# Patient Record
Sex: FEMALE | Race: WHITE | NOT HISPANIC OR LATINO
[De-identification: names, ages, dates, MRNs, and addresses within clinical notes are randomized per-mention and may not be internally consistent; named-entity substitution may affect disease eponyms.]

---

## 2017-11-08 ENCOUNTER — APPOINTMENT (OUTPATIENT)
Dept: FAMILY MEDICINE | Facility: CLINIC | Age: 57
End: 2017-11-08
Payer: COMMERCIAL

## 2017-11-08 ENCOUNTER — NON-APPOINTMENT (OUTPATIENT)
Age: 57
End: 2017-11-08

## 2017-11-08 VITALS
BODY MASS INDEX: 28.34 KG/M2 | RESPIRATION RATE: 14 BRPM | OXYGEN SATURATION: 97 % | SYSTOLIC BLOOD PRESSURE: 134 MMHG | DIASTOLIC BLOOD PRESSURE: 68 MMHG | HEIGHT: 62 IN | TEMPERATURE: 97.6 F | HEART RATE: 59 BPM | WEIGHT: 154 LBS

## 2017-11-08 VITALS — DIASTOLIC BLOOD PRESSURE: 70 MMHG | SYSTOLIC BLOOD PRESSURE: 158 MMHG

## 2017-11-08 DIAGNOSIS — F15.90 OTHER STIMULANT USE, UNSPECIFIED, UNCOMPLICATED: ICD-10-CM

## 2017-11-08 DIAGNOSIS — Z87.891 PERSONAL HISTORY OF NICOTINE DEPENDENCE: ICD-10-CM

## 2017-11-08 DIAGNOSIS — F32.9 MAJOR DEPRESSIVE DISORDER, SINGLE EPISODE, UNSPECIFIED: ICD-10-CM

## 2017-11-08 DIAGNOSIS — Z87.09 PERSONAL HISTORY OF OTHER DISEASES OF THE RESPIRATORY SYSTEM: ICD-10-CM

## 2017-11-08 DIAGNOSIS — L10.0 PEMPHIGUS VULGARIS: ICD-10-CM

## 2017-11-08 DIAGNOSIS — Z83.3 FAMILY HISTORY OF DIABETES MELLITUS: ICD-10-CM

## 2017-11-08 DIAGNOSIS — Z83.49 FAMILY HISTORY OF OTHER ENDOCRINE, NUTRITIONAL AND METABOLIC DISEASES: ICD-10-CM

## 2017-11-08 DIAGNOSIS — Z78.9 OTHER SPECIFIED HEALTH STATUS: ICD-10-CM

## 2017-11-08 DIAGNOSIS — Z92.89 PERSONAL HISTORY OF OTHER MEDICAL TREATMENT: ICD-10-CM

## 2017-11-08 DIAGNOSIS — Z82.49 FAMILY HISTORY OF ISCHEMIC HEART DISEASE AND OTHER DISEASES OF THE CIRCULATORY SYSTEM: ICD-10-CM

## 2017-11-08 PROCEDURE — 93000 ELECTROCARDIOGRAM COMPLETE: CPT

## 2017-11-08 PROCEDURE — 99203 OFFICE O/P NEW LOW 30 MIN: CPT | Mod: 25

## 2017-11-22 ENCOUNTER — APPOINTMENT (OUTPATIENT)
Dept: FAMILY MEDICINE | Facility: CLINIC | Age: 57
End: 2017-11-22
Payer: COMMERCIAL

## 2017-11-22 VITALS
BODY MASS INDEX: 28.34 KG/M2 | HEIGHT: 62 IN | OXYGEN SATURATION: 99 % | RESPIRATION RATE: 14 BRPM | SYSTOLIC BLOOD PRESSURE: 142 MMHG | WEIGHT: 154 LBS | HEART RATE: 55 BPM | DIASTOLIC BLOOD PRESSURE: 74 MMHG

## 2017-11-22 VITALS — DIASTOLIC BLOOD PRESSURE: 76 MMHG | SYSTOLIC BLOOD PRESSURE: 146 MMHG

## 2017-11-22 DIAGNOSIS — F41.1 GENERALIZED ANXIETY DISORDER: ICD-10-CM

## 2017-11-22 DIAGNOSIS — D56.9 THALASSEMIA, UNSPECIFIED: ICD-10-CM

## 2017-11-22 PROCEDURE — 99213 OFFICE O/P EST LOW 20 MIN: CPT

## 2017-11-22 RX ORDER — ALPRAZOLAM 0.25 MG/1
0.25 TABLET ORAL
Qty: 30 | Refills: 0 | Status: ACTIVE | COMMUNITY
Start: 2017-11-22 | End: 1900-01-01

## 2018-01-26 LAB — HCV AB SER-ACNC: <0.1

## 2018-10-30 ENCOUNTER — APPOINTMENT (OUTPATIENT)
Dept: OBGYN | Facility: CLINIC | Age: 58
End: 2018-10-30

## 2018-11-12 ENCOUNTER — APPOINTMENT (OUTPATIENT)
Dept: OBGYN | Facility: CLINIC | Age: 58
End: 2018-11-12
Payer: COMMERCIAL

## 2018-11-12 VITALS
BODY MASS INDEX: 28.89 KG/M2 | SYSTOLIC BLOOD PRESSURE: 124 MMHG | HEIGHT: 62 IN | DIASTOLIC BLOOD PRESSURE: 68 MMHG | WEIGHT: 157 LBS

## 2018-11-12 DIAGNOSIS — Z00.00 ENCOUNTER FOR GENERAL ADULT MEDICAL EXAMINATION W/OUT ABNORMAL FINDINGS: ICD-10-CM

## 2018-11-12 PROCEDURE — 99396 PREV VISIT EST AGE 40-64: CPT

## 2018-11-14 LAB
C TRACH RRNA SPEC QL NAA+PROBE: NOT DETECTED
HPV HIGH+LOW RISK DNA PNL CVX: NOT DETECTED
N GONORRHOEA RRNA SPEC QL NAA+PROBE: NOT DETECTED
SOURCE TP AMPLIFICATION: NORMAL

## 2018-11-16 LAB — CYTOLOGY CVX/VAG DOC THIN PREP: NORMAL

## 2019-04-10 ENCOUNTER — OFFICE VISIT (OUTPATIENT)
Dept: URGENT CARE | Facility: CLINIC | Age: 59
End: 2019-04-10
Payer: COMMERCIAL

## 2019-04-10 VITALS
BODY MASS INDEX: 29.63 KG/M2 | HEIGHT: 62 IN | SYSTOLIC BLOOD PRESSURE: 177 MMHG | OXYGEN SATURATION: 100 % | TEMPERATURE: 97.5 F | DIASTOLIC BLOOD PRESSURE: 83 MMHG | HEART RATE: 65 BPM | RESPIRATION RATE: 18 BRPM | WEIGHT: 161 LBS

## 2019-04-10 DIAGNOSIS — L23.7 ALLERGIC CONTACT DERMATITIS DUE TO PLANTS, EXCEPT FOOD: Primary | ICD-10-CM

## 2019-04-10 PROCEDURE — 99203 OFFICE O/P NEW LOW 30 MIN: CPT | Performed by: FAMILY MEDICINE

## 2019-04-10 RX ORDER — CITALOPRAM 10 MG/1
10 TABLET ORAL DAILY
COMMUNITY
End: 2019-09-17 | Stop reason: SDUPTHER

## 2019-04-10 RX ORDER — LEVOTHYROXINE SODIUM 0.03 MG/1
25 TABLET ORAL DAILY
COMMUNITY
End: 2019-06-04 | Stop reason: SDUPTHER

## 2019-04-10 RX ORDER — PREDNISONE 20 MG/1
40 TABLET ORAL DAILY
Qty: 14 TABLET | Refills: 0 | Status: SHIPPED | OUTPATIENT
Start: 2019-04-10 | End: 2019-06-04 | Stop reason: ALTCHOICE

## 2019-06-04 ENCOUNTER — OFFICE VISIT (OUTPATIENT)
Dept: FAMILY MEDICINE CLINIC | Facility: CLINIC | Age: 59
End: 2019-06-04
Payer: COMMERCIAL

## 2019-06-04 VITALS
SYSTOLIC BLOOD PRESSURE: 140 MMHG | DIASTOLIC BLOOD PRESSURE: 86 MMHG | BODY MASS INDEX: 29.26 KG/M2 | HEIGHT: 62 IN | WEIGHT: 159 LBS | TEMPERATURE: 98.6 F | OXYGEN SATURATION: 98 % | RESPIRATION RATE: 12 BRPM | HEART RATE: 63 BPM

## 2019-06-04 DIAGNOSIS — E03.9 ACQUIRED HYPOTHYROIDISM: Primary | ICD-10-CM

## 2019-06-04 DIAGNOSIS — J45.20 MILD INTERMITTENT ASTHMA WITHOUT COMPLICATION: ICD-10-CM

## 2019-06-04 DIAGNOSIS — L10.0 PEMPHIGUS VULGARIS: ICD-10-CM

## 2019-06-04 DIAGNOSIS — F41.9 ANXIETY: ICD-10-CM

## 2019-06-04 DIAGNOSIS — E78.2 MIXED HYPERLIPIDEMIA: ICD-10-CM

## 2019-06-04 DIAGNOSIS — D56.9 MEDITERRANEAN ANEMIA: ICD-10-CM

## 2019-06-04 DIAGNOSIS — E53.8 VITAMIN B12 DEFICIENCY: ICD-10-CM

## 2019-06-04 DIAGNOSIS — E55.9 VITAMIN D DEFICIENCY: ICD-10-CM

## 2019-06-04 PROCEDURE — 36415 COLL VENOUS BLD VENIPUNCTURE: CPT | Performed by: NURSE PRACTITIONER

## 2019-06-04 PROCEDURE — 99204 OFFICE O/P NEW MOD 45 MIN: CPT | Performed by: NURSE PRACTITIONER

## 2019-06-04 RX ORDER — ALPRAZOLAM 0.25 MG/1
0.25 TABLET ORAL
COMMUNITY
End: 2019-09-17 | Stop reason: SDUPTHER

## 2019-06-04 RX ORDER — ALBUTEROL SULFATE 90 UG/1
2 AEROSOL, METERED RESPIRATORY (INHALATION) EVERY 6 HOURS PRN
Qty: 1 INHALER | Refills: 3 | Status: SHIPPED | OUTPATIENT
Start: 2019-06-04 | End: 2019-12-10 | Stop reason: SDUPTHER

## 2019-06-04 RX ORDER — LEVOTHYROXINE SODIUM 0.03 MG/1
25 TABLET ORAL DAILY
Qty: 90 TABLET | Refills: 1 | Status: SHIPPED | OUTPATIENT
Start: 2019-06-04 | End: 2019-09-17 | Stop reason: SDUPTHER

## 2019-06-04 RX ORDER — ALBUTEROL SULFATE 90 UG/1
2 AEROSOL, METERED RESPIRATORY (INHALATION) EVERY 6 HOURS PRN
COMMUNITY
End: 2019-06-04 | Stop reason: SDUPTHER

## 2019-06-05 LAB
25(OH)D3+25(OH)D2 SERPL-MCNC: 29.3 NG/ML (ref 30–100)
ALBUMIN SERPL-MCNC: 4.6 G/DL (ref 3.5–5.5)
ALBUMIN/GLOB SERPL: 1.6 {RATIO} (ref 1.2–2.2)
ALP SERPL-CCNC: 56 IU/L (ref 39–117)
ALT SERPL-CCNC: 17 IU/L (ref 0–32)
AST SERPL-CCNC: 17 IU/L (ref 0–40)
BASOPHILS # BLD AUTO: 0 X10E3/UL (ref 0–0.2)
BASOPHILS NFR BLD AUTO: 1 %
BILIRUB SERPL-MCNC: 0.3 MG/DL (ref 0–1.2)
BUN SERPL-MCNC: 16 MG/DL (ref 6–24)
BUN/CREAT SERPL: 20 (ref 9–23)
CALCIUM SERPL-MCNC: 9.8 MG/DL (ref 8.7–10.2)
CHLORIDE SERPL-SCNC: 101 MMOL/L (ref 96–106)
CHOLEST SERPL-MCNC: 207 MG/DL (ref 100–199)
CHOLEST/HDLC SERPL: 3.9 RATIO (ref 0–4.4)
CO2 SERPL-SCNC: 25 MMOL/L (ref 20–29)
CREAT SERPL-MCNC: 0.81 MG/DL (ref 0.57–1)
EOSINOPHIL # BLD AUTO: 0.5 X10E3/UL (ref 0–0.4)
EOSINOPHIL NFR BLD AUTO: 8 %
ERYTHROCYTE [DISTWIDTH] IN BLOOD BY AUTOMATED COUNT: 15.3 % (ref 12.3–15.4)
FERRITIN SERPL-MCNC: 386 NG/ML (ref 15–150)
GLOBULIN SER-MCNC: 2.8 G/DL (ref 1.5–4.5)
GLUCOSE SERPL-MCNC: 82 MG/DL (ref 65–99)
HCT VFR BLD AUTO: 40.4 % (ref 34–46.6)
HDLC SERPL-MCNC: 53 MG/DL
HGB BLD-MCNC: 13 G/DL (ref 11.1–15.9)
IMM GRANULOCYTES # BLD: 0 X10E3/UL (ref 0–0.1)
IMM GRANULOCYTES NFR BLD: 0 %
IRON SERPL-MCNC: 42 UG/DL (ref 27–159)
LDLC SERPL CALC-MCNC: 120 MG/DL (ref 0–99)
LYMPHOCYTES # BLD AUTO: 2.4 X10E3/UL (ref 0.7–3.1)
LYMPHOCYTES NFR BLD AUTO: 37 %
MCH RBC QN AUTO: 24.7 PG (ref 26.6–33)
MCHC RBC AUTO-ENTMCNC: 32.2 G/DL (ref 31.5–35.7)
MCV RBC AUTO: 77 FL (ref 79–97)
MONOCYTES # BLD AUTO: 0.4 X10E3/UL (ref 0.1–0.9)
MONOCYTES NFR BLD AUTO: 7 %
NEUTROPHILS # BLD AUTO: 3.1 X10E3/UL (ref 1.4–7)
NEUTROPHILS NFR BLD AUTO: 47 %
PLATELET # BLD AUTO: 207 X10E3/UL (ref 150–450)
POTASSIUM SERPL-SCNC: 4 MMOL/L (ref 3.5–5.2)
PROT SERPL-MCNC: 7.4 G/DL (ref 6–8.5)
RBC # BLD AUTO: 5.27 X10E6/UL (ref 3.77–5.28)
SL AMB EGFR AFRICAN AMERICAN: 93 ML/MIN/1.73
SL AMB EGFR NON AFRICAN AMERICAN: 80 ML/MIN/1.73
SL AMB VLDL CHOLESTEROL CALC: 34 MG/DL (ref 5–40)
SODIUM SERPL-SCNC: 141 MMOL/L (ref 134–144)
TRIGL SERPL-MCNC: 170 MG/DL (ref 0–149)
TSH SERPL DL<=0.005 MIU/L-ACNC: 1.94 UIU/ML (ref 0.45–4.5)
VIT B12 SERPL-MCNC: 760 PG/ML (ref 232–1245)
WBC # BLD AUTO: 6.5 X10E3/UL (ref 3.4–10.8)

## 2019-06-11 ENCOUNTER — OFFICE VISIT (OUTPATIENT)
Dept: FAMILY MEDICINE CLINIC | Facility: CLINIC | Age: 59
End: 2019-06-11
Payer: COMMERCIAL

## 2019-06-11 VITALS
DIASTOLIC BLOOD PRESSURE: 78 MMHG | HEIGHT: 62 IN | BODY MASS INDEX: 29.81 KG/M2 | RESPIRATION RATE: 12 BRPM | HEART RATE: 72 BPM | WEIGHT: 162 LBS | SYSTOLIC BLOOD PRESSURE: 134 MMHG | TEMPERATURE: 98.5 F | OXYGEN SATURATION: 99 %

## 2019-06-11 DIAGNOSIS — D56.9 MEDITERRANEAN ANEMIA: ICD-10-CM

## 2019-06-11 DIAGNOSIS — E55.9 VITAMIN D DEFICIENCY: Primary | ICD-10-CM

## 2019-06-11 DIAGNOSIS — E03.9 ACQUIRED HYPOTHYROIDISM: ICD-10-CM

## 2019-06-11 DIAGNOSIS — E78.1 HYPERTRIGLYCERIDEMIA: ICD-10-CM

## 2019-06-11 DIAGNOSIS — E78.2 MIXED HYPERLIPIDEMIA: ICD-10-CM

## 2019-06-11 DIAGNOSIS — F41.9 ANXIETY: ICD-10-CM

## 2019-06-11 DIAGNOSIS — E01.0 THYROMEGALY: ICD-10-CM

## 2019-06-11 DIAGNOSIS — E53.8 VITAMIN B12 DEFICIENCY: ICD-10-CM

## 2019-06-11 PROBLEM — L10.0 PEMPHIGUS VULGARIS: Status: ACTIVE | Noted: 2019-06-11

## 2019-06-11 PROCEDURE — 1036F TOBACCO NON-USER: CPT | Performed by: NURSE PRACTITIONER

## 2019-06-11 PROCEDURE — 99214 OFFICE O/P EST MOD 30 MIN: CPT | Performed by: NURSE PRACTITIONER

## 2019-06-11 PROCEDURE — 3008F BODY MASS INDEX DOCD: CPT | Performed by: NURSE PRACTITIONER

## 2019-06-11 RX ORDER — ERGOCALCIFEROL 1.25 MG/1
50000 CAPSULE ORAL WEEKLY
Qty: 4 CAPSULE | Refills: 3 | Status: SHIPPED | OUTPATIENT
Start: 2019-06-11 | End: 2019-09-17

## 2019-07-02 ENCOUNTER — TELEPHONE (OUTPATIENT)
Dept: FAMILY MEDICINE CLINIC | Facility: CLINIC | Age: 59
End: 2019-07-02

## 2019-07-02 NOTE — TELEPHONE ENCOUNTER
US Thyroid is in clinical review  Reference number [id number]  Clinical review fax # [phone]  OV, blood work faxed

## 2019-07-05 ENCOUNTER — TELEPHONE (OUTPATIENT)
Dept: FAMILY MEDICINE CLINIC | Facility: CLINIC | Age: 59
End: 2019-07-05

## 2019-07-05 NOTE — TELEPHONE ENCOUNTER
Brady Mota:    Patient needs an order for an orthopedic for her knee  Please advise when this is ready

## 2019-07-08 ENCOUNTER — TELEPHONE (OUTPATIENT)
Dept: FAMILY MEDICINE CLINIC | Facility: CLINIC | Age: 59
End: 2019-07-08

## 2019-07-08 DIAGNOSIS — J30.9 ALLERGIC RHINITIS, UNSPECIFIED SEASONALITY, UNSPECIFIED TRIGGER: Primary | ICD-10-CM

## 2019-07-08 RX ORDER — FLUTICASONE PROPIONATE 50 MCG
2 SPRAY, SUSPENSION (ML) NASAL DAILY
Qty: 16 G | Refills: 3 | Status: SHIPPED | OUTPATIENT
Start: 2019-07-08 | End: 2019-12-10 | Stop reason: SDUPTHER

## 2019-07-08 NOTE — TELEPHONE ENCOUNTER
Pt has been using Generic Flonase 2 sprays into each nostril previous Doctor and is wondering if she can get this refilled

## 2019-07-08 NOTE — TELEPHONE ENCOUNTER
Pt is seeing Dr Cady Cardenas on 7/12/19 For pain in both knees, specifically the right one  She would get shots in her knees for previous orthopedic doctor    Referral is needed and insurance referral will be started

## 2019-07-10 ENCOUNTER — CONSULT (OUTPATIENT)
Dept: HEMATOLOGY ONCOLOGY | Facility: MEDICAL CENTER | Age: 59
End: 2019-07-10
Payer: COMMERCIAL

## 2019-07-10 VITALS
DIASTOLIC BLOOD PRESSURE: 70 MMHG | RESPIRATION RATE: 18 BRPM | HEIGHT: 62 IN | TEMPERATURE: 98 F | BODY MASS INDEX: 29.44 KG/M2 | OXYGEN SATURATION: 98 % | HEART RATE: 69 BPM | WEIGHT: 160 LBS | SYSTOLIC BLOOD PRESSURE: 130 MMHG

## 2019-07-10 DIAGNOSIS — D56.9 MEDITERRANEAN ANEMIA: ICD-10-CM

## 2019-07-10 DIAGNOSIS — L10.0 PEMPHIGUS VULGARIS: ICD-10-CM

## 2019-07-10 PROCEDURE — 99204 OFFICE O/P NEW MOD 45 MIN: CPT | Performed by: INTERNAL MEDICINE

## 2019-07-10 NOTE — PROGRESS NOTES
Leeann Sevilla  1960  [facility] [place] [facility] [address] [address]  HEMATOLOGY/ONCOLOGY CONSULTATION REPORT    DISCUSSION/SUMMARY:    07/19/2019 5:00 p m  Addendum  Information from [facility] became available recently  The information will be scanned into the chart  Patient has an appointment in 3 months  Before that, patient will have a CBC, CMP, iron studies, hemoglobinopathy panel and desmoglein 1 and 3 antibodies (for the pemphigus vulgaris) drawn at approximately 2 5 months  23-year-old female with a history of pemphigus vulgaris treated with Rituxan (rituximab) (another institution)  Presently Mrs Ana Rodriguez feels well and clinically there are no troubling dermatologic/immunologic signs  We discussed options  The information on the pemphigus is not presently available  This office will contact the patient's prior hematologist (Dr Gail Ocampo, [facility] [address]) to get the specific information on the pemphigus and the treatments  Pemphigus vulgaris is an autoimmune disorder of the skin involving blistering and sores not only of the skin but also of mucous membranes  It is more commonly followed by either dermatology or rheumatology - at this time is not clear why patient was being followed by Hematology (thus why the information from Dr Cindi Sutherland is so crucial)  For noow, Mrs Ana Rodriguez is to return in 3 months; this may change depending upon the eventual obtained information  There is a question of whether or not patient has a thalassemia trait  The hemoglobin level is actually pretty good; MCV is slightly decreased with a normal RDW  A hemoglobinopathy panel can be eventually drawn - not an emergent problem  See potential causes for an elevated ferritin level below      The ferritin level is not terribly elevated, patient does not have any signs or symptoms associated with the elevated ferritin level  At this time, I would just monitor the trend  Ferritin is the cellular storage protein for iron  It is a huge 440 kill adult in 247 unit protein consisting of light and heavy chains and can store up to 4500 Zambrano of iron  Ferritin is an acute phase reactant and along with transferrin and its receptor, coordinate cellular defense against oxidative stress and inflammation  Ferritin is increased in acute and chronic liver disease, alcoholism (will decline with abstinence), malignancies such as leukemia and Hodgkin's disease, infections and chronic inflammation such as arthritis, hypothyroidism and iron overload  Ferritin is elevated in acute MI and Gaucher disease  It is also seen in hemolytic anemias, megaloblastic anemia is, sideroblastic anemias and thalassemia  It can also be seen in patients with porphyria cutanea tarda and in patients with end-stage renal disease  Patient knows to call the hematology/oncology office if there are any other questions or concerns  Carefully review your medication list and verify that the list is accurate and up-to-date  Please call the hematology/oncology office if there are medications missing from the list, medications on the list that you are not currently taking or if there is a dosage or instruction that is different from how you're taking that medication  Patient goals and areas of care:  Obtain information from prior hematologist  Barriers to care:  None  Patient is able to self-care   ______________________________________________________________________________________    Chief Complaint   Patient presents with    Consult     Elevated ferritin, history of pemphigus vulgaris     History of Present Illness:  67-year-old female previously diagnosed with pemphigus vulgaris treated with Rituxan (rituximab) in need of a new hematologist   Recently patient was found to have an elevated ferritin level      Presently Mrs Andra Finn states feeling OK, baseline; fatigue is baseline  Mrs Naif Sanders is under stress presently; patient is looking for new job  No fevers, chills or sweats  No pain control issues  Appetite is good, weight is stable  Activities are baseline  No shortness of breath or dyspnea on exertion  No chest pain or pressure  No recent skin issues, blistering or bleeding  Routine health maintenance and medical care is up-to-date  Review of Systems   Constitutional: Positive for fatigue  HENT: Negative  Eyes: Negative  Respiratory: Negative  Cardiovascular: Negative  Gastrointestinal: Negative  Endocrine: Negative  Genitourinary: Negative  Musculoskeletal: Negative  Skin: Negative  Allergic/Immunologic: Negative  Neurological: Negative  Hematological: Negative  Psychiatric/Behavioral: Negative  All other systems reviewed and are negative  Patient Active Problem List   Diagnosis    Mild intermittent asthma without complication    Acquired hypothyroidism    Vitamin D deficiency    Anxiety    Vitamin B12 deficiency    Mediterranean anemia    Mixed hyperlipidemia    Hypertriglyceridemia    Pemphigus vulgaris     Past Medical History:   Diagnosis Date    Arthritis     Asthma     Pemphigus vulgaris     Thalassemia      Past surgical history:  No prior blood transfusions    Ob gyn:  No breast pathology, mammograms up-to-date, no postmenopausal bleeding    History reviewed  No pertinent surgical history    Family History   Problem Relation Age of Onset    Asthma Father     Hypertension Maternal Grandmother     Liver cancer Maternal Grandmother     Cancer Family         multiple relatives    Seizures Sister     Asthma Brother     Hypertension Brother     Leukemia Maternal Uncle     Substance Abuse Neg Hx     Mental illness Neg Hx     Family history:  2 children alive and well, no known familial or genetic diseases although various family members have had different forms of 2 cancers, other family members have had issues with asthma, diabetes and hypertension, patient does not know the specifics on the thalassemia history    Social History     Socioeconomic History    Marital status: Single     Spouse name: Not on file    Number of children: Not on file    Years of education: Not on file    Highest education level: Not on file   Occupational History    Not on file   Social Needs    Financial resource strain: Not on file    Food insecurity:     Worry: Not on file     Inability: Not on file    Transportation needs:     Medical: Not on file     Non-medical: Not on file   Tobacco Use    Smoking status: Former Smoker     Last attempt to quit:      Years since quittin 5    Smokeless tobacco: Never Used   Substance and Sexual Activity    Alcohol use: Yes     Frequency: 2-4 times a month     Comment: socially     Drug use: Never    Sexual activity: Not on file   Lifestyle    Physical activity:     Days per week: Not on file     Minutes per session: Not on file    Stress: Not on file   Relationships    Social connections:     Talks on phone: Not on file     Gets together: Not on file     Attends Zoroastrian service: Not on file     Active member of club or organization: Not on file     Attends meetings of clubs or organizations: Not on file     Relationship status: Not on file    Intimate partner violence:     Fear of current or ex partner: Not on file     Emotionally abused: Not on file     Physically abused: Not on file     Forced sexual activity: Not on file   Other Topics Concern    Not on file   Social History Narrative    Not on file    Social history:  No tobacco, alcohol or drug abuse, no toxic exposure, 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    Current Outpatient Medications:     albuterol (PROVENTIL HFA,VENTOLIN HFA) 90 mcg/act inhaler, Inhale 2 puffs every 6 (six) hours as needed for wheezing, Disp: 1 Inhaler, Rfl: 3    ALPRAZolam (XANAX) 0 25 mg tablet, Take 0 25 mg by mouth, Disp: , Rfl:     citalopram (CeleXA) 10 mg tablet, Take 10 mg by mouth daily, Disp: , Rfl:     ergocalciferol (VITAMIN D2) 50,000 units, Take 1 capsule (50,000 Units total) by mouth once a week, Disp: 4 capsule, Rfl: 3    fluticasone (FLONASE) 50 mcg/act nasal spray, 2 sprays into each nostril daily, Disp: 16 g, Rfl: 3    levothyroxine 25 mcg tablet, Take 1 tablet (25 mcg total) by mouth daily, Disp: 90 tablet, Rfl: 1    Allergies   Allergen Reactions    Iodine     Levaquin [Levofloxacin]     Shellfish-Derived Products        Vitals:    07/10/19 1450   BP: 130/70   Pulse: 69   Resp: 18   Temp: 98 °F (36 7 °C)   SpO2: 98%     Physical Exam   Constitutional: She is oriented to person, place, and time  She appears well-developed and well-nourished  Well-nourished female, no respiratory distress   HENT:   Head: Normocephalic and atraumatic  Right Ear: External ear normal    Left Ear: External ear normal    Mouth/Throat: Oropharynx is clear and moist    Mucosa moist and pink, no exudate, teeth good condition, no blisters or ulcers   Eyes: Pupils are equal, round, and reactive to light  Conjunctivae and EOM are normal    Neck: Normal range of motion  Neck supple  Supple, no JVD   Cardiovascular: Normal rate, regular rhythm, normal heart sounds and intact distal pulses  Pulmonary/Chest: Effort normal and breath sounds normal    Good air entry bilaterally, clear   Abdominal: Soft  Bowel sounds are normal    Soft, nontender, +bowel sounds, no hepatosplenomegaly, no guarding, no rigidity or rebound   Musculoskeletal: Normal range of motion  Good range of motion in all 4 extremities, no pain or tenderness with palpation of joints, muscles or bones, some tenderness with pressure on right posterior lateral ribcage   Neurological: She is alert and oriented to person, place, and time  She has normal reflexes  Skin: Skin is warm     Good color, warm, moist, no petechiae or ecchymoses   Psychiatric: She has a normal mood and affect   Her behavior is normal  Judgment and thought content normal    Extremities:  No lower extremity edema, no cords, pulses are 1+  Lymphatics:  No adenopathy in the neck, supraclavicular region, axilla and groin bilaterally    Labs    06/04/2019 WBC = 6 5 hemoglobin = 13 0 hematocrit = 40 4 MCV = 77 RDW = 15 3 platelet = 963 neutrophil = 47% lymphocytes = 37% monocyte = 7% eosinophil = 8% BUN = 16 creatinine = 0 81 calcium = 9 8 total protein = 7 4 total bilirubin = 0 3 AST = 17 ALT = 17 TSH = 1 940 vitamin B12 = 760 iron = 42 ferritin = 386 = elevated ( ng/mL)

## 2019-07-11 ENCOUNTER — OFFICE VISIT (OUTPATIENT)
Dept: OBGYN CLINIC | Facility: CLINIC | Age: 59
End: 2019-07-11
Payer: COMMERCIAL

## 2019-07-11 ENCOUNTER — APPOINTMENT (OUTPATIENT)
Dept: RADIOLOGY | Facility: CLINIC | Age: 59
End: 2019-07-11
Payer: COMMERCIAL

## 2019-07-11 ENCOUNTER — HOSPITAL ENCOUNTER (OUTPATIENT)
Dept: RADIOLOGY | Facility: HOSPITAL | Age: 59
Discharge: HOME | End: 2019-07-11
Payer: COMMERCIAL

## 2019-07-11 VITALS
SYSTOLIC BLOOD PRESSURE: 152 MMHG | BODY MASS INDEX: 29.33 KG/M2 | DIASTOLIC BLOOD PRESSURE: 67 MMHG | WEIGHT: 159.4 LBS | HEIGHT: 62 IN | HEART RATE: 59 BPM

## 2019-07-11 DIAGNOSIS — M25.562 PAIN IN BOTH KNEES, UNSPECIFIED CHRONICITY: Primary | ICD-10-CM

## 2019-07-11 DIAGNOSIS — M25.562 PAIN IN BOTH KNEES, UNSPECIFIED CHRONICITY: ICD-10-CM

## 2019-07-11 DIAGNOSIS — E01.0 THYROMEGALY: ICD-10-CM

## 2019-07-11 DIAGNOSIS — M17.0 PRIMARY OSTEOARTHRITIS OF KNEES, BILATERAL: ICD-10-CM

## 2019-07-11 DIAGNOSIS — E03.9 ACQUIRED HYPOTHYROIDISM: ICD-10-CM

## 2019-07-11 DIAGNOSIS — M25.561 PAIN IN BOTH KNEES, UNSPECIFIED CHRONICITY: Primary | ICD-10-CM

## 2019-07-11 DIAGNOSIS — M25.561 PAIN IN BOTH KNEES, UNSPECIFIED CHRONICITY: ICD-10-CM

## 2019-07-11 PROCEDURE — 73562 X-RAY EXAM OF KNEE 3: CPT

## 2019-07-11 PROCEDURE — 76536 US EXAM OF HEAD AND NECK: CPT

## 2019-07-11 PROCEDURE — 99203 OFFICE O/P NEW LOW 30 MIN: CPT | Performed by: ORTHOPAEDIC SURGERY

## 2019-07-11 NOTE — PROGRESS NOTES
Assessment/Plan:  1  Pain in both knees, unspecified chronicity  XR knee 3 vw right non injury    Ambulatory referral to Physical Therapy    Injection procedure prior authorization   2  Primary osteoarthritis of knees, bilateral  Ambulatory referral to Physical Therapy    Injection procedure prior authorization     Patient is a very pleasant 69-year-old female that is here to see me for treatment of her bilateral anterior knee pain  After thorough history, clinical exam, review of her x-rays she does have bilateral knee osteoarthritis that is mild  Most of her symptoms are coming from her bilateral patellofemoral joint  She did get significant relief with Euflexxa in the past   She has gotten over a year's worth of relief from her last Euflexxa injection series and I feel that this is an appropriate course of treatment again here today  I also gave her a physical therapy referral to address her bilateral knee patellofemoral joint  I have placed a referral for bilateral knee Euflexxa injection series  We will call her once approved and have her appointment scheduled  Subjective:  Bilateral knee pain    Patient ID: Carole Age is a 63 y o  female  HPI  Patient is here for evaluation of her bilateral knee pain  The patient is a 69-year-old female that presents today with bilateral anterior knee pain  The right is worse than the left  Pain in the left is 5/10 in the pain and right is 10/10 at times  Pain is localized over the anterior medial aspect of her knee  Pain is throbbing in nature  The pain worsens with activity, prolonged periods of standing, walking, activity, she states that there is intermittent swelling  The pain could also be swelling in the posterior aspect of the knee  She states she has significant discomfort going up and down steps, getting out of a low seated chair off the toilet  She has trouble stooping down or squatting  She denies mechanical symptoms    Steroid use has become difficult for her and going up and down the steps she has using hand rail  She has not attempted anything but an occasional anti-inflammatory and Tylenol  She had undergone Euflexxa injection series approximately a year ago with another orthopedic surgeon and got significant relief from that in her bilateral knees  She got approximately 1 year worth of relief  She is here to establish care with me as she recently moved to the area and would like to seek treatment again  Review of Systems   Constitutional: Positive for activity change  HENT: Negative  Eyes: Negative  Respiratory: Negative  Cardiovascular: Negative  Gastrointestinal: Negative  Endocrine: Negative  Musculoskeletal: Positive for arthralgias  Skin: Negative  Neurological: Negative  Psychiatric/Behavioral: Negative  Past Medical History:   Diagnosis Date    Arthritis     Asthma     Pemphigus vulgaris     Thalassemia        History reviewed  No pertinent surgical history      Family History   Problem Relation Age of Onset    Asthma Father     Hypertension Maternal Grandmother     Liver cancer Maternal Grandmother     Cancer Family         multiple relatives    Seizures Sister     Asthma Brother     Hypertension Brother     Leukemia Maternal Uncle     Substance Abuse Neg Hx     Mental illness Neg Hx        Social History     Occupational History    Not on file   Tobacco Use    Smoking status: Former Smoker     Last attempt to quit:      Years since quittin 5    Smokeless tobacco: Never Used   Substance and Sexual Activity    Alcohol use: Yes     Frequency: 2-4 times a month     Comment: socially     Drug use: Never    Sexual activity: Not on file         Current Outpatient Medications:     albuterol (PROVENTIL HFA,VENTOLIN HFA) 90 mcg/act inhaler, Inhale 2 puffs every 6 (six) hours as needed for wheezing, Disp: 1 Inhaler, Rfl: 3    ALPRAZolam (XANAX) 0 25 mg tablet, Take 0 25 mg by mouth, Disp: , Rfl:     citalopram (CeleXA) 10 mg tablet, Take 10 mg by mouth daily, Disp: , Rfl:     ergocalciferol (VITAMIN D2) 50,000 units, Take 1 capsule (50,000 Units total) by mouth once a week, Disp: 4 capsule, Rfl: 3    fluticasone (FLONASE) 50 mcg/act nasal spray, 2 sprays into each nostril daily, Disp: 16 g, Rfl: 3    levothyroxine 25 mcg tablet, Take 1 tablet (25 mcg total) by mouth daily, Disp: 90 tablet, Rfl: 1    Allergies   Allergen Reactions    Iodine     Levaquin [Levofloxacin]     Shellfish-Derived Products        Objective:  Vitals:    07/11/19 1401   BP: 152/67   Pulse: 59       Body mass index is 29 15 kg/m²  Right Knee Exam     Tenderness   The patient is experiencing tenderness in the patella  Range of Motion   Extension: 0   Flexion: 130     Tests   Davida:  Medial - negative Lateral - negative  Varus: negative Valgus: negative  Lachman:  Anterior - negative      Drawer:  Anterior - negative    Posterior - negative  Patellar apprehension: negative    Other   Erythema: absent  Scars: absent  Sensation: normal  Pulse: present  Swelling: none  Effusion: no effusion present    Comments:  Positive patellofemoral grind  Knee is stable ligamentous exam  No erythema effusion or warmth      Left Knee Exam     Tenderness   The patient is experiencing tenderness in the patella  Range of Motion   Extension: 0   Flexion: 130     Tests   Davida:  Medial - negative Lateral - negative  Varus: negative Valgus: negative  Lachman:  Anterior - negative      Drawer:  Anterior - negative     Posterior - negative  Patellar apprehension: negative    Other   Erythema: absent  Sensation: normal  Pulse: present  Swelling: none  Effusion: no effusion present    Comments:  Negative patellofemoral grind  Knee is stable ligamentous exam  No erythema effusion or warmth          Observations   Left Knee   Negative for effusion  Right Knee   Negative for effusion         Physical Exam Constitutional: She is oriented to person, place, and time  She appears well-developed  HENT:   Head: Atraumatic  Eyes: EOM are normal    Neck: Neck supple  Cardiovascular: Normal rate  Pulmonary/Chest: Effort normal    Musculoskeletal:        Right knee: She exhibits no effusion  Left knee: She exhibits no effusion  See orthopedic exam   Neurological: She is alert and oriented to person, place, and time  Skin: Skin is warm and dry  Psychiatric: She has a normal mood and affect  Nursing note and vitals reviewed  I have personally reviewed pertinent films in PACS  X-rays of the bilateral knees obtained here in the office today demonstrate mild osteoarthritis of the bilateral knees with medial compartment joint space narrowing and sclerosis  There is still approximately a greater than 50% of the joint space still remaining  No significant osteophyte formation  Meredith WATSON    Division of Adult Reconstruction  Department of Orthopaedic Surgery  [facility]

## 2019-07-19 DIAGNOSIS — L10.0 PEMPHIGUS VULGARIS: Primary | ICD-10-CM

## 2019-07-19 DIAGNOSIS — D56.9 MEDITERRANEAN ANEMIA: ICD-10-CM

## 2019-08-01 ENCOUNTER — TELEPHONE (OUTPATIENT)
Dept: OBGYN CLINIC | Facility: HOSPITAL | Age: 59
End: 2019-08-01

## 2019-08-01 NOTE — TELEPHONE ENCOUNTER
TO BE COMPLETED BY CENTRAL AUTH TEAM:     Physician: DR Hi Bryant    Medication: Lucy Caro    Number of Injections in Series (Appointments scheduled 1 week apart from one another): 3    Schedule after this date: 8/9/19    Billing Info: Buy and Bill/Specialty Pharmacy-Patient Supply>> BUY & BILL    Appointment Message Line:  (please copy and paste appointment message line when scheduling appointment) BILATERAL EUFLEXXA #1,#2,#3/BUY & BILL    Additional Comments:

## 2019-08-06 NOTE — TELEPHONE ENCOUNTER
TO BE COMPLETED BY :     Office location appointment scheduled: [facility]    Date of First Appointment scheduled:  08/14    Additional Comments:

## 2019-08-14 ENCOUNTER — OFFICE VISIT (OUTPATIENT)
Dept: OBGYN CLINIC | Facility: CLINIC | Age: 59
End: 2019-08-14
Payer: COMMERCIAL

## 2019-08-14 ENCOUNTER — TELEPHONE (OUTPATIENT)
Dept: OTHER | Facility: OTHER | Age: 59
End: 2019-08-14

## 2019-08-14 VITALS — WEIGHT: 160 LBS | BODY MASS INDEX: 29.44 KG/M2 | HEIGHT: 62 IN

## 2019-08-14 DIAGNOSIS — M25.561 CHRONIC PAIN OF BOTH KNEES: Primary | ICD-10-CM

## 2019-08-14 DIAGNOSIS — M25.562 CHRONIC PAIN OF BOTH KNEES: Primary | ICD-10-CM

## 2019-08-14 DIAGNOSIS — M17.0 BILATERAL PRIMARY OSTEOARTHRITIS OF KNEE: ICD-10-CM

## 2019-08-14 DIAGNOSIS — G89.29 CHRONIC PAIN OF BOTH KNEES: Primary | ICD-10-CM

## 2019-08-14 PROCEDURE — 20610 DRAIN/INJ JOINT/BURSA W/O US: CPT | Performed by: ORTHOPAEDIC SURGERY

## 2019-08-14 RX ORDER — HYALURONATE SODIUM 10 MG/ML
20 SYRINGE (ML) INTRAARTICULAR
Status: COMPLETED | OUTPATIENT
Start: 2019-08-14 | End: 2019-08-14

## 2019-08-14 RX ADMIN — Medication 20 MG: at 18:00

## 2019-08-14 NOTE — PROGRESS NOTES
Assessment/Plan:  1  Chronic pain of both knees     2  Bilateral primary osteoarthritis of knee       Patient is here for her 1st bilateral knee Euflexxa injection  She tolerated these injections well today  Post-injection instructions were provided  I will see her back next week for her next injection in the series  Large joint arthrocentesis: bilateral knee  Date/Time: 8/14/2019 6:00 PM  Consent given by: patient  Site marked: site marked  Timeout: Immediately prior to procedure a time out was called to verify the correct patient, procedure, equipment, support staff and site/side marked as required   Supporting Documentation  Indications: pain   Procedure Details  Location: knee - bilateral knee  Preparation: Patient was prepped and draped in the usual sterile fashion  Needle size: 20 G  Ultrasound guidance: no  Approach: anterolateral    Medications (Right): 20 mg Sodium Hyaluronate 20 MG/2MLMedications (Left): 20 mg Sodium Hyaluronate 20 MG/2ML   Patient tolerance: patient tolerated the procedure well with no immediate complications  Dressing:  Sterile dressing applied        Subjective: bilateral knee Euflexxa injection #1    Patient ID: Mariel Joshua is a 63 y o  female  HPI  Patient is here for her 1st Euflexxa injection in a series of 3 for her bilateral knee  Review of Systems   Constitutional: Positive for activity change  HENT: Negative  Eyes: Negative  Respiratory: Negative  Cardiovascular: Negative  Gastrointestinal: Negative  Endocrine: Negative  Musculoskeletal: Positive for arthralgias  Skin: Negative  Neurological: Negative  Psychiatric/Behavioral: Negative  Past Medical History:   Diagnosis Date    Arthritis     Asthma     Pemphigus vulgaris     Thalassemia        History reviewed  No pertinent surgical history      Family History   Problem Relation Age of Onset    Asthma Father     Hypertension Maternal Grandmother     Liver cancer Maternal David [facility] Family         multiple relatives    Seizures Sister     Asthma Brother     Hypertension Brother     Leukemia Maternal Uncle     Substance Abuse Neg Hx     Mental illness Neg Hx        Social History     Occupational History    Not on file   Tobacco Use    Smoking status: Former Smoker     Last attempt to quit: 2000     Years since quittin 6    Smokeless tobacco: Never Used   Substance and Sexual Activity    Alcohol use: Yes     Frequency: 2-4 times a month     Comment: socially     Drug use: Never    Sexual activity: Not on file         Current Outpatient Medications:     albuterol (PROVENTIL HFA,VENTOLIN HFA) 90 mcg/act inhaler, Inhale 2 puffs every 6 (six) hours as needed for wheezing, Disp: 1 Inhaler, Rfl: 3    ALPRAZolam (XANAX) 0 25 mg tablet, Take 0 25 mg by mouth, Disp: , Rfl:     citalopram (CeleXA) 10 mg tablet, Take 10 mg by mouth daily, Disp: , Rfl:     ergocalciferol (VITAMIN D2) 50,000 units, Take 1 capsule (50,000 Units total) by mouth once a week, Disp: 4 capsule, Rfl: 3    fluticasone (FLONASE) 50 mcg/act nasal spray, 2 sprays into each nostril daily, Disp: 16 g, Rfl: 3    levothyroxine 25 mcg tablet, Take 1 tablet (25 mcg total) by mouth daily, Disp: 90 tablet, Rfl: 1    Allergies   Allergen Reactions    Iodine     Levaquin [Levofloxacin]     Shellfish-Derived Products        Objective: There were no vitals filed for this visit  Body mass index is 29 26 kg/m²  Right Knee Exam     Tenderness   The patient is experiencing tenderness in the patella      Range of Motion   Extension: 0   Flexion: 130     Tests   Davida:  Medial - negative Lateral - negative  Varus: negative Valgus: negative  Lachman:  Anterior - negative      Drawer:  Anterior - negative    Posterior - negative  Patellar apprehension: negative    Other   Erythema: absent  Scars: absent  Sensation: normal  Pulse: present  Swelling: none  Effusion: no effusion present    Comments: Positive patellofemoral grind  Knee is stable ligamentous exam  No erythema effusion or warmth      Left Knee Exam     Tenderness   The patient is experiencing tenderness in the patella  Range of Motion   Extension: 0   Flexion: 130     Tests   Davida:  Medial - negative Lateral - negative  Varus: negative Valgus: negative  Lachman:  Anterior - negative      Drawer:  Anterior - negative     Posterior - negative  Patellar apprehension: negative    Other   Erythema: absent  Sensation: normal  Pulse: present  Swelling: none  Effusion: no effusion present    Comments:  Negative patellofemoral grind  Knee is stable ligamentous exam  No erythema effusion or warmth          Observations   Left Knee   Negative for effusion  Right Knee   Negative for effusion  Physical Exam   Musculoskeletal:        Right knee: She exhibits no effusion  Left knee: She exhibits no effusion  Nursing note and vitals reviewed  Tori WATSON    Division of Adult Reconstruction  Department of Orthopaedic Surgery  [facility]

## 2019-08-21 ENCOUNTER — OFFICE VISIT (OUTPATIENT)
Dept: OBGYN CLINIC | Facility: CLINIC | Age: 59
End: 2019-08-21
Payer: COMMERCIAL

## 2019-08-21 VITALS — HEIGHT: 62 IN | BODY MASS INDEX: 29.26 KG/M2 | WEIGHT: 159 LBS

## 2019-08-21 DIAGNOSIS — M25.561 CHRONIC PAIN OF BOTH KNEES: ICD-10-CM

## 2019-08-21 DIAGNOSIS — G89.29 CHRONIC PAIN OF BOTH KNEES: ICD-10-CM

## 2019-08-21 DIAGNOSIS — M17.0 BILATERAL PRIMARY OSTEOARTHRITIS OF KNEE: Primary | ICD-10-CM

## 2019-08-21 DIAGNOSIS — M25.562 CHRONIC PAIN OF BOTH KNEES: ICD-10-CM

## 2019-08-21 PROCEDURE — 20610 DRAIN/INJ JOINT/BURSA W/O US: CPT | Performed by: PHYSICIAN ASSISTANT

## 2019-08-21 RX ORDER — HYALURONATE SODIUM 10 MG/ML
20 SYRINGE (ML) INTRAARTICULAR
Status: COMPLETED | OUTPATIENT
Start: 2019-08-21 | End: 2019-08-21

## 2019-08-21 RX ADMIN — Medication 20 MG: at 14:57

## 2019-08-21 NOTE — PROGRESS NOTES
Assessment/Plan:  1  Bilateral primary osteoarthritis of knee  Large joint arthrocentesis   2  Chronic pain of both knees  Large joint arthrocentesis     Patient is here for her 2nd bilateral knee Euflexxa injection  She tolerated these injections well today  Post-injection instructions were provided  I will see her back next week to complete the series  All questions addressed  Large joint arthrocentesis: bilateral knee  Date/Time: 8/21/2019 2:57 PM  Consent given by: patient  Site marked: site marked  Timeout: Immediately prior to procedure a time out was called to verify the correct patient, procedure, equipment, support staff and site/side marked as required   Supporting Documentation  Indications: pain   Procedure Details  Location: knee - bilateral knee  Preparation: Patient was prepped and draped in the usual sterile fashion  Needle size: 20 G  Ultrasound guidance: no  Approach: anterolateral    Medications (Right): 20 mg Sodium Hyaluronate 20 MG/2MLMedications (Left): 20 mg Sodium Hyaluronate 20 MG/2ML   Patient tolerance: patient tolerated the procedure well with no immediate complications  Dressing:  Sterile dressing applied        Subjective: bilateral knee Euflexxa injection #2     Patient ID: Atul Miller is a 63 y o  female  HPI  Patient is here for her 2nd Euflexxa injections in a series of 3 for her bilateral knee  She did see benefit from the first injections and denies adverse effects  Review of Systems   Constitutional: Positive for activity change  HENT: Negative  Eyes: Negative  Respiratory: Negative  Cardiovascular: Negative  Gastrointestinal: Negative  Endocrine: Negative  Musculoskeletal: Positive for arthralgias  Skin: Negative  Neurological: Negative  Psychiatric/Behavioral: Negative  Past Medical History:   Diagnosis Date    Arthritis     Asthma     Pemphigus vulgaris     Thalassemia        No past surgical history on file      Family History   Problem Relation Age of Onset    Asthma Father     Hypertension Maternal Grandmother     Liver cancer Maternal Grandmother     Cancer Family         multiple relatives    Seizures Sister     Asthma Brother     Hypertension Brother     Leukemia Maternal Uncle     Substance Abuse Neg Hx     Mental illness Neg Hx        Social History     Occupational History    Not on file   Tobacco Use    Smoking status: Former Smoker     Last attempt to quit: 2000     Years since quittin 6    Smokeless tobacco: Never Used   Substance and Sexual Activity    Alcohol use: Yes     Frequency: 2-4 times a month     Comment: socially     Drug use: Never    Sexual activity: Not on file         Current Outpatient Medications:     albuterol (PROVENTIL HFA,VENTOLIN HFA) 90 mcg/act inhaler, Inhale 2 puffs every 6 (six) hours as needed for wheezing, Disp: 1 Inhaler, Rfl: 3    ALPRAZolam (XANAX) 0 25 mg tablet, Take 0 25 mg by mouth, Disp: , Rfl:     citalopram (CeleXA) 10 mg tablet, Take 10 mg by mouth daily, Disp: , Rfl:     ergocalciferol (VITAMIN D2) 50,000 units, Take 1 capsule (50,000 Units total) by mouth once a week, Disp: 4 capsule, Rfl: 3    fluticasone (FLONASE) 50 mcg/act nasal spray, 2 sprays into each nostril daily, Disp: 16 g, Rfl: 3    levothyroxine 25 mcg tablet, Take 1 tablet (25 mcg total) by mouth daily, Disp: 90 tablet, Rfl: 1    Allergies   Allergen Reactions    Iodine     Levaquin [Levofloxacin]     Shellfish-Derived Products        Objective: There were no vitals filed for this visit  Body mass index is 29 08 kg/m²  Right Knee Exam     Tenderness   The patient is experiencing tenderness in the patella      Range of Motion   Extension: 0   Flexion: 130     Tests   Davida:  Medial - negative Lateral - negative  Varus: negative Valgus: negative  Lachman:  Anterior - negative      Drawer:  Anterior - negative    Posterior - negative  Patellar apprehension: negative    Other Erythema: absent  Scars: absent  Sensation: normal  Pulse: present  Swelling: none  Effusion: no effusion present    Comments:  Positive patellofemoral grind  Knee is stable ligamentous exam  No erythema effusion or warmth      Left Knee Exam     Tenderness   The patient is experiencing tenderness in the patella  Range of Motion   Extension: 0   Flexion: 130     Tests   Davida:  Medial - negative Lateral - negative  Varus: negative Valgus: negative  Lachman:  Anterior - negative      Drawer:  Anterior - negative     Posterior - negative  Patellar apprehension: negative    Other   Erythema: absent  Sensation: normal  Pulse: present  Swelling: none  Effusion: no effusion present    Comments:  Negative patellofemoral grind  Knee is stable ligamentous exam  No erythema effusion or warmth          Observations   Left Knee   Negative for effusion  Right Knee   Negative for effusion  Physical Exam   Constitutional: She is oriented to person, place, and time  She appears well-developed and well-nourished  Body mass index is 29 08 kg/m²  HENT:   Head: Normocephalic and atraumatic  Eyes: EOM are normal    Neck: Normal range of motion  Cardiovascular: Intact distal pulses  Pulmonary/Chest: Effort normal    Musculoskeletal:        Right knee: She exhibits no effusion  Left knee: She exhibits no effusion  See ortho exam   Neurological: She is alert and oriented to person, place, and time  Skin: Skin is warm and dry  Psychiatric: She has a normal mood and affect  Her behavior is normal  Judgment and thought content normal    Nursing note and vitals reviewed

## 2019-08-28 ENCOUNTER — OFFICE VISIT (OUTPATIENT)
Dept: OBGYN CLINIC | Facility: CLINIC | Age: 59
End: 2019-08-28
Payer: COMMERCIAL

## 2019-08-28 DIAGNOSIS — M25.562 CHRONIC PAIN OF BOTH KNEES: ICD-10-CM

## 2019-08-28 DIAGNOSIS — G89.29 CHRONIC PAIN OF BOTH KNEES: ICD-10-CM

## 2019-08-28 DIAGNOSIS — M17.0 PRIMARY OSTEOARTHRITIS OF KNEES, BILATERAL: Primary | ICD-10-CM

## 2019-08-28 DIAGNOSIS — M25.561 CHRONIC PAIN OF BOTH KNEES: ICD-10-CM

## 2019-08-28 PROCEDURE — 20610 DRAIN/INJ JOINT/BURSA W/O US: CPT | Performed by: ORTHOPAEDIC SURGERY

## 2019-08-28 RX ORDER — HYALURONATE SODIUM 10 MG/ML
20 SYRINGE (ML) INTRAARTICULAR
Status: COMPLETED | OUTPATIENT
Start: 2019-08-28 | End: 2019-08-28

## 2019-08-28 RX ADMIN — Medication 20 MG: at 17:51

## 2019-08-28 NOTE — PROGRESS NOTES
Assessment/Plan:  1  Primary osteoarthritis of knees, bilateral     2  Chronic pain of both knees       Patient is here for her 3rd and final Euflexxa injection for the bilateral knee  She tolerated today's injection well  Post-injection instructions were provided  I will see her back in 6 months for possible re-initiation of viscosupplementation injections  Large joint arthrocentesis: bilateral knee  Date/Time: 8/28/2019 5:51 PM  Consent given by: patient  Site marked: site marked  Timeout: Immediately prior to procedure a time out was called to verify the correct patient, procedure, equipment, support staff and site/side marked as required   Supporting Documentation  Indications: pain   Procedure Details  Location: knee - bilateral knee  Preparation: Patient was prepped and draped in the usual sterile fashion  Needle size: 20 G  Ultrasound guidance: no  Approach: anterolateral    Medications (Right): 20 mg Sodium Hyaluronate 20 MG/2MLMedications (Left): 20 mg Sodium Hyaluronate 20 MG/2ML   Patient tolerance: patient tolerated the procedure well with no immediate complications  Dressing:  Sterile dressing applied        Subjective:  Bilateral knee Euflexxa injection #3    Patient ID: Helene Walker is a 63 y o  female  HPI  She is here for 3rd and final Euflexxa injections for her bilateral knee  She has been tolerating the series of injections well  Review of Systems   Constitutional: Positive for activity change  HENT: Negative  Eyes: Negative  Respiratory: Negative  Cardiovascular: Negative  Gastrointestinal: Negative  Endocrine: Negative  Musculoskeletal: Positive for arthralgias  Skin: Negative  Neurological: Negative  Psychiatric/Behavioral: Negative  Past Medical History:   Diagnosis Date    Arthritis     Asthma     Pemphigus vulgaris     Thalassemia        History reviewed  No pertinent surgical history      Family History   Problem Relation Age of Onset  Asthma Father     Hypertension Maternal Grandmother     Liver cancer Maternal Grandmother     Cancer Family         multiple relatives    Seizures Sister     Asthma Brother     Hypertension Brother     Leukemia Maternal Uncle     Substance Abuse Neg Hx     Mental illness Neg Hx        Social History     Occupational History    Not on file   Tobacco Use    Smoking status: Former Smoker     Last attempt to quit:      Years since quittin 6    Smokeless tobacco: Never Used   Substance and Sexual Activity    Alcohol use: Yes     Frequency: 2-4 times a month     Comment: socially     Drug use: Never    Sexual activity: Not on file         Current Outpatient Medications:     albuterol (PROVENTIL HFA,VENTOLIN HFA) 90 mcg/act inhaler, Inhale 2 puffs every 6 (six) hours as needed for wheezing, Disp: 1 Inhaler, Rfl: 3    ALPRAZolam (XANAX) 0 25 mg tablet, Take 0 25 mg by mouth, Disp: , Rfl:     citalopram (CeleXA) 10 mg tablet, Take 10 mg by mouth daily, Disp: , Rfl:     ergocalciferol (VITAMIN D2) 50,000 units, Take 1 capsule (50,000 Units total) by mouth once a week, Disp: 4 capsule, Rfl: 3    fluticasone (FLONASE) 50 mcg/act nasal spray, 2 sprays into each nostril daily, Disp: 16 g, Rfl: 3    levothyroxine 25 mcg tablet, Take 1 tablet (25 mcg total) by mouth daily, Disp: 90 tablet, Rfl: 1    Allergies   Allergen Reactions    Iodine     Levaquin [Levofloxacin]     Shellfish-Derived Products        Objective: There were no vitals filed for this visit  There is no height or weight on file to calculate BMI  Right Knee Exam     Tenderness   The patient is experiencing tenderness in the patella      Range of Motion   Extension: 0   Flexion: 130     Tests   Davida:  Medial - negative Lateral - negative  Varus: negative Valgus: negative  Lachman:  Anterior - negative      Drawer:  Anterior - negative    Posterior - negative  Patellar apprehension: negative    Other   Erythema: absent  Scars: absent  Sensation: normal  Pulse: present  Swelling: none  Effusion: no effusion present    Comments:  Positive patellofemoral grind  Knee is stable ligamentous exam  No erythema effusion or warmth      Left Knee Exam     Tenderness   The patient is experiencing tenderness in the patella  Range of Motion   Extension: 0   Flexion: 130     Tests   Davida:  Medial - negative Lateral - negative  Varus: negative Valgus: negative  Lachman:  Anterior - negative      Drawer:  Anterior - negative     Posterior - negative  Patellar apprehension: negative    Other   Erythema: absent  Sensation: normal  Pulse: present  Swelling: none  Effusion: no effusion present    Comments:  Negative patellofemoral grind  Knee is stable ligamentous exam  No erythema effusion or warmth          Observations   Left Knee   Negative for effusion  Right Knee   Negative for effusion  Physical Exam   Musculoskeletal:        Right knee: She exhibits no effusion  Left knee: She exhibits no effusion  Nursing note and vitals reviewed  Roxy WATSON    Division of Adult Reconstruction  Department of Orthopaedic Surgery  [facility]

## 2019-09-17 ENCOUNTER — OFFICE VISIT (OUTPATIENT)
Dept: FAMILY MEDICINE CLINIC | Facility: CLINIC | Age: 59
End: 2019-09-17
Payer: COMMERCIAL

## 2019-09-17 VITALS
TEMPERATURE: 98.3 F | SYSTOLIC BLOOD PRESSURE: 118 MMHG | HEIGHT: 62 IN | BODY MASS INDEX: 29.63 KG/M2 | RESPIRATION RATE: 12 BRPM | DIASTOLIC BLOOD PRESSURE: 76 MMHG | HEART RATE: 72 BPM | WEIGHT: 161 LBS

## 2019-09-17 DIAGNOSIS — Z82.49 FAMILY HISTORY OF EARLY CAD: ICD-10-CM

## 2019-09-17 DIAGNOSIS — F41.9 ANXIETY: ICD-10-CM

## 2019-09-17 DIAGNOSIS — J45.20 MILD INTERMITTENT ASTHMA WITHOUT COMPLICATION: ICD-10-CM

## 2019-09-17 DIAGNOSIS — E03.9 ACQUIRED HYPOTHYROIDISM: Primary | ICD-10-CM

## 2019-09-17 DIAGNOSIS — Z12.11 SCREENING FOR MALIGNANT NEOPLASM OF COLON: ICD-10-CM

## 2019-09-17 DIAGNOSIS — D56.9 MEDITERRANEAN ANEMIA: ICD-10-CM

## 2019-09-17 DIAGNOSIS — E78.2 MIXED HYPERLIPIDEMIA: ICD-10-CM

## 2019-09-17 DIAGNOSIS — E55.9 VITAMIN D DEFICIENCY: ICD-10-CM

## 2019-09-17 DIAGNOSIS — E78.1 HYPERTRIGLYCERIDEMIA: ICD-10-CM

## 2019-09-17 PROCEDURE — 3008F BODY MASS INDEX DOCD: CPT | Performed by: NURSE PRACTITIONER

## 2019-09-17 PROCEDURE — 99214 OFFICE O/P EST MOD 30 MIN: CPT | Performed by: NURSE PRACTITIONER

## 2019-09-17 RX ORDER — ALPRAZOLAM 0.25 MG/1
0.25 TABLET ORAL
Qty: 30 TABLET | Refills: 1 | Status: SHIPPED | OUTPATIENT
Start: 2019-09-17 | End: 2019-11-19 | Stop reason: SDUPTHER

## 2019-09-17 RX ORDER — ERGOCALCIFEROL 1.25 MG/1
50000 CAPSULE ORAL
Qty: 3 CAPSULE | Refills: 3 | Status: SHIPPED | OUTPATIENT
Start: 2019-09-17 | End: 2019-12-10 | Stop reason: SDUPTHER

## 2019-09-17 RX ORDER — LEVOTHYROXINE SODIUM 0.03 MG/1
25 TABLET ORAL DAILY
Qty: 90 TABLET | Refills: 1 | Status: SHIPPED | OUTPATIENT
Start: 2019-09-17 | End: 2019-12-10 | Stop reason: SDUPTHER

## 2019-09-17 RX ORDER — CITALOPRAM 10 MG/1
10 TABLET ORAL DAILY
Qty: 90 TABLET | Refills: 1 | Status: SHIPPED | OUTPATIENT
Start: 2019-09-17 | End: 2019-12-10 | Stop reason: SDUPTHER

## 2019-09-17 NOTE — PATIENT INSTRUCTIONS
Continue with current medications  No change to current treatment plan  Schedule evaluation with cardiology as discussed  Stress management  Activities to divert attention when possible  Conscious breathing techniques as discussed  Coping mechanisms and strategies vary from person to person so try to utilize strategies that you think may work for you (such as meditation, music, etc  )  Anti anxiety/depression medications as prescribed   Will decrease vitamin D2 to once monthly  Follow up as discussed or return to office earlier if you have any new issues or concerns

## 2019-09-17 NOTE — PROGRESS NOTES
Assessment/Plan:  1  Acquired hypothyroidism  Stable  No change to current tx  Will repeat labs in 3 months  - levothyroxine 25 mcg tablet; Take 1 tablet (25 mcg total) by mouth daily  Dispense: 90 tablet; Refill: 1  2  Mild intermittent asthma without complication  Stable  Monitor  3  Vitamin D deficiency  Decrease to monthly  Will repeat labs in 3 months  - ergocalciferol (VITAMIN D2) 50,000 units; Take 1 capsule (50,000 Units total) by mouth every 28 days  Dispense: 3 capsule; Refill: 3  4  Anxiety  Stress management  Activities to divert attention when possible  Conscious breathing techniques as discussed  Coping mechanisms and strategies vary from person to person so try to utilize strategies that you think may work for you (such as meditation, music, etc  )  Consider or continue counseling as discussed  Anti anxiety/depression medications as prescribed if applicable  - citalopram (CeleXA) 10 mg tablet; Take 1 tablet (10 mg total) by mouth daily  Dispense: 90 tablet; Refill: 1  - ALPRAZolam (XANAX) 0 25 mg tablet; Take 1 tablet (0 25 mg total) by mouth daily at bedtime as needed for anxiety  Dispense: 30 tablet; Refill: 1  5  Mediterranean anemia  Stable  Managed by hematology  6  Mixed hyperlipidemia  Heart  Healthy diet  Will check labs in 3 months  7  Hypertriglyceridemia  Heart healthy diet  Will check labs in 3 months  8  Family history of early CAD  - Ambulatory referral to Cardiology; Future  9  Screening for malignant neoplasm of colon  - Ambulatory referral to Gastroenterology; Future    Patient was counseled regarding instructions for management which included: impression/diagnosis, risk/benefits of treatment options, importance of compliance with treatment, risk factor reduction, and prognosis  I have reviewed the instructions with the patient answering all questions and patient verbalized understanding  BMI Counseling: Body mass index is 29 45 kg/m²   The BMI is above normal  Nutrition recommendations include reducing portion sizes, decreasing overall calorie intake, moderation in carbohydrate intake, reducing intake of saturated fat and trans fat and reducing intake of cholesterol  Exercise recommendations include exercising 3-5 times per week  Subjective:      Patient ID: Sade Davidson is a 63 y o  female who presents for follow up on chronic issues    Here for follow up on chronic issues  Saw ortho for knees  Saw hematology regarding anemia  Taking celexa for anxiety  No panic attacks  Takes xanax when feels overly stressed, only occasionally needs it  Sleeping well  Family history of cad  Would like to see cardiology        The following portions of the patient's history were reviewed and updated as appropriate: allergies, current medications, past family history, past medical history, past social history, past surgical history and problem list     Review of Systems   Constitutional: Negative for activity change, appetite change and fatigue  Respiratory: Negative for cough, chest tightness and shortness of breath  Cardiovascular: Negative for chest pain, palpitations and leg swelling  Gastrointestinal: Negative for abdominal pain, diarrhea, nausea and vomiting  Endocrine: Negative for cold intolerance, heat intolerance, polydipsia, polyphagia and polyuria  Skin: Negative for color change, pallor and rash  Neurological: Negative for dizziness and headaches  Psychiatric/Behavioral: Negative for self-injury, sleep disturbance and suicidal ideas  The patient is nervous/anxious  Objective:      /76 (BP Location: Right arm, Patient Position: Sitting, Cuff Size: Large)   Pulse 72   Temp 98 3 °F (36 8 °C) (Temporal)   Resp 12   Ht 5' 2" (1 575 m)   Wt 73 kg (161 lb)   BMI 29 45 kg/m²          Physical Exam   Constitutional: She is oriented to person, place, and time  She appears well-developed and well-nourished  No distress     Neck: Normal range of motion  Neck supple  Cardiovascular: Normal rate and regular rhythm  No JVD  No audible carotid bruit  No peripheral edema  Pulmonary/Chest: Effort normal and breath sounds normal  No respiratory distress  She has no wheezes  Abdominal: Soft  Musculoskeletal: She exhibits no edema  Neurological: She is alert and oriented to person, place, and time  No cranial nerve deficit  Coordination normal    Skin: Skin is warm and dry  No rash noted  She is not diaphoretic  No erythema  No pallor  Psychiatric: She has a normal mood and affect  Her behavior is normal  Judgment and thought content normal    Vitals reviewed

## 2019-10-02 ENCOUNTER — TRANSCRIBE ORDERS (OUTPATIENT)
Dept: HEMATOLOGY ONCOLOGY | Facility: MEDICAL CENTER | Age: 59
End: 2019-10-02

## 2019-10-02 DIAGNOSIS — D56.9 MEDITERRANEAN ANEMIA: Primary | ICD-10-CM

## 2019-10-03 LAB
ALBUMIN SERPL-MCNC: 4.6 G/DL (ref 3.5–5.5)
ALBUMIN/GLOB SERPL: 1.6 {RATIO} (ref 1.2–2.2)
ALP SERPL-CCNC: 57 IU/L (ref 39–117)
ALT SERPL-CCNC: 20 IU/L (ref 0–32)
AST SERPL-CCNC: 18 IU/L (ref 0–40)
BASOPHILS # BLD AUTO: 0 X10E3/UL (ref 0–0.2)
BASOPHILS NFR BLD AUTO: 1 %
BILIRUB SERPL-MCNC: 0.3 MG/DL (ref 0–1.2)
BUN SERPL-MCNC: 17 MG/DL (ref 6–24)
BUN/CREAT SERPL: 18 (ref 9–23)
CALCIUM SERPL-MCNC: 9.9 MG/DL (ref 8.7–10.2)
CHLORIDE SERPL-SCNC: 100 MMOL/L (ref 96–106)
CO2 SERPL-SCNC: 26 MMOL/L (ref 20–29)
CREAT SERPL-MCNC: 0.92 MG/DL (ref 0.57–1)
EOSINOPHIL # BLD AUTO: 0.4 X10E3/UL (ref 0–0.4)
EOSINOPHIL NFR BLD AUTO: 7 %
ERYTHROCYTE [DISTWIDTH] IN BLOOD BY AUTOMATED COUNT: 15.4 % (ref 12.3–15.4)
FERRITIN SERPL-MCNC: 354 NG/ML (ref 15–150)
GLOBULIN SER-MCNC: 2.8 G/DL (ref 1.5–4.5)
GLUCOSE SERPL-MCNC: 83 MG/DL (ref 65–99)
HCT VFR BLD AUTO: 39.6 % (ref 34–46.6)
HGB BLD-MCNC: 12.8 G/DL (ref 11.1–15.9)
IMM GRANULOCYTES # BLD: 0 X10E3/UL (ref 0–0.1)
IMM GRANULOCYTES NFR BLD: 0 %
IRON SATN MFR SERPL: 24 % (ref 15–55)
IRON SERPL-MCNC: 67 UG/DL (ref 27–159)
LYMPHOCYTES # BLD AUTO: 2.3 X10E3/UL (ref 0.7–3.1)
LYMPHOCYTES NFR BLD AUTO: 38 %
MCH RBC QN AUTO: 24.8 PG (ref 26.6–33)
MCHC RBC AUTO-ENTMCNC: 32.3 G/DL (ref 31.5–35.7)
MCV RBC AUTO: 77 FL (ref 79–97)
MONOCYTES # BLD AUTO: 0.4 X10E3/UL (ref 0.1–0.9)
MONOCYTES NFR BLD AUTO: 7 %
NEUTROPHILS # BLD AUTO: 2.9 X10E3/UL (ref 1.4–7)
NEUTROPHILS NFR BLD AUTO: 47 %
PLATELET # BLD AUTO: 196 X10E3/UL (ref 150–450)
POTASSIUM SERPL-SCNC: 4.1 MMOL/L (ref 3.5–5.2)
PROT SERPL-MCNC: 7.4 G/DL (ref 6–8.5)
RBC # BLD AUTO: 5.17 X10E6/UL (ref 3.77–5.28)
SL AMB EGFR AFRICAN AMERICAN: 79 ML/MIN/1.73
SL AMB EGFR NON AFRICAN AMERICAN: 69 ML/MIN/1.73
SODIUM SERPL-SCNC: 142 MMOL/L (ref 134–144)
TIBC SERPL-MCNC: 276 UG/DL (ref 250–450)
UIBC SERPL-MCNC: 209 UG/DL (ref 131–425)
WBC # BLD AUTO: 6.1 X10E3/UL (ref 3.4–10.8)

## 2019-10-04 ENCOUNTER — CONSULT (OUTPATIENT)
Dept: CARDIOLOGY CLINIC | Facility: CLINIC | Age: 59
End: 2019-10-04
Payer: COMMERCIAL

## 2019-10-04 VITALS
DIASTOLIC BLOOD PRESSURE: 80 MMHG | HEIGHT: 62 IN | WEIGHT: 160 LBS | BODY MASS INDEX: 29.44 KG/M2 | HEART RATE: 83 BPM | SYSTOLIC BLOOD PRESSURE: 140 MMHG | OXYGEN SATURATION: 100 %

## 2019-10-04 DIAGNOSIS — E78.2 MIXED HYPERLIPIDEMIA: Primary | ICD-10-CM

## 2019-10-04 DIAGNOSIS — R00.2 PALPITATIONS: ICD-10-CM

## 2019-10-04 DIAGNOSIS — R07.9 CHEST PAIN IN ADULT: ICD-10-CM

## 2019-10-04 DIAGNOSIS — R06.00 EXERTIONAL DYSPNEA: ICD-10-CM

## 2019-10-04 PROCEDURE — 93000 ELECTROCARDIOGRAM COMPLETE: CPT | Performed by: INTERNAL MEDICINE

## 2019-10-04 PROCEDURE — 99244 OFF/OP CNSLTJ NEW/EST MOD 40: CPT | Performed by: INTERNAL MEDICINE

## 2019-10-04 NOTE — PROGRESS NOTES
[facility]  [address] [place] [address] [address]   [place], [address]  Cardiology Consultation    Hanna Pappas  [id number]  1960      Consult for:Dyspnea  Appreciate consult by: HAYDEE Oliva    1  Mixed hyperlipidemia  POCT ECG    Holter monitor - 48 hour    Stress test only, exercise   2  Exertional dyspnea  Holter monitor - 48 hour    Stress test only, exercise   3  Chest pain in adult  Holter monitor - 48 hour    Stress test only, exercise   4  Palpitations  Holter monitor - 48 hour    Stress test only, exercise      Discussion/Summary:   Shortness of breath with exertion + family history of premature coronary artery disease- exercise treadmill stress test to rule out ischemia    Palpitations- 48hr holter monitor    Elevated triglycerides with hyperlipidemia- low-carbohydrate diet, exercise, a Omega 3 supplementation 2000 mg twice a day    Hypothyroidism currently on levothyroxine 25 mcg daily    Mild intermittent asthma without complications    Vitamin-D deficiency    Pemphigus vulgaris treated with Rituxan    Elevated ferritin    Smoking hx      HPI:   70-year-old woman with a former history of smoking, no prior cardiac events, elevated triglycerides presents secondary to exertional shortness of breath plus neck discomfort  She has noted had increased dyspnea with mild exertion  She has also noted pulsations in her bilateral carotid distribution  She denies having prior history of myocardial infarction  She denies having prior history of atrial fibrillation  She denies having prior history of syncope    She is picking up gallons of paint    Social Hx  Live with friends  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  Former smoker- 20 years ago  Social alcohol  Sleep-wake up early morning  Stress test 10 years    Family Hx  Both grandmother-CV[facility]  Cousin- MI at young age      Past Medical History:   Diagnosis Date    Arthritis     Asthma     Pemphigus vulgaris     Thalassemia      Social History     Socioeconomic History    Marital status: Single     Spouse name: Not on file    Number of children: Not on file    Years of education: Not on file    Highest education level: Not on file   Occupational History    Not on file   Social Needs    Financial resource strain: Not on file    Food insecurity:     Worry: Not on file     Inability: Not on file    Transportation needs:     Medical: Not on file     Non-medical: Not on file   Tobacco Use    Smoking status: Former Smoker     Last attempt to quit:      Years since quittin 7    Smokeless tobacco: Never Used   Substance and Sexual Activity    Alcohol use:  Yes     Alcohol/week: 1 0 standard drinks     Types: 1 Glasses of wine per week     Frequency: 2-4 times a month     Drinks per session: 1 or 2     Binge frequency: Less than monthly     Comment: socially     Drug use: Never    Sexual activity: Not on file   Lifestyle    Physical activity:     Days per week: Not on file     Minutes per session: Not on file    Stress: Not on file   Relationships    Social connections:     Talks on phone: Not on file     Gets together: Not on file     Attends Restorationism service: Not on file     Active member of club or organization: Not on file     Attends meetings of clubs or organizations: Not on file     Relationship status: Not on file    Intimate partner violence:     Fear of current or ex partner: Not on file     Emotionally abused: Not on file     Physically abused: Not on file     Forced sexual activity: Not on file   Other Topics Concern    Not on file   Social History Narrative    Not on file      Family History   Problem Relation Age of Onset    Asthma Father     Hypertension Maternal Grandmother     Liver cancer Maternal Grandmother     Cancer Family         multiple relatives    Seizures Sister     Asthma Brother     Hypertension Brother     Leukemia Maternal Uncle     Heart attack Cousin     Substance Abuse Neg Hx     Mental illness Neg Hx      History reviewed  No pertinent surgical history  Current Outpatient Medications:     albuterol (PROVENTIL HFA,VENTOLIN HFA) 90 mcg/act inhaler, Inhale 2 puffs every 6 (six) hours as needed for wheezing, Disp: 1 Inhaler, Rfl: 3    ALPRAZolam (XANAX) 0 25 mg tablet, Take 1 tablet (0 25 mg total) by mouth daily at bedtime as needed for anxiety, Disp: 30 tablet, Rfl: 1    citalopram (CeleXA) 10 mg tablet, Take 1 tablet (10 mg total) by mouth daily, Disp: 90 tablet, Rfl: 1    ergocalciferol (VITAMIN D2) 50,000 units, Take 1 capsule (50,000 Units total) by mouth every 28 days, Disp: 3 capsule, Rfl: 3    fluticasone (FLONASE) 50 mcg/act nasal spray, 2 sprays into each nostril daily, Disp: 16 g, Rfl: 3    levothyroxine 25 mcg tablet, Take 1 tablet (25 mcg total) by mouth daily, Disp: 90 tablet, Rfl: 1  Allergies   Allergen Reactions    Iodine     Levaquin [Levofloxacin]     Shellfish-Derived Products      Vitals:    10/04/19 1604   BP: 140/80   BP Location: Left arm   Patient Position: Sitting   Cuff Size: Large   Pulse: 83   SpO2: 100%   Weight: 72 6 kg (160 lb)   Height: 5' 2" (1 575 m)       Review of Systems:   Review of Systems    Vitals:    10/04/19 1604   BP: 140/80   BP Location: Left arm   Patient Position: Sitting   Cuff Size: Large   Pulse: 83   SpO2: 100%   Weight: 72 6 kg (160 lb)   Height: 5' 2" (1 575 m)     Physical Examination:   Physical Exam   Constitutional: She is oriented to person, place, and time  She appears well-developed and well-nourished  No distress  HENT:   Head: Normocephalic and atraumatic  Right Ear: External ear normal    Left Ear: External ear normal    Eyes: Pupils are equal, round, and reactive to light  Conjunctivae are normal  Right eye exhibits no discharge  Left eye exhibits no discharge  No scleral icterus  Neck: Normal range of motion  Neck supple  No JVD present   No tracheal deviation present  No thyromegaly present  Cardiovascular: Normal rate and regular rhythm  Exam reveals gallop  Exam reveals no friction rub  No murmur heard  Pulmonary/Chest: Effort normal and breath sounds normal  No stridor  No respiratory distress  She has no wheezes  She has no rales  She exhibits no tenderness  Abdominal: Soft  Bowel sounds are normal  She exhibits no distension and no mass  There is no tenderness  There is no rebound and no guarding  Musculoskeletal: Normal range of motion  She exhibits no edema, tenderness or deformity  Neurological: She is alert and oriented to person, place, and time  She has normal reflexes  She displays normal reflexes  No cranial nerve deficit  She exhibits normal muscle tone  Coordination normal    Skin: Skin is warm and dry  No rash noted  She is not diaphoretic  No erythema  No pallor  Psychiatric: She has a normal mood and affect   Her behavior is normal  Judgment and thought content normal        Labs:     Lab Results   Component Value Date    WBC 6 1 10/03/2019    HGB 12 8 10/03/2019    HCT 39 6 10/03/2019    MCV 77 (L) 10/03/2019    RDW 15 4 10/03/2019     10/03/2019     BMP:  Lab Results   Component Value Date    SODIUM 142 10/03/2019    K 4 1 10/03/2019     10/03/2019    CO2 26 10/03/2019    BUN 17 10/03/2019    CREATININE 0 92 10/03/2019    GLUC 83 10/03/2019     LFT:  Lab Results   Component Value Date    AST 18 10/03/2019    ALT 20 10/03/2019    TP 7 4 10/03/2019    ALB 4 6 10/03/2019      No results found for: [id number]  No results found for: HGBA1C  Lipid Profile:   Lab Results   Component Value Date    CHOLESTEROL 207 (H) 06/04/2019    HDL 53 06/04/2019    TRIG 170 (H) 06/04/2019     Lab Results   Component Value Date    CHOLESTEROL 207 (H) 06/04/2019     No results found for: CKTOTAL, CKMB, CKMBINDEX, TROPONINI  No results found for: NTBNP   Recent Results (from the past 672 hour(s))   CBC and differential    Collection Time: 10/03/19 12:22 PM   Result Value Ref Range    White Blood Cell Count 6 1 3 4 - 10 8 x10E3/uL    Red Blood Cell Count 5 17 3 77 - 5 28 x10E6/uL    Hemoglobin 12 8 11 1 - 15 9 g/dL    HCT 39 6 34 0 - 46 6 %    MCV 77 (L) 79 - 97 fL    MCH 24 8 (L) 26 6 - 33 0 pg    MCHC 32 3 31 5 - 35 7 g/dL    RDW 15 4 12 3 - 15 4 %    Platelet Count 667 162 - 450 x10E3/uL    Neutrophils 47 Not Estab  %    Lymphocytes 38 Not Estab  %    Monocytes 7 Not Estab  %    Eosinophils 7 Not Estab  %    Basophils PCT 1 Not Estab  %    Neutrophils (Absolute) 2 9 1 4 - 7 0 x10E3/uL    Lymphocytes (Absolute) 2 3 0 7 - 3 1 x10E3/uL    Monocytes (Absolute) 0 4 0 1 - 0 9 x10E3/uL    Eosinophils (Absolute) 0 4 0 0 - 0 4 x10E3/uL    Basophils ABS 0 0 0 0 - 0 2 x10E3/uL    Immature Granulocytes 0 Not Estab  %    Immature Granulocytes (Absolute) 0 0 0 0 - 0 1 x10E3/uL   Comprehensive metabolic panel    Collection Time: 10/03/19 12:22 PM   Result Value Ref Range    Glucose, Random 83 65 - 99 mg/dL    BUN 17 6 - 24 mg/dL    Creatinine 0 92 0 57 - 1 00 mg/dL    eGFR Non  69 >59 mL/min/1 73    eGFR  79 >59 mL/min/1 73    SL AMB BUN/CREATININE RATIO 18 9 - 23    Sodium 142 134 - 144 mmol/L    Potassium 4 1 3 5 - 5 2 mmol/L    Chloride 100 96 - 106 mmol/L    CO2 26 20 - 29 mmol/L    CALCIUM 9 9 8 7 - 10 2 mg/dL    Protein, Total 7 4 6 0 - 8 5 g/dL    Albumin 4 6 3 5 - 5 5 g/dL    Globulin, Total 2 8 1 5 - 4 5 g/dL    Albumin/Globulin Ratio 1 6 1 2 - 2 2    TOTAL BILIRUBIN 0 3 0 0 - 1 2 mg/dL    Alk Phos Isoenzymes 57 39 - 117 IU/L    AST 18 0 - 40 IU/L    ALT 20 0 - 32 IU/L   TIBC    Collection Time: 10/03/19 12:25 PM   Result Value Ref Range    Total Iron Binding Capacity (TIBC) 276 250 - 450 ug/dL    UIBC 209 131 - 425 ug/dL    Iron, Serum 67 27 - 159 ug/dL    Iron Saturation 24 15 - 55 %   Ferritin    Collection Time: 10/03/19 12:25 PM   Result Value Ref Range    Ferritin 354 (H) 15 - 150 ng/mL       Imaging & Testing   I have personally reviewed pertinent reports  Cardiac Testing   No results found for this or any previous visit  EKG: Personally reviewed  Normal sinus rhythm no acute st/t wave changes qtc [address] [facility]  [phone]  Please call with any questions or suggestions    Counseling :  A description of the counseling:   Goals and Barriers:  Patient's ability to self care:  Medication side effect reviewed with patient in detail and all their questions answered  "Portions of the record may have been created with voice recognition software  Occasional wrong word or "sound a like" substitutions may have occurred due to the inherent limitations of voice recognition software  Read the chart carefully and recognize, using context, where substitutions have occurred   Please call if you have any questions  "

## 2019-10-08 ENCOUNTER — TELEPHONE (OUTPATIENT)
Dept: HEMATOLOGY ONCOLOGY | Facility: MEDICAL CENTER | Age: 59
End: 2019-10-08

## 2019-10-08 NOTE — TELEPHONE ENCOUNTER
Patient aware that we are working on obtaining the authorization for her labs  I will contact patient with a status update

## 2019-10-14 ENCOUNTER — HOSPITAL ENCOUNTER (OUTPATIENT)
Dept: NON INVASIVE DIAGNOSTICS | Facility: HOSPITAL | Age: 59
Discharge: HOME | End: 2019-10-14
Attending: INTERNAL MEDICINE

## 2019-11-19 ENCOUNTER — EVALUATION (OUTPATIENT)
Dept: PHYSICAL THERAPY | Facility: CLINIC | Age: 59
End: 2019-11-19
Payer: COMMERCIAL

## 2019-11-19 DIAGNOSIS — M25.562 PAIN IN BOTH KNEES, UNSPECIFIED CHRONICITY: Primary | ICD-10-CM

## 2019-11-19 DIAGNOSIS — F41.9 ANXIETY: ICD-10-CM

## 2019-11-19 DIAGNOSIS — M25.561 PAIN IN BOTH KNEES, UNSPECIFIED CHRONICITY: Primary | ICD-10-CM

## 2019-11-19 PROCEDURE — 97161 PT EVAL LOW COMPLEX 20 MIN: CPT

## 2019-11-19 RX ORDER — ALPRAZOLAM 0.25 MG/1
TABLET ORAL
Qty: 30 TABLET | Refills: 0 | Status: SHIPPED | OUTPATIENT
Start: 2019-11-19 | End: 2019-12-10 | Stop reason: SDUPTHER

## 2019-11-19 NOTE — PROGRESS NOTES
PT Evaluation     Today's date: 2019  Patient name: Tasha Muñoz  : 1960  MRN: [id number]  Referring provider: Rosie Mark DO  Dx:   Encounter Diagnosis     ICD-10-CM    1  Pain in both knees, unspecified chronicity M25 561     M25 562        Start Time: 1720  Stop Time: 1800  Total time in clinic (min): 40 minutes    Assessment  Assessment details: Patient reports BL knee pain that began a few months with no trauma or accident  Pain is located on medial side of the knees and goes down the shins BL  Pain is at worst 9/10 with no specific position or activity, sometimes with stairs and with carrying heavy weight or groceries  Painful end ranges limiting knee ROM actively  Strength impairments present in BL LEs including the L hip musculature  Poor activation of BL quadriceps and glutes contributing to poor knee stability BL  Patellar hypermobility in all planes present BL  Patient would benefit from skilled PT in order to reduce pain, improve ROM, improve BL LE strength, improve gait abnormalities, and address patellar mobility in order to stand and ambulate symptom free, 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, complete yard work and outdoor activities, complete standing household chores symptom free  Patient was provided with thorough HEP which she demonstrated correctly and verbalized understanding  Patient brought in knee brace that she bought; educated to wear as needed with function to provide pain relief and improved stability and told not to wear at night  Impairments: abnormal gait, abnormal muscle firing, abnormal muscle tone, abnormal or restricted ROM, impaired balance, impaired physical strength, lacks appropriate home exercise program, pain with function and weight-bearing intolerance    Symptom irritability: moderateUnderstanding of Dx/Px/POC: good   Prognosis: good    Goals  STGs:  1  Reduce pain levels to constant 0/10 50% of time  2   Improve strength of BL LEs by 1/4-1/2 in order to stand and ambulate with good stabilization to prevent re-injury  3  Progress quad sets to [facility] KAYLEN LOPEZ [facility] to regain quadriceps strength present prior to injury  4  Improve ROM by 5-10 degrees of BL knees to WNL to allow easier ambulation and stair climbing  LTGs:  1  Improve strength of BL LEs to 4+/5 or greater in order to stand and ambulate with good stabilization to prevent re-injury  2  Ambulate 10 flights of stairs without knee buckling and no UE use indicating improvements in quad strength   3  Ambulate on TM for 20-30 min indicating improvements in endurance and LE strength so she can 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 and dancing symptom free  Plan  Patient would benefit from: PT eval and skilled physical therapy  Planned modality interventions: TENS, thermotherapy: hydrocollator packs, ultrasound, unattended electrical stimulation and cryotherapy  Planned therapy interventions: joint mobilization, manual therapy, abdominal trunk stabilization, neuromuscular re-education, patient education, strengthening, stretching, therapeutic exercise, home exercise program, graded motor, graded exercise, functional ROM exercises and gait training  Frequency: 2x week  Duration in weeks: 8  Plan of Care beginning date: 11/19/2019  Plan of Care expiration date: 1/14/2020  Treatment plan discussed with: patient        Subjective Evaluation    History of Present Illness  Mechanism of injury: Patient reports BL knee pain that began a few months with no trauma or accident  Pain is located on medial side of the knees and goes down the shins BL  Pain is at worst 9/10 with no specific position or activity, sometimes with stairs and with carrying heavy weight or groceries    No NT or burning  Pain is intermittent reaching 0/10 at best    Had x rays which showed degenerative changes  Difficulty sleeping  States that cancer runs in her family and is afraid of cancer causing BL shin pain    Pain  Current pain rating: 0  At best pain ratin  At worst pain ratin  Location: medial BL knees into the shins   Quality: throbbing and sharp  Relieving factors: relaxation and rest    Social Support  Steps to enter house: yes  Stairs in house: yes   Lives in: multiple-level home  Lives with: lives with friends      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)    Diagnostic Tests  X-ray: abnormal    FCE comments: Medial narrowing of kneesTreatments  Current treatment: physical therapy  Patient Goals  Patient goals for therapy: increased strength, independence with ADLs/IADLs, decreased pain, increased motion, return to sport/leisure activities and improved balance  Patient goal: dancing and wearing high heels         Objective     Static Posture     Comments  SLS: R= 30 sec; L= 20 sec stop due to increase pain     Palpation     Additional Palpation Details  Poor muscle activation of BL quads and glutes    Active Range of Motion   Left Knee   Flexion: 145 degrees   Extension: 5 degrees     Right Knee   Flexion: 130 degrees with pain  Extension: 0 degrees     Mobility   Patellar Mobility:   Left Knee   Hypermobile: left medial, left lateral, left superior and left inferior      Right Knee   Hypermobile: medial, lateral, superior and inferior     Strength/Myotome Testing     Left Hip   Planes of Motion   Flexion: 4  Abduction: 4  External rotation: 4  Internal rotation: 4    Right Hip   Planes of Motion   Flexion: 4  Abduction: 4  External rotation: 4  Internal rotation: 4    Left Knee   Flexion: 4+  Extension: 5    Right Knee   Flexion: 4+  Extension: 5    Left Ankle/Foot   Dorsiflexion: 5    Right Ankle/Foot   Dorsiflexion: 5    Functional Assessment        Comments  Gait observation: hip ER, toe out, genu valgus, reduced hip extension BL  Stair ambulation: pain and genu valgus present             Precautions:   Arthritis   Patient   Asthma   Provider   Pemphigus vulgaris   Patient   Thalassemia Manual:      TE's:  Glute sets: 2 x 10 3" hold-pain free  Quad set: 2  x10 3" hold-pain free  Standing hip abduction: 2 x 10 pain free  Strength/ROM testin'    Updated HEP: 19- standing hip abd @ counter, quad set, glute set    Modalites:

## 2019-11-21 ENCOUNTER — HOSPITAL ENCOUNTER (OUTPATIENT)
Dept: NON INVASIVE DIAGNOSTICS | Facility: HOSPITAL | Age: 59
Discharge: HOME | End: 2019-11-21
Attending: INTERNAL MEDICINE
Payer: COMMERCIAL

## 2019-11-21 ENCOUNTER — OFFICE VISIT (OUTPATIENT)
Dept: PHYSICAL THERAPY | Facility: CLINIC | Age: 59
End: 2019-11-21
Payer: COMMERCIAL

## 2019-11-21 DIAGNOSIS — E78.2 MIXED HYPERLIPIDEMIA: ICD-10-CM

## 2019-11-21 DIAGNOSIS — R07.9 CHEST PAIN IN ADULT: ICD-10-CM

## 2019-11-21 DIAGNOSIS — R06.00 EXERTIONAL DYSPNEA: ICD-10-CM

## 2019-11-21 DIAGNOSIS — M25.562 PAIN IN BOTH KNEES, UNSPECIFIED CHRONICITY: Primary | ICD-10-CM

## 2019-11-21 DIAGNOSIS — R00.2 PALPITATIONS: ICD-10-CM

## 2019-11-21 DIAGNOSIS — M25.561 PAIN IN BOTH KNEES, UNSPECIFIED CHRONICITY: Primary | ICD-10-CM

## 2019-11-21 LAB
MAX DIASTOLIC BP: 80 MMHG
MAX HEART RATE: 139 BPM
MAX PREDICTED HEART RATE: 162 BPM
MAX. SYSTOLIC BP: 174 MMHG
PROTOCOL NAME: NORMAL
REASON FOR TERMINATION: NORMAL
TARGET HR FORMULA: NORMAL
TEST INDICATION: NORMAL
TIME IN EXERCISE PHASE: NORMAL

## 2019-11-21 PROCEDURE — 93225 XTRNL ECG REC<48 HRS REC: CPT

## 2019-11-21 PROCEDURE — 93226 XTRNL ECG REC<48 HR SCAN A/R: CPT

## 2019-11-21 PROCEDURE — 97112 NEUROMUSCULAR REEDUCATION: CPT

## 2019-11-21 PROCEDURE — 97110 THERAPEUTIC EXERCISES: CPT

## 2019-11-21 PROCEDURE — 93016 CV STRESS TEST SUPVJ ONLY: CPT | Performed by: INTERNAL MEDICINE

## 2019-11-21 PROCEDURE — 93017 CV STRESS TEST TRACING ONLY: CPT

## 2019-11-21 PROCEDURE — 93018 CV STRESS TEST I&R ONLY: CPT | Performed by: INTERNAL MEDICINE

## 2019-11-22 NOTE — PROGRESS NOTES
Daily Note     Today's date: 19  Patient name: Aristides Tamez  : 1960  MRN: [id number]  Referring provider: Cuauhtemoc Henry DO  Dx:   Encounter Diagnosis     ICD-10-CM    1  Pain in both knees, unspecified chronicity M25 561     M25 562        Start Time: 3054  Stop Time: 1830  Total time in clinic (min): 45 minutes    Subjective:patient reports that she has been non compliant with HEP  Objective: See treatment diary below      Assessment: Tolerated treatment well  Patient would benefit from continued PT      Plan: Continue per plan of care        Precautions:   Arthritis   Patient   Asthma   Provider   Pemphigus vulgaris   Patient   Thalassemia              TE's:  Upright Bike: 7' L1  Standing gastrocs stretch: 10 x 10" each LE   HS SOS: 10 x 10" R/L  SLR Flexion: 1  X 10 each  Modified demarcus stretch: 6 x 10" R/L     NM:  TB side stepping: red 10'x 2  TB diagonal walking: red 10' x 2  Bridges: 2 x 01    Modalities  CP post tx to BL knees: 5' skin intact pre and post    Updated HEP: 19- standing hip abd @ counter, quad set, glute set

## 2019-11-25 ENCOUNTER — OFFICE VISIT (OUTPATIENT)
Dept: PHYSICAL THERAPY | Facility: CLINIC | Age: 59
End: 2019-11-25
Payer: COMMERCIAL

## 2019-11-25 DIAGNOSIS — M25.561 PAIN IN BOTH KNEES, UNSPECIFIED CHRONICITY: Primary | ICD-10-CM

## 2019-11-25 DIAGNOSIS — M25.562 PAIN IN BOTH KNEES, UNSPECIFIED CHRONICITY: Primary | ICD-10-CM

## 2019-11-25 PROCEDURE — 97110 THERAPEUTIC EXERCISES: CPT | Performed by: PHYSICAL THERAPIST

## 2019-11-25 NOTE — PROGRESS NOTES
Daily Note     Today's date: 2019  Patient name: Silverio Holman  : 1960  MRN: [id number]  Referring provider: Negro Patterson DO  Dx:   Encounter Diagnosis     ICD-10-CM    1  Pain in both knees, unspecified chronicity M25 561     M25 562                   Subjective: Pt states at times it feels like her muscle is ripping in her R knee, but other times it feels ok  Tylenol really helps  She has no c/o during her session today - she took Tylenol earlier  Objective: See treatment diary below      Assessment: Tolerated treatment well  Patient demonstrated fatigue post treatment and would benefit from continued PT      Plan: Progress treatment as tolerated         Precautions:   Arthritis   Patient   Asthma   Provider   Pemphigus vulgaris   Patient   Thalassemia            SOC: 19  Visit#: 3  FOTO:     Daily treatment Log:    TE's: 30'  Bike L1: 8'  Stand hip ext w/ TB: red 20x R/L  diag walk w/ TB: red 15'x2  Sidestepping w/ TB: red 15'x1 R/L  Bridges: 2" 2x10  SLR flexion w/ qset: 2x10 R/L  Sup modif demarcus stretch: 20"x5 R/L  Sup ham stretch w/ SOS: 20"x5 R/L    Updated HEP: 19- standing hip abd @ counter, quad set, glute set    Modalites:  CP B/L knees: 5' to end - skin intact pre/post

## 2019-11-27 PROCEDURE — 93227 XTRNL ECG REC<48 HR R&I: CPT | Performed by: INTERNAL MEDICINE

## 2019-12-02 ENCOUNTER — OFFICE VISIT (OUTPATIENT)
Dept: CARDIOLOGY CLINIC | Facility: CLINIC | Age: 59
End: 2019-12-02
Payer: COMMERCIAL

## 2019-12-02 VITALS
HEIGHT: 62 IN | HEART RATE: 63 BPM | DIASTOLIC BLOOD PRESSURE: 70 MMHG | OXYGEN SATURATION: 98 % | SYSTOLIC BLOOD PRESSURE: 162 MMHG | WEIGHT: 162 LBS | BODY MASS INDEX: 29.81 KG/M2

## 2019-12-02 DIAGNOSIS — R00.2 PALPITATIONS: ICD-10-CM

## 2019-12-02 DIAGNOSIS — E78.2 MIXED HYPERLIPIDEMIA: Primary | ICD-10-CM

## 2019-12-02 DIAGNOSIS — R07.9 CHEST PAIN IN ADULT: ICD-10-CM

## 2019-12-02 PROCEDURE — 99214 OFFICE O/P EST MOD 30 MIN: CPT | Performed by: INTERNAL MEDICINE

## 2019-12-02 NOTE — PROGRESS NOTES
[facility]  [address] [place] [address] [address]   [place], [address]  Cardiology Consultation    Tasha Muñoz  [id number]  1960      Consult for:Dyspnea  Appreciate consult by: HAYDEE Barnes    No diagnosis found  Discussion/Summary:   Shortness of breath with exertion + family history of premature coronary artery disease- exercise treadmill stress test to rule out ischemia    Palpitations- holter negative for significant arrhtyhmia    Elevated triglycerides with hyperlipidemia- low-carbohydrate diet, exercise, a Omega 3 supplementation 2000 mg twice a day    Hypothyroidism currently on levothyroxine 25 mcg daily    Mild intermittent asthma without complications    Vitamin-D deficiency    Pemphigus vulgaris treated with Rituxan    Elevated ferritin    Bruit/smoking- vascular u/s    Smoking hx      HPI:   60-year-old woman with a former history of smoking, no prior cardiac events, elevated triglycerides presents secondary to exertional shortness of breath plus neck discomfort  She has noted had increased dyspnea with mild exertion  She has also noted pulsations in her bilateral carotid distribution  She denies having prior history of myocardial infarction  She denies having prior history of atrial fibrillation  She denies having prior history of syncope  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    12/02/2019: We reviewed through her recent exercise stress test   It was negative for evidence of exercise-induced ischemia  She still feels some pulsations in her bilateral carotid distribution  She has concerning sick include given her family history for CVA      Social Hx  Live with friends  [employer]- parking lots/[employer]- some fumes  Former smoker- 20 years ago  Social alcohol  Sleep-wake up early morning  Stress test 10 years    Family Hx  Both grandmother-CVA  Cousin- MI at young age      Past Medical History:   Diagnosis Date    Arthritis     Asthma  Pemphigus vulgaris     Thalassemia      Social History     Socioeconomic History    Marital status: Single     Spouse name: Not on file    Number of children: Not on file    Years of education: Not on file    Highest education level: Not on file   Occupational History    Not on file   Social Needs    Financial resource strain: Not on file    Food insecurity:     Worry: Not on file     Inability: Not on file    Transportation needs:     Medical: Not on file     Non-medical: Not on file   Tobacco Use    Smoking status: Former Smoker     Last attempt to quit: 2000     Years since quittin 9    Smokeless tobacco: Never Used   Substance and Sexual Activity    Alcohol use:  Yes     Alcohol/week: 1 0 standard drinks     Types: 1 Glasses of wine per week     Frequency: 2-4 times a month     Drinks per session: 1 or 2     Binge frequency: Less than monthly     Comment: socially     Drug use: Never    Sexual activity: Not on file   Lifestyle    Physical activity:     Days per week: Not on file     Minutes per session: Not on file    Stress: Not on file   Relationships    Social connections:     Talks on phone: Not on file     Gets together: Not on file     Attends Church service: Not on file     Active member of club or organization: Not on file     Attends meetings of clubs or organizations: Not on file     Relationship status: Not on file    Intimate partner violence:     Fear of current or ex partner: Not on file     Emotionally abused: Not on file     Physically abused: Not on file     Forced sexual activity: Not on file   Other Topics Concern    Not on file   Social History Narrative    Not on file      Family History   Problem Relation Age of Onset    Asthma Father     Hypertension Maternal Grandmother     Liver cancer Maternal Grandmother     Cancer Family         multiple relatives    Seizures Sister     Asthma Brother     Hypertension Brother     Leukemia Maternal Uncle     Heart attack Cousin     Substance Abuse Neg Hx     Mental illness Neg Hx      History reviewed  No pertinent surgical history  Current Outpatient Medications:     albuterol (PROVENTIL HFA,VENTOLIN HFA) 90 mcg/act inhaler, Inhale 2 puffs every 6 (six) hours as needed for wheezing, Disp: 1 Inhaler, Rfl: 3    ALPRAZolam (XANAX) 0 25 mg tablet, take 1 tablet by mouth at bedtime if needed for anxiety, Disp: 30 tablet, Rfl: 0    citalopram (CeleXA) 10 mg tablet, Take 1 tablet (10 mg total) by mouth daily, Disp: 90 tablet, Rfl: 1    ergocalciferol (VITAMIN D2) 50,000 units, Take 1 capsule (50,000 Units total) by mouth every 28 days, Disp: 3 capsule, Rfl: 3    fluticasone (FLONASE) 50 mcg/act nasal spray, 2 sprays into each nostril daily, Disp: 16 g, Rfl: 3    levothyroxine 25 mcg tablet, Take 1 tablet (25 mcg total) by mouth daily, Disp: 90 tablet, Rfl: 1  Allergies   Allergen Reactions    Iodine     Levaquin [Levofloxacin]     Shellfish-Derived Products      Vitals:    12/02/19 1612   BP: 162/70   BP Location: Right arm   Patient Position: Sitting   Cuff Size: Large   Pulse: 63   SpO2: 98%   Weight: 73 5 kg (162 lb)   Height: 5' 2" (1 575 m)       Review of Systems:   Review of Systems   Constitutional: Negative  Negative for activity change, appetite change, chills, diaphoresis, fatigue, fever and unexpected weight change  HENT: Negative  Negative for congestion, dental problem, drooling, ear discharge, ear pain, facial swelling, hearing loss, mouth sores, nosebleeds, postnasal drip, rhinorrhea, sinus pressure, sinus pain, sneezing, sore throat, tinnitus, trouble swallowing and voice change  Eyes: Negative  Negative for photophobia, pain, redness, itching and visual disturbance  Respiratory: Negative  Negative for apnea, cough, choking, chest tightness, shortness of breath, wheezing and stridor  Cardiovascular: Positive for palpitations  Negative for chest pain and leg swelling  Gastrointestinal: Negative  Negative for abdominal distention, abdominal pain, anal bleeding, blood in stool, constipation, diarrhea, nausea, rectal pain and vomiting  Endocrine: Negative  Negative for cold intolerance, heat intolerance, polydipsia, polyphagia and polyuria  Genitourinary: Negative  Negative for decreased urine volume, difficulty urinating, dyspareunia, dysuria, enuresis, flank pain, frequency, genital sores, hematuria, menstrual problem, pelvic pain, urgency, vaginal bleeding, vaginal discharge and vaginal pain  Musculoskeletal: Negative  Negative for arthralgias, back pain, gait problem, joint swelling, myalgias, neck pain and neck stiffness  Skin: Negative  Negative for color change, pallor, rash and wound  Allergic/Immunologic: Negative  Negative for environmental allergies, food allergies and immunocompromised state  Neurological: Negative  Negative for dizziness, tremors, seizures, syncope, facial asymmetry, speech difficulty, weakness, light-headedness, numbness and headaches  Hematological: Negative  Negative for adenopathy  Does not bruise/bleed easily  Psychiatric/Behavioral: Negative  Negative for agitation, behavioral problems, confusion, decreased concentration, dysphoric mood, hallucinations, self-injury, sleep disturbance and suicidal ideas  The patient is not nervous/anxious and is not hyperactive  All other systems reviewed and are negative  Vitals:    12/02/19 1612   BP: 162/70   BP Location: Right arm   Patient Position: Sitting   Cuff Size: Large   Pulse: 63   SpO2: 98%   Weight: 73 5 kg (162 lb)   Height: 5' 2" (1 575 m)     Physical Examination:   Physical Exam   Constitutional: She is oriented to person, place, and time  She appears well-developed and well-nourished  No distress  HENT:   Head: Normocephalic and atraumatic     Right Ear: External ear normal    Left Ear: External ear normal    Eyes: Pupils are equal, round, and reactive to light  Conjunctivae are normal  Right eye exhibits no discharge  Left eye exhibits no discharge  No scleral icterus  Neck: Normal range of motion  Neck supple  No JVD present  No tracheal deviation present  No thyromegaly present  Cardiovascular: Normal rate and regular rhythm  Exam reveals gallop  Exam reveals no friction rub  No murmur heard  Pulmonary/Chest: Effort normal and breath sounds normal  No stridor  No respiratory distress  She has no wheezes  She has no rales  She exhibits no tenderness  Abdominal: Soft  Bowel sounds are normal  She exhibits no distension and no mass  There is no tenderness  There is no rebound and no guarding  Musculoskeletal: Normal range of motion  She exhibits no edema, tenderness or deformity  Neurological: She is alert and oriented to person, place, and time  She has normal reflexes  She displays normal reflexes  No cranial nerve deficit  She exhibits normal muscle tone  Coordination normal    Skin: Skin is warm and dry  No rash noted  She is not diaphoretic  No erythema  No pallor  Psychiatric: She has a normal mood and affect   Her behavior is normal  Judgment and thought content normal        Labs:     Lab Results   Component Value Date    WBC 6 1 10/03/2019    HGB 12 8 10/03/2019    HCT 39 6 10/03/2019    MCV 77 (L) 10/03/2019    RDW 15 4 10/03/2019     10/03/2019     BMP:  Lab Results   Component Value Date    SODIUM 142 10/03/2019    K 4 1 10/03/2019     10/03/2019    CO2 26 10/03/2019    BUN 17 10/03/2019    CREATININE 0 92 10/03/2019    GLUC 83 10/03/2019     LFT:  Lab Results   Component Value Date    AST 18 10/03/2019    ALT 20 10/03/2019    TP 7 4 10/03/2019    ALB 4 6 10/03/2019      No results found for: [id number]  No results found for: HGBA1C  Lipid Profile:   Lab Results   Component Value Date    CHOLESTEROL 207 (H) 06/04/2019    HDL 53 06/04/2019    TRIG 170 (H) 06/04/2019     Lab Results   Component Value Date    CHOLESTEROL 207 (H) 06/04/2019     No results found for: CKTOTAL, CKMB, CKMBINDEX, TROPONINI  No results found for: NTBNP   Recent Results (from the past 672 hour(s))   Stress strip    Collection Time: 11/21/19 11:00 AM   Result Value Ref Range    Protocol Name CHRISTEL     Time In Exercise Phase 00:09:14     MAX  SYSTOLIC  mmHg    Max Diastolic Bp 80 mmHg    Max Heart Rate 139 BPM    Max Predicted Heart Rate 162 BPM    Reason for Termination Fatigue     Test Indication HUA, CHEST PAIN     Target Hr Formular (220 - Age)*100%     Arrhy During Ex      ECG Interp Before Ex      ECG Interp during Ex      Ex Summary Comment      Overall Hr Response To Exercise      Overall BP Response To Exercise     Stress test only, exercise    Collection Time: 11/22/19  4:28 PM   Result Value Ref Range    Target HR  bpm       Imaging & Testing   I have personally reviewed pertinent reports  Cardiac Testing   No results found for this or any previous visit  EKG: Personally reviewed  Normal sinus rhythm no acute st/t wave changes [facility] [facility]  [phone]  Please call with any questions or suggestions    Counseling :  A description of the counseling:   Goals and Barriers:  Patient's ability to self care:  Medication side effect reviewed with patient in detail and all their questions answered  "Portions of the record may have been created with voice recognition software  Occasional wrong word or "sound a like" substitutions may have occurred due to the inherent limitations of voice recognition software  Read the chart carefully and recognize, using context, where substitutions have occurred   Please call if you have any questions  "

## 2019-12-03 ENCOUNTER — OFFICE VISIT (OUTPATIENT)
Dept: PHYSICAL THERAPY | Facility: CLINIC | Age: 59
End: 2019-12-03
Payer: COMMERCIAL

## 2019-12-03 DIAGNOSIS — M25.561 PAIN IN BOTH KNEES, UNSPECIFIED CHRONICITY: Primary | ICD-10-CM

## 2019-12-03 DIAGNOSIS — M25.562 PAIN IN BOTH KNEES, UNSPECIFIED CHRONICITY: Primary | ICD-10-CM

## 2019-12-03 PROCEDURE — 97110 THERAPEUTIC EXERCISES: CPT

## 2019-12-03 NOTE — PROGRESS NOTES
Daily Note     Today's date: 12/3/2019  Patient name: Ariadna Baca  : 1960  MRN: [id number]  Referring provider: Papito Wolf DO  Dx:   Encounter Diagnosis     ICD-10-CM    1  Pain in both knees, unspecified chronicity M25 561     M25 562                   Subjective: pt reports pain on Saturday when she laid down at night that was so bad she wanted to go to the hospital, tried ice with no relief  Pain kept her up at night  Prolonged walking causes her pain  She is having no pain upon arrival in her R knee or L coelho  Lamar Hind is painful to the touch but not in WB or rest  Reports it feels like her "bone is ripping" in her L shin and her R knee when she has pain  Objective: See treatment diary below      Assessment: Tolerated treatment well  Patient exhibited good technique with therapeutic exercises  Pt educated to try REIL when the shin pain arises to see if that can abolish the shin pain  Will report back nv to see if it gives her relief  Plan: Continue per plan of care        Precautions:   Arthritis   Patient   Asthma   Provider   Pemphigus vulgaris   Patient   Thalassemia            SOC: 19  Visit#: 4  FOTO:     Daily treatment Log:    TE's: 20'  Bike L1: 8'  Stand hip ext w/ TB: red 20x R/L  diag walk w/ TB: red 15'x2  Sidestepping w/ TB: red 15'x1 R/L-pain today in medial R knee   Bridges: 2" 2x10 unable to do today, medial R knee pain   SLR flexion w/ qset: 2x10 R/L  Sup modif demarcus stretch: 20"x5 R/L  Sup ham stretch w/ SOS: 20"x5 R/L    Updated HEP: 19- standing hip abd @ counter, quad set, glute set    Modalites:  CP R knee, L shin: 5' to end - skin intact pre/post

## 2019-12-05 ENCOUNTER — OFFICE VISIT (OUTPATIENT)
Dept: PHYSICAL THERAPY | Facility: CLINIC | Age: 59
End: 2019-12-05
Payer: COMMERCIAL

## 2019-12-05 DIAGNOSIS — M25.562 PAIN IN BOTH KNEES, UNSPECIFIED CHRONICITY: Primary | ICD-10-CM

## 2019-12-05 DIAGNOSIS — M25.561 PAIN IN BOTH KNEES, UNSPECIFIED CHRONICITY: Primary | ICD-10-CM

## 2019-12-05 PROCEDURE — 97110 THERAPEUTIC EXERCISES: CPT

## 2019-12-05 NOTE — PROGRESS NOTES
Daily Note     Today's date: 2019  Patient name: Ariadna Baca  : 1960  MRN: [id number]  Referring provider: Papito Wolf DO  Dx:   Encounter Diagnosis     ICD-10-CM    1  Pain in both knees, unspecified chronicity M25 561     M25 562                   Subjective: Pt reports feeling okay today, reports walking around the mall yesterday and her legs were very tired after  Going up the steps today her were tired  Complaints of intermittent pain in her knees with no cause except for certain positions in bed  Reports she is working tomorrow and will take note if anything at work increases her pain  Was unable to test REIL to alleviate shin pain due to no episodes of pain except to the touch        Objective: See treatment diary below      Assessment: Tolerated treatment well  Patient exhibited good technique with therapeutic exercises      Plan: Continue per plan of care        Precautions:   Arthritis   Patient   Asthma   Provider   Pemphigus vulgaris   Patient   Thalassemia            SOC: 19  Visit#: 5  FOTO:     Daily treatment Log:    TE's: 30'  Bike L1: 8'  Stand hip ext w/ TB: red 20x R/L  diag walk w/ TB: red 15'x2   Sidestepping w/ TB: red 15'x1 R/L-pain today in medial R knee    SLR flexion w/ qset: 2x10 R/L  Sup modif demarcus stretch: 20"x5 R/L  Sup ham stretch w/ SOS: 20"x5 R/L    Updated HEP: 19- standing hip abd @ counter, quad set, glute set    Modalites:  CP R knee, L shin: 5' to end - skin intact pre/post      Not today:  Bridges: 2" 2x10 unable to do today, medial R knee pain

## 2019-12-10 ENCOUNTER — APPOINTMENT (OUTPATIENT)
Dept: PHYSICAL THERAPY | Facility: CLINIC | Age: 59
End: 2019-12-10
Payer: COMMERCIAL

## 2019-12-10 ENCOUNTER — OFFICE VISIT (OUTPATIENT)
Dept: FAMILY MEDICINE CLINIC | Facility: CLINIC | Age: 59
End: 2019-12-10
Payer: COMMERCIAL

## 2019-12-10 VITALS
TEMPERATURE: 98.9 F | SYSTOLIC BLOOD PRESSURE: 122 MMHG | HEIGHT: 62 IN | HEART RATE: 80 BPM | WEIGHT: 164 LBS | DIASTOLIC BLOOD PRESSURE: 80 MMHG | OXYGEN SATURATION: 96 % | BODY MASS INDEX: 30.18 KG/M2 | RESPIRATION RATE: 20 BRPM

## 2019-12-10 DIAGNOSIS — J45.20 MILD INTERMITTENT ASTHMA WITHOUT COMPLICATION: ICD-10-CM

## 2019-12-10 DIAGNOSIS — F41.9 ANXIETY: ICD-10-CM

## 2019-12-10 DIAGNOSIS — E03.9 ACQUIRED HYPOTHYROIDISM: Primary | ICD-10-CM

## 2019-12-10 DIAGNOSIS — Z13.820 SCREENING FOR OSTEOPOROSIS: ICD-10-CM

## 2019-12-10 DIAGNOSIS — E78.2 MIXED HYPERLIPIDEMIA: ICD-10-CM

## 2019-12-10 DIAGNOSIS — D56.9 MEDITERRANEAN ANEMIA: ICD-10-CM

## 2019-12-10 DIAGNOSIS — D17.0 LIPOMA OF SCALP: ICD-10-CM

## 2019-12-10 DIAGNOSIS — Z12.39 SCREENING FOR MALIGNANT NEOPLASM OF BREAST: ICD-10-CM

## 2019-12-10 DIAGNOSIS — E78.1 HYPERTRIGLYCERIDEMIA: ICD-10-CM

## 2019-12-10 DIAGNOSIS — E55.9 VITAMIN D DEFICIENCY: ICD-10-CM

## 2019-12-10 DIAGNOSIS — Z11.4 SCREENING FOR HIV (HUMAN IMMUNODEFICIENCY VIRUS): ICD-10-CM

## 2019-12-10 DIAGNOSIS — J30.9 ALLERGIC RHINITIS, UNSPECIFIED SEASONALITY, UNSPECIFIED TRIGGER: ICD-10-CM

## 2019-12-10 PROCEDURE — 99214 OFFICE O/P EST MOD 30 MIN: CPT | Performed by: NURSE PRACTITIONER

## 2019-12-10 PROCEDURE — 1036F TOBACCO NON-USER: CPT | Performed by: NURSE PRACTITIONER

## 2019-12-10 PROCEDURE — 3008F BODY MASS INDEX DOCD: CPT | Performed by: NURSE PRACTITIONER

## 2019-12-10 RX ORDER — ERGOCALCIFEROL 1.25 MG/1
50000 CAPSULE ORAL
Qty: 3 CAPSULE | Refills: 3 | Status: SHIPPED | OUTPATIENT
Start: 2019-12-10 | End: 2020-03-10 | Stop reason: SDUPTHER

## 2019-12-10 RX ORDER — ALPRAZOLAM 0.25 MG/1
0.25 TABLET ORAL
Qty: 30 TABLET | Refills: 0 | Status: SHIPPED | OUTPATIENT
Start: 2019-12-10 | End: 2020-03-10 | Stop reason: SDUPTHER

## 2019-12-10 RX ORDER — FLUTICASONE PROPIONATE 50 MCG
2 SPRAY, SUSPENSION (ML) NASAL DAILY
Qty: 16 G | Refills: 3 | Status: SHIPPED | OUTPATIENT
Start: 2019-12-10 | End: 2020-03-10 | Stop reason: SDUPTHER

## 2019-12-10 RX ORDER — LEVOTHYROXINE SODIUM 0.03 MG/1
25 TABLET ORAL DAILY
Qty: 90 TABLET | Refills: 1 | Status: SHIPPED | OUTPATIENT
Start: 2019-12-10 | End: 2020-03-10 | Stop reason: SDUPTHER

## 2019-12-10 RX ORDER — ALBUTEROL SULFATE 90 UG/1
2 AEROSOL, METERED RESPIRATORY (INHALATION) EVERY 6 HOURS PRN
Qty: 1 INHALER | Refills: 3 | Status: SHIPPED | OUTPATIENT
Start: 2019-12-10 | End: 2020-12-07

## 2019-12-10 RX ORDER — CITALOPRAM 10 MG/1
10 TABLET ORAL DAILY
Qty: 90 TABLET | Refills: 1 | Status: SHIPPED | OUTPATIENT
Start: 2019-12-10 | End: 2020-03-10 | Stop reason: SDUPTHER

## 2019-12-10 NOTE — PROGRESS NOTES
Assessment/Plan:  1  Acquired hypothyroidism  Stable  Will check labs  - CBC and differential; Future  - Comprehensive metabolic panel; Future  - TSH, 3rd generation; Future  - levothyroxine 25 mcg tablet; Take 1 tablet (25 mcg total) by mouth daily  Dispense: 90 tablet; Refill: 1  2  Mild intermittent asthma without complication  Stable  No recent exac  Rescue inhaler prn    - CBC and differential; Future  - Comprehensive metabolic panel; Future  - - albuterol (PROVENTIL HFA,VENTOLIN HFA) 90 mcg/act inhaler; Inhale 2 puffs every 6 (six) hours as needed for wheezing  Dispense: 1 Inhaler; Refill: 3  3  Vitamin D deficiency  - CBC and differential; Future  - Comprehensive metabolic panel; Future  - Vitamin D 1,25 dihydroxy; Future  - - ergocalciferol (VITAMIN D2) 50,000 units; Take 1 capsule (50,000 Units total) by mouth every 28 days  Dispense: 3 capsule; Refill: 3  4  Anxiety  Stress management  Activities to divert attention when possible  Conscious breathing techniques as discussed  Coping mechanisms and strategies vary from person to person so try to utilize strategies that you think may work for you (such as meditation, music, etc  )  Consider  counseling as discussed  Anti anxiety/depression medications as prescribed  - CBC and differential; Future  - Comprehensive metabolic panel; Future  -- Comprehensive metabolic panel  - citalopram (CeleXA) 10 mg tablet; Take 1 tablet (10 mg total) by mouth daily  Dispense: 90 tablet; Refill: 1  - ALPRAZolam (XANAX) 0 25 mg tablet; Take 1 tablet (0 25 mg total) by mouth daily at bedtime as needed for anxiety  Dispense: 30 tablet; Refill: 0  5  Mediterranean anemia  Stable  Management per hematology  - CBC and differential; Future  - Comprehensive metabolic panel; Future  6  Mixed hyperlipidemia  - CBC and differential; Future  - Comprehensive metabolic panel; Future  - Lipid panel; Future  Heart healthy diet     7  Hypertriglyceridemia  Heart healthy diet  - CBC and differential; Future  - Comprehensive metabolic panel; Future  - Lipid panel; Future  - 8  Screening for osteoporosis  - DXA bone density spine hip and pelvis; Future  9  Screening for malignant neoplasm of breast  - Mammo screening bilateral w cad; Future  10  Screening for HIV (human immunodeficiency virus)  - Human Immunodeficiency Virus 1/2 Antigen / Antibody ( Fourth Generation) with Reflex Testing; Future  11  Allergic rhinitis, unspecified seasonality, unspecified trigger  - fluticasone (FLONASE) 50 mcg/act nasal spray; 2 sprays into each nostril daily  Dispense: 16 g; Refill: 3  12  Lipoma of scalp  Stable  No need for treatment  Will refer to derm if worsens    Patient was counseled regarding instructions for management which included: impression/diagnosis, risk/benefits of treatment options, importance of compliance with treatment, risk factor reduction, and prognosis  I have reviewed the instructions with the patient answering all questions and patient verbalized understanding  Subjective:      Patient ID: Yary Sadler is a 63 y o  female who presents for follow up    Here for follow up on chronic issues  Feels well  No recent illness  Saw cardio a week ago  Will have carotid vascular study  Has not been taking celexa regularly  No panic attacks  Does find that she needs to take xanax at times , especially when trying to wind down for night and sleep  States cannot "shut brain off"  No chest pain or sob  No side effects from meds  On meds for thyroid and vitamin d  Has mediterrean anemia an saw hematologist  Insurance would not cover all of the testing  Has lump on scalp  Not painful   Been there for a while but getting bigger      The following portions of the patient's history were reviewed and updated as appropriate: allergies, current medications, past family history, past medical history, past social history, past surgical history and problem list     Review of Systems Constitutional: Negative for activity change, appetite change and unexpected weight change  Respiratory: Negative for chest tightness and shortness of breath  Cardiovascular: Negative for chest pain, palpitations and leg swelling  Gastrointestinal: Negative for abdominal pain, diarrhea, nausea and vomiting  Endocrine: Negative for cold intolerance, heat intolerance, polydipsia, polyphagia and polyuria  Skin: Negative for color change and pallor  Neurological: Negative for dizziness and headaches  Hematological: Negative for adenopathy  Psychiatric/Behavioral: Positive for sleep disturbance  Negative for self-injury and suicidal ideas  The patient is nervous/anxious  Objective:      /80 (BP Location: Right arm, Patient Position: Sitting, Cuff Size: Adult)   Pulse 80   Temp 98 9 °F (37 2 °C) (Temporal)   Resp 20   Ht 5' 2" (1 575 m)   Wt 74 4 kg (164 lb)   SpO2 96%   BMI 30 00 kg/m²          Physical Exam   Constitutional: She is oriented to person, place, and time  She appears well-developed and well-nourished  No distress  Neck: No thyromegaly present  Cardiovascular: Normal rate and regular rhythm  No JVD  No audible carotid bruit  No peripheral edema  Pulmonary/Chest: Effort normal and breath sounds normal  No respiratory distress  She has no wheezes  Lymphadenopathy:     She has no cervical adenopathy  Neurological: She is alert and oriented to person, place, and time  No cranial nerve deficit  Coordination normal    Skin: Skin is warm and dry  No rash noted  She is not diaphoretic  No erythema  No pallor  Small protruding, raised , fleshy lesion to left mid scalp  approx 1 cm diameter   Psychiatric: She has a normal mood and affect  Her behavior is normal  Judgment and thought content normal    Vitals reviewed

## 2019-12-12 ENCOUNTER — OFFICE VISIT (OUTPATIENT)
Dept: PHYSICAL THERAPY | Facility: CLINIC | Age: 59
End: 2019-12-12
Payer: COMMERCIAL

## 2019-12-12 DIAGNOSIS — M25.561 PAIN IN BOTH KNEES, UNSPECIFIED CHRONICITY: Primary | ICD-10-CM

## 2019-12-12 DIAGNOSIS — M25.562 PAIN IN BOTH KNEES, UNSPECIFIED CHRONICITY: Primary | ICD-10-CM

## 2019-12-12 PROCEDURE — 97110 THERAPEUTIC EXERCISES: CPT

## 2019-12-12 NOTE — PROGRESS NOTES
Daily Note     Today's date: 2019  Patient name: Néstor Grace  : 1960  MRN: [id number]  Referring provider: Ramona Patel DO  Dx:   Encounter Diagnosis     ICD-10-CM    1  Pain in both knees, unspecified chronicity M25 561     M25 562                   Subjective: Pt reports she has been feeling soreness in the outside of her thighs over the past couple days with an unknown origin  reports she wore orthotics she has not been wearing and walked a lot around the mall during her xmas shoppng  Reports her knee has been feeling a lot better since wearing them, her shin is less tender to the touch  Objective: See treatment diary below      Assessment: Tolerated treatment well  Patient exhibited good technique with therapeutic exercises  Educated pt that the use of the orthotics may have made her gait change that could have been the cause of the muscle soreness in her legs if she did not do anything differently      Plan: Continue per plan of care        Precautions:   Arthritis   Patient   Asthma   Provider   Pemphigus vulgaris   Patient   Thalassemia            SOC: 19  Visit#: 6  FOTO:     Daily treatment Log:    TE's: 39'  Bike L2: 10'  Stand hip ext w/ TB: red 20x R/L  diag walk w/ TB: red 15'x3   Sidestepping w/ TB: red 15'x2  SLR flexion w/ qset: 2x10 R/L   SLR abd 1x10 R/L   Sup modif demarcus stretch: 30"x5 R/L  Sup ham stretch w/ SOS: 30"x5 R/L    Updated HEP: 19- standing hip abd @ counter, quad set, glute set    Modalites:  CP R knee, L shin: 5' to end - skin intact pre/post      Not today:   Bridges: 2" 2x10 unable to do today, medial R knee pain

## 2019-12-16 ENCOUNTER — APPOINTMENT (OUTPATIENT)
Dept: PHYSICAL THERAPY | Facility: CLINIC | Age: 59
End: 2019-12-16
Payer: COMMERCIAL

## 2019-12-17 ENCOUNTER — APPOINTMENT (OUTPATIENT)
Dept: PHYSICAL THERAPY | Facility: CLINIC | Age: 59
End: 2019-12-17
Payer: COMMERCIAL

## 2019-12-19 ENCOUNTER — APPOINTMENT (OUTPATIENT)
Dept: PHYSICAL THERAPY | Facility: CLINIC | Age: 59
End: 2019-12-19
Payer: COMMERCIAL

## 2019-12-24 ENCOUNTER — APPOINTMENT (OUTPATIENT)
Dept: PHYSICAL THERAPY | Facility: CLINIC | Age: 59
End: 2019-12-24
Payer: COMMERCIAL

## 2019-12-26 ENCOUNTER — EVALUATION (OUTPATIENT)
Dept: PHYSICAL THERAPY | Facility: CLINIC | Age: 59
End: 2019-12-26
Payer: COMMERCIAL

## 2019-12-26 DIAGNOSIS — M25.562 PAIN IN BOTH KNEES, UNSPECIFIED CHRONICITY: Primary | ICD-10-CM

## 2019-12-26 DIAGNOSIS — M25.561 PAIN IN BOTH KNEES, UNSPECIFIED CHRONICITY: Primary | ICD-10-CM

## 2019-12-26 PROCEDURE — 97110 THERAPEUTIC EXERCISES: CPT | Performed by: PHYSICAL THERAPIST

## 2019-12-26 PROCEDURE — 97140 MANUAL THERAPY 1/> REGIONS: CPT | Performed by: PHYSICAL THERAPIST

## 2019-12-26 NOTE — PROGRESS NOTES
PT Re-Evaluation     Today's date: 2019  Patient name: Yary Sadler  : 1960  MRN: [id number]  Referring provider: Keith Tobar DO  Dx:   Encounter Diagnosis     ICD-10-CM    1  Pain in both knees, unspecified chronicity M25 561     M25 562                   Assessment  Assessment details:   19: Patient reports BL knee pain that began a few months with no trauma or accident  Pain is located on medial side of the knees and goes down the shins BL  Pain is at worst /10 with no specific position or activity, sometimes with stairs and with carrying heavy weight or groceries  Painful end ranges limiting knee ROM actively  Strength impairments present in BL LEs including the L hip musculature  Poor activation of BL quadriceps and glutes contributing to poor knee stability BL  Patellar hypermobility in all planes present BL  Patient would benefit from skilled PT in order to reduce pain, improve ROM, improve BL LE strength, improve gait abnormalities, and address patellar mobility in order to stand and ambulate symptom free, 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, complete yard work and outdoor activities, complete standing household chores symptom free  Patient was provided with thorough HEP which she demonstrated correctly and verbalized understanding  Patient brought in knee brace that she bought; educated to wear as needed with function to provide pain relief and improved stability and told not to wear at night  19: Pt has made gains in strength, modest improvements in pain and ROM  Her attendance has been sparse (7 visits in 6 weeks)  She would likely be progressing better w/ more consistent attendance, but has had to tend to helping other family members w/ their medical issues  Pt will benefit from continued PT per POC to progress towards all goals (will extend POC to allow for 4 weeks of PT from now)    Impairments: abnormal gait, abnormal muscle firing, abnormal muscle tone, abnormal or restricted ROM, impaired balance, impaired physical strength, lacks appropriate home exercise program, pain with function and weight-bearing intolerance    Symptom irritability: moderateUnderstanding of Dx/Px/POC: good   Prognosis: good    Goals  STGs:   1  Reduce pain levels to constant 0/10 50% of time - 0/10 25% of the time  2  Improve strength of BL LEs by 1/4-1/2 in order to stand and ambulate with good stabilization to prevent re-injury  - met  3  [facility] to regain quadriceps strength present prior to injury  - met  4  Improve ROM by 5-10 degrees of BL knees to WNL to allow easier ambulation and stair climbing  - partially met (L knee flexion still limited by pain)      LTGs:  1  Improve strength of BL LEs to 4+/5 or greater in order to stand and ambulate with good stabilization to prevent re-injury  - met  2  Ambulate 10 flights of stairs without knee buckling and no UE use indicating improvements in quad strength   - able to do w/ pain  3  Ambulate on TM for 20-30 min indicating improvements in endurance and LE strength so she can 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 and dancing symptom free   - not met    Plan  Patient would benefit from: PT eval and skilled physical therapy  Planned modality interventions: TENS, thermotherapy: hydrocollator packs, ultrasound, unattended electrical stimulation and cryotherapy  Planned therapy interventions: joint mobilization, manual therapy, abdominal trunk stabilization, neuromuscular re-education, patient education, strengthening, stretching, therapeutic exercise, home exercise program, graded motor, graded exercise, functional ROM exercises and gait training  Frequency: 2x week  Plan of Care beginning date: 11/19/2019  Plan of Care expiration date: 1/23/2020  Treatment plan discussed with: patient        Subjective Evaluation    History of Present Illness  Mechanism of injury:   11/19/19: Patient reports BL knee pain that began a few months with no trauma or accident  Pain is located on medial side of the knees and goes down the shins BL  Pain is at worst 9/10 with no specific position or activity, sometimes with stairs and with carrying heavy weight or groceries    No NT or burning  Pain is intermittent reaching 0/10 at best    Had x rays which showed degenerative changes  Difficulty sleeping  States that cancer runs in her family and is afraid of cancer causing BL shin pain  19: Pt reports she feels therapy is helping her strength, but she still has intermittent high pain  She has not had L or R shin pain, but continues w/ R medial knee pain and tenderness  Pain  Current pain ratin  At best pain ratin  At worst pain ratin  Location: medial BL knees into the shins   Quality: throbbing and sharp  Relieving factors: relaxation and rest    Social Support  Steps to enter house: yes  Stairs in house: yes   Lives in: multiple-level home  Lives with: lives with friends      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)    Diagnostic Tests  X-ray: abnormal    FCE comments: Medial narrowing of kneesTreatments  Current treatment: physical therapy  Patient Goals  Patient goals for therapy: increased strength, independence with ADLs/IADLs, decreased pain, increased motion, return to sport/leisure activities and improved balance  Patient goal: dancing and wearing high heels         Objective     Static Posture     Comments  19: SLS 30 sec R/L  19: SLS: R= 30 sec; L= 20 sec stop due to increase pain     Palpation     Additional Palpation Details  fair muscle activation of BL quads and glutes (improved)  TTP R pes anserines    Active Range of Motion   Left Knee   Flexion: 145 degrees   Extension: 0 degrees     Right Knee   Flexion: 130 degrees with pain  Extension: 0 degrees     Mobility   Patellar Mobility:   Left Knee   Hypermobile: left medial, left lateral, left superior and left inferior Right Knee   Hypermobile: medial, lateral, superior and inferior     Strength/Myotome Testing     Left Hip   Planes of Motion   Flexion: 4+  Abduction: 4+  External rotation: 4+  Internal rotation: 4+    Right Hip   Planes of Motion   Flexion: 4+  Abduction: 4+  External rotation: 4+  Internal rotation: 4+    Left Knee   Flexion: 5  Extension: 5    Right Knee   Flexion: 5  Extension: 5    Left Ankle/Foot   Dorsiflexion: 5    Right Ankle/Foot   Dorsiflexion: 5    Functional Assessment        Comments  Gait observation: hip ER, toe out, genu valgus, reduced hip extension BL  Stair ambulation: pain and genu valgus present             Precautions:   Arthritis   Patient   Asthma   Provider   Pemphigus vulgaris   Patient   Thalassemia            SOC: 11/19/19  Visit#: 7  FOTO: 11/119    Daily treatment Log:    Manual: 10'  Stick rolling R quad/pes in supine w/ foot off edge    TE's: 20'  Bike L2: 10'  SAQ: 3# 20x R/L  Bridges 2x10  diag walk w/ TB: red 15'x4   Sidestepping w/ TB: red 15'x2 each R/L      Updated HEP: 11/19/19- standing hip abd @ counter, quad set, glute set    Modalites:  CP R knee, L shin: 5' to end - skin intact pre/post      Not today:   Stand hip ext w/ TB: red 20x R/L  SLR flexion w/ qset: 2x10 R/L   SLR abd 1x10 R/L   Sup modif demarcus stretch: 30"x5 R/L  Sup ham stretch w/ SOS: 30"x5 R/L

## 2019-12-27 ENCOUNTER — HOSPITAL ENCOUNTER (OUTPATIENT)
Dept: RADIOLOGY | Facility: HOSPITAL | Age: 59
Discharge: HOME | End: 2019-12-27
Attending: INTERNAL MEDICINE
Payer: COMMERCIAL

## 2019-12-27 DIAGNOSIS — R00.2 PALPITATIONS: ICD-10-CM

## 2019-12-27 DIAGNOSIS — R07.9 CHEST PAIN IN ADULT: ICD-10-CM

## 2019-12-27 DIAGNOSIS — E78.2 MIXED HYPERLIPIDEMIA: ICD-10-CM

## 2019-12-27 PROCEDURE — 93880 EXTRACRANIAL BILAT STUDY: CPT

## 2019-12-28 PROCEDURE — 93880 EXTRACRANIAL BILAT STUDY: CPT | Performed by: SURGERY

## 2019-12-30 ENCOUNTER — APPOINTMENT (OUTPATIENT)
Dept: PHYSICAL THERAPY | Facility: CLINIC | Age: 59
End: 2019-12-30
Payer: COMMERCIAL

## 2020-01-02 ENCOUNTER — OFFICE VISIT (OUTPATIENT)
Dept: PHYSICAL THERAPY | Facility: CLINIC | Age: 60
End: 2020-01-02
Payer: COMMERCIAL

## 2020-01-02 DIAGNOSIS — M25.562 PAIN IN BOTH KNEES, UNSPECIFIED CHRONICITY: Primary | ICD-10-CM

## 2020-01-02 DIAGNOSIS — M25.561 PAIN IN BOTH KNEES, UNSPECIFIED CHRONICITY: Primary | ICD-10-CM

## 2020-01-02 PROCEDURE — 97110 THERAPEUTIC EXERCISES: CPT

## 2020-01-02 PROCEDURE — 97140 MANUAL THERAPY 1/> REGIONS: CPT

## 2020-01-02 NOTE — PROGRESS NOTES
Daily Note     Today's date: 2020  Patient name: Yary Sadler  : 1960  MRN: [id number]  Referring provider: Keith Tobar DO  Dx:   Encounter Diagnosis     ICD-10-CM    1  Pain in both knees, unspecified chronicity M25 561     M25 562                   Subjective: 5/10 at start of session took 3 ibuprofen today because she had to work  Reports her leg getting hit out to the side while in bed and 'saw stars' reports she almost called her dr today because she wants and MRI, is not getting relief  Reports she is going to call her Dr tomorrow      Objective: See treatment diary below  Seated R knee ext with OP= NE  Seated R knee ext in ER with OP= Dec/   Measured with SLR which produced burning        Assessment: Tolerated treatment well  Patient exhibited good technique with therapeutic exercises      Plan: Continue per plan of care  Precautions:   Arthritis   Patient   Asthma   Provider   Pemphigus vulgaris   Patient   Thalassemia            SOC: 19  Visit#: 8  FOTO:     Daily treatment Log:    Manual: 10'  IASTM R pes ans, medial quad and knee   Tender to touch medial to R patella      TE's: 30'  Bike L2: 10'  SAQ: 3# 20x R/L  Bridges 2x10  SLR flexion w/ qset: 2x10 R/L    diag walk w/ TB: red 15'x4   Sidestepping w/ TB: red 15'x2 each R/L      Updated HEP: 19- standing hip abd @ counter, quad set, glute set    Modalites:  CP R knee, L shin: 5' to end - skin intact pre/post      Not today:   Stand hip ext w/ TB: red 20x R/L   Sup modif demarcus stretch: 30"x5 R/L  Sup ham stretch w/ SOS: 30"x5 R/L   SLR abd 1x10 R/L

## 2020-01-06 ENCOUNTER — OFFICE VISIT (OUTPATIENT)
Dept: PHYSICAL THERAPY | Facility: CLINIC | Age: 60
End: 2020-01-06
Payer: COMMERCIAL

## 2020-01-06 DIAGNOSIS — M25.562 PAIN IN BOTH KNEES, UNSPECIFIED CHRONICITY: Primary | ICD-10-CM

## 2020-01-06 DIAGNOSIS — M25.561 PAIN IN BOTH KNEES, UNSPECIFIED CHRONICITY: Primary | ICD-10-CM

## 2020-01-06 PROCEDURE — 97140 MANUAL THERAPY 1/> REGIONS: CPT

## 2020-01-06 PROCEDURE — 97110 THERAPEUTIC EXERCISES: CPT

## 2020-01-06 NOTE — PROGRESS NOTES
Daily Note     Today's date: 2020  Patient name: Ryan Thao  : 1960  MRN: [id number]  Referring provider: Neeru Nelson DO  Dx:   Encounter Diagnosis     ICD-10-CM    1  Pain in both knees, unspecified chronicity M25 561     M25 562                   Subjective: 7/10 at arrival  pt reports she got onto the floor to and was unable to get up without UE support  Reports she had relief after the use of the IASTM last session and slept well that night  6/10 post session       Objective: See treatment diary below      Assessment: Tolerated treatment well  Patient exhibited good technique with therapeutic exercises  Pt continues to present with pain, slight relief post session  Plan: Continue per plan of care  Precautions:   Arthritis   Patient   Asthma   Provider   Pemphigus vulgaris   Patient   Thalassemia            SOC: 19  Visit#: 9  FOTO:     Daily treatment Log:    Manual: 10'  IASTM R pes ans, medial quad and knee   Tender to touch medial to R patella      TE's: 30'  Bike L2: 10'  SAQ: 3# 20x R/L  Bridges 2x10  SLR flexion w/ qset: 2x10 R/L    SLR abd 1x10 R/L   diag walk w/ TB: red 15'x4   Sidestepping w/ TB: red 15'x2 each R/L      Updated HEP: 19- standing hip abd @ counter, quad set, glute set    Modalites:  CP R knee, L shin: 5' to end - skin intact pre/post      Not today:   Stand hip ext w/ TB: red 20x R/L   Sup modif demarcus stretch: 30"x5 R/L  Sup ham stretch w/ SOS: 30"x5 R/L

## 2020-01-09 ENCOUNTER — OFFICE VISIT (OUTPATIENT)
Dept: OBGYN CLINIC | Facility: CLINIC | Age: 60
End: 2020-01-09
Payer: COMMERCIAL

## 2020-01-09 ENCOUNTER — OFFICE VISIT (OUTPATIENT)
Dept: PHYSICAL THERAPY | Facility: CLINIC | Age: 60
End: 2020-01-09
Payer: COMMERCIAL

## 2020-01-09 VITALS
WEIGHT: 162 LBS | HEART RATE: 62 BPM | BODY MASS INDEX: 29.81 KG/M2 | HEIGHT: 62 IN | DIASTOLIC BLOOD PRESSURE: 81 MMHG | SYSTOLIC BLOOD PRESSURE: 147 MMHG

## 2020-01-09 DIAGNOSIS — M17.11 PRIMARY OSTEOARTHRITIS OF RIGHT KNEE: ICD-10-CM

## 2020-01-09 DIAGNOSIS — G89.29 CHRONIC PAIN OF RIGHT KNEE: ICD-10-CM

## 2020-01-09 DIAGNOSIS — M25.562 PAIN IN BOTH KNEES, UNSPECIFIED CHRONICITY: Primary | ICD-10-CM

## 2020-01-09 DIAGNOSIS — M25.561 CHRONIC PAIN OF RIGHT KNEE: ICD-10-CM

## 2020-01-09 DIAGNOSIS — M23.91 INTERNAL DERANGEMENT OF RIGHT KNEE: Primary | ICD-10-CM

## 2020-01-09 DIAGNOSIS — M25.561 PAIN IN BOTH KNEES, UNSPECIFIED CHRONICITY: Primary | ICD-10-CM

## 2020-01-09 PROCEDURE — 97110 THERAPEUTIC EXERCISES: CPT | Performed by: PHYSICAL THERAPIST

## 2020-01-09 PROCEDURE — 99213 OFFICE O/P EST LOW 20 MIN: CPT | Performed by: ORTHOPAEDIC SURGERY

## 2020-01-09 PROCEDURE — 97140 MANUAL THERAPY 1/> REGIONS: CPT | Performed by: PHYSICAL THERAPIST

## 2020-01-09 NOTE — PROGRESS NOTES
Daily Note     Today's date: 2020  Patient name: Silverio Holman  : 1960  MRN: [id number]  Referring provider: Negro Patterson DO  Dx:   Encounter Diagnosis     ICD-10-CM    1  Pain in both knees, unspecified chronicity M25 561     M25 562                   Subjective: MD appt got moved to tomorrow, "it's funny, I've felt good the past few days"      Objective: See treatment diary below      Assessment: Tolerated treatment well  Patient demonstrated fatigue post treatment and minor medial soreness after "pushing herself" on the bike, better after CP  Plan: Continue per plan of care  Precautions:   Arthritis   Patient   Asthma   Provider   Pemphigus vulgaris   Patient   Thalassemia            SOC: 19  Visit#: 10  FOTO: 19  RE:     Daily treatment Log:    Manual: 10'  IASTM R pes ans, medial quad and knee   Tender to touch medial to R patella      TE's: 20'  Bike L3: 10'  Sit to stand from chair +2" pad w/ B/L OH press: 2# DB's 2x12  Upright B/L leg press: 45# 25x  diag walk w/ TB: red 15'x4   Sidestepping w/ TB: red 15'x4 each R/L      Updated HEP: 19- standing hip abd @ counter, quad set, glute set    Modalites:  CP R knee, L shin: 5' to end - skin intact pre/post      Not today:   Stand hip ext w/ TB: red 20x R/L   Sup modif demarcus stretch: 30"x5 R/L  Sup ham stretch w/ SOS: 30"x5 R/L   SAQ: 3# 20x R/L  Bridges 2x10  SLR flexion w/ qset: 2x10 R/L    SLR abd 1x10 R/L

## 2020-01-09 NOTE — PROGRESS NOTES
Assessment/Plan:  1  Internal derangement of right knee  MRI knee right  wo contrast   2  Chronic pain of right knee  MRI knee right  wo contrast   3  Primary osteoarthritis of right knee  MRI knee right  wo contrast     Nahomy Brito is a pleasant 55-year-old female presenting today for follow-up of her activity related right knee pain  We've been treating her for her mild underlying osteoarthritis with physical therapy, NSAIDs, Tylenol, home exercise program, and recently a series of Euflexxa completed in August   Unfortunately, she continues to activity related sharp stabbing medial-sided knee pain  In conjunction with her positive Apley grind and Davida test on exam today, there is significant concern for a medial meniscus tear  Therefore, we've referred her for an MRI without contrast of her right knee to determine if there may be any internal pathology beyond her known mild osteoarthritis  We will plan to see her back in the office to review the MRI and discussed options  She expressed understanding all of her questions were addressed today  Subjective: right knee follow up    Patient ID: Peace Elliott is a 63 y o  female  Nahomy Brito is a pleasant 55-year-old female presenting today for follow-up of her activity related right knee pain  She completed the Euflexxa series in August of last year and has been working physical therapy  Unfortunately, her medial-sided activity related knee pain has persisted  She denies locking or catching, but does not fully trust her knee especially with increased activity  She feels a sharp stabbing pain in the medial aspect of her knee  It has not been relieved by injections, NSAIDs, Tylenol, home exercise program, or physical therapy  Her pain is worse at night  Review of Systems   Constitutional: Negative  HENT: Negative  Eyes: Negative  Respiratory: Negative  Cardiovascular: Negative  Gastrointestinal: Negative  Endocrine: Negative  Genitourinary: Negative  Musculoskeletal: Positive for arthralgias  Skin: Negative  Allergic/Immunologic: Negative  Neurological: Negative  Hematological: Negative  Psychiatric/Behavioral: Negative  Past Medical History:   Diagnosis Date    Arthritis     Asthma     Pemphigus vulgaris     Thalassemia        History reviewed  No pertinent surgical history  Family History   Problem Relation Age of Onset    Asthma Father     Hypertension Maternal Grandmother     Liver cancer Maternal Grandmother     Cancer Family         multiple relatives    Seizures Sister     Asthma Brother     Hypertension Brother     Leukemia Maternal Uncle     Heart attack Cousin     Substance Abuse Neg Hx     Mental illness Neg Hx        Social History     Occupational History    Not on file   Tobacco Use    Smoking status: Former Smoker     Last attempt to quit:      Years since quittin 0    Smokeless tobacco: Never Used   Substance and Sexual Activity    Alcohol use:  Yes     Alcohol/week: 1 0 standard drinks     Types: 1 Glasses of wine per week     Frequency: 2-4 times a month     Drinks per session: 1 or 2     Binge frequency: Less than monthly     Comment: socially     Drug use: Never    Sexual activity: Not on file         Current Outpatient Medications:     albuterol (PROVENTIL HFA,VENTOLIN HFA) 90 mcg/act inhaler, Inhale 2 puffs every 6 (six) hours as needed for wheezing, Disp: 1 Inhaler, Rfl: 3    ALPRAZolam (XANAX) 0 25 mg tablet, Take 1 tablet (0 25 mg total) by mouth daily at bedtime as needed for anxiety, Disp: 30 tablet, Rfl: 0    citalopram (CeleXA) 10 mg tablet, Take 1 tablet (10 mg total) by mouth daily, Disp: 90 tablet, Rfl: 1    ergocalciferol (VITAMIN D2) 50,000 units, Take 1 capsule (50,000 Units total) by mouth every 28 days, Disp: 3 capsule, Rfl: 3    fluticasone (FLONASE) 50 mcg/act nasal spray, 2 sprays into each nostril daily, Disp: 16 g, Rfl: 3   levothyroxine 25 mcg tablet, Take 1 tablet (25 mcg total) by mouth daily, Disp: 90 tablet, Rfl: 1    Allergies   Allergen Reactions    Iodine     Levaquin [Levofloxacin]     Shellfish-Derived Products        Objective:  Vitals:    01/09/20 1116   BP: 147/81   Pulse: 62       Body mass index is 29 63 kg/m²  Right Knee Exam     Muscle Strength   The patient has normal right knee strength  Tenderness   The patient is experiencing tenderness in the MCL and medial joint line  Range of Motion   Extension:  0 normal   Flexion:  130 normal     Tests   Davida:  Medial - positive Lateral - negative  Varus: negative Valgus: negative  Drawer:  Anterior - negative    Posterior - negative  Patellar apprehension: negative    Other   Erythema: absent  Scars: absent  Sensation: normal  Pulse: present  Swelling: none  Effusion: no effusion present    Comments:  Positive medial Davida and Apley  Collateral ligaments stable to stressing  Minimal PF crepitus and negative PFG  Thigh and calf soft and nontender  Grossly NVI          Observations     Right Knee   Negative for effusion  Physical Exam   Constitutional: She is oriented to person, place, and time  She appears well-developed and well-nourished  Body mass index is 29 63 kg/m²  HENT:   Head: Normocephalic and atraumatic  Eyes: EOM are normal    Neck: Normal range of motion  Cardiovascular: Intact distal pulses  Pulmonary/Chest: Effort normal    Musculoskeletal:        Right knee: She exhibits no effusion  See ortho exam   Neurological: She is alert and oriented to person, place, and time  Skin: Skin is warm and dry  Psychiatric: She has a normal mood and affect  Her behavior is normal  Judgment and thought content normal    Nursing note and vitals reviewed

## 2020-01-13 ENCOUNTER — OFFICE VISIT (OUTPATIENT)
Dept: PHYSICAL THERAPY | Facility: CLINIC | Age: 60
End: 2020-01-13
Payer: COMMERCIAL

## 2020-01-13 DIAGNOSIS — M25.562 PAIN IN BOTH KNEES, UNSPECIFIED CHRONICITY: Primary | ICD-10-CM

## 2020-01-13 DIAGNOSIS — M25.561 PAIN IN BOTH KNEES, UNSPECIFIED CHRONICITY: Primary | ICD-10-CM

## 2020-01-13 PROCEDURE — 97110 THERAPEUTIC EXERCISES: CPT

## 2020-01-13 PROCEDURE — 97140 MANUAL THERAPY 1/> REGIONS: CPT

## 2020-01-13 NOTE — PROGRESS NOTES
Daily Note     Today's date: 2020  Patient name: Peace Elliott  : 1960  MRN: [id number]  Referring provider: Estrella Parker DO  Dx:   Encounter Diagnosis     ICD-10-CM    1  Pain in both knees, unspecified chronicity M25 561     M25 562                   Subjective: reports her knee was hurting last night, goes for an MRI on feb 3rd  4/10 in R knee upon arrival  Reports feeling tired today     Objective: See treatment diary below      Assessment: Tolerated treatment well  Patient exhibited good technique with therapeutic exercises  Plan: Continue per plan of care  Precautions:   Arthritis   Patient   Asthma   Provider   Pemphigus vulgaris   Patient   Thalassemia            SOC: 19  Visit#: 11  FOTO: 19  RE:     Daily treatment Log:    Manual: 10'  IASTM R pes ans, medial quad and knee   Tender to touch medial to R patella      TE's: 30'  Bike L3: 8'  Sit to stand from chair +2" pad w/ B/L OH press: 2# DB's 2x12  Upright B/L leg press: 45# 25x  Sup modif demarcus stretch: 30"x5 R/L  Sup ham stretch w/ SOS: 30"x5 R/L   SAQ: 3# 20x R/L  SLR flexion w/ qset: 2x10 R/L          Updated HEP: 19- standing hip abd @ counter, quad set, glute set    Modalites:  CP R knee 5' to end - skin intact pre/post      Not today:   Bridges 2x10   Stand hip ext w/ TB: red 20x R/L   diag walk w/ TB: red 15'x4   Sidestepping w/ TB: red 15'x4 each R/L   SLR abd 1x10 R/L

## 2020-01-16 ENCOUNTER — APPOINTMENT (OUTPATIENT)
Dept: PHYSICAL THERAPY | Facility: CLINIC | Age: 60
End: 2020-01-16
Payer: COMMERCIAL

## 2020-01-21 ENCOUNTER — OFFICE VISIT (OUTPATIENT)
Dept: PHYSICAL THERAPY | Facility: CLINIC | Age: 60
End: 2020-01-21
Payer: COMMERCIAL

## 2020-01-21 DIAGNOSIS — M25.561 PAIN IN BOTH KNEES, UNSPECIFIED CHRONICITY: Primary | ICD-10-CM

## 2020-01-21 DIAGNOSIS — M25.562 PAIN IN BOTH KNEES, UNSPECIFIED CHRONICITY: Primary | ICD-10-CM

## 2020-01-21 PROCEDURE — 97110 THERAPEUTIC EXERCISES: CPT | Performed by: PHYSICAL THERAPIST

## 2020-01-21 NOTE — PROGRESS NOTES
Daily Note     Today's date: 2020  Patient name: Jean Pierre Allred  : 1960  MRN: [id number]   Referring provider: Wiliam Mcardle, DO  Dx:   Encounter Diagnosis     ICD-10-CM    1  Pain in both knees, unspecified chronicity M25 561     M25 562                   Subjective: Pt c/o L shin pain and decreased sensation in L 1st toe  She has not tried REIL as suggested previously  Objective: See treatment diary below  L L4 DTR deminished vs R  L gr toe ext 4+/5 vs R 5/5  Dec sensation L 1st toe    REIL 2x10 followed by ReIL w/ belt fixation 1x10: L L4 DTR WNL, L toe ext 5/5 and increased sensation noted  Instructed pt for REIL every 2 hours and avoidance of prolonged flexed/sititng postures  Then resumed knee program       Assessment: Tolerated treatment well  Patient demonstrated fatigue post treatment and also seems to have relevant lumbar component  She will be away again next week  Her spase attendence is limiting her progress  Plan: Continue per plan of care  Precautions:   Arthritis   Patient   Asthma   Provider   Pemphigus vulgaris   Patient   Thalassemia            SOC: 19  Visit#: 12  FOTO: 19  RE:     Daily treatment Log:        TE's: 15' +15 mech assessment as above  Bike L4x10'  diag walk w/ TB: red 15'x4   Sidestepping w/ TB: red 15'x4 each R/L  Clamshells grn : 2x10 R/L  Upright B/L leg press: 45# 25x           Updated HEP: 19- standing hip abd @ counter, quad set, glute set    2020 - REIL every 2 hours      Not today:   Bridges 2x10   Stand hip ext w/ TB: red 20x R/L    Sit to stand from chair +2" pad w/ B/L OH press: 2# DB's 2x12  SLR abd 1x10 R/L    Sup modif demarcus stretch: 30"x5 R/L  Sup ham stretch w/ SOS: 30"x5 R/L   SAQ: 3# 20x R/L  SLR flexion w/ qset: 2x10 R/L   Manual: Not today  IASTM R pes ans, medial quad and knee   Tender to touch medial to R patella

## 2020-01-23 ENCOUNTER — EVALUATION (OUTPATIENT)
Dept: PHYSICAL THERAPY | Facility: CLINIC | Age: 60
End: 2020-01-23
Payer: COMMERCIAL

## 2020-01-23 DIAGNOSIS — M25.561 PAIN IN BOTH KNEES, UNSPECIFIED CHRONICITY: Primary | ICD-10-CM

## 2020-01-23 DIAGNOSIS — M25.562 PAIN IN BOTH KNEES, UNSPECIFIED CHRONICITY: Primary | ICD-10-CM

## 2020-01-23 PROCEDURE — 97110 THERAPEUTIC EXERCISES: CPT

## 2020-01-23 PROCEDURE — 97140 MANUAL THERAPY 1/> REGIONS: CPT

## 2020-01-23 NOTE — PROGRESS NOTES
Daily Note     Today's date: 2020  Patient name: Néstor Grace  : 1960  MRN: [id number]  Referring provider: Ramona Patel DO  Dx:   Encounter Diagnosis     ICD-10-CM    1  Pain in both knees, unspecified chronicity M25 561     M25 562                   Subjective: reports pain in R knee last night throbbing and on her way here  7/10       Objective: See treatment diary below      Assessment: Tolerated treatment well  Patient exhibited good technique with therapeutic exercises      Plan: Continue per plan of care  Precautions:   Arthritis   Patient   Asthma   Provider   Pemphigus vulgaris   Patient   Thalassemia            SOC: 19  Visit#: 12  FOTO: 19  RE:     Daily treatment Log:    Manual: 10'  IASTM R pes ans, medial quad and knee   Tender to touch medial to R patella     TE's: 20'  Bike L4x10'  Bridges 2x10    diag walk w/ TB: red 15'x4   Sidestepping w/ TB: red 15'x4 each R/L  Clamshells grn : 2x10 R/L  Upright B/L leg press: 45# 30x       Updated HEP: 19- standing hip abd @ counter, quad set, glute set    2020 - REIL every 2 hours      Not today:   Stand hip ext w/ TB: red 20x R/L    Sit to stand from chair +2" pad w/ B/L OH press: 2# DB's 2x12  SLR abd 1x10 R/L    Sup modif demarcus stretch: 30"x5 R/L  Sup ham stretch w/ SOS: 30"x5 R/L   SAQ: 3# 20x R/L  SLR flexion w/ qset: 2x10 R/L

## 2020-01-24 NOTE — PROGRESS NOTES
PT Re-Evaluation     Today's date: 2020  Patient name: Anay Mulligan  : 1960  MRN: [id number]  Referring provider: Venkatesh Dickens DO  Dx:   Encounter Diagnosis     ICD-10-CM    1  Pain in both knees, unspecified chronicity M25 561     M25 562        Start Time: 1700  Stop Time: 1740  Total time in clinic (min): 40 minutes    Assessment  Assessment details:   19: Patient reports BL knee pain that began a few months with no trauma or accident  Pain is located on medial side of the knees and goes down the shins BL  Pain is at worst 9/10 with no specific position or activity, sometimes with stairs and with carrying heavy weight or groceries  Painful end ranges limiting knee ROM actively  Strength impairments present in BL LEs including the L hip musculature  Poor activation of BL quadriceps and glutes contributing to poor knee stability BL  Patellar hypermobility in all planes present BL  Patient would benefit from skilled PT in order to reduce pain, improve ROM, improve BL LE strength, improve gait abnormalities, and address patellar mobility in order to stand and ambulate symptom free, 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, complete yard work and outdoor activities, complete standing household chores symptom free  Patient was provided with thorough HEP which she demonstrated correctly and verbalized understanding  Patient brought in knee brace that she bought; educated to wear as needed with function to provide pain relief and improved stability and told not to wear at night  19: Pt has made gains in strength, modest improvements in pain and ROM  Her attendance has been sparse (7 visits in 6 weeks)  She would likely be progressing better w/ more consistent attendance, but has had to tend to helping other family members w/ their medical issues  Pt will benefit from continued PT per POC to progress towards all goals (will extend POC to allow for 4 weeks of PT from now)    2020: Pt has attended 13 skilled PT sessions and continues w/ intermittent severe pain despite good strength and functional ROM  She will complete 2 remaining authorized visits  Insurance is denying further visits due to lack of progress and limited attendance by the pt  HEP will be updated  Pt is having and MRI 2/4/2020, which may alter POC  Impairments: abnormal gait, abnormal muscle firing, abnormal muscle tone, abnormal or restricted ROM, impaired balance, impaired physical strength, lacks appropriate home exercise program, pain with function and weight-bearing intolerance    Symptom irritability: moderateUnderstanding of Dx/Px/POC: good   Prognosis: good    Goals  STGs:   1  Reduce pain levels to constant 0/10 50% of time - 0/10 25% of the time - not met  2  Improve strength of BL LEs by 1/4-1/2 in order to stand and ambulate with good stabilization to prevent re-injury  - met  3  Progress quad sets to [facility] to regain quadriceps strength present prior to injury  - met  4  Improve ROM by 5-10 degrees of BL knees to WNL to allow easier ambulation and stair climbing  - partially met (L knee flexion still limited by pain)      LTGs: target date 2/14/2020  1  Improve strength of BL LEs to 4+/5 or greater in order to stand and ambulate with good stabilization to prevent re-injury  - met  2  Ambulate 10 flights of stairs without knee buckling and no UE use indicating improvements in quad strength   - able to do w/ pain  3  Ambulate on TM for 20-30 min indicating improvements in endurance and LE strength so she can 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 and dancing symptom free   - not met    Plan  Patient would benefit from: PT eval and skilled physical therapy  Planned modality interventions: TENS, thermotherapy: hydrocollator packs, ultrasound, unattended electrical stimulation and cryotherapy  Planned therapy interventions: joint mobilization, manual therapy, abdominal trunk stabilization, neuromuscular re-education, patient education, strengthening, stretching, therapeutic exercise, home exercise program, graded motor, graded exercise, functional ROM exercises and gait training  Frequency: 2x week  Plan of Care beginning date: 2019  Plan of Care expiration date: 2020  Treatment plan discussed with: patient        Subjective Evaluation    History of Present Illness  Mechanism of injury:   19: Patient reports BL knee pain that began a few months with no trauma or accident  Pain is located on medial side of the knees and goes down the shins BL  Pain is at worst 9/10 with no specific position or activity, sometimes with stairs and with carrying heavy weight or groceries    No NT or burning  Pain is intermittent reaching 0/10 at best    Had x rays which showed degenerative changes  Difficulty sleeping  States that cancer runs in her family and is afraid of cancer causing BL shin pain  19: Pt reports she feels therapy is helping her strength, but she still has intermittent high pain  She has not had L or R shin pain, but continues w/ R medial knee pain and tenderness  2020: Pt reports continued variable pain in her knee/leg  Some days are very painful  She has an MRI scheduled for next week  Walking tolerance remains 20-30 min  Pain  Current pain ratin  At best pain ratin  At worst pain ratin  Location: medial BL knees into the shins   Quality: throbbing and sharp  Relieving factors: relaxation and rest    Social Support  Steps to enter house: yes  Stairs in house: yes   Lives in: multiple-level home  Lives with: lives with friends      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)    Diagnostic Tests  X-ray: abnormal    FCE comments: Medial narrowing of kneesTreatments  Current treatment: physical therapy  Patient Goals  Patient goals for therapy: increased strength, independence with ADLs/IADLs, decreased pain, increased motion, return to sport/leisure activities and improved balance  Patient goal: dancing and wearing high heels         Objective     Static Posture     Comments  1/242020: R/L 30 sec each  12/26/19: SLS 30 sec R/L  11/19/19: SLS: R= 30 sec; L= 20 sec stop due to increase pain       Palpation     Additional Palpation Details  fair muscle activation of BL quads and glutes (improved)  TTP R pes anserines    Active Range of Motion   Left Knee   Flexion: 145 degrees   Extension: 0 degrees     Right Knee   Flexion: 132 degrees with pain  Extension: 0 degrees     Mobility   Patellar Mobility:   Left Knee   Hypermobile: left medial, left lateral, left superior and left inferior      Right Knee   Hypermobile: medial, lateral, superior and inferior     Strength/Myotome Testing     Left Hip   Planes of Motion   Flexion: 4+  Abduction: 4+  External rotation: 4+  Internal rotation: 4+    Right Hip   Planes of Motion   Flexion: 4+  Abduction: 4+  External rotation: 4+  Internal rotation: 4+    Left Knee   Flexion: 5  Extension: 5    Right Knee   Flexion: 5  Extension: 5    Left Ankle/Foot   Dorsiflexion: 5    Right Ankle/Foot   Dorsiflexion: 5    Functional Assessment        Comments  Gait observation: hip ER, toe out, genu valgus, reduced hip extension BL  Stair ambulation: pain and genu valgus present             Precautions:   Arthritis   Patient   Asthma   Provider   Pemphigus vulgaris   Patient   Thalassemia        See separate note for daily treatment intervention from today

## 2020-02-03 ENCOUNTER — HOSPITAL ENCOUNTER (OUTPATIENT)
Dept: RADIOLOGY | Facility: HOSPITAL | Age: 60
Discharge: HOME | End: 2020-02-03
Payer: COMMERCIAL

## 2020-02-03 DIAGNOSIS — M23.91 INTERNAL DERANGEMENT OF RIGHT KNEE: ICD-10-CM

## 2020-02-03 DIAGNOSIS — M17.11 PRIMARY OSTEOARTHRITIS OF RIGHT KNEE: ICD-10-CM

## 2020-02-03 DIAGNOSIS — G89.29 CHRONIC PAIN OF RIGHT KNEE: ICD-10-CM

## 2020-02-03 DIAGNOSIS — M25.561 CHRONIC PAIN OF RIGHT KNEE: ICD-10-CM

## 2020-02-03 PROCEDURE — 73721 MRI JNT OF LWR EXTRE W/O DYE: CPT

## 2020-02-06 ENCOUNTER — OFFICE VISIT (OUTPATIENT)
Dept: OBGYN CLINIC | Facility: CLINIC | Age: 60
End: 2020-02-06
Payer: COMMERCIAL

## 2020-02-06 VITALS
WEIGHT: 165 LBS | HEART RATE: 68 BPM | SYSTOLIC BLOOD PRESSURE: 121 MMHG | BODY MASS INDEX: 30.36 KG/M2 | HEIGHT: 62 IN | DIASTOLIC BLOOD PRESSURE: 73 MMHG

## 2020-02-06 DIAGNOSIS — S83.241A OTHER TEAR OF MEDIAL MENISCUS, CURRENT INJURY, RIGHT KNEE, INITIAL ENCOUNTER: Primary | ICD-10-CM

## 2020-02-06 DIAGNOSIS — M25.561 CHRONIC PAIN OF BOTH KNEES: ICD-10-CM

## 2020-02-06 DIAGNOSIS — M25.562 CHRONIC PAIN OF BOTH KNEES: ICD-10-CM

## 2020-02-06 DIAGNOSIS — G89.29 CHRONIC PAIN OF BOTH KNEES: ICD-10-CM

## 2020-02-06 DIAGNOSIS — M17.0 PRIMARY OSTEOARTHRITIS OF BOTH KNEES: ICD-10-CM

## 2020-02-06 PROCEDURE — 99214 OFFICE O/P EST MOD 30 MIN: CPT | Performed by: ORTHOPAEDIC SURGERY

## 2020-02-06 PROCEDURE — 3008F BODY MASS INDEX DOCD: CPT | Performed by: ORTHOPAEDIC SURGERY

## 2020-02-06 PROCEDURE — 1036F TOBACCO NON-USER: CPT | Performed by: ORTHOPAEDIC SURGERY

## 2020-02-06 NOTE — PROGRESS NOTES
Assessment/Plan:  1  Other tear of medial meniscus, current injury, right knee, initial encounter     2  Chronic pain of both knees     3  Primary osteoarthritis of both knees       Aly Cárdenas is a very pleasant 60-year-old female presenting today for follow-up of her right knee MRI  She does have evidence of mild to moderate tricompartmental osteoarthritis as well as a complex posterior horn medial meniscus tear  However, she does not currently have any sharp pain and is free of mechanical symptoms  She does have baseline aches which are tolerable right now  Therefore, we agreed that there is no indication to address the meniscus tear surgically at this time  If pain returns or worsens without mechanical symptoms, then we would recommend pursuing Euflexxa in each knee when she is eligible in March  If she starts to develop sharp pain and mechanical symptoms, we can revisit a possible arthroscopy to address the meniscus tear  She will call us at the end of the month if her soreness and aches worsen and she would like to repeat Euflexxa  We will have to submit for authorization at that time  Otherwise she will follow-up as needed  She expressed understanding all of her questions were addressed today  Subjective: Right knee MRI follow up    Patient ID: Ekta Owen is a 63 y o  female  Aly Cárdenas is a very pleasant 60-year-old female presenting today for follow-up after undergoing a right knee MRI  She reports that since her last appointment, her pain has resolved over the past week  She was doing well with physical therapy and that has been put on hold  She is free of any mechanical symptoms in the right knee of locking, buckling, or giving way  She does have occasional soreness in both knees, but it is tolerable at this time  She did complete Euflexxa for both knees at the end of August 2019  Review of Systems   Constitutional: Negative  HENT: Negative  Eyes: Negative      Respiratory: Negative  Cardiovascular: Negative  Gastrointestinal: Negative  Endocrine: Negative  Genitourinary: Negative  Musculoskeletal: Positive for arthralgias, joint swelling and myalgias  Skin: Negative  Allergic/Immunologic: Negative  Neurological: Negative  Hematological: Negative  Psychiatric/Behavioral: Negative  Past Medical History:   Diagnosis Date    Arthritis     Asthma     Pemphigus vulgaris     Thalassemia        History reviewed  No pertinent surgical history  Family History   Problem Relation Age of Onset    Asthma Father     Hypertension Maternal Grandmother     Liver cancer Maternal Grandmother     Cancer Family         multiple relatives    Seizures Sister     Asthma Brother     Hypertension Brother     Leukemia Maternal Uncle     Heart attack Cousin     Substance Abuse Neg Hx     Mental illness Neg Hx        Social History     Occupational History    Not on file   Tobacco Use    Smoking status: Former Smoker     Last attempt to quit:      Years since quittin 1    Smokeless tobacco: Never Used   Substance and Sexual Activity    Alcohol use:  Yes     Alcohol/week: 1 0 standard drinks     Types: 1 Glasses of wine per week     Frequency: 2-4 times a month     Drinks per session: 1 or 2     Binge frequency: Less than monthly     Comment: socially     Drug use: Never    Sexual activity: Not on file         Current Outpatient Medications:     albuterol (PROVENTIL HFA,VENTOLIN HFA) 90 mcg/act inhaler, Inhale 2 puffs every 6 (six) hours as needed for wheezing, Disp: 1 Inhaler, Rfl: 3    ALPRAZolam (XANAX) 0 25 mg tablet, Take 1 tablet (0 25 mg total) by mouth daily at bedtime as needed for anxiety, Disp: 30 tablet, Rfl: 0    citalopram (CeleXA) 10 mg tablet, Take 1 tablet (10 mg total) by mouth daily, Disp: 90 tablet, Rfl: 1    ergocalciferol (VITAMIN D2) 50,000 units, Take 1 capsule (50,000 Units total) by mouth every 28 days, Disp: 3 capsule, Rfl: 3    fluticasone (FLONASE) 50 mcg/act nasal spray, 2 sprays into each nostril daily, Disp: 16 g, Rfl: 3    levothyroxine 25 mcg tablet, Take 1 tablet (25 mcg total) by mouth daily, Disp: 90 tablet, Rfl: 1    Allergies   Allergen Reactions    Iodine     Levaquin [Levofloxacin]     Shellfish-Derived Products        Objective:  Vitals:    02/06/20 1550   BP: 121/73   Pulse: 68       Body mass index is 30 18 kg/m²  Right Knee Exam     Muscle Strength   The patient has normal right knee strength  Tenderness   The patient is experiencing tenderness in the MCL and medial joint line  Range of Motion   Extension:  0 normal   Flexion:  130 normal     Tests   Davida:  Medial - positive Lateral - negative  Varus: negative Valgus: negative  Drawer:  Anterior - negative    Posterior - negative  Patellar apprehension: negative    Other   Erythema: absent  Scars: absent  Sensation: normal  Pulse: present  Swelling: none  Effusion: no effusion present    Comments:  Equivocal medial Davida and Apley  Collateral ligaments stable to stressing at 0, 30, and 90  Minimal PF crepitus and negative PFG  Thigh and calf soft and nontender  Grossly NVI          Observations     Right Knee   Negative for effusion  Physical Exam   Constitutional: She is oriented to person, place, and time  She appears well-developed and well-nourished  Body mass index is 30 18 kg/m²  HENT:   Head: Normocephalic and atraumatic  Eyes: EOM are normal    Neck: Normal range of motion  Cardiovascular: Intact distal pulses  Pulmonary/Chest: Effort normal    Musculoskeletal:        Right knee: She exhibits no effusion  See ortho exam   Neurological: She is alert and oriented to person, place, and time  Skin: Skin is warm and dry  Psychiatric: She has a normal mood and affect  Her behavior is normal  Judgment and thought content normal    Nursing note and vitals reviewed      I have personally reviewed pertinent films in PACS of her right knee MRI which demonstrates cartilage loss tricompartmentally in the weight-bearing portions of the knee  She also has a complex posterior horn medial meniscus tear with extrusion into the medial gutter  Collateral and cruciate ligaments appear intact    No other significant abnormalities appreciated

## 2020-02-11 NOTE — PROGRESS NOTES
2/11/2020 Will D/C pt as per MD note indicating despite MRI showing meniscus tear, she has been feeling better and does not need further intervention at this time

## 2020-02-20 ENCOUNTER — HOSPITAL ENCOUNTER (OUTPATIENT)
Dept: RADIOLOGY | Facility: HOSPITAL | Age: 60
Discharge: HOME | End: 2020-02-20
Payer: COMMERCIAL

## 2020-02-20 VITALS — BODY MASS INDEX: 30.36 KG/M2 | WEIGHT: 165 LBS | HEIGHT: 62 IN

## 2020-02-20 DIAGNOSIS — Z12.39 SCREENING FOR MALIGNANT NEOPLASM OF BREAST: ICD-10-CM

## 2020-02-20 PROCEDURE — 77067 SCR MAMMO BI INCL CAD: CPT

## 2020-02-20 PROCEDURE — 77063 BREAST TOMOSYNTHESIS BI: CPT

## 2020-02-26 ENCOUNTER — TELEPHONE (OUTPATIENT)
Dept: OBGYN CLINIC | Facility: CLINIC | Age: 60
End: 2020-02-26

## 2020-02-26 DIAGNOSIS — M17.0 PRIMARY OSTEOARTHRITIS OF KNEES, BILATERAL: Primary | ICD-10-CM

## 2020-02-26 LAB
1,25(OH)2D3 SERPL-MCNC: 32.4 PG/ML (ref 19.9–79.3)
ALBUMIN SERPL-MCNC: 4.6 G/DL (ref 3.8–4.9)
ALBUMIN/GLOB SERPL: 1.6 {RATIO} (ref 1.2–2.2)
ALP SERPL-CCNC: 62 IU/L (ref 39–117)
ALT SERPL-CCNC: 20 IU/L (ref 0–32)
AST SERPL-CCNC: 16 IU/L (ref 0–40)
BASOPHILS # BLD AUTO: 0.1 X10E3/UL (ref 0–0.2)
BASOPHILS NFR BLD AUTO: 1 %
BILIRUB SERPL-MCNC: 0.3 MG/DL (ref 0–1.2)
BUN SERPL-MCNC: 17 MG/DL (ref 6–24)
BUN/CREAT SERPL: 18 (ref 9–23)
CALCIUM SERPL-MCNC: 10 MG/DL (ref 8.7–10.2)
CHLORIDE SERPL-SCNC: 101 MMOL/L (ref 96–106)
CHOLEST SERPL-MCNC: 199 MG/DL (ref 100–199)
CHOLEST/HDLC SERPL: 3.6 RATIO (ref 0–4.4)
CO2 SERPL-SCNC: 26 MMOL/L (ref 20–29)
CREAT SERPL-MCNC: 0.94 MG/DL (ref 0.57–1)
EOSINOPHIL # BLD AUTO: 0.4 X10E3/UL (ref 0–0.4)
EOSINOPHIL NFR BLD AUTO: 8 %
ERYTHROCYTE [DISTWIDTH] IN BLOOD BY AUTOMATED COUNT: 14.2 % (ref 11.7–15.4)
GLOBULIN SER-MCNC: 2.8 G/DL (ref 1.5–4.5)
GLUCOSE SERPL-MCNC: 100 MG/DL (ref 65–99)
HCT VFR BLD AUTO: 42.7 % (ref 34–46.6)
HDLC SERPL-MCNC: 56 MG/DL
HGB BLD-MCNC: 13.5 G/DL (ref 11.1–15.9)
HIV 1+2 AB+HIV1 P24 AG SERPL QL IA: NON REACTIVE
IMM GRANULOCYTES # BLD: 0 X10E3/UL (ref 0–0.1)
IMM GRANULOCYTES NFR BLD: 0 %
LDLC SERPL CALC-MCNC: 118 MG/DL (ref 0–99)
LYMPHOCYTES # BLD AUTO: 2.1 X10E3/UL (ref 0.7–3.1)
LYMPHOCYTES NFR BLD AUTO: 38 %
MCH RBC QN AUTO: 24.6 PG (ref 26.6–33)
MCHC RBC AUTO-ENTMCNC: 31.6 G/DL (ref 31.5–35.7)
MCV RBC AUTO: 78 FL (ref 79–97)
MONOCYTES # BLD AUTO: 0.4 X10E3/UL (ref 0.1–0.9)
MONOCYTES NFR BLD AUTO: 7 %
NEUTROPHILS # BLD AUTO: 2.5 X10E3/UL (ref 1.4–7)
NEUTROPHILS NFR BLD AUTO: 46 %
PLATELET # BLD AUTO: 221 X10E3/UL (ref 150–450)
POTASSIUM SERPL-SCNC: 4.2 MMOL/L (ref 3.5–5.2)
PROT SERPL-MCNC: 7.4 G/DL (ref 6–8.5)
RBC # BLD AUTO: 5.49 X10E6/UL (ref 3.77–5.28)
SL AMB EGFR AFRICAN AMERICAN: 77 ML/MIN/1.73
SL AMB EGFR NON AFRICAN AMERICAN: 67 ML/MIN/1.73
SL AMB VLDL CHOLESTEROL CALC: 25 MG/DL (ref 5–40)
SODIUM SERPL-SCNC: 144 MMOL/L (ref 134–144)
TRIGL SERPL-MCNC: 125 MG/DL (ref 0–149)
TSH SERPL DL<=0.005 MIU/L-ACNC: 2.86 UIU/ML (ref 0.45–4.5)
WBC # BLD AUTO: 5.4 X10E3/UL (ref 3.4–10.8)

## 2020-03-04 ENCOUNTER — OFFICE VISIT (OUTPATIENT)
Dept: GASTROENTEROLOGY | Facility: CLINIC | Age: 60
End: 2020-03-04
Payer: COMMERCIAL

## 2020-03-04 VITALS
BODY MASS INDEX: 30.62 KG/M2 | HEIGHT: 62 IN | RESPIRATION RATE: 18 BRPM | TEMPERATURE: 97.6 F | WEIGHT: 166.4 LBS | DIASTOLIC BLOOD PRESSURE: 70 MMHG | SYSTOLIC BLOOD PRESSURE: 136 MMHG | HEART RATE: 70 BPM

## 2020-03-04 DIAGNOSIS — R10.11 RIGHT UPPER QUADRANT ABDOMINAL PAIN: ICD-10-CM

## 2020-03-04 DIAGNOSIS — Z12.11 COLON CANCER SCREENING: Primary | ICD-10-CM

## 2020-03-04 DIAGNOSIS — R14.0 ABDOMINAL BLOATING: ICD-10-CM

## 2020-03-04 PROCEDURE — 99244 OFF/OP CNSLTJ NEW/EST MOD 40: CPT | Performed by: INTERNAL MEDICINE

## 2020-03-04 NOTE — LETTER
March 4, 2020     Yazmin Mahmood [phone] [phone]    Patient: Nathanael Baker   YOB: 1960   Date of Visit: 3/4/2020       Dear Dr Mayra Edwards: Thank you for referring Nathanael Baker to me for evaluation  Below are my notes for this consultation  If you have questions, please do not hesitate to call me  I look forward to following your patient along with you  Sincerely,        Dottie Blair MD        CC: No Recipients  Dottie Blair MD  3/4/2020  4:53 PM  Sign at close encounter  Consultation - [address] [facility]  Nathanael Baker 63 y o  female MRN: [id number]  Unit/Bed#:  Encounter: [id number]        Consults    ASSESSMENT/PLAN:     1  Colon cancer screening-no alarm symptoms, no change in bowel habits, no hematochezia  No family history of colon cancer  Previous colonoscopy was 8 years ago, patient does not recall the results  -will schedule average risk screening colonoscopy at this time  - Patient was explained about  the risks and benefits of the procedure  Risks including but not limited to bleeding, infection, perforation were explained in detail  Also explained about less than 100% sensitivity with the exam and other alternatives  -patient is not on any antiplatelets or anticoagulants  2  Abdominal bloating/increased flatulence-will check stool for H pylori, check celiac serologies  -start on a probiotic   -avoid gassy foods  -will obtain right upper quadrant ultrasound to assess for gallstones      3  Right-sided abdominal pain-isolated episode, suspect may have been biliary colic as symptoms occurred after a fatty meal   -would recommend low-fat diet   -obtain right upper quadrant ultrasound   -if ultrasound is unremarkable and patient has a persistent symptoms, would recommend EGD to assess for peptic ulcer disease   -avoid the use of NSAIDs   ______________________________________________________________________    Reason for Consult / Principal Problem: [unfilled]    HPI: Enrique Bey is a 63y o  year old female with history of hypothyroidism, vitamin-D deficiency, vitamin B12 deficiency, presents for colon cancer screening evaluation  Patient reports that she has been having symptoms of abdominal bloating and increased flatulence over the past several years  She denies any change in bowel habits, hematochezia or unintentional weight loss  She denies any upper GI symptoms including heartburn, dysphagia, odynophagia, nausea or vomiting  She denies loss of appetite  No family history of GI malignancy  She reports that her last colonoscopy was 8 years ago, does not recall the results  She reports having had an EGD at the same time  She denies history of peptic ulcer disease  She does not take any acid suppressive medications  She reports that several weeks ago she noted isolated episode of right upper quadrant abdominal pain that lasted almost an hour  She states that she had been eating fatty meals prior to this at a wedding  She has not had similar symptoms since  She denies any nausea or vomiting  Denies jaundice, pruritus  No NSAID use  No melena  Patient recently underwent blood work and was noted to have normal liver function tests, hemoglobin was normal at 13 5, platelets were 268  TSH is normal     Review of Systems: The remainder of the review of systems was negative except for the pertinent positives noted in HPI       Historical Information   Past Medical History:   Diagnosis Date    Arthritis     Asthma     Hypothyroidism     Pemphigus vulgaris     Thalassemia      Past Surgical History:   Procedure Laterality Date    BREAST BIOPSY Right     benign     Social History   Social History     Substance and Sexual Activity   Alcohol Use Yes    Alcohol/week: 1 0 standard drinks    Types: 1 Glasses of wine per week    Frequency: 2-4 times a month    Drinks per session: 1 or 2    Binge frequency: Less than monthly    Comment: socially      Social History     Substance and Sexual Activity   Drug Use Never     Social History     Tobacco Use   Smoking Status Former Smoker    Last attempt to quit: 2000    Years since quittin 1   Smokeless Tobacco Never Used     Family History   Problem Relation Age of Onset    Asthma Father     Hypertension Maternal Grandmother     Liver cancer Maternal Grandmother     Cancer Family         multiple relatives    Seizures Sister     Asthma Brother     Hypertension Brother     Leukemia Maternal Uncle     Heart attack Cousin     Bone cancer Maternal Uncle     Substance Abuse Neg Hx     Mental illness Neg Hx        Meds/Allergies       (Not in a hospital admission)  No current facility-administered medications for this visit  Allergies   Allergen Reactions    Iodine     Levaquin [Levofloxacin]     Shellfish-Derived Products        Objective     Blood pressure 136/70, pulse 70, temperature 97 6 °F (36 4 °C), temperature source Tympanic, resp  rate 18, height 5' 2" (1 575 m), weight 75 5 kg (166 lb 6 4 oz)  [unfilled]    PHYSICAL EXAM     GEN: well nourished, well developed, no acute distress  HEENT: anicteric, MMM, no cervical or supraclavicular lymphadenopathy  CV: RRR, no m/r/g  CHEST: CTA b/l, no WRR  ABD: +BS, soft, NT/ND, no hepatosplenomegaly  EXT: no c/c/e  SKIN: no rashes,  NEURO: aaox3    Lab Results:   No visits with results within 1 Day(s) from this visit     Latest known visit with results is:   Office Visit on 12/10/2019   Component Date Value    White Blood Cell Count 2020 5 4     Red Blood Cell Count 2020 5 49*    Hemoglobin 2020 13 5     HCT 2020 42 7     MCV 2020 78*    MCH 2020 24 6*    MCHC 2020 31 6     RDW 2020 14 2     Platelet Count  221     Neutrophils 2020 46     Lymphocytes 02/24/2020 38     Monocytes 02/24/2020 7     Eosinophils 02/24/2020 8     Basophils PCT 02/24/2020 1     Neutrophils (Absolute) 02/24/2020 2 5     Lymphocytes (Absolute) 02/24/2020 2 1     Monocytes (Absolute) 02/24/2020 0 4     Eosinophils (Absolute) 02/24/2020 0 4     Basophils ABS 02/24/2020 0 1     Immature Granulocytes 02/24/2020 0     Immature Granulocytes (A* 02/24/2020 0 0     Glucose, Random 02/24/2020 100*    BUN 02/24/2020 17     Creatinine 02/24/2020 0 94     eGFR Non  02/24/2020 67     eGFR  02/24/2020 77     SL AMB BUN/CREATININE RA* 02/24/2020 18     Sodium 02/24/2020 144     Potassium 02/24/2020 4 2     Chloride 02/24/2020 101     CO2 02/24/2020 26     CALCIUM 02/24/2020 10 0     Protein, Total 02/24/2020 7 4     Albumin 02/24/2020 4 6     Globulin, Total 02/24/2020 2 8     Albumin/Globulin Ratio 02/24/2020 1 6     TOTAL BILIRUBIN 02/24/2020 0 3     Alk Phos Isoenzymes 02/24/2020 62     AST 02/24/2020 16     ALT 02/24/2020 20     TSH 02/24/2020 2 860     Vit D, 1,25-Dihydroxy 02/24/2020 32 4     Cholesterol, Total 02/24/2020 199     Triglycerides 02/24/2020 125     HDL 02/24/2020 56     VLDL Cholesterol Calcula* 02/24/2020 25     LDL Direct 02/24/2020 118*    T   Chol/HDL Ratio 02/24/2020 3 6     HIV Screen 4th Generatio* 02/24/2020 Non Reactive      Imaging Studies: I have personally reviewed pertinent films in PACS

## 2020-03-04 NOTE — PROGRESS NOTES
Consultation - [facility]  Ariadna Baca 63 y o  female MRN: [id number]  Unit/Bed#:  Encounter: [id number]        Consults    ASSESSMENT/PLAN:     1  Colon cancer screening-no alarm symptoms, no change in bowel habits, no hematochezia  No family history of colon cancer  Previous colonoscopy was 8 years ago, patient does not recall the results  -will schedule average risk screening colonoscopy at this time  - Patient was explained about  the risks and benefits of the procedure  Risks including but not limited to bleeding, infection, perforation were explained in detail  Also explained about less than 100% sensitivity with the exam and other alternatives  -patient is not on any antiplatelets or anticoagulants  2  Abdominal bloating/increased flatulence-will check stool for H pylori, check celiac serologies  -start on a probiotic   -avoid gassy foods  -will obtain right upper quadrant ultrasound to assess for gallstones  3  Right-sided abdominal pain-isolated episode, suspect may have been biliary colic as symptoms occurred after a fatty meal   -would recommend low-fat diet   -obtain right upper quadrant ultrasound   -if ultrasound is unremarkable and patient has a persistent symptoms, would recommend EGD to assess for peptic ulcer disease   -avoid the use of NSAIDs   ______________________________________________________________________    Reason for Consult / Principal Problem: [unfilled]    HPI: Ariadna Baca is a 63y o  year old female with history of hypothyroidism, vitamin-D deficiency, vitamin B12 deficiency, presents for colon cancer screening evaluation  Patient reports that she has been having symptoms of abdominal bloating and increased flatulence over the past several years  She denies any change in bowel habits, hematochezia or unintentional weight loss  She denies any upper GI symptoms including heartburn, dysphagia, odynophagia, nausea or vomiting    She denies loss of appetite  No family history of GI malignancy  She reports that her last colonoscopy was 8 years ago, does not recall the results  She reports having had an EGD at the same time  She denies history of peptic ulcer disease  She does not take any acid suppressive medications  She reports that several weeks ago she noted isolated episode of right upper quadrant abdominal pain that lasted almost an hour  She states that she had been eating fatty meals prior to this at a wedding  She has not had similar symptoms since  She denies any nausea or vomiting  Denies jaundice, pruritus  No NSAID use  No melena  Patient recently underwent blood work and was noted to have normal liver function tests, hemoglobin was normal at 13 5, platelets were 497  TSH is normal     Review of Systems: The remainder of the review of systems was negative except for the pertinent positives noted in HPI       Historical Information   Past Medical History:   Diagnosis Date    Arthritis     Asthma     Hypothyroidism     Pemphigus vulgaris     Thalassemia      Past Surgical History:   Procedure Laterality Date    BREAST BIOPSY Right     benign     Social History   Social History     Substance and Sexual Activity   Alcohol Use Yes    Alcohol/week: 1 0 standard drinks    Types: 1 Glasses of wine per week    Frequency: 2-4 times a month    Drinks per session: 1 or 2    Binge frequency: Less than monthly    Comment: socially      Social History     Substance and Sexual Activity   Drug Use Never     Social History     Tobacco Use   Smoking Status Former Smoker    Last attempt to quit: 2000    Years since quittin 1   Smokeless Tobacco Never Used     Family History   Problem Relation Age of Onset    Asthma Father     Hypertension Maternal Grandmother     Liver cancer Maternal Grandmother     Cancer Family         multiple relatives    Seizures Sister     Asthma Brother     Hypertension Brother     Leukemia Maternal Uncle     Heart attack Cousin     Bone cancer Maternal Uncle     Substance Abuse Neg Hx     Mental illness Neg Hx        Meds/Allergies       (Not in a hospital admission)  No current facility-administered medications for this visit  Allergies   Allergen Reactions    Iodine     Levaquin [Levofloxacin]     Shellfish-Derived Products        Objective     Blood pressure 136/70, pulse 70, temperature 97 6 °F (36 4 °C), temperature source Tympanic, resp  rate 18, height 5' 2" (1 575 m), weight 75 5 kg (166 lb 6 4 oz)  [unfilled]    PHYSICAL EXAM     GEN: well nourished, well developed, no acute distress  HEENT: anicteric, MMM, no cervical or supraclavicular lymphadenopathy  CV: RRR, no m/r/g  CHEST: CTA b/l, no WRR  ABD: +BS, soft, NT/ND, no hepatosplenomegaly  EXT: no c/c/e  SKIN: no rashes,  NEURO: aaox3    Lab Results:   No visits with results within 1 Day(s) from this visit     Latest known visit with results is:   Office Visit on 12/10/2019   Component Date Value    White Blood Cell Count 02/24/2020 5 4     Red Blood Cell Count 02/24/2020 5 49*    Hemoglobin 02/24/2020 13 5     HCT 02/24/2020 42 7     MCV 02/24/2020 78*    MCH 02/24/2020 24 6*    MCHC 02/24/2020 31 6     RDW 02/24/2020 14 2     Platelet Count 52/04/1875 221     Neutrophils 02/24/2020 46     Lymphocytes 02/24/2020 38     Monocytes 02/24/2020 7     Eosinophils 02/24/2020 8     Basophils PCT 02/24/2020 1     Neutrophils (Absolute) 02/24/2020 2 5     Lymphocytes (Absolute) 02/24/2020 2 1     Monocytes (Absolute) 02/24/2020 0 4     Eosinophils (Absolute) 02/24/2020 0 4     Basophils ABS 02/24/2020 0 1     Immature Granulocytes 02/24/2020 0     Immature Granulocytes (A* 02/24/2020 0 0     Glucose, Random 02/24/2020 100*    BUN 02/24/2020 17     Creatinine 02/24/2020 0 94     eGFR Non  02/24/2020 67     eGFR  02/24/2020 77     SL AMB BUN/CREATININE RA* 02/24/2020 18  Sodium 02/24/2020 144     Potassium 02/24/2020 4 2     Chloride 02/24/2020 101     CO2 02/24/2020 26     CALCIUM 02/24/2020 10 0     Protein, Total 02/24/2020 7 4     Albumin 02/24/2020 4 6     Globulin, Total 02/24/2020 2 8     Albumin/Globulin Ratio 02/24/2020 1 6     TOTAL BILIRUBIN 02/24/2020 0 3     Alk Phos Isoenzymes 02/24/2020 62     AST 02/24/2020 16     ALT 02/24/2020 20     TSH 02/24/2020 2 860     Vit D, 1,25-Dihydroxy 02/24/2020 32 4     Cholesterol, Total 02/24/2020 199     Triglycerides 02/24/2020 125     HDL 02/24/2020 56     VLDL Cholesterol Calcula* 02/24/2020 25     LDL Direct 02/24/2020 118*    T   Chol/HDL Ratio 02/24/2020 3 6     HIV Screen 4th Generatio* 02/24/2020 Non Reactive      Imaging Studies: I have personally reviewed pertinent films in PACS

## 2020-03-10 ENCOUNTER — OFFICE VISIT (OUTPATIENT)
Dept: FAMILY MEDICINE CLINIC | Facility: CLINIC | Age: 60
End: 2020-03-10
Payer: COMMERCIAL

## 2020-03-10 VITALS
DIASTOLIC BLOOD PRESSURE: 70 MMHG | OXYGEN SATURATION: 98 % | TEMPERATURE: 98.9 F | HEART RATE: 72 BPM | BODY MASS INDEX: 30.73 KG/M2 | SYSTOLIC BLOOD PRESSURE: 134 MMHG | WEIGHT: 167 LBS | HEIGHT: 62 IN | RESPIRATION RATE: 12 BRPM

## 2020-03-10 DIAGNOSIS — E78.1 HYPERTRIGLYCERIDEMIA: ICD-10-CM

## 2020-03-10 DIAGNOSIS — J30.9 ALLERGIC RHINITIS, UNSPECIFIED SEASONALITY, UNSPECIFIED TRIGGER: ICD-10-CM

## 2020-03-10 DIAGNOSIS — F41.9 ANXIETY: ICD-10-CM

## 2020-03-10 DIAGNOSIS — E55.9 VITAMIN D DEFICIENCY: ICD-10-CM

## 2020-03-10 DIAGNOSIS — E78.2 MIXED HYPERLIPIDEMIA: ICD-10-CM

## 2020-03-10 DIAGNOSIS — E03.9 ACQUIRED HYPOTHYROIDISM: Primary | ICD-10-CM

## 2020-03-10 PROCEDURE — 3008F BODY MASS INDEX DOCD: CPT | Performed by: NURSE PRACTITIONER

## 2020-03-10 PROCEDURE — 99214 OFFICE O/P EST MOD 30 MIN: CPT | Performed by: NURSE PRACTITIONER

## 2020-03-10 PROCEDURE — 1036F TOBACCO NON-USER: CPT | Performed by: NURSE PRACTITIONER

## 2020-03-10 RX ORDER — LEVOTHYROXINE SODIUM 0.03 MG/1
25 TABLET ORAL DAILY
Qty: 90 TABLET | Refills: 1 | Status: SHIPPED | OUTPATIENT
Start: 2020-03-10 | End: 2020-09-04

## 2020-03-10 RX ORDER — ALPRAZOLAM 0.25 MG/1
0.25 TABLET ORAL
Qty: 30 TABLET | Refills: 0 | Status: SHIPPED | OUTPATIENT
Start: 2020-03-10 | End: 2020-04-16

## 2020-03-10 RX ORDER — ERGOCALCIFEROL 1.25 MG/1
50000 CAPSULE ORAL
Qty: 3 CAPSULE | Refills: 3 | Status: SHIPPED | OUTPATIENT
Start: 2020-03-10 | End: 2020-09-11 | Stop reason: SDUPTHER

## 2020-03-10 RX ORDER — FLUTICASONE PROPIONATE 50 MCG
2 SPRAY, SUSPENSION (ML) NASAL DAILY
Qty: 16 G | Refills: 3 | Status: SHIPPED | OUTPATIENT
Start: 2020-03-10 | End: 2020-07-10

## 2020-03-10 RX ORDER — CITALOPRAM 10 MG/1
10 TABLET ORAL DAILY
Qty: 90 TABLET | Refills: 1 | Status: SHIPPED | OUTPATIENT
Start: 2020-03-10 | End: 2020-09-10 | Stop reason: SDUPTHER

## 2020-03-10 NOTE — PROGRESS NOTES
Assessment/Plan:  1  Acquired hypothyroidism  Stable  No change to med dose  TSH wnl    - levothyroxine 25 mcg tablet; Take 1 tablet (25 mcg total) by mouth daily  Dispense: 90 tablet; Refill: 1  2  Vitamin D deficiency  - ergocalciferol (VITAMIN D2) 50,000 units; Take 1 capsule (50,000 Units total) by mouth every 28 days  Dispense: 3 capsule; Refill: 3  3  Anxiety  Stress management  Activities to divert attention when possible  Conscious breathing techniques as discussed  Coping mechanisms and strategies vary from person to person so try to utilize strategies that you think may work for you (such as meditation, music, etc  )  Consider or continue counseling as discussed  Anti anxiety/depression medications as prescribed  - citalopram (CeleXA) 10 mg tablet; Take 1 tablet (10 mg total) by mouth daily  Dispense: 90 tablet; Refill: 1  - ALPRAZolam (XANAX) 0 25 mg tablet; Take 1 tablet (0 25 mg total) by mouth daily at bedtime as needed for anxiety  Dispense: 30 tablet; Refill: 0  Given that the patient reports overall reduced anxiety and improved level functioning without significant side effects, I felt a reasonable to continue the patient on current agent and dosing schedule as needed for anxiety  Continue current treatment plan  Reviewed   Review Appropriate  Patient is not receiving medications from other  Providers  No physical or psychological signs of dependence  Patient compliant with our agreement  4  Mixed hyperlipidemia  Stable  Heart healthy diet  Regular exercise  Weight loss  5  Hypertriglyceridemia  Stable  Heart healthy diet  Regular exercise  Weight loss     6  Allergic rhinitis, unspecified seasonality, unspecified trigger  - fluticasone (FLONASE) 50 mcg/act nasal spray; 2 sprays into each nostril daily  Dispense: 16 g; Refill: 3    Patient was counseled regarding instructions for management which included: impression/diagnosis, risk/benefits of treatment options, importance of compliance with treatment, risk factor reduction, and prognosis  I have reviewed the instructions with the patient answering all questions and patient verbalized understanding  BMI Counseling: Body mass index is 30 54 kg/m²  The BMI is above normal  Nutrition recommendations include reducing portion sizes, decreasing overall calorie intake, consuming healthier snacks and moderation in carbohydrate intake  Exercise recommendations include exercising 3-5 times per week  Depression Screening Follow-up Plan: Patient's depression screening was negative with a PHQ-2 score of 0    Clinically patient does not have depression  No treatment is required  Subjective:      Patient ID: Aristides Tamez is a 63 y o  female who presents for follow up    Here for chronic issue follow up, multiple medical conditions  Scheduled for colonoscopy  Had mammo  No recent illness  Tolerating all meds with no side effects  Anxiety fairly well controlled  Taking celexa "most days"  Still has not found job  Using xanax only prn  , sometimes when cannot get to sleep  Cannot lose weight but admits that does not exercise regularly and most days does not watch diet  Uses flonase at times for allergies  No recent issues with asthma  The following portions of the patient's history were reviewed and updated as appropriate: allergies, current medications, past family history, past medical history, past social history, past surgical history and problem list     Review of Systems   Constitutional: Negative for fatigue, fever and unexpected weight change  Respiratory: Negative for chest tightness and shortness of breath  Gastrointestinal: Negative for abdominal pain, diarrhea, nausea and vomiting  Endocrine: Negative for cold intolerance, heat intolerance, polydipsia, polyphagia and polyuria  Musculoskeletal: Negative for arthralgias and myalgias  Skin: Negative for color change and pallor     Neurological: Negative for dizziness and headaches  Psychiatric/Behavioral: Negative for self-injury and suicidal ideas  The patient is nervous/anxious            Objective:  Recent Results (from the past 672 hour(s))   CBC and differential    Collection Time: 02/24/20  9:18 AM   Result Value Ref Range    White Blood Cell Count 5 4 3 4 - 10 8 x10E3/uL    Red Blood Cell Count 5 49 (H) 3 77 - 5 28 x10E6/uL    Hemoglobin 13 5 11 1 - 15 9 g/dL    HCT 42 7 34 0 - 46 6 %    MCV 78 (L) 79 - 97 fL    MCH 24 6 (L) 26 6 - 33 0 pg    MCHC 31 6 31 5 - 35 7 g/dL    RDW 14 2 11 7 - 15 4 %    Platelet Count 442 051 - 450 x10E3/uL    Neutrophils 46 Not Estab  %    Lymphocytes 38 Not Estab  %    Monocytes 7 Not Estab  %    Eosinophils 8 Not Estab  %    Basophils PCT 1 Not Estab  %    Neutrophils (Absolute) 2 5 1 4 - 7 0 x10E3/uL    Lymphocytes (Absolute) 2 1 0 7 - 3 1 x10E3/uL    Monocytes (Absolute) 0 4 0 1 - 0 9 x10E3/uL    Eosinophils (Absolute) 0 4 0 0 - 0 4 x10E3/uL    Basophils ABS 0 1 0 0 - 0 2 x10E3/uL    Immature Granulocytes 0 Not Estab  %    Immature Granulocytes (Absolute) 0 0 0 0 - 0 1 x10E3/uL   Comprehensive metabolic panel    Collection Time: 02/24/20  9:18 AM   Result Value Ref Range    Glucose, Random 100 (H) 65 - 99 mg/dL    BUN 17 6 - 24 mg/dL    Creatinine 0 94 0 57 - 1 00 mg/dL    eGFR Non  67 >59 mL/min/1 73    eGFR  77 >59 mL/min/1 73    SL AMB BUN/CREATININE RATIO 18 9 - 23    Sodium 144 134 - 144 mmol/L    Potassium 4 2 3 5 - 5 2 mmol/L    Chloride 101 96 - 106 mmol/L    CO2 26 20 - 29 mmol/L    CALCIUM 10 0 8 7 - 10 2 mg/dL    Protein, Total 7 4 6 0 - 8 5 g/dL    Albumin 4 6 3 8 - 4 9 g/dL    Globulin, Total 2 8 1 5 - 4 5 g/dL    Albumin/Globulin Ratio 1 6 1 2 - 2 2    TOTAL BILIRUBIN 0 3 0 0 - 1 2 mg/dL    Alk Phos Isoenzymes 62 39 - 117 IU/L    AST 16 0 - 40 IU/L    ALT 20 0 - 32 IU/L   TSH, 3rd generation    Collection Time: 02/24/20  9:18 AM   Result Value Ref Range    TSH 2 860 0 450 - 4 500 uIU/mL   Vitamin D 1,25 dihydroxy    Collection Time: 02/24/20  9:18 AM   Result Value Ref Range    Vit D, 1,25-Dihydroxy 32 4 19 9 - 79 3 pg/mL   Lipid panel    Collection Time: 02/24/20  9:18 AM   Result Value Ref Range    Cholesterol, Total 199 100 - 199 mg/dL    Triglycerides 125 0 - 149 mg/dL    HDL 56 >39 mg/dL    VLDL Cholesterol Calculated 25 5 - 40 mg/dL    LDL Direct 118 (H) 0 - 99 mg/dL    T  Chol/HDL Ratio 3 6 0 0 - 4 4 ratio   Human Immunodeficiency Virus 1/2 Antigen / Antibody ( Fourth Generation) with Reflex Testing    Collection Time: 02/24/20  9:18 AM   Result Value Ref Range    HIV Screen 4th Generation wRflx Non Reactive Non Reactive         /70 (BP Location: Right arm, Patient Position: Sitting, Cuff Size: Standard)   Pulse 72   Temp 98 9 °F (37 2 °C) (Temporal)   Resp 12   Ht 5' 2" (1 575 m)   Wt 75 8 kg (167 lb)   SpO2 98%   BMI 30 54 kg/m²          Physical Exam   Constitutional: She is oriented to person, place, and time  She appears well-developed and well-nourished  No distress  Cardiovascular: Normal rate and regular rhythm  No JVD  No audible carotid bruit  No peripheral edema  Pulmonary/Chest: Effort normal and breath sounds normal  No respiratory distress  She has no wheezes  Neurological: She is alert and oriented to person, place, and time  No cranial nerve deficit  Coordination normal    Skin: Skin is warm and dry  No rash noted  She is not diaphoretic  No erythema  No pallor  Psychiatric: She has a normal mood and affect  Her behavior is normal  Judgment and thought content normal    Vitals reviewed

## 2020-03-11 ENCOUNTER — HOSPITAL ENCOUNTER (OUTPATIENT)
Dept: RADIOLOGY | Facility: HOSPITAL | Age: 60
Discharge: HOME | End: 2020-03-11
Attending: INTERNAL MEDICINE
Payer: COMMERCIAL

## 2020-03-11 DIAGNOSIS — R14.0 ABDOMINAL BLOATING: ICD-10-CM

## 2020-03-11 PROCEDURE — 76705 ECHO EXAM OF ABDOMEN: CPT

## 2020-03-13 LAB
ENDOMYSIUM IGA SER QL: NEGATIVE
GLIADIN PEPTIDE IGA SER-ACNC: 11 UNITS (ref 0–19)
GLIADIN PEPTIDE IGG SER-ACNC: 3 UNITS (ref 0–19)
IGA SERPL-MCNC: 355 MG/DL (ref 87–352)
TTG IGA SER-ACNC: <2 U/ML (ref 0–3)
TTG IGG SER-ACNC: <2 U/ML (ref 0–5)

## 2020-03-21 ENCOUNTER — NURSE TRIAGE (OUTPATIENT)
Dept: OTHER | Facility: OTHER | Age: 60
End: 2020-03-21

## 2020-03-21 NOTE — TELEPHONE ENCOUNTER
Regarding: Rt back pain shooting down to leg  ----- Message from [facility] sent at 3/21/2020  8:03 AM EDT -----  "I am in a lot of pain right now that is coming from my lower back on the right side and I cant press on that side or move  The pain is shooting down to my leg   I just took 800mg of Motrin and that does not help either"

## 2020-03-21 NOTE — TELEPHONE ENCOUNTER
Reason for Disposition   Unable to walk   [1] MODERATE back pain (e g , interferes with normal activities) AND [2] present > 3 days    Answer Assessment - Initial Assessment Questions  1  ONSET: "When did the pain begin?"       2 weeks ago  States it started getting worse about 3 days ago     2  LOCATION: "Where does it hurt?" (upper, mid or lower back)      Center of lower back to the right side, and the pain travels down right leg    3  SEVERITY: "How bad is the pain?"  (e g , Scale 1-10; mild, moderate, or severe)    - MILD (1-3): doesn't interfere with normal activities     - MODERATE (4-7): interferes with normal activities or awakens from sleep     - SEVERE (8-10): excruciating pain, unable to do any normal activities       10/10     4  PATTERN: "Is the pain constant?" (e g , yes, no; constant, intermittent)       At this time the pain is constant  5  RADIATION: "Does the pain shoot into your legs or elsewhere?"      Radiates down her right leg  6  CAUSE:  "What do you think is causing the back pain?"       Unknown    7  BACK OVERUSE:  "Any recent lifting of heavy objects, strenuous work or exercise?"      Denies     8  MEDICATIONS: "What have you taken so far for the pain?" (e g , nothing, acetaminophen, NSAIDS)      800mg of ibuprofen, with minimal relief and tried a heating a pad  9  NEUROLOGIC SYMPTOMS: "Do you have any weakness, numbness, or problems with bowel/bladder control?"      Denies     10  OTHER SYMPTOMS: "Do you have any other symptoms?" (e g , fever, abdominal pain, burning with urination, blood in urine)        Denies  11  PREGNANCY: "Is there any chance you are pregnant?" (e g , yes, no; LMP)        Denies     Denies SOB, cough and fever at this time      Protocols used: BACK PAIN-ADULT-

## 2020-03-21 NOTE — TELEPHONE ENCOUNTER
Spoke with Dr Beverly Joshi and discussed patient's symptoms  States patient should be evaluated, to rule out if her symptoms are related to sciatica or urinary related  Pain medications can not be called in at this time  Patient should be seen at the nearest [facility]  Patient informed and verbalized understanding

## 2020-03-23 ENCOUNTER — OFFICE VISIT (OUTPATIENT)
Dept: FAMILY MEDICINE CLINIC | Facility: CLINIC | Age: 60
End: 2020-03-23
Payer: COMMERCIAL

## 2020-03-23 VITALS
SYSTOLIC BLOOD PRESSURE: 122 MMHG | HEIGHT: 62 IN | BODY MASS INDEX: 30.91 KG/M2 | DIASTOLIC BLOOD PRESSURE: 72 MMHG | OXYGEN SATURATION: 98 % | WEIGHT: 168 LBS | HEART RATE: 76 BPM | TEMPERATURE: 98.3 F | RESPIRATION RATE: 12 BRPM

## 2020-03-23 DIAGNOSIS — M54.41 ACUTE RIGHT-SIDED LOW BACK PAIN WITH RIGHT-SIDED SCIATICA: Primary | ICD-10-CM

## 2020-03-23 DIAGNOSIS — M62.838 MUSCLE SPASM: ICD-10-CM

## 2020-03-23 DIAGNOSIS — M54.16 LUMBAR RADICULOPATHY: ICD-10-CM

## 2020-03-23 LAB
SL AMB  POCT GLUCOSE, UA: 0
SL AMB LEUKOCYTE ESTERASE,UA: 0
SL AMB POCT BILIRUBIN,UA: 0
SL AMB POCT BLOOD,UA: 0
SL AMB POCT CLARITY,UA: CLEAR
SL AMB POCT COLOR,UA: YELLOW
SL AMB POCT KETONES,UA: 0
SL AMB POCT NITRITE,UA: 0
SL AMB POCT PH,UA: 5
SL AMB POCT SPECIFIC GRAVITY,UA: 1.02
SL AMB POCT URINE PROTEIN: NORMAL
SL AMB POCT UROBILINOGEN: 0

## 2020-03-23 PROCEDURE — 99214 OFFICE O/P EST MOD 30 MIN: CPT | Performed by: NURSE PRACTITIONER

## 2020-03-23 PROCEDURE — 3008F BODY MASS INDEX DOCD: CPT | Performed by: NURSE PRACTITIONER

## 2020-03-23 PROCEDURE — 81003 URINALYSIS AUTO W/O SCOPE: CPT | Performed by: NURSE PRACTITIONER

## 2020-03-23 PROCEDURE — 1036F TOBACCO NON-USER: CPT | Performed by: NURSE PRACTITIONER

## 2020-03-23 RX ORDER — KETOROLAC TROMETHAMINE 30 MG/ML
30 INJECTION, SOLUTION INTRAMUSCULAR; INTRAVENOUS ONCE
Status: DISCONTINUED | OUTPATIENT
Start: 2020-03-23 | End: 2020-03-23

## 2020-03-23 RX ORDER — KETOROLAC TROMETHAMINE 30 MG/ML
30 INJECTION, SOLUTION INTRAMUSCULAR; INTRAVENOUS ONCE
Status: COMPLETED | OUTPATIENT
Start: 2020-03-23 | End: 2020-03-23

## 2020-03-23 RX ORDER — PREDNISONE 20 MG/1
20 TABLET ORAL 2 TIMES DAILY WITH MEALS
Qty: 14 TABLET | Refills: 0 | Status: SHIPPED | OUTPATIENT
Start: 2020-03-23 | End: 2020-03-30 | Stop reason: ALTCHOICE

## 2020-03-23 RX ORDER — CYCLOBENZAPRINE HCL 10 MG
10 TABLET ORAL 3 TIMES DAILY PRN
Qty: 20 TABLET | Refills: 0 | Status: SHIPPED | OUTPATIENT
Start: 2020-03-23 | End: 2020-06-25

## 2020-03-23 NOTE — PATIENT INSTRUCTIONS
Alternate ice and heat- 20 minutes on, 20 minutes off  Prednisone twice daily with food for next 7 days   Do not take Ibuprofen while taking Prednisone  Flexeril 10mg every 8 hours as needed  Do not drive or operate machinery while taking this medication  Gentle stretching exercises  If symptoms persist or worsen, consider physical therapy as discussed  You were given Toradol injection in the office today

## 2020-03-23 NOTE — TELEPHONE ENCOUNTER
She should be evaluated with ua in office if possible  Screen her first and if negative screen, bring her in for ua and at that point I can evaluate further

## 2020-03-24 ENCOUNTER — TELEPHONE (OUTPATIENT)
Dept: GASTROENTEROLOGY | Facility: AMBULARY SURGERY CENTER | Age: 60
End: 2020-03-24

## 2020-03-30 ENCOUNTER — TELEMEDICINE (OUTPATIENT)
Dept: FAMILY MEDICINE CLINIC | Facility: CLINIC | Age: 60
End: 2020-03-30
Payer: COMMERCIAL

## 2020-03-30 VITALS
HEIGHT: 62 IN | DIASTOLIC BLOOD PRESSURE: 83 MMHG | SYSTOLIC BLOOD PRESSURE: 119 MMHG | BODY MASS INDEX: 30.73 KG/M2 | WEIGHT: 167 LBS | HEART RATE: 65 BPM

## 2020-03-30 DIAGNOSIS — M54.31 SCIATICA OF RIGHT SIDE: Primary | ICD-10-CM

## 2020-03-30 PROCEDURE — 99213 OFFICE O/P EST LOW 20 MIN: CPT | Performed by: NURSE PRACTITIONER

## 2020-03-30 NOTE — PROGRESS NOTES
Virtual Regular Visit    Problem List Items Addressed This Visit     None               Reason for visit is follow up    Encounter provider HAYDEE Barton    Provider located at [facility] [phone] [facility] [id number]      Recent Visits  Date Type Provider Dept   03/23/20 Office Visit Samantha Barton recent visits within past 7 days and meeting all other requirements     Today's Visits  Date Type Provider Dept   03/30/20 Telemedicine HAYDEE Barton Pg   Showing today's visits and meeting all other requirements     Future Appointments  No visits were found meeting these conditions  Showing future appointments within next 150 days and meeting all other requirements        The patient was identified by name and date of birth  Margareth Canela was informed that this is a telemedicine visit and that the visit is being conducted through [employer] and patient was informed that this is not a secure, HIPAA-complaint platform  she agrees to proceed     My office door was closed  No one else was in the room  She acknowledged consent and understanding of privacy and security of the video platform  The patient has agreed to participate and understands they can discontinue the visit at any time  Patient is aware this is a billable service  Subjective  Margareth Canela is a 62 y o  female , video visit, follow up back pain     HPI:  Follow up on back pain   Seen in office on 3/23 for back pain  Diagnosed with sciatica  Treated with prednisone and flexeril  Advised to alternate ice/heat  Was doing much better but today bent to lift a child and it started to hurt again  Using heating pad  Pain is in the low back into right buttock but is mild  No numbness to legs            Past Medical History:   Diagnosis Date    Arthritis     Asthma     Hypothyroidism     Pemphigus vulgaris     Thalassemia Past Surgical History:   Procedure Laterality Date    BREAST BIOPSY Right     benign       Current Outpatient Medications   Medication Sig Dispense Refill    albuterol (PROVENTIL HFA,VENTOLIN HFA) 90 mcg/act inhaler Inhale 2 puffs every 6 (six) hours as needed for wheezing 1 Inhaler 3    ALPRAZolam (XANAX) 0 25 mg tablet Take 1 tablet (0 25 mg total) by mouth daily at bedtime as needed for anxiety 30 tablet 0    citalopram (CeleXA) 10 mg tablet Take 1 tablet (10 mg total) by mouth daily 90 tablet 1    cyclobenzaprine (FLEXERIL) 10 mg tablet Take 1 tablet (10 mg total) by mouth 3 (three) times a day as needed for muscle spasms 20 tablet 0    ergocalciferol (VITAMIN D2) 50,000 units Take 1 capsule (50,000 Units total) by mouth every 28 days 3 capsule 3    fluticasone (FLONASE) 50 mcg/act nasal spray 2 sprays into each nostril daily 16 g 3    levothyroxine 25 mcg tablet Take 1 tablet (25 mcg total) by mouth daily 90 tablet 1     No current facility-administered medications for this visit  Allergies   Allergen Reactions    Iodine     Levaquin [Levofloxacin]     Shellfish-Derived Products        Review of Systems   Genitourinary: Negative for dysuria  Musculoskeletal: Positive for back pain  Skin: Negative for rash and wound  Neurological: Negative for numbness  Physical Exam   Constitutional: She appears well-developed and well-nourished  No distress  Pulmonary/Chest: Effort normal    Neurological: She is alert  Coordination normal    Observed ambulating with normal gait  Full lumbar flexion noted via video exam   Skin: No pallor  Psychiatric: She has a normal mood and affect  Her behavior is normal  Thought content normal       1  Sciatica of right side  Alternate ice/heat  Gentle stretching  otc nsaids bid to tid prn  Monitor  Call office if symptoms worsen  I spent 15 minutes with the patient during this visit

## 2020-04-02 ENCOUNTER — TELEPHONE (OUTPATIENT)
Dept: OBGYN CLINIC | Facility: CLINIC | Age: 60
End: 2020-04-02

## 2020-04-07 ENCOUNTER — TELEPHONE (OUTPATIENT)
Dept: GASTROENTEROLOGY | Facility: CLINIC | Age: 60
End: 2020-04-07

## 2020-04-16 DIAGNOSIS — F41.9 ANXIETY: ICD-10-CM

## 2020-04-16 RX ORDER — ALPRAZOLAM 0.25 MG/1
TABLET ORAL
Qty: 30 TABLET | Refills: 1 | Status: SHIPPED | OUTPATIENT
Start: 2020-04-16 | End: 2020-07-28 | Stop reason: SDUPTHER

## 2020-04-17 ENCOUNTER — TELEPHONE (OUTPATIENT)
Dept: OBGYN CLINIC | Facility: CLINIC | Age: 60
End: 2020-04-17

## 2020-04-17 DIAGNOSIS — M17.0 BILATERAL PRIMARY OSTEOARTHRITIS OF KNEE: Primary | ICD-10-CM

## 2020-04-17 DIAGNOSIS — M25.562 CHRONIC PAIN OF BOTH KNEES: ICD-10-CM

## 2020-04-17 DIAGNOSIS — M25.561 CHRONIC PAIN OF BOTH KNEES: ICD-10-CM

## 2020-04-17 DIAGNOSIS — G89.29 CHRONIC PAIN OF BOTH KNEES: ICD-10-CM

## 2020-05-15 DIAGNOSIS — M25.562 CHRONIC PAIN OF BOTH KNEES: ICD-10-CM

## 2020-05-15 DIAGNOSIS — M25.561 CHRONIC PAIN OF BOTH KNEES: ICD-10-CM

## 2020-05-15 DIAGNOSIS — G89.29 CHRONIC PAIN OF BOTH KNEES: ICD-10-CM

## 2020-05-15 DIAGNOSIS — M17.0 BILATERAL PRIMARY OSTEOARTHRITIS OF KNEE: ICD-10-CM

## 2020-05-27 ENCOUNTER — TELEPHONE (OUTPATIENT)
Dept: OTHER | Facility: OTHER | Age: 60
End: 2020-05-27

## 2020-05-28 ENCOUNTER — OFFICE VISIT (OUTPATIENT)
Dept: OBGYN CLINIC | Facility: CLINIC | Age: 60
End: 2020-05-28
Payer: COMMERCIAL

## 2020-05-28 ENCOUNTER — APPOINTMENT (OUTPATIENT)
Dept: RADIOLOGY | Facility: CLINIC | Age: 60
End: 2020-05-28
Payer: COMMERCIAL

## 2020-05-28 VITALS — SYSTOLIC BLOOD PRESSURE: 138 MMHG | DIASTOLIC BLOOD PRESSURE: 80 MMHG | HEART RATE: 66 BPM | TEMPERATURE: 97.3 F

## 2020-05-28 DIAGNOSIS — M17.11 PRIMARY OSTEOARTHRITIS OF RIGHT KNEE: ICD-10-CM

## 2020-05-28 DIAGNOSIS — M25.561 ACUTE PAIN OF RIGHT KNEE: ICD-10-CM

## 2020-05-28 DIAGNOSIS — Z01.812 PRE-OPERATIVE LABORATORY EXAMINATION: ICD-10-CM

## 2020-05-28 DIAGNOSIS — S83.241A OTHER TEAR OF MEDIAL MENISCUS, CURRENT INJURY, RIGHT KNEE, INITIAL ENCOUNTER: Primary | ICD-10-CM

## 2020-05-28 DIAGNOSIS — Z11.59 SCREENING FOR VIRAL DISEASE: ICD-10-CM

## 2020-05-28 PROCEDURE — 99214 OFFICE O/P EST MOD 30 MIN: CPT | Performed by: ORTHOPAEDIC SURGERY

## 2020-05-28 PROCEDURE — 73562 X-RAY EXAM OF KNEE 3: CPT

## 2020-05-28 PROCEDURE — 1036F TOBACCO NON-USER: CPT | Performed by: ORTHOPAEDIC SURGERY

## 2020-05-28 RX ORDER — TRAMADOL HYDROCHLORIDE 50 MG/1
50 TABLET ORAL EVERY 6 HOURS PRN
Qty: 45 TABLET | Refills: 0 | Status: SHIPPED | OUTPATIENT
Start: 2020-05-28 | End: 2020-09-10 | Stop reason: HOSPADM

## 2020-05-28 RX ORDER — CHLORHEXIDINE GLUCONATE 0.12 MG/ML
15 RINSE ORAL ONCE
Status: CANCELLED | OUTPATIENT
Start: 2020-05-28 | End: 2020-05-28

## 2020-05-29 ENCOUNTER — TRANSCRIBE ORDERS (OUTPATIENT)
Dept: ADMINISTRATIVE | Facility: HOSPITAL | Age: 60
End: 2020-05-29

## 2020-05-29 ENCOUNTER — APPOINTMENT (OUTPATIENT)
Dept: LAB | Facility: HOSPITAL | Age: 60
End: 2020-05-29
Attending: ORTHOPAEDIC SURGERY
Payer: COMMERCIAL

## 2020-05-29 ENCOUNTER — HOSPITAL ENCOUNTER (OUTPATIENT)
Dept: RADIOLOGY | Facility: HOSPITAL | Age: 60
Discharge: HOME | End: 2020-05-29
Attending: ORTHOPAEDIC SURGERY
Payer: COMMERCIAL

## 2020-05-29 ENCOUNTER — TELEPHONE (OUTPATIENT)
Dept: OBGYN CLINIC | Facility: CLINIC | Age: 60
End: 2020-05-29

## 2020-05-29 DIAGNOSIS — S83.241A OTHER TEAR OF MEDIAL MENISCUS, CURRENT INJURY, RIGHT KNEE, INITIAL ENCOUNTER: ICD-10-CM

## 2020-05-29 DIAGNOSIS — Z01.818 PRE-OP TESTING: Primary | ICD-10-CM

## 2020-05-29 DIAGNOSIS — M25.561 ACUTE PAIN OF RIGHT KNEE: ICD-10-CM

## 2020-05-29 DIAGNOSIS — Z01.818 PRE-OP TESTING: ICD-10-CM

## 2020-05-29 DIAGNOSIS — Z01.812 PRE-OPERATIVE LABORATORY EXAMINATION: ICD-10-CM

## 2020-05-29 LAB
ABO GROUP BLD: NORMAL
ALBUMIN SERPL BCP-MCNC: 3.8 G/DL (ref 3.5–5)
ALP SERPL-CCNC: 63 U/L (ref 46–116)
ALT SERPL W P-5'-P-CCNC: 62 U/L (ref 12–78)
ANION GAP SERPL CALCULATED.3IONS-SCNC: 8 MMOL/L (ref 4–13)
APTT PPP: 30 SECONDS (ref 23–37)
AST SERPL W P-5'-P-CCNC: 22 U/L (ref 5–45)
BASOPHILS # BLD AUTO: 0.05 THOUSANDS/ΜL (ref 0–0.1)
BASOPHILS NFR BLD AUTO: 1 % (ref 0–1)
BILIRUB SERPL-MCNC: 0.5 MG/DL (ref 0.2–1)
BLD GP AB SCN SERPL QL: NEGATIVE
BUN SERPL-MCNC: 20 MG/DL (ref 5–25)
CALCIUM SERPL-MCNC: 9.4 MG/DL (ref 8.3–10.1)
CHLORIDE SERPL-SCNC: 101 MMOL/L (ref 100–108)
CO2 SERPL-SCNC: 28 MMOL/L (ref 21–32)
CREAT SERPL-MCNC: 0.84 MG/DL (ref 0.6–1.3)
EOSINOPHIL # BLD AUTO: 0.33 THOUSAND/ΜL (ref 0–0.61)
EOSINOPHIL NFR BLD AUTO: 6 % (ref 0–6)
ERYTHROCYTE [DISTWIDTH] IN BLOOD BY AUTOMATED COUNT: 14.6 % (ref 11.6–15.1)
GFR SERPL CREATININE-BSD FRML MDRD: 76 ML/MIN/1.73SQ M
GLUCOSE SERPL-MCNC: 94 MG/DL (ref 65–140)
HCT VFR BLD AUTO: 43.4 % (ref 34.8–46.1)
HGB BLD-MCNC: 13.4 G/DL (ref 11.5–15.4)
IMM GRANULOCYTES # BLD AUTO: 0.01 THOUSAND/UL (ref 0–0.2)
IMM GRANULOCYTES NFR BLD AUTO: 0 % (ref 0–2)
INR PPP: 0.99 (ref 0.84–1.19)
LYMPHOCYTES # BLD AUTO: 2.16 THOUSANDS/ΜL (ref 0.6–4.47)
LYMPHOCYTES NFR BLD AUTO: 40 % (ref 14–44)
MCH RBC QN AUTO: 25 PG (ref 26.8–34.3)
MCHC RBC AUTO-ENTMCNC: 30.9 G/DL (ref 31.4–37.4)
MCV RBC AUTO: 81 FL (ref 82–98)
MONOCYTES # BLD AUTO: 0.41 THOUSAND/ΜL (ref 0.17–1.22)
MONOCYTES NFR BLD AUTO: 8 % (ref 4–12)
NEUTROPHILS # BLD AUTO: 2.42 THOUSANDS/ΜL (ref 1.85–7.62)
NEUTS SEG NFR BLD AUTO: 45 % (ref 43–75)
NRBC BLD AUTO-RTO: 0 /100 WBCS
PLATELET # BLD AUTO: 215 THOUSANDS/UL (ref 149–390)
PMV BLD AUTO: 12.9 FL (ref 8.9–12.7)
POTASSIUM SERPL-SCNC: 3.8 MMOL/L (ref 3.5–5.3)
PROT SERPL-MCNC: 7.6 G/DL (ref 6.4–8.2)
PROTHROMBIN TIME: 13.4 SECONDS (ref 11.6–14.5)
RBC # BLD AUTO: 5.35 MILLION/UL (ref 3.81–5.12)
RH BLD: POSITIVE
SODIUM SERPL-SCNC: 137 MMOL/L (ref 136–145)
SPECIMEN EXPIRATION DATE: NORMAL
WBC # BLD AUTO: 5.38 THOUSAND/UL (ref 4.31–10.16)

## 2020-05-29 PROCEDURE — 86850 RBC ANTIBODY SCREEN: CPT

## 2020-05-29 PROCEDURE — 93005 ELECTROCARDIOGRAM TRACING: CPT

## 2020-05-29 PROCEDURE — 86900 BLOOD TYPING SEROLOGIC ABO: CPT

## 2020-05-29 PROCEDURE — 85025 COMPLETE CBC W/AUTO DIFF WBC: CPT

## 2020-05-29 PROCEDURE — U0003 INFECTIOUS AGENT DETECTION BY NUCLEIC ACID (DNA OR RNA); SEVERE ACUTE RESPIRATORY SYNDROME CORONAVIRUS 2 (SARS-COV-2) (CORONAVIRUS DISEASE [COVID-19]), AMPLIFIED PROBE TECHNIQUE, MAKING USE OF HIGH THROUGHPUT TECHNOLOGIES AS DESCRIBED BY CMS-2020-01-R: HCPCS

## 2020-05-29 PROCEDURE — 80053 COMPREHEN METABOLIC PANEL: CPT

## 2020-05-29 PROCEDURE — 85730 THROMBOPLASTIN TIME PARTIAL: CPT

## 2020-05-29 PROCEDURE — 36415 COLL VENOUS BLD VENIPUNCTURE: CPT

## 2020-05-29 PROCEDURE — 85610 PROTHROMBIN TIME: CPT

## 2020-05-29 PROCEDURE — 71046 X-RAY EXAM CHEST 2 VIEWS: CPT

## 2020-05-29 PROCEDURE — 86901 BLOOD TYPING SEROLOGIC RH(D): CPT

## 2020-06-01 ENCOUNTER — OFFICE VISIT (OUTPATIENT)
Dept: FAMILY MEDICINE CLINIC | Facility: CLINIC | Age: 60
End: 2020-06-01
Payer: COMMERCIAL

## 2020-06-01 ENCOUNTER — ANESTHESIA EVENT (OUTPATIENT)
Dept: PERIOP | Facility: HOSPITAL | Age: 60
End: 2020-06-01
Payer: COMMERCIAL

## 2020-06-01 VITALS
SYSTOLIC BLOOD PRESSURE: 134 MMHG | TEMPERATURE: 98.2 F | HEART RATE: 72 BPM | OXYGEN SATURATION: 99 % | BODY MASS INDEX: 31.28 KG/M2 | WEIGHT: 170 LBS | DIASTOLIC BLOOD PRESSURE: 78 MMHG | HEIGHT: 62 IN

## 2020-06-01 DIAGNOSIS — D56.9 MEDITERRANEAN ANEMIA: ICD-10-CM

## 2020-06-01 DIAGNOSIS — Z01.818 PRE-OP EVALUATION: Primary | ICD-10-CM

## 2020-06-01 DIAGNOSIS — S83.241S ACUTE MEDIAL MENISCUS TEAR, RIGHT, SEQUELA: ICD-10-CM

## 2020-06-01 PROCEDURE — 99214 OFFICE O/P EST MOD 30 MIN: CPT | Performed by: NURSE PRACTITIONER

## 2020-06-01 PROCEDURE — 3008F BODY MASS INDEX DOCD: CPT | Performed by: NURSE PRACTITIONER

## 2020-06-01 PROCEDURE — 1036F TOBACCO NON-USER: CPT | Performed by: NURSE PRACTITIONER

## 2020-06-02 ENCOUNTER — HOSPITAL ENCOUNTER (OUTPATIENT)
Facility: HOSPITAL | Age: 60
Setting detail: OUTPATIENT SURGERY
Discharge: HOME | End: 2020-06-02
Attending: ORTHOPAEDIC SURGERY | Admitting: ORTHOPAEDIC SURGERY
Payer: COMMERCIAL

## 2020-06-02 ENCOUNTER — ANESTHESIA (OUTPATIENT)
Dept: PERIOP | Facility: HOSPITAL | Age: 60
End: 2020-06-02
Payer: COMMERCIAL

## 2020-06-02 VITALS
DIASTOLIC BLOOD PRESSURE: 80 MMHG | SYSTOLIC BLOOD PRESSURE: 145 MMHG | RESPIRATION RATE: 16 BRPM | HEART RATE: 70 BPM | OXYGEN SATURATION: 99 % | TEMPERATURE: 97.8 F

## 2020-06-02 DIAGNOSIS — S83.241A ACUTE MEDIAL MENISCUS TEAR OF RIGHT KNEE, INITIAL ENCOUNTER: Primary | ICD-10-CM

## 2020-06-02 LAB
ABO GROUP BLD: NORMAL
ATRIAL RATE: 58 BPM
P AXIS: 29 DEGREES
PR INTERVAL: 174 MS
QRS AXIS: 32 DEGREES
QRSD INTERVAL: 84 MS
QT INTERVAL: 416 MS
QTC INTERVAL: 408 MS
RH BLD: POSITIVE
SARS-COV-2 RNA SPEC QL NAA+PROBE: NOT DETECTED
T WAVE AXIS: 38 DEGREES
VENTRICULAR RATE: 58 BPM

## 2020-06-02 PROCEDURE — 29881 ARTHRS KNE SRG MNISECTMY M/L: CPT | Performed by: ORTHOPAEDIC SURGERY

## 2020-06-02 PROCEDURE — 93010 ELECTROCARDIOGRAM REPORT: CPT | Performed by: INTERNAL MEDICINE

## 2020-06-02 RX ORDER — ASPIRIN 325 MG
325 TABLET, DELAYED RELEASE (ENTERIC COATED) ORAL DAILY
Qty: 21 TABLET | Refills: 0 | Status: SHIPPED | OUTPATIENT
Start: 2020-06-02 | End: 2020-06-25

## 2020-06-02 RX ORDER — FENTANYL CITRATE 50 UG/ML
INJECTION, SOLUTION INTRAMUSCULAR; INTRAVENOUS AS NEEDED
Status: DISCONTINUED | OUTPATIENT
Start: 2020-06-02 | End: 2020-06-02 | Stop reason: SURG

## 2020-06-02 RX ORDER — PROMETHAZINE HYDROCHLORIDE 25 MG/ML
25 INJECTION, SOLUTION INTRAMUSCULAR; INTRAVENOUS ONCE AS NEEDED
Status: DISCONTINUED | OUTPATIENT
Start: 2020-06-02 | End: 2020-06-02 | Stop reason: HOSPADM

## 2020-06-02 RX ORDER — SODIUM CHLORIDE, SODIUM LACTATE, POTASSIUM CHLORIDE, CALCIUM CHLORIDE 600; 310; 30; 20 MG/100ML; MG/100ML; MG/100ML; MG/100ML
125 INJECTION, SOLUTION INTRAVENOUS CONTINUOUS
Status: DISCONTINUED | OUTPATIENT
Start: 2020-06-02 | End: 2020-06-02

## 2020-06-02 RX ORDER — METOCLOPRAMIDE HYDROCHLORIDE 5 MG/ML
10 INJECTION INTRAMUSCULAR; INTRAVENOUS ONCE AS NEEDED
Status: DISCONTINUED | OUTPATIENT
Start: 2020-06-02 | End: 2020-06-02 | Stop reason: HOSPADM

## 2020-06-02 RX ORDER — CHLORHEXIDINE GLUCONATE 0.12 MG/ML
15 RINSE ORAL ONCE
Status: COMPLETED | OUTPATIENT
Start: 2020-06-02 | End: 2020-06-02

## 2020-06-02 RX ORDER — PROPOFOL 10 MG/ML
INJECTION, EMULSION INTRAVENOUS AS NEEDED
Status: DISCONTINUED | OUTPATIENT
Start: 2020-06-02 | End: 2020-06-02 | Stop reason: SURG

## 2020-06-02 RX ORDER — SODIUM CHLORIDE, SODIUM LACTATE, POTASSIUM CHLORIDE, CALCIUM CHLORIDE 600; 310; 30; 20 MG/100ML; MG/100ML; MG/100ML; MG/100ML
125 INJECTION, SOLUTION INTRAVENOUS CONTINUOUS
Status: DISCONTINUED | OUTPATIENT
Start: 2020-06-02 | End: 2020-06-02 | Stop reason: HOSPADM

## 2020-06-02 RX ORDER — CEFAZOLIN SODIUM 2 G/50ML
2000 SOLUTION INTRAVENOUS ONCE
Status: COMPLETED | OUTPATIENT
Start: 2020-06-02 | End: 2020-06-02

## 2020-06-02 RX ORDER — ONDANSETRON 2 MG/ML
4 INJECTION INTRAMUSCULAR; INTRAVENOUS ONCE AS NEEDED
Status: DISCONTINUED | OUTPATIENT
Start: 2020-06-02 | End: 2020-06-02 | Stop reason: HOSPADM

## 2020-06-02 RX ORDER — MAGNESIUM HYDROXIDE 1200 MG/15ML
LIQUID ORAL AS NEEDED
Status: DISCONTINUED | OUTPATIENT
Start: 2020-06-02 | End: 2020-06-02 | Stop reason: HOSPADM

## 2020-06-02 RX ORDER — FENTANYL CITRATE/PF 50 MCG/ML
25 SYRINGE (ML) INJECTION
Status: DISCONTINUED | OUTPATIENT
Start: 2020-06-02 | End: 2020-06-02 | Stop reason: HOSPADM

## 2020-06-02 RX ORDER — GLYCOPYRROLATE 0.2 MG/ML
INJECTION INTRAMUSCULAR; INTRAVENOUS AS NEEDED
Status: DISCONTINUED | OUTPATIENT
Start: 2020-06-02 | End: 2020-06-02 | Stop reason: SURG

## 2020-06-02 RX ORDER — MIDAZOLAM HYDROCHLORIDE 2 MG/2ML
INJECTION, SOLUTION INTRAMUSCULAR; INTRAVENOUS AS NEEDED
Status: DISCONTINUED | OUTPATIENT
Start: 2020-06-02 | End: 2020-06-02 | Stop reason: SURG

## 2020-06-02 RX ORDER — BUPIVACAINE HYDROCHLORIDE 2.5 MG/ML
INJECTION, SOLUTION EPIDURAL; INFILTRATION; INTRACAUDAL AS NEEDED
Status: DISCONTINUED | OUTPATIENT
Start: 2020-06-02 | End: 2020-06-02 | Stop reason: HOSPADM

## 2020-06-02 RX ADMIN — CEFAZOLIN SODIUM 2000 MG: 2 SOLUTION INTRAVENOUS at 09:56

## 2020-06-02 RX ADMIN — SODIUM CHLORIDE, SODIUM LACTATE, POTASSIUM CHLORIDE, AND CALCIUM CHLORIDE 125 ML/HR: .6; .31; .03; .02 INJECTION, SOLUTION INTRAVENOUS at 08:08

## 2020-06-02 RX ADMIN — GLYCOPYRROLATE 0.2 MG: 0.2 INJECTION, SOLUTION INTRAMUSCULAR; INTRAVENOUS at 10:19

## 2020-06-02 RX ADMIN — MIDAZOLAM HYDROCHLORIDE 2 MG: 1 INJECTION, SOLUTION INTRAMUSCULAR; INTRAVENOUS at 09:56

## 2020-06-02 RX ADMIN — FENTANYL CITRATE 100 MCG: 50 INJECTION, SOLUTION INTRAMUSCULAR; INTRAVENOUS at 10:00

## 2020-06-02 RX ADMIN — CHLORHEXIDINE GLUCONATE 0.12% ORAL RINSE 15 ML: 1.2 LIQUID ORAL at 08:09

## 2020-06-02 RX ADMIN — PROPOFOL 200 MG: 10 INJECTION, EMULSION INTRAVENOUS at 10:00

## 2020-06-03 ENCOUNTER — EVALUATION (OUTPATIENT)
Dept: PHYSICAL THERAPY | Facility: CLINIC | Age: 60
End: 2020-06-03
Payer: COMMERCIAL

## 2020-06-03 DIAGNOSIS — M25.561 ACUTE PAIN OF RIGHT KNEE: ICD-10-CM

## 2020-06-03 DIAGNOSIS — S83.241A OTHER TEAR OF MEDIAL MENISCUS, CURRENT INJURY, RIGHT KNEE, INITIAL ENCOUNTER: ICD-10-CM

## 2020-06-03 DIAGNOSIS — M17.11 PRIMARY OSTEOARTHRITIS OF RIGHT KNEE: ICD-10-CM

## 2020-06-03 PROCEDURE — 97116 GAIT TRAINING THERAPY: CPT | Performed by: PHYSICAL THERAPIST

## 2020-06-03 PROCEDURE — 97161 PT EVAL LOW COMPLEX 20 MIN: CPT | Performed by: PHYSICAL THERAPIST

## 2020-06-04 ENCOUNTER — OFFICE VISIT (OUTPATIENT)
Dept: PHYSICAL THERAPY | Facility: CLINIC | Age: 60
End: 2020-06-04
Payer: COMMERCIAL

## 2020-06-04 DIAGNOSIS — M25.561 ACUTE PAIN OF RIGHT KNEE: ICD-10-CM

## 2020-06-04 DIAGNOSIS — S83.241A OTHER TEAR OF MEDIAL MENISCUS, CURRENT INJURY, RIGHT KNEE, INITIAL ENCOUNTER: Primary | ICD-10-CM

## 2020-06-04 DIAGNOSIS — M17.11 PRIMARY OSTEOARTHRITIS OF RIGHT KNEE: ICD-10-CM

## 2020-06-04 PROCEDURE — 97110 THERAPEUTIC EXERCISES: CPT

## 2020-06-08 ENCOUNTER — OFFICE VISIT (OUTPATIENT)
Dept: PHYSICAL THERAPY | Facility: CLINIC | Age: 60
End: 2020-06-08
Payer: COMMERCIAL

## 2020-06-08 ENCOUNTER — APPOINTMENT (OUTPATIENT)
Dept: PHYSICAL THERAPY | Facility: CLINIC | Age: 60
End: 2020-06-08
Payer: COMMERCIAL

## 2020-06-08 DIAGNOSIS — M25.561 ACUTE PAIN OF RIGHT KNEE: ICD-10-CM

## 2020-06-08 DIAGNOSIS — M17.11 PRIMARY OSTEOARTHRITIS OF RIGHT KNEE: ICD-10-CM

## 2020-06-08 DIAGNOSIS — S83.241A OTHER TEAR OF MEDIAL MENISCUS, CURRENT INJURY, RIGHT KNEE, INITIAL ENCOUNTER: Primary | ICD-10-CM

## 2020-06-08 PROCEDURE — 97110 THERAPEUTIC EXERCISES: CPT | Performed by: PHYSICAL THERAPIST

## 2020-06-10 ENCOUNTER — OFFICE VISIT (OUTPATIENT)
Dept: PHYSICAL THERAPY | Facility: CLINIC | Age: 60
End: 2020-06-10
Payer: COMMERCIAL

## 2020-06-10 ENCOUNTER — OFFICE VISIT (OUTPATIENT)
Dept: OBGYN CLINIC | Facility: CLINIC | Age: 60
End: 2020-06-10

## 2020-06-10 VITALS
TEMPERATURE: 97 F | HEIGHT: 62 IN | DIASTOLIC BLOOD PRESSURE: 82 MMHG | SYSTOLIC BLOOD PRESSURE: 145 MMHG | BODY MASS INDEX: 31.28 KG/M2 | WEIGHT: 170 LBS

## 2020-06-10 DIAGNOSIS — M25.561 ACUTE PAIN OF RIGHT KNEE: ICD-10-CM

## 2020-06-10 DIAGNOSIS — S83.241A OTHER TEAR OF MEDIAL MENISCUS, CURRENT INJURY, RIGHT KNEE, INITIAL ENCOUNTER: Primary | ICD-10-CM

## 2020-06-10 DIAGNOSIS — M17.11 PRIMARY OSTEOARTHRITIS OF RIGHT KNEE: ICD-10-CM

## 2020-06-10 PROCEDURE — 3008F BODY MASS INDEX DOCD: CPT | Performed by: ORTHOPAEDIC SURGERY

## 2020-06-10 PROCEDURE — 99024 POSTOP FOLLOW-UP VISIT: CPT | Performed by: ORTHOPAEDIC SURGERY

## 2020-06-10 PROCEDURE — 97110 THERAPEUTIC EXERCISES: CPT

## 2020-06-11 ENCOUNTER — OFFICE VISIT (OUTPATIENT)
Dept: PHYSICAL THERAPY | Facility: CLINIC | Age: 60
End: 2020-06-11
Payer: COMMERCIAL

## 2020-06-11 DIAGNOSIS — M25.561 ACUTE PAIN OF RIGHT KNEE: ICD-10-CM

## 2020-06-11 DIAGNOSIS — M25.561 CHRONIC PAIN OF BOTH KNEES: ICD-10-CM

## 2020-06-11 DIAGNOSIS — M17.11 PRIMARY OSTEOARTHRITIS OF RIGHT KNEE: ICD-10-CM

## 2020-06-11 DIAGNOSIS — G89.29 CHRONIC PAIN OF BOTH KNEES: ICD-10-CM

## 2020-06-11 DIAGNOSIS — M17.0 BILATERAL PRIMARY OSTEOARTHRITIS OF KNEE: ICD-10-CM

## 2020-06-11 DIAGNOSIS — M25.562 CHRONIC PAIN OF BOTH KNEES: ICD-10-CM

## 2020-06-11 DIAGNOSIS — S83.241A OTHER TEAR OF MEDIAL MENISCUS, CURRENT INJURY, RIGHT KNEE, INITIAL ENCOUNTER: Primary | ICD-10-CM

## 2020-06-11 PROCEDURE — 97110 THERAPEUTIC EXERCISES: CPT

## 2020-06-15 ENCOUNTER — OFFICE VISIT (OUTPATIENT)
Dept: PHYSICAL THERAPY | Facility: CLINIC | Age: 60
End: 2020-06-15
Payer: COMMERCIAL

## 2020-06-15 DIAGNOSIS — M25.561 ACUTE PAIN OF RIGHT KNEE: ICD-10-CM

## 2020-06-15 DIAGNOSIS — M17.11 PRIMARY OSTEOARTHRITIS OF RIGHT KNEE: ICD-10-CM

## 2020-06-15 DIAGNOSIS — S83.241A OTHER TEAR OF MEDIAL MENISCUS, CURRENT INJURY, RIGHT KNEE, INITIAL ENCOUNTER: Primary | ICD-10-CM

## 2020-06-15 PROCEDURE — 97110 THERAPEUTIC EXERCISES: CPT

## 2020-06-17 ENCOUNTER — OFFICE VISIT (OUTPATIENT)
Dept: PHYSICAL THERAPY | Facility: CLINIC | Age: 60
End: 2020-06-17
Payer: COMMERCIAL

## 2020-06-17 DIAGNOSIS — S83.241A OTHER TEAR OF MEDIAL MENISCUS, CURRENT INJURY, RIGHT KNEE, INITIAL ENCOUNTER: Primary | ICD-10-CM

## 2020-06-17 DIAGNOSIS — M17.11 PRIMARY OSTEOARTHRITIS OF RIGHT KNEE: ICD-10-CM

## 2020-06-17 DIAGNOSIS — M25.561 ACUTE PAIN OF RIGHT KNEE: ICD-10-CM

## 2020-06-17 PROCEDURE — 97110 THERAPEUTIC EXERCISES: CPT

## 2020-06-18 ENCOUNTER — OFFICE VISIT (OUTPATIENT)
Dept: PHYSICAL THERAPY | Facility: CLINIC | Age: 60
End: 2020-06-18
Payer: COMMERCIAL

## 2020-06-18 DIAGNOSIS — S83.241A OTHER TEAR OF MEDIAL MENISCUS, CURRENT INJURY, RIGHT KNEE, INITIAL ENCOUNTER: Primary | ICD-10-CM

## 2020-06-18 DIAGNOSIS — M17.11 PRIMARY OSTEOARTHRITIS OF RIGHT KNEE: ICD-10-CM

## 2020-06-18 DIAGNOSIS — M25.561 ACUTE PAIN OF RIGHT KNEE: ICD-10-CM

## 2020-06-18 PROCEDURE — 97112 NEUROMUSCULAR REEDUCATION: CPT | Performed by: PHYSICAL THERAPIST

## 2020-06-18 PROCEDURE — 97110 THERAPEUTIC EXERCISES: CPT | Performed by: PHYSICAL THERAPIST

## 2020-06-22 ENCOUNTER — OFFICE VISIT (OUTPATIENT)
Dept: PHYSICAL THERAPY | Facility: CLINIC | Age: 60
End: 2020-06-22
Payer: COMMERCIAL

## 2020-06-22 ENCOUNTER — TELEPHONE (OUTPATIENT)
Dept: OBGYN CLINIC | Facility: CLINIC | Age: 60
End: 2020-06-22

## 2020-06-22 DIAGNOSIS — M17.11 PRIMARY OSTEOARTHRITIS OF RIGHT KNEE: ICD-10-CM

## 2020-06-22 DIAGNOSIS — S83.241A OTHER TEAR OF MEDIAL MENISCUS, CURRENT INJURY, RIGHT KNEE, INITIAL ENCOUNTER: Primary | ICD-10-CM

## 2020-06-22 DIAGNOSIS — M25.561 ACUTE PAIN OF RIGHT KNEE: ICD-10-CM

## 2020-06-22 PROCEDURE — 97112 NEUROMUSCULAR REEDUCATION: CPT

## 2020-06-22 PROCEDURE — 97110 THERAPEUTIC EXERCISES: CPT

## 2020-06-24 ENCOUNTER — EVALUATION (OUTPATIENT)
Dept: PHYSICAL THERAPY | Facility: CLINIC | Age: 60
End: 2020-06-24
Payer: COMMERCIAL

## 2020-06-24 DIAGNOSIS — S83.241A OTHER TEAR OF MEDIAL MENISCUS, CURRENT INJURY, RIGHT KNEE, INITIAL ENCOUNTER: Primary | ICD-10-CM

## 2020-06-24 DIAGNOSIS — M25.561 ACUTE PAIN OF RIGHT KNEE: ICD-10-CM

## 2020-06-24 DIAGNOSIS — M17.11 PRIMARY OSTEOARTHRITIS OF RIGHT KNEE: ICD-10-CM

## 2020-06-24 PROCEDURE — 97110 THERAPEUTIC EXERCISES: CPT | Performed by: PHYSICAL THERAPIST

## 2020-06-24 PROCEDURE — 97140 MANUAL THERAPY 1/> REGIONS: CPT | Performed by: PHYSICAL THERAPIST

## 2020-06-25 ENCOUNTER — HOSPITAL ENCOUNTER (EMERGENCY)
Facility: HOSPITAL | Age: 60
Discharge: HOME | End: 2020-06-25
Attending: EMERGENCY MEDICINE | Admitting: EMERGENCY MEDICINE
Payer: COMMERCIAL

## 2020-06-25 ENCOUNTER — APPOINTMENT (EMERGENCY)
Dept: RADIOLOGY | Facility: HOSPITAL | Age: 60
End: 2020-06-25
Payer: COMMERCIAL

## 2020-06-25 ENCOUNTER — OFFICE VISIT (OUTPATIENT)
Dept: PHYSICAL THERAPY | Facility: CLINIC | Age: 60
End: 2020-06-25
Payer: COMMERCIAL

## 2020-06-25 ENCOUNTER — TELEPHONE (OUTPATIENT)
Dept: PHYSICAL THERAPY | Facility: CLINIC | Age: 60
End: 2020-06-25

## 2020-06-25 ENCOUNTER — OFFICE VISIT (OUTPATIENT)
Dept: OBGYN CLINIC | Facility: CLINIC | Age: 60
End: 2020-06-25
Payer: COMMERCIAL

## 2020-06-25 VITALS
HEART RATE: 56 BPM | SYSTOLIC BLOOD PRESSURE: 132 MMHG | DIASTOLIC BLOOD PRESSURE: 80 MMHG | RESPIRATION RATE: 16 BRPM | BODY MASS INDEX: 31.24 KG/M2 | OXYGEN SATURATION: 98 % | WEIGHT: 170.8 LBS

## 2020-06-25 VITALS
TEMPERATURE: 98.7 F | SYSTOLIC BLOOD PRESSURE: 130 MMHG | BODY MASS INDEX: 31.28 KG/M2 | DIASTOLIC BLOOD PRESSURE: 80 MMHG | WEIGHT: 171 LBS

## 2020-06-25 DIAGNOSIS — Z01.419 WELL WOMAN EXAM: Primary | ICD-10-CM

## 2020-06-25 DIAGNOSIS — M17.11 PRIMARY OSTEOARTHRITIS OF RIGHT KNEE: ICD-10-CM

## 2020-06-25 DIAGNOSIS — E28.39 HYPOESTROGENISM: ICD-10-CM

## 2020-06-25 DIAGNOSIS — Z12.39 BREAST CANCER SCREENING: ICD-10-CM

## 2020-06-25 DIAGNOSIS — E07.9 THYROID DISEASE: ICD-10-CM

## 2020-06-25 DIAGNOSIS — M25.561 ACUTE PAIN OF RIGHT KNEE: ICD-10-CM

## 2020-06-25 DIAGNOSIS — S83.241A OTHER TEAR OF MEDIAL MENISCUS, CURRENT INJURY, RIGHT KNEE, INITIAL ENCOUNTER: Primary | ICD-10-CM

## 2020-06-25 DIAGNOSIS — M79.89 LEG SWELLING: Primary | ICD-10-CM

## 2020-06-25 PROBLEM — M51.36 DDD (DEGENERATIVE DISC DISEASE), LUMBAR: Status: ACTIVE | Noted: 2019-01-07

## 2020-06-25 PROBLEM — M54.16 LUMBAR RADICULOPATHY: Status: ACTIVE | Noted: 2019-02-28

## 2020-06-25 PROBLEM — M51.369 DDD (DEGENERATIVE DISC DISEASE), LUMBAR: Status: ACTIVE | Noted: 2019-01-07

## 2020-06-25 LAB
ANION GAP SERPL CALCULATED.3IONS-SCNC: 6 MMOL/L (ref 4–13)
APTT PPP: 29 SECONDS (ref 23–37)
BASOPHILS # BLD AUTO: 0.04 THOUSANDS/ΜL (ref 0–0.1)
BASOPHILS NFR BLD AUTO: 1 % (ref 0–1)
BUN SERPL-MCNC: 15 MG/DL (ref 5–25)
CALCIUM SERPL-MCNC: 8.9 MG/DL (ref 8.3–10.1)
CHLORIDE SERPL-SCNC: 100 MMOL/L (ref 100–108)
CO2 SERPL-SCNC: 31 MMOL/L (ref 21–32)
CREAT SERPL-MCNC: 1.06 MG/DL (ref 0.6–1.3)
EOSINOPHIL # BLD AUTO: 0.33 THOUSAND/ΜL (ref 0–0.61)
EOSINOPHIL NFR BLD AUTO: 5 % (ref 0–6)
ERYTHROCYTE [DISTWIDTH] IN BLOOD BY AUTOMATED COUNT: 14.3 % (ref 11.6–15.1)
GFR SERPL CREATININE-BSD FRML MDRD: 58 ML/MIN/1.73SQ M
GLUCOSE SERPL-MCNC: 91 MG/DL (ref 65–140)
HCT VFR BLD AUTO: 42.1 % (ref 34.8–46.1)
HGB BLD-MCNC: 13 G/DL (ref 11.5–15.4)
IMM GRANULOCYTES # BLD AUTO: 0.02 THOUSAND/UL (ref 0–0.2)
IMM GRANULOCYTES NFR BLD AUTO: 0 % (ref 0–2)
INR PPP: 0.97 (ref 0.84–1.19)
LYMPHOCYTES # BLD AUTO: 2.55 THOUSANDS/ΜL (ref 0.6–4.47)
LYMPHOCYTES NFR BLD AUTO: 35 % (ref 14–44)
MCH RBC QN AUTO: 25.2 PG (ref 26.8–34.3)
MCHC RBC AUTO-ENTMCNC: 30.9 G/DL (ref 31.4–37.4)
MCV RBC AUTO: 82 FL (ref 82–98)
MONOCYTES # BLD AUTO: 0.53 THOUSAND/ΜL (ref 0.17–1.22)
MONOCYTES NFR BLD AUTO: 7 % (ref 4–12)
NEUTROPHILS # BLD AUTO: 3.83 THOUSANDS/ΜL (ref 1.85–7.62)
NEUTS SEG NFR BLD AUTO: 52 % (ref 43–75)
NRBC BLD AUTO-RTO: 0 /100 WBCS
PLATELET # BLD AUTO: 250 THOUSANDS/UL (ref 149–390)
PMV BLD AUTO: 12.1 FL (ref 8.9–12.7)
POTASSIUM SERPL-SCNC: 3.7 MMOL/L (ref 3.5–5.3)
PROTHROMBIN TIME: 13.2 SECONDS (ref 11.6–14.5)
RBC # BLD AUTO: 5.16 MILLION/UL (ref 3.81–5.12)
SODIUM SERPL-SCNC: 137 MMOL/L (ref 136–145)
WBC # BLD AUTO: 7.3 THOUSAND/UL (ref 4.31–10.16)

## 2020-06-25 PROCEDURE — 85610 PROTHROMBIN TIME: CPT | Performed by: PHYSICIAN ASSISTANT

## 2020-06-25 PROCEDURE — 99396 PREV VISIT EST AGE 40-64: CPT | Performed by: PHYSICIAN ASSISTANT

## 2020-06-25 PROCEDURE — 93971 EXTREMITY STUDY: CPT

## 2020-06-25 PROCEDURE — 85025 COMPLETE CBC W/AUTO DIFF WBC: CPT | Performed by: PHYSICIAN ASSISTANT

## 2020-06-25 PROCEDURE — 85730 THROMBOPLASTIN TIME PARTIAL: CPT | Performed by: PHYSICIAN ASSISTANT

## 2020-06-25 PROCEDURE — 80048 BASIC METABOLIC PNL TOTAL CA: CPT | Performed by: PHYSICIAN ASSISTANT

## 2020-06-25 PROCEDURE — 99284 EMERGENCY DEPT VISIT MOD MDM: CPT | Performed by: PHYSICIAN ASSISTANT

## 2020-06-25 PROCEDURE — 36415 COLL VENOUS BLD VENIPUNCTURE: CPT | Performed by: PHYSICIAN ASSISTANT

## 2020-06-25 PROCEDURE — 99284 EMERGENCY DEPT VISIT MOD MDM: CPT

## 2020-06-26 PROCEDURE — 93971 EXTREMITY STUDY: CPT | Performed by: SURGERY

## 2020-06-29 ENCOUNTER — OFFICE VISIT (OUTPATIENT)
Dept: PHYSICAL THERAPY | Facility: CLINIC | Age: 60
End: 2020-06-29
Payer: COMMERCIAL

## 2020-06-29 DIAGNOSIS — M25.561 ACUTE PAIN OF RIGHT KNEE: ICD-10-CM

## 2020-06-29 DIAGNOSIS — M17.11 PRIMARY OSTEOARTHRITIS OF RIGHT KNEE: ICD-10-CM

## 2020-06-29 DIAGNOSIS — S83.241A OTHER TEAR OF MEDIAL MENISCUS, CURRENT INJURY, RIGHT KNEE, INITIAL ENCOUNTER: Primary | ICD-10-CM

## 2020-06-29 LAB
CYTOLOGIST CVX/VAG CYTO: NORMAL
DX ICD CODE: NORMAL
HPV I/H RISK 1 DNA CVX QL PROBE+SIG AMP: NEGATIVE
Lab: NORMAL
OTHER STN SPEC: NORMAL
PATH REPORT.FINAL DX SPEC: NORMAL
SL AMB NOTE:: NORMAL
SL AMB SPECIMEN ADEQUACY: NORMAL
SL AMB TEST METHODOLOGY: NORMAL

## 2020-06-29 PROCEDURE — 97140 MANUAL THERAPY 1/> REGIONS: CPT | Performed by: PHYSICAL THERAPIST

## 2020-06-29 PROCEDURE — 97110 THERAPEUTIC EXERCISES: CPT | Performed by: PHYSICAL THERAPIST

## 2020-07-01 ENCOUNTER — OFFICE VISIT (OUTPATIENT)
Dept: PHYSICAL THERAPY | Facility: CLINIC | Age: 60
End: 2020-07-01
Payer: COMMERCIAL

## 2020-07-01 DIAGNOSIS — M17.11 PRIMARY OSTEOARTHRITIS OF RIGHT KNEE: ICD-10-CM

## 2020-07-01 DIAGNOSIS — S83.241A OTHER TEAR OF MEDIAL MENISCUS, CURRENT INJURY, RIGHT KNEE, INITIAL ENCOUNTER: Primary | ICD-10-CM

## 2020-07-01 DIAGNOSIS — M25.561 ACUTE PAIN OF RIGHT KNEE: ICD-10-CM

## 2020-07-01 PROCEDURE — 97110 THERAPEUTIC EXERCISES: CPT

## 2020-07-01 NOTE — PROGRESS NOTES
Daily Note     Today's date: 2020  Patient name: Chanda Lenz  : 1960  MRN: [id number]  Referring provider: Nadine Avila DO  Dx:   Encounter Diagnosis     ICD-10-CM    1  Other tear of medial meniscus, current injury, right knee, initial encounter S83 241A    2  Acute pain of right knee M25 561    3  Primary osteoarthritis of right knee M17 11                   Subjective: pt reports feeling good after last visit, she has been taking it easy       Objective: See treatment diary below      Assessment: Tolerated treatment well  Patient exhibited good technique with therapeutic exercises brief increases in pain when she had a twisting motion occur at her knee when she planted to turn and sit  Plan: Continue per plan of care  Precautions: B/L knee OA  SOC: 6/3/2020  DOS: 2020  POC expiration: 2020  Daily Treatment Log  Date 2020      Visit # 11 12      Manual 10' 5'      Stick rolling 10' 5'       R fib head mob w/ foot on step        There Exer 30' 35'       Upright bike L2x10' L2x12'       Alt qset & Heel slides w/ board  3" hold flex/ext 20x 3" hold flex/ext 20x       SLR abd qset 2x10 2x10       SLR flex w/ qset 2x10 2x10       SLR adduction 1x10 2x10       Prone knee flexion 2x10 2x10       Prone quad set 2x10 2x10       Seated knee flexion with OP         LAQ        Stand B/L HR/TR        bridges 2x10 2x10      Leg press 45#  45# 2x10       STS         DF gastroc stretch 20"x3  20"x3       FSU        NMRed        Supine NG knee ext w/ DF        WB ROM PD 20x 20x      Sidestep w/ TB ankles        Alt cone taps        Feet on pball bridges                                Modalities        CP 5' to end 5' to end                         Access Code: [email or web address]   URL: [email or web address]   Date: 2020   Prepared by:  Nuzhat Cha      Exercises  · Supine Heel Slide - 10 reps - 2 sets - 3 hold - 2x daily - 7x weekly  · Prone Knee Flexion AROM - 10 reps - 2 sets - 3 hold - 2x daily - 7x weekly  · Prone Quadriceps Set - 10 reps - 2 sets - 3 hold - 2x daily - 7x weekly  · Supine Active Straight Leg Raise - 10 reps - 2 sets - 1x daily - 7x weekly  · Supine Bridge - 10 reps - 2 sets - 1x daily - 7x weekly  · Heel rises with counter support - 10 reps - 2 sets - 1x daily - 7x weekly  · Toe Raises with Counter Support - 10 reps - 2 sets - 1x daily - 7x weekly

## 2020-07-02 ENCOUNTER — OFFICE VISIT (OUTPATIENT)
Dept: PHYSICAL THERAPY | Facility: CLINIC | Age: 60
End: 2020-07-02
Payer: COMMERCIAL

## 2020-07-02 DIAGNOSIS — M25.561 ACUTE PAIN OF RIGHT KNEE: ICD-10-CM

## 2020-07-02 DIAGNOSIS — M17.11 PRIMARY OSTEOARTHRITIS OF RIGHT KNEE: ICD-10-CM

## 2020-07-02 DIAGNOSIS — S83.241A OTHER TEAR OF MEDIAL MENISCUS, CURRENT INJURY, RIGHT KNEE, INITIAL ENCOUNTER: Primary | ICD-10-CM

## 2020-07-02 PROCEDURE — 97110 THERAPEUTIC EXERCISES: CPT

## 2020-07-02 PROCEDURE — 97112 NEUROMUSCULAR REEDUCATION: CPT

## 2020-07-02 NOTE — PROGRESS NOTES
Daily Note     Today's date: 2020  Patient name: Teri Reddy  : 1960  MRN: [id number]  Referring provider: Carlene Blair DO  Dx:   Encounter Diagnosis     ICD-10-CM    1  Other tear of medial meniscus, current injury, right knee, initial encounter S83 975A    2  Acute pain of right knee M25 561    3  Primary osteoarthritis of right knee M17 11                   Subjective: pt reports she was a little sore this morning but she also had guests over yesterday and she was on her feet a lot entertaining  Objective: See treatment diary below      Assessment: Tolerated treatment well  Patient exhibited good technique with therapeutic exercises pt with no difficulties with the progressions to her exercise program today as noted below  Pt cont to get increases in pain when she over does it and educated to take it easy       Plan: Continue per plan of care        Precautions: B/L knee OA  SOC: 6/3/2020  DOS: 2020  POC expiration: 2020  Daily Treatment Log  Date 2020     Visit # 11 12 13     Manual 10' 5' 5'     Stick rolling 10' 5'  5'     R fib head mob w/ foot on step        There Exer 30' 35' 25'      Upright bike L2x10' L2x12'  L2x10'      Alt qset & Heel slides w/ board  3" hold flex/ext 20x 3" hold flex/ext 20x  3" hold flex/ext 20x       SLR abd qset 2x10 2x10       SLR flex w/ qset 2x10 2x10       SLR adduction 1x10 2x10       Prone knee flexion 2x10 2x10  1x15      Prone quad set 2x10 2x10       Seated knee flexion with OP         LAQ        Stand B/L HR/TR        bridges 2x10 2x10      Leg press 45#  45# 2x10  45# 2x15     STS    Low mat 2x10      DF gastroc stretch 20"x3  20"x3   20"x4       FSU        NMRed   10'     Supine NG knee ext w/ DF   2x10      WB ROM PD 20x 20x 20x      Sidestep w/ TB ankles        Alt cone taps        Feet on pball bridges   2x10                              Modalities        CP 5' to end 5' to end  5' to end Access Code: [email or web address]   URL: [email or web address]   Date: 06/18/2020   Prepared by:  Danny Brown      Exercises  · Supine Heel Slide - 10 reps - 2 sets - 3 hold - 2x daily - 7x weekly  · Prone Knee Flexion AROM - 10 reps - 2 sets - 3 hold - 2x daily - 7x weekly  · Prone Quadriceps Set - 10 reps - 2 sets - 3 hold - 2x daily - 7x weekly  · Supine Active Straight Leg Raise - 10 reps - 2 sets - 1x daily - 7x weekly  · Supine Bridge - 10 reps - 2 sets - 1x daily - 7x weekly  · Heel rises with counter support - 10 reps - 2 sets - 1x daily - 7x weekly  · Toe Raises with Counter Support - 10 reps - 2 sets - 1x daily - 7x weekly

## 2020-07-06 ENCOUNTER — OFFICE VISIT (OUTPATIENT)
Dept: PHYSICAL THERAPY | Facility: CLINIC | Age: 60
End: 2020-07-06
Payer: COMMERCIAL

## 2020-07-06 DIAGNOSIS — S83.241A OTHER TEAR OF MEDIAL MENISCUS, CURRENT INJURY, RIGHT KNEE, INITIAL ENCOUNTER: Primary | ICD-10-CM

## 2020-07-06 DIAGNOSIS — M17.11 PRIMARY OSTEOARTHRITIS OF RIGHT KNEE: ICD-10-CM

## 2020-07-06 DIAGNOSIS — M25.561 ACUTE PAIN OF RIGHT KNEE: ICD-10-CM

## 2020-07-06 PROCEDURE — 97110 THERAPEUTIC EXERCISES: CPT

## 2020-07-06 NOTE — PROGRESS NOTES
Daily Note     Today's date: 2020  Patient name: Teri Reddy  : 1960  MRN: [id number]  Referring provider: Carlene Blair DO  Dx:   Encounter Diagnosis     ICD-10-CM    1  Other tear of medial meniscus, current injury, right knee, initial encounter S83 241A    2  Acute pain of right knee M25 561    3  Primary osteoarthritis of right knee M17 11                   Subjective: pt reports she left here and went swimming due to the heat and she was moving her knee like she was riding the bike and swimming around and she was in a lot of pain afterward, both knees were painful and even her L one feels swollen       Objective: See treatment diary below      Assessment: Tolerated treatment well  Patient exhibited good technique with therapeutic exercises Pt educated that she needs to slowly increase her activity instead of doing a lot when she feels good and that her surgery may or may not help her completely due to there being other underlying issues in her knee  Eductaed in wearing comfortable supportive shoes vs  Her flip flops, pt with a good understanding  Increases in pain during STS today  Exercise modification today due to increases in pain during session today due to over doing it over the weekend  Plan: Continue per plan of care        Precautions: B/L knee OA  SOC: 6/3/2020  DOS: 2020  POC expiration: 2020  Daily Treatment Log  Date 2020    Visit # 11 12 13 14     Manual 10' 5' 5'     Stick rolling 10' 5'  5'     R fib head mob w/ foot on step        There Exer 30' 35' 25' 40'      Upright bike L2x10' L2x12'  L2x10'  L2x10'      Alt qset & Heel slides w/ board  3" hold flex/ext 20x 3" hold flex/ext 20x  3" hold flex/ext 20x   3" hold flex/ext 20x       SLR abd qset 2x10 2x10       SLR flex w/ qset 2x10 2x10       SLR adduction 1x10 2x10       Prone knee flexion 2x10 2x10  1x15      Prone quad set 2x10 2x10       Seated knee flexion with OP         LAQ Stand B/L HR/TR    20x each    bridges 2x10 2x10      Leg press 45#  45# 2x10  45# 2x15 45# 2x10     STS    Low mat 2x10      DF gastroc stretch 20"x3  20"x3   20"x4   20"x4      FSU        NMRed   10'     Supine NG knee ext w/ DF   2x10      WB ROM PD 20x 20x 20x  20x     Sidestep w/ TB ankles        Alt cone taps        Feet on pball bridges   2x10                                Modalities        CP 5' to end 5' to end  5' to end  5' to end B/L  knees today                       Access Code: [email or web address]   URL: [employer]/   Date: 06/18/2020   Prepared by:  Jazlyn Galloway      Exercises  · Supine Heel Slide - 10 reps - 2 sets - 3 hold - 2x daily - 7x weekly  · Prone Knee Flexion AROM - 10 reps - 2 sets - 3 hold - 2x daily - 7x weekly  · Prone Quadriceps Set - 10 reps - 2 sets - 3 hold - 2x daily - 7x weekly  · Supine Active Straight Leg Raise - 10 reps - 2 sets - 1x daily - 7x weekly  · Supine Bridge - 10 reps - 2 sets - 1x daily - 7x weekly  · Heel rises with counter support - 10 reps - 2 sets - 1x daily - 7x weekly  · Toe Raises with Counter Support - 10 reps - 2 sets - 1x daily - 7x weekly

## 2020-07-08 ENCOUNTER — TELEPHONE (OUTPATIENT)
Dept: PHYSICAL THERAPY | Facility: CLINIC | Age: 60
End: 2020-07-08

## 2020-07-08 ENCOUNTER — APPOINTMENT (OUTPATIENT)
Dept: PHYSICAL THERAPY | Facility: CLINIC | Age: 60
End: 2020-07-08
Payer: COMMERCIAL

## 2020-07-08 ENCOUNTER — OFFICE VISIT (OUTPATIENT)
Dept: OBGYN CLINIC | Facility: CLINIC | Age: 60
End: 2020-07-08

## 2020-07-08 VITALS — BODY MASS INDEX: 31.65 KG/M2 | HEIGHT: 62 IN | TEMPERATURE: 97 F | WEIGHT: 172 LBS

## 2020-07-08 DIAGNOSIS — M17.11 PRIMARY OSTEOARTHRITIS OF RIGHT KNEE: ICD-10-CM

## 2020-07-08 DIAGNOSIS — Z98.890 STATUS POST MEDIAL MENISCECTOMY OF RIGHT KNEE: Primary | ICD-10-CM

## 2020-07-08 PROCEDURE — 3008F BODY MASS INDEX DOCD: CPT | Performed by: ORTHOPAEDIC SURGERY

## 2020-07-08 PROCEDURE — 99024 POSTOP FOLLOW-UP VISIT: CPT | Performed by: ORTHOPAEDIC SURGERY

## 2020-07-08 NOTE — TELEPHONE ENCOUNTER
Pt called and reports seeing the Dr this morning and he wants her to take the rest of this week and weekend off from PT, he thinks she is over doing it   Today and the remainder of apts for this week were cancelled and Pt to resume PT [facility] 7/13/20

## 2020-07-08 NOTE — PROGRESS NOTES
Assessment/Plan:  1  Status post medial meniscectomy of right knee     2  Primary osteoarthritis of right knee       Scribe Attestation    I,:   Mracy Kruger am acting as a scribe while in the presence of the attending physician :        I,:   Hyun Wynn, DO personally performed the services described in this documentation    as scribed in my presence :            Giovany Gorman is a pleasant 28-year-old female presenting today for 5 weeks status post right knee arthroscopy, partial medial meniscectomy and chondroplasty of the medial femoral condyle  I do believe she is unfortunately experiencing post activity related pain about her knee  She is fortunately able to pinpoint when her pain arises  I would like for her to take 1 week off from all activities, including formal physical therapy and shopping  In the meantime, she may take anti-inflammatories as needed and ice daily for pain and swelling control  I did review her physical therapy notes today in the office and agree with their assessment that she is currently just over using her knee for this stage in the postoperative course  At this time, I will have her follow up back in the office in 3 weeks for repeat clinical evaluation  Subjective: 5 weeks status post right knee arthroscopy, partial medial meniscectomy and chondroplasty of the medial femoral condyle  Patient ID: Heide Pappas is a 62 y o  female  HPI  Giovany Gorman is a pleasant 28-year-old female presenting today for 5 weeks status post right knee arthroscopy, partial medial meniscectomy and chondroplasty of the medial femoral condyle  She presents to office today partial weight-bearing with the use of one crutch  She states she has been compliant with formal physical therapy and has seen good results  However, she states roughly 2 weeks ago after formal physical therapy she went shopping and begin experiencing a sharp pain along the medial aspect of her knee afterwards    Likewise, last Thursday after formal physical therapy she was swimming laps in the pool and again began experiencing pain about her knee  She states her knee feels roughly okay today as she has been resting for 2 days  She states she does understand that she may be doing too much in the way of activities  She does appreciate a sharp pain about the medial aspect of her knee when she gets up from her couch  She states she continues icing her knee daily for pain and swelling control  Additionally, she has been taking Tylenol as needed  She has discontinued her aspirin  She denies any numbness and tingling  She denies any fever, chills or sweats  Review of Systems   Constitutional: Positive for activity change  Negative for chills, fever and unexpected weight change  HENT: Negative for hearing loss, nosebleeds and sore throat  Eyes: Negative for pain, redness and visual disturbance  Respiratory: Negative for cough, shortness of breath and wheezing  Cardiovascular: Negative for chest pain, palpitations and leg swelling  Gastrointestinal: Negative for abdominal pain, nausea and vomiting  Endocrine: Negative for polydipsia and polyuria  Genitourinary: Negative for dysuria and hematuria  Musculoskeletal: Positive for arthralgias  See HPI   Skin: Negative for rash and wound  Neurological: Negative for dizziness, numbness and headaches  Psychiatric/Behavioral: Negative for decreased concentration and suicidal ideas  The patient is not nervous/anxious            Past Medical History:   Diagnosis Date    Arthritis     Asthma     Hypothyroidism     hypo    Pemphigus vulgaris     Thalassemia        Past Surgical History:   Procedure Laterality Date    BREAST BIOPSY Right     benign    COLONOSCOPY      MI KNEE SCOPE,MED/LAT MENISECTOMY Right 6/2/2020    Procedure: MENISCECTOMY MEDIAL;  Surgeon: Corinne Henry DO;  Location: [facility];  Service: Orthopedics       Family History   Problem Relation Age of Onset    Asthma Father     Hypertension Maternal Grandmother     Liver cancer Maternal Grandmother     Diabetes Maternal Grandmother     Cancer Family         multiple relatives    Seizures Sister     Asthma Brother     Hypertension Brother     Leukemia Maternal Uncle     Heart attack Cousin     Bone cancer Maternal Uncle     Substance Abuse Neg Hx     Mental illness Neg Hx        Social History     Occupational History    Not on file   Tobacco Use    Smoking status: Former Smoker     Years: 10      Last attempt to quit:      Years since quittin 5    Smokeless tobacco: Never Used   Substance and Sexual Activity    Alcohol use:  Yes     Alcohol/week: 1 0 standard drinks     Types: 1 Glasses of wine per week     Frequency: 2-4 times a month     Drinks per session: 1 or 2     Binge frequency: Less than monthly     Comment: socially     Drug use: Never    Sexual activity: Yes     Partners: Male         Current Outpatient Medications:     albuterol (PROVENTIL HFA,VENTOLIN HFA) 90 mcg/act inhaler, Inhale 2 puffs every 6 (six) hours as needed for wheezing, Disp: 1 Inhaler, Rfl: 3    ALPRAZolam (XANAX) 0 25 mg tablet, take 1 tablet by mouth at bedtime if needed for anxiety, Disp: 30 tablet, Rfl: 1    citalopram (CeleXA) 10 mg tablet, Take 1 tablet (10 mg total) by mouth daily, Disp: 90 tablet, Rfl: 1    conjugated estrogens (PREMARIN) vaginal cream, Insert 0 5 g into the vagina 3 (three) times a week, Disp: 45 g, Rfl: 1    diclofenac sodium (VOLTAREN) 1 %, Apply 2 g topically 4 (four) times a day, Disp: 100 g, Rfl: 1    ergocalciferol (VITAMIN D2) 50,000 units, Take 1 capsule (50,000 Units total) by mouth every 28 days, Disp: 3 capsule, Rfl: 3    fluticasone (FLONASE) 50 mcg/act nasal spray, 2 sprays into each nostril daily, Disp: 16 g, Rfl: 3    levothyroxine 25 mcg tablet, Take 1 tablet (25 mcg total) by mouth daily, Disp: 90 tablet, Rfl: 1    traMADol (ULTRAM) 50 mg tablet, Take 1 tablet (50 mg total) by mouth every 6 (six) hours as needed for moderate pain or severe pain, Disp: 45 tablet, Rfl: 0    Allergies   Allergen Reactions    Iodine     Levaquin [Levofloxacin]     Shellfish-Derived Products        Objective:  Vitals:    07/08/20 0947   Temp: (!) 97 °F (36 1 °C)       Body mass index is 31 46 kg/m²  Right Knee Exam     Tenderness   The patient is experiencing tenderness in the medial joint line  Range of Motion   Extension: 0   Flexion: 120     Tests   Varus: negative Valgus: negative    Other   Erythema: absent  Scars: present (Well healed surgical incisions )  Sensation: normal  Pulse: present (2+ dorsal pedal)  Effusion: no effusion present    Comments:    Pain free passive and active range of motion  No signs of infection  Observations     Right Knee   Negative for effusion  Physical Exam   Constitutional: She is oriented to person, place, and time  She appears well-developed and well-nourished  HENT:   Head: Normocephalic and atraumatic  Eyes: Pupils are equal, round, and reactive to light  Conjunctivae are normal  Right eye exhibits no discharge  Left eye exhibits no discharge  Neck: Normal range of motion  Neck supple  Cardiovascular: Normal rate and intact distal pulses  Pulmonary/Chest: Effort normal  No respiratory distress  Musculoskeletal:        Right knee: She exhibits no effusion  As noted in HPI  Neurological: She is alert and oriented to person, place, and time  Skin: Skin is warm and dry  Nursing note and vitals reviewed

## 2020-07-09 DIAGNOSIS — G89.29 CHRONIC PAIN OF BOTH KNEES: ICD-10-CM

## 2020-07-09 DIAGNOSIS — M25.562 CHRONIC PAIN OF BOTH KNEES: ICD-10-CM

## 2020-07-09 DIAGNOSIS — M25.561 CHRONIC PAIN OF BOTH KNEES: ICD-10-CM

## 2020-07-09 DIAGNOSIS — M17.0 BILATERAL PRIMARY OSTEOARTHRITIS OF KNEE: ICD-10-CM

## 2020-07-10 DIAGNOSIS — J30.9 ALLERGIC RHINITIS, UNSPECIFIED SEASONALITY, UNSPECIFIED TRIGGER: ICD-10-CM

## 2020-07-10 RX ORDER — FLUTICASONE PROPIONATE 50 MCG
SPRAY, SUSPENSION (ML) NASAL
Qty: 16 G | Refills: 3 | Status: SHIPPED | OUTPATIENT
Start: 2020-07-10 | End: 2021-04-26

## 2020-07-13 ENCOUNTER — OFFICE VISIT (OUTPATIENT)
Dept: PHYSICAL THERAPY | Facility: CLINIC | Age: 60
End: 2020-07-13
Payer: COMMERCIAL

## 2020-07-13 DIAGNOSIS — M17.11 PRIMARY OSTEOARTHRITIS OF RIGHT KNEE: ICD-10-CM

## 2020-07-13 DIAGNOSIS — M25.561 ACUTE PAIN OF RIGHT KNEE: ICD-10-CM

## 2020-07-13 DIAGNOSIS — S83.241A OTHER TEAR OF MEDIAL MENISCUS, CURRENT INJURY, RIGHT KNEE, INITIAL ENCOUNTER: Primary | ICD-10-CM

## 2020-07-13 PROCEDURE — 97110 THERAPEUTIC EXERCISES: CPT | Performed by: PHYSICAL THERAPIST

## 2020-07-13 PROCEDURE — 97112 NEUROMUSCULAR REEDUCATION: CPT | Performed by: PHYSICAL THERAPIST

## 2020-07-13 NOTE — PROGRESS NOTES
Daily Note     Today's date: 2020  Patient name: Dexter Morrison  : 1960  MRN: [id number]  Referring provider: Moon Bruce DO  Dx:   Encounter Diagnosis     ICD-10-CM    1  Other tear of medial meniscus, current injury, right knee, initial encounter S83 241A    2  Acute pain of right knee M25 561    3  Primary osteoarthritis of right knee M17 11                   Subjective: The doctor agreed with you that I'm just overdoing it  I did rest most of last week  Pain level today has been 3/10 in knee  Calf pain is resolved  Objective: See treatment diary below      Assessment: Tolerated treatment well  Patient demonstrated fatigue post treatment, would benefit from continued PT and continues w/ mild diffuse edema R knee joint  Plan: Progress treatment as tolerated         Precautions: B/L knee OA  SOC: 6/3/2020  DOS: 2020  POC expiration: 2020  Daily Treatment Log  Date 2020   Visit # 11 12 13 14  15   Manual 10' 5' 5'     Stick rolling 10' 5'  5'     There Exer 30' 35' 25' 36'  25'    Upright bike L2x10' L2x12'  L2x10'  L2x10'   L2x10'   Alt qset & Heel slides w/ board  3" hold flex/ext 20x 3" hold flex/ext 20x  3" hold flex/ext 20x   3" hold flex/ext 20x       SLR abd qset 2x10 2x10    2x10   SLR flex w/ qset 2x10 2x10    2x10   SLR adduction 1x10 2x10    2x10   Prone knee flexion 2x10 2x10  1x15   2x10   Prone quad set 2x10 2x10       LAQ        Stand B/L HR/TR    20x each HR off step   2x10   bridges 2x10 2x10      Leg press 45#  45# 2x10  45# 2x15 45# 2x10     STS    Low mat 2x10      DF gastroc stretch 20"x3  20"x3   20"x4   20"x4      FSU        NMRed   10'  15'   Supine NG knee ext w/ DF   2x10   2x10   WB ROM PD 20x 20x 20x  20x     Sidestep w/ TB ankles     TB @ knees red 15'x3 R/L   Alt cone taps        Feet on pball bridges   2x10     2x10   diag walk w TB @ knees     Red 15'x6                   Modalities        CP 5' to end 5' to end 5' to end  5' to end B/L  knees today  5' to end R knee                     Access Code: [email or web address]   URL: [employer]/   Date: 06/18/2020   Prepared by:  Wallace Thompson      Exercises  · Supine Heel Slide - 10 reps - 2 sets - 3 hold - 2x daily - 7x weekly  · Prone Knee Flexion AROM - 10 reps - 2 sets - 3 hold - 2x daily - 7x weekly  · Prone Quadriceps Set - 10 reps - 2 sets - 3 hold - 2x daily - 7x weekly  · Supine Active Straight Leg Raise - 10 reps - 2 sets - 1x daily - 7x weekly  · Supine Bridge - 10 reps - 2 sets - 1x daily - 7x weekly  · Heel rises with counter support - 10 reps - 2 sets - 1x daily - 7x weekly  · Toe Raises with Counter Support - 10 reps - 2 sets - 1x daily - 7x weekly

## 2020-07-15 ENCOUNTER — OFFICE VISIT (OUTPATIENT)
Dept: PHYSICAL THERAPY | Facility: CLINIC | Age: 60
End: 2020-07-15
Payer: COMMERCIAL

## 2020-07-15 DIAGNOSIS — S83.241A OTHER TEAR OF MEDIAL MENISCUS, CURRENT INJURY, RIGHT KNEE, INITIAL ENCOUNTER: Primary | ICD-10-CM

## 2020-07-15 DIAGNOSIS — M25.561 ACUTE PAIN OF RIGHT KNEE: ICD-10-CM

## 2020-07-15 DIAGNOSIS — M17.11 PRIMARY OSTEOARTHRITIS OF RIGHT KNEE: ICD-10-CM

## 2020-07-15 PROCEDURE — 97110 THERAPEUTIC EXERCISES: CPT

## 2020-07-15 PROCEDURE — 97112 NEUROMUSCULAR REEDUCATION: CPT

## 2020-07-15 NOTE — PROGRESS NOTES
Daily Note     Today's date: 7/15/2020  Patient name: Libby Vásquez  : 1960  MRN: [id number]  Referring provider: Melissa Finn DO  Dx:   Encounter Diagnosis     ICD-10-CM    1  Other tear of medial meniscus, current injury, right knee, initial encounter S83 241A    2  Acute pain of right knee M25 561    3  Primary osteoarthritis of right knee M17 11                   Subjective: pt reports she slipped when getting out of the shower last night and had an increase in pain in her medial knee  She had a tough time sleeping last night due to the increase in pain  Objective: See treatment diary below      Assessment: Tolerated treatment well  Patient exhibited good technique with therapeutic exercises exercise modification today to NWB activities due to recent slip out of the shower  No increases in pain today except with SL hip abd  Plan: Continue per plan of care  Precautions: B/L knee OA  SOC: 6/3/2020  DOS: 2020  POC expiration: 2020  Daily Treatment Log  Date 7/15/2020       Visit # 16       Manual        Stick rolling        There Exer 30'        Upright bike L1x5'       Alt qset & Heel slides w/ board         SLR abd qset 2x10       SLR flex w/ qset 2x10       SLR adduction 2x10       Prone knee flexion 2x15       Prone quad set 3" 2x12       LAQ        Stand B/L HR/TR 1x15 each        bridges        Leg press 45# 2x10       STS         DF gastroc stretch 20"x4       FSU        NMRed 10'       Supine NG knee ext w/ DF 2x10        WB ROM PD        Sidestep w/ TB ankles        Alt cone taps        Feet on pball bridges 3" 2x10        diag walk w TB @ knees                        Modalities        CP 6'  to end                         Access Code: [email or web address]   URL: [email or web address]   Date: 2020   Prepared by:  Rancho Pacheco      Exercises  · Supine Heel Slide - 10 reps - 2 sets - 3 hold - 2x daily - 7x weekly  · Prone Knee Flexion AROM - 10 reps - 2 sets - 3 hold - 2x daily - 7x weekly  · Prone Quadriceps Set - 10 reps - 2 sets - 3 hold - 2x daily - 7x weekly  · Supine Active Straight Leg Raise - 10 reps - 2 sets - 1x daily - 7x weekly  · Supine Bridge - 10 reps - 2 sets - 1x daily - 7x weekly  · Heel rises with counter support - 10 reps - 2 sets - 1x daily - 7x weekly  · Toe Raises with Counter Support - 10 reps - 2 sets - 1x daily - 7x weekly

## 2020-07-20 ENCOUNTER — APPOINTMENT (OUTPATIENT)
Dept: PHYSICAL THERAPY | Facility: CLINIC | Age: 60
End: 2020-07-20
Payer: COMMERCIAL

## 2020-07-20 ENCOUNTER — OFFICE VISIT (OUTPATIENT)
Dept: FAMILY MEDICINE CLINIC | Facility: CLINIC | Age: 60
End: 2020-07-20
Payer: COMMERCIAL

## 2020-07-20 ENCOUNTER — TELEPHONE (OUTPATIENT)
Dept: PHYSICAL THERAPY | Facility: CLINIC | Age: 60
End: 2020-07-20

## 2020-07-20 ENCOUNTER — TELEPHONE (OUTPATIENT)
Dept: FAMILY MEDICINE CLINIC | Facility: CLINIC | Age: 60
End: 2020-07-20

## 2020-07-20 VITALS
OXYGEN SATURATION: 98 % | SYSTOLIC BLOOD PRESSURE: 120 MMHG | TEMPERATURE: 97.7 F | RESPIRATION RATE: 16 BRPM | DIASTOLIC BLOOD PRESSURE: 84 MMHG | HEIGHT: 62 IN | WEIGHT: 170 LBS | HEART RATE: 76 BPM | BODY MASS INDEX: 31.28 KG/M2

## 2020-07-20 DIAGNOSIS — R11.0 NAUSEA: ICD-10-CM

## 2020-07-20 DIAGNOSIS — J01.00 ACUTE NON-RECURRENT MAXILLARY SINUSITIS: Primary | ICD-10-CM

## 2020-07-20 DIAGNOSIS — R42 DIZZINESS: ICD-10-CM

## 2020-07-20 PROCEDURE — 1036F TOBACCO NON-USER: CPT | Performed by: NURSE PRACTITIONER

## 2020-07-20 PROCEDURE — 99214 OFFICE O/P EST MOD 30 MIN: CPT | Performed by: NURSE PRACTITIONER

## 2020-07-20 PROCEDURE — 3008F BODY MASS INDEX DOCD: CPT | Performed by: NURSE PRACTITIONER

## 2020-07-20 RX ORDER — AMOXICILLIN AND CLAVULANATE POTASSIUM 875; 125 MG/1; MG/1
1 TABLET, FILM COATED ORAL EVERY 12 HOURS SCHEDULED
Qty: 14 TABLET | Refills: 0 | Status: SHIPPED | OUTPATIENT
Start: 2020-07-20 | End: 2020-07-28 | Stop reason: ALTCHOICE

## 2020-07-20 RX ORDER — MECLIZINE HYDROCHLORIDE 25 MG/1
25 TABLET ORAL 3 TIMES DAILY PRN
Qty: 30 TABLET | Refills: 0 | Status: SHIPPED | OUTPATIENT
Start: 2020-07-20 | End: 2020-09-10 | Stop reason: HOSPADM

## 2020-07-20 RX ORDER — FLUTICASONE PROPIONATE 50 MCG
1 SPRAY, SUSPENSION (ML) NASAL DAILY
Qty: 16 G | Refills: 0 | Status: SHIPPED | OUTPATIENT
Start: 2020-07-20 | End: 2020-07-28 | Stop reason: SDUPTHER

## 2020-07-20 NOTE — PATIENT INSTRUCTIONS
Fluids  Rest  Nasal saline rinses  Symptom management for supportive care such as decongestants, tylenol/motrin as needed for fever or discomfort  Use a cool mist humidifier at bedtime  Flonase as directed  Finish antibiotics as prescribed  Augmentin 875mg twice daily for one week  Warm compresses to facilitate nasal drainage     Antivert (Meclizine) 25mg every 8 hours as needed for dizziness  Make sure that you are staying hydrated

## 2020-07-20 NOTE — TELEPHONE ENCOUNTER
Pt called  She just went to her MD and has a sinus and double ear infection   She needs to cancel today, but confirmed Wed and Yoel mandel

## 2020-07-20 NOTE — PROGRESS NOTES
Assessment/Plan:  1  Dizziness  Maintain hydration  Suspect secondary to sinus congestion/infection  - meclizine (ANTIVERT) 25 mg tablet; Take 1 tablet (25 mg total) by mouth 3 (three) times a day as needed for dizziness or nausea  Dispense: 30 tablet; Refill: 0  2  Acute non-recurrent maxillary sinusitis  - amoxicillin-clavulanate (Augmentin) 875-125 mg per tablet; Take 1 tablet by mouth every 12 (twelve) hours for 7 days  Dispense: 14 tablet; Refill: 0  - fluticasone (FLONASE) 50 mcg/act nasal spray; 1 spray into each nostril daily  Dispense: 16 g; Refill: 0  Fluids  Rest  Nasal saline rinses  Symptom management for supportive care such as decongestants, tylenol/motrin as needed for fever or discomfort  Use a cool mist humidifier at bedtime  Flonase as directed  Finish antibiotics as prescribed  Warm compresses to facilitate nasal drainage  3  Nausea  antivert prn  Suspect secondary to dizziness/sinus issues  Patient was counseled regarding instructions for management which included: impression/diagnosis, risk/benefits of treatment options, importance of compliance with treatment, risk factor reduction, and prognosis  I have reviewed the instructions with the patient answering all questions and patient verbalized understanding  Subjective:      Patient ID: Moe Solano is a 62 y o  female who presents for dizziness    Dizziness on and off for a few days  Sways side to side  Nausea at times  Worse today  Has been coming and going  No headache  No fever  Some sinus congestion for over a week  No ear pain  Sneezing a lot last several days  Thought maybe was allergies   No visual disturbance  No chest pain , no palpitations         The following portions of the patient's history were reviewed and updated as appropriate: allergies, current medications, past family history, past medical history, past social history, past surgical history and problem list     Review of Systems Constitutional: Positive for fatigue  Negative for fever  HENT: Positive for congestion, postnasal drip and rhinorrhea  Negative for sinus pressure, sinus pain and sore throat  Eyes: Negative for visual disturbance  Respiratory: Negative for cough and shortness of breath  Cardiovascular: Negative for chest pain and palpitations  Gastrointestinal: Positive for nausea  Negative for abdominal pain, diarrhea and vomiting  Musculoskeletal: Negative for myalgias  Skin: Negative for rash  Allergic/Immunologic: Positive for environmental allergies  Neurological: Positive for dizziness, light-headedness and headaches  Hematological: Negative for adenopathy  Objective:      /84 (BP Location: Left arm, Patient Position: Sitting, Cuff Size: Adult)   Pulse 76   Temp 97 7 °F (36 5 °C) (Temporal)   Resp 16   Ht 5' 2" (1 575 m)   Wt 77 1 kg (170 lb)   SpO2 98%   BMI 31 09 kg/m²          Physical Exam   Constitutional: She is oriented to person, place, and time  She appears well-developed and well-nourished  She appears distressed (mildly ill appearing)  HENT:   [address] WNL,left ear canal erythematous  Turbinates inflamed, left worse than right  Left turbinates edematous  Oropharynx with no erythema or exudate    (+) left maxillary sinus tenderness to palpation    (+) PND     Eyes: Pupils are equal, round, and reactive to light  EOM are normal    Cardiovascular: Normal rate and regular rhythm  No JVD  No audible carotid bruit  No peripheral edema  Pulmonary/Chest: Effort normal and breath sounds normal  No respiratory distress  She has no wheezes  Abdominal: Soft  Bowel sounds are normal  She exhibits no distension  There is no tenderness  Neurological: She is alert and oriented to person, place, and time  No cranial nerve deficit  Coordination normal    Dizziness reproduced with head movements   Skin: Skin is warm and dry  No erythema  No pallor     Psychiatric: She has a normal mood and affect   Her behavior is normal

## 2020-07-20 NOTE — TELEPHONE ENCOUNTER
Pt called asking if this office would automatically test her for COVID  Advised pt that, if ME felt like pt was sick with or presumed to have COVID then ME would have tested pt  Pt stated that a friend of hers stated that if pt is sick, she should get tested and pts friend does not want pt to visit  Pt was upset by this  Advised pt that this office can make ME aware if this but patient declined and stated she was just upset with what her friend had said   Pt declines any virtual visit or call back

## 2020-07-21 NOTE — TELEPHONE ENCOUNTER
Pt said she just received a call stating that her friends coworker tested positive for COVID  Last Friday 7/17/20, the friend was over patients house, social distancing with masks  Patient states the friend never came in close contact with the coworker, only used a bathroom facility once  Pt is worried for her own health and she would like to know if you suggest that she get tested for COVID since there may have been potential exposure through someone  She would like to see her children this weekend  Please advise

## 2020-07-22 ENCOUNTER — APPOINTMENT (OUTPATIENT)
Dept: PHYSICAL THERAPY | Facility: CLINIC | Age: 60
End: 2020-07-22
Payer: COMMERCIAL

## 2020-07-23 ENCOUNTER — EVALUATION (OUTPATIENT)
Dept: PHYSICAL THERAPY | Facility: CLINIC | Age: 60
End: 2020-07-23
Payer: COMMERCIAL

## 2020-07-23 DIAGNOSIS — M17.11 PRIMARY OSTEOARTHRITIS OF RIGHT KNEE: ICD-10-CM

## 2020-07-23 DIAGNOSIS — M25.561 ACUTE PAIN OF RIGHT KNEE: ICD-10-CM

## 2020-07-23 DIAGNOSIS — S83.241A OTHER TEAR OF MEDIAL MENISCUS, CURRENT INJURY, RIGHT KNEE, INITIAL ENCOUNTER: Primary | ICD-10-CM

## 2020-07-23 PROCEDURE — 97112 NEUROMUSCULAR REEDUCATION: CPT | Performed by: PHYSICAL THERAPIST

## 2020-07-23 PROCEDURE — 97110 THERAPEUTIC EXERCISES: CPT | Performed by: PHYSICAL THERAPIST

## 2020-07-23 NOTE — PROGRESS NOTES
PT Re-Evaluation     Today's date: 2020  Patient name: Oscar Shaw  : 1960  MRN: [id number]  Referring provider: Isrrael Plummer DO  Dx:   Encounter Diagnosis     ICD-10-CM    1  Other tear of medial meniscus, current injury, right knee, initial encounter S83 241A    2  Acute pain of right knee M25 561    3  Primary osteoarthritis of right knee M17 11                   Assessment  Assessment details: 6/3/2020: Oscar Shaw is a 62 y o  female who presents with pain, decreased strength, decreased ROM, decreased joint mobility, joint effusion and ambulatory dysfunction  Due to these impairments, patient has difficulty performing ADL's, recreational activities, work-related activities, engaging in social activities, ambulation, stair negotiation, lifting/carrying, transfers, reaching  Patient's clinical presentation is consistent with their referring diagnosis of Other tear of medial meniscus, current injury, right knee, initial encounter and Acute pain of right knee and Primary osteoarthritis of right knee  Plan: Ambulatory referral to Physical Therapy  Patient has been educated in post-op contraindications / precautions, wound care including CP and LE elevation, gait training w/ B/L crutches level surfaces and stairs/curbs, weight bearing status, home exercise program and plan of care  Discussed w/ pt some debridement was done to address OA, but she may not be pain free after she completes therapy due to OA  She understands at some point she may need TKA  Patient would benefit from skilled physical therapy services to address their aforementioned functional limitations and progress towards prior level of function and independence with home exercise program    2020: Pt is progressing well w/ ROM and strength R knee w/ minimal edema  She remains limited, as expected, with tolerance to weight bearing activity   Her c/o R LE numbness/paresthesias are not affected by lumbar mechanical assessment or nerve glides, but do seem to respond to fibular head mobilization today  This numbness reportedly began when she injured her knee  Pt is progressing well and will benefit from continued PT as per original POC to further advance toward return of function and LTG's   7/23/2020: Pt demonstrates improving ROM, balance and strength, but is still having issues w/ knee extension strength and tolerance of terminal knee extension  Her R foot numbness resolved about a week ago  Mild edema remains  Pt will likely benefit from continued PT an additional 4 weeks to maximize progress  Pt is aware OA in her knee remains  Impairments: abnormal gait, abnormal or restricted ROM, activity intolerance, impaired physical strength, pain with function and weight-bearing intolerance  Functional limitations: stairs now reciprocal w/ rail but w/ limited tolerance; pt can partially squat (1/2); limited nikki to prolonged weight bearing activity  Understanding of Dx/Px/POC: excellent   Prognosis: good    Goals  Short Term Goals: Target Date 4 weeks (7/1/2020) - partially met  1  Initiate and advance HEP - met  2 Improve AROM R knee to be equal to L w/o pain to prepare for reciprocal stairs w/o c/o  - met w/ pain  3  Improve R LE strength to 4+/5 to prepare for functional LTG  - partially met  4  D/C assistive device for ambulation  - met  5  Pt to report ease w/ all transfers, bed mobility and be able to resume driving  - met    Long Term Goals: Target Date (8/26/2020)  1  Indep with HEP  2  Pt to tolerate reciprocal stairs w/o handrail w/ pain 0-2/10 - met (1-2 flights only)  3  Pt to tolerate prolonged walking on uneven terrain w/ pain 0-3/10  - not met  4  Improve balance such that pt can maintain R/L SLS x 30"  - met  5  Pt to be able to RTW  - progressing toward  6  Improve FOTO to 57 or better   - improved/not met    Plan  Plan details:     Patient would benefit from: PT eval and skilled physical therapy  Planned modality interventions: cryotherapy, thermotherapy: hydrocollator packs and unattended electrical stimulation  Planned therapy interventions: manual therapy, neuromuscular re-education, therapeutic activities, therapeutic exercise, home exercise program, patient education, stretching, functional ROM exercises and balance/weight bearing training  Frequency: 3x week (3x week x 1 month; then BIW prn)  Plan of Care beginning date: 6/3/2020  Plan of Care expiration date: 2020  Treatment plan discussed with: patient        Subjective Evaluation    History of Present Illness  Date of surgery: 2020  Mechanism of injury: 6/3/2020: Pt originally injured her R knee in 2020, and MRI confirmed a meniscus tear, but pt's symptoms improved to minimal discomfort until late May  She was standing in her kitchen, changed direction and felt a snap in her knee  She states she couldn't walk after that  Her primary c/o is R medial knee pain  She also reports tingling in her R foot/heel which started before her surgery  3/24/2020: pt reports her knee is improving  She has some increased soreness today after walking a lot yesterday w/o assistive device including up/down stairs reciprocally  She c/o continued numbness and paresthesias R LE from prox/post calf into her foot (constant)  2020: One month post-op menisectomy/debridement, pt reports some improvement, but is struggling with tolerance to prolonged walking especially on uneven terrain leading to pain/swelling  She continues w/ pain w/ terminal knee extension both in open and closed chain  She has limited tolerance to reciprocal stairs  Pain is also worse at night    Quality of life: fair    Pain  Current pain ratin  At best pain ratin  At worst pain ratin  Location: medial knee  Quality: sharp (stiffness)  Relieving factors: rest and ice  Aggravating factors: standing, walking and stair climbing  Progression: worsening    Social Support  Steps to enter house: yes (1 entrance 4 steps/no rail; 1 entrance w/ steps/rail)    Life stress: 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 - not working now due to injury      Diagnostic Tests  X-ray: abnormal (signficant OA)  MRI studies: abnormal  Treatments  Previous treatment: physical therapy  Current treatment: physical therapy  Patient Goals  Patient goals for therapy: decreased edema, decreased pain, return to work and return to sport/leisure activities  Patient goal: walk w/o crutches w/o limitation (met), resume hiking (not met)        Objective  A/PROM:   R  R  R  L      7/23/2020 6/24/2020 6/3/2020 6/3/2020  Knee flex supprone  120//103 60/50  120/110  Knee ext Qset/SLR  0/0  -2/-5  -5/-12  0/0    MMT:    R  R  R  L     7/23/2020 6/24/2020 6/3/2020 6/3/2020  Hip flex   5/5  5/5  4+/5  4+/5  Hip abd  4+/5  4+/5  3/5  4+/5  Hip ER   4+/5  4/5  3/5*  4+/5  Hip IR   4+/5  4/5  3+/5*  4+/5  Knee ext  4-/5*  4-/5  3-/5*  4+/5  Knee flex  4+/5  4/5  3/5  5/5      Balance:    7/23/2020 6/24/2020 6/3/2020  Tandem R posterior w/ EO: 30"(+)  30" (+)  NT  Tandem L posterior w/ EO: 30"(+)  30" (+)  NT  SLS R w/ EO:   30"(+)  NT  NT  SLS L w/ EO:   30"(+)  NT  NT    Observation: Pt's wound fully healed, minor diffuse edema throughout R knee  Pt ambulates w/o Assistive device w/ minor restriction of heel strike due to remaining extension limitation   Joint line R 41 7 cm vs L 41 3 cm 7/23/2020              Precautions: B/L knee OA  SOC: 6/3/2020  DOS: 6/2/2020  POC expiration: 8/26/2020  Daily Treatment Log  Date 7/15/2020 7/23/2020      Visit # 16       Manual        IASTM pes  8'      There Exer 30' 23'       Upright bike L1x5' L2x7'      Alt qset & Heel slides w/ board         SLR abd qset 2x10       SLR flex w/ qset 2x10 2x10      SLR adduction 2x10       Prone knee flexion 2x15       Prone quad set 3" 2x12       LAQ  2# 2x10      TB hams        Stand B/L HR/TR 1x15 each        bridges        Leg press 45# 2x10 Upright 45# B/L  3x12      STS         DF gastroc stretch 20"x4 20"x4      FSU        NMRed 10' 10'      Supine NG knee ext w/ DF 2x10        WB ROM PD        Sidestep w/ TB ankles  Red 20'x2 R/L      Alt cone taps        Feet on pball bridges 3" 2x10        diag walk w TB @ knees        Long stepping wTB ankles  Red 20"x4      SLS  20"x2      Modalities        CP 6'  to end 5'                        Access Code: [email or web address]   URL: [email or web address]   Date: 06/18/2020   Prepared by:  Gypsy Rodrigues      Exercises  · Supine Heel Slide - 10 reps - 2 sets - 3 hold - 2x daily - 7x weekly  · Prone Knee Flexion AROM - 10 reps - 2 sets - 3 hold - 2x daily - 7x weekly  · Prone Quadriceps Set - 10 reps - 2 sets - 3 hold - 2x daily - 7x weekly  · Supine Active Straight Leg Raise - 10 reps - 2 sets - 1x daily - 7x weekly  · Supine Bridge - 10 reps - 2 sets - 1x daily - 7x weekly  · Heel rises with counter support - 10 reps - 2 sets - 1x daily - 7x weekly  · Toe Raises with Counter Support - 10 reps - 2 sets - 1x daily - 7x weekly

## 2020-07-27 ENCOUNTER — OFFICE VISIT (OUTPATIENT)
Dept: PHYSICAL THERAPY | Facility: CLINIC | Age: 60
End: 2020-07-27
Payer: COMMERCIAL

## 2020-07-27 DIAGNOSIS — S83.241A OTHER TEAR OF MEDIAL MENISCUS, CURRENT INJURY, RIGHT KNEE, INITIAL ENCOUNTER: Primary | ICD-10-CM

## 2020-07-27 DIAGNOSIS — M25.561 ACUTE PAIN OF RIGHT KNEE: ICD-10-CM

## 2020-07-27 DIAGNOSIS — M17.11 PRIMARY OSTEOARTHRITIS OF RIGHT KNEE: ICD-10-CM

## 2020-07-27 PROCEDURE — 97110 THERAPEUTIC EXERCISES: CPT

## 2020-07-27 PROCEDURE — 97112 NEUROMUSCULAR REEDUCATION: CPT

## 2020-07-27 NOTE — PROGRESS NOTES
Daily Note     Today's date: 2020  Patient name: Edin Uribe  : 1960  MRN: [id number]  Referring provider: Maty Chiu DO  Dx:   Encounter Diagnosis     ICD-10-CM    1  Other tear of medial meniscus, current injury, right knee, initial encounter S83 241A    2  Acute pain of right knee M25 561    3  Primary osteoarthritis of right knee M17 11        Start Time: 1140  Stop Time: 1225  Total time in clinic (min): 45 minutes    Subjective: Pt reports no pain prior to session  Objective: See treatment diary below      Assessment: Tolerated treatment well  Patient exhibited good technique with therapeutic exercises and would benefit from continued PT  Pt demo apprehension when performing exercises  Tolerated well overall  She continues to experience clicking and was educated this is normal due to swelling  She experiences no pain  Plan: Continue per plan of care        Precautions: B/L knee OA  SOC: 6/3/2020  DOS: 2020  POC expiration: 2020  Daily Treatment Log  Date 7/15/2020 2020 7/27/20     Visit # 16       Manual        IASTM pes  8'      There Exer 30' 23' 30'      Upright bike L1x5' L2x7' L2x7'     Alt qset & Heel slides w/ board         SLR abd qset 2x10       SLR flex w/ qset 2x10 2x10 2x10     SLR adduction 2x10       Prone knee flexion 2x15       Prone quad set 3" 2x12       LAQ  2# 2x10 2# 2x10     TB hams        Stand B/L HR/TR 1x15 each        bridges        Leg press 45# 2x10 Upright 45# B/L  3x12 Upright 45# B/L  3x12     STS         DF gastroc stretch 20"x4 20"x4 20"x4     FSU        NMRed 10' 10' 15'     Supine NG knee ext w/ DF 2x10        WB ROM PD        Sidestep w/ TB ankles  Red 20'x2 R/L Red 20'x2 R/L     Alt cone taps        Feet on pball bridges 3" 2x10        diag walk w TB @ knees        Long stepping wTB ankles  Red 20"x4 Red 20"x4     SLS  20"x2 10"10x     Modalities        CP 6'  to end 5' 5'                       Access Code: [email or web address]   URL: [email or web address]   Date: 06/18/2020   Prepared by:  Shlomo Kang      Exercises  · Supine Heel Slide - 10 reps - 2 sets - 3 hold - 2x daily - 7x weekly  · Prone Knee Flexion AROM - 10 reps - 2 sets - 3 hold - 2x daily - 7x weekly  · Prone Quadriceps Set - 10 reps - 2 sets - 3 hold - 2x daily - 7x weekly  · Supine Active Straight Leg Raise - 10 reps - 2 sets - 1x daily - 7x weekly  · Supine Bridge - 10 reps - 2 sets - 1x daily - 7x weekly  · Heel rises with counter support - 10 reps - 2 sets - 1x daily - 7x weekly  · Toe Raises with Counter Support - 10 reps - 2 sets - 1x daily - 7x weekly

## 2020-07-28 ENCOUNTER — OFFICE VISIT (OUTPATIENT)
Dept: FAMILY MEDICINE CLINIC | Facility: CLINIC | Age: 60
End: 2020-07-28
Payer: COMMERCIAL

## 2020-07-28 ENCOUNTER — TELEPHONE (OUTPATIENT)
Dept: HEMATOLOGY ONCOLOGY | Facility: CLINIC | Age: 60
End: 2020-07-28

## 2020-07-28 VITALS
WEIGHT: 169 LBS | OXYGEN SATURATION: 98 % | HEIGHT: 62 IN | BODY MASS INDEX: 31.1 KG/M2 | TEMPERATURE: 98.3 F | HEART RATE: 68 BPM | SYSTOLIC BLOOD PRESSURE: 118 MMHG | DIASTOLIC BLOOD PRESSURE: 68 MMHG

## 2020-07-28 DIAGNOSIS — F43.21 SITUATIONAL DEPRESSION: ICD-10-CM

## 2020-07-28 DIAGNOSIS — F41.9 ANXIETY: ICD-10-CM

## 2020-07-28 DIAGNOSIS — L10.0 PEMPHIGUS VULGARIS: Primary | ICD-10-CM

## 2020-07-28 DIAGNOSIS — L08.9 SKIN INFECTION: ICD-10-CM

## 2020-07-28 PROCEDURE — 3008F BODY MASS INDEX DOCD: CPT | Performed by: NURSE PRACTITIONER

## 2020-07-28 PROCEDURE — 1036F TOBACCO NON-USER: CPT | Performed by: NURSE PRACTITIONER

## 2020-07-28 PROCEDURE — 99214 OFFICE O/P EST MOD 30 MIN: CPT | Performed by: NURSE PRACTITIONER

## 2020-07-28 RX ORDER — ALPRAZOLAM 0.25 MG/1
0.25 TABLET ORAL
Qty: 30 TABLET | Refills: 1 | Status: SHIPPED | OUTPATIENT
Start: 2020-07-28 | End: 2021-09-20 | Stop reason: SDUPTHER

## 2020-07-28 NOTE — PATIENT INSTRUCTIONS
Stress management  Activities to divert attention when possible  Conscious breathing techniques as discussed  Coping mechanisms and strategies vary from person to person so try to utilize strategies that you think may work for you (such as meditation, music, etc  )  Consider or continue counseling as discussed  Anti anxiety/depression medications as prescribed  Schedule appt with Dr Yany Wynn for follow up as discussed    Bactroban ointment three times daily to affected area
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
## 2020-07-29 ENCOUNTER — APPOINTMENT (OUTPATIENT)
Dept: PHYSICAL THERAPY | Facility: CLINIC | Age: 60
End: 2020-07-29
Payer: COMMERCIAL

## 2020-07-29 ENCOUNTER — OFFICE VISIT (OUTPATIENT)
Dept: OBGYN CLINIC | Facility: CLINIC | Age: 60
End: 2020-07-29

## 2020-07-29 ENCOUNTER — OFFICE VISIT (OUTPATIENT)
Dept: HEMATOLOGY ONCOLOGY | Facility: MEDICAL CENTER | Age: 60
End: 2020-07-29
Payer: COMMERCIAL

## 2020-07-29 VITALS
WEIGHT: 168.2 LBS | TEMPERATURE: 97.5 F | SYSTOLIC BLOOD PRESSURE: 135 MMHG | HEIGHT: 62 IN | BODY MASS INDEX: 30.95 KG/M2 | HEART RATE: 64 BPM | DIASTOLIC BLOOD PRESSURE: 85 MMHG

## 2020-07-29 VITALS
DIASTOLIC BLOOD PRESSURE: 80 MMHG | WEIGHT: 167 LBS | HEART RATE: 72 BPM | HEIGHT: 62 IN | BODY MASS INDEX: 30.73 KG/M2 | OXYGEN SATURATION: 97 % | TEMPERATURE: 97.3 F | SYSTOLIC BLOOD PRESSURE: 140 MMHG | RESPIRATION RATE: 18 BRPM

## 2020-07-29 DIAGNOSIS — D56.9 MEDITERRANEAN ANEMIA: Primary | ICD-10-CM

## 2020-07-29 DIAGNOSIS — L10.0 PEMPHIGUS VULGARIS: ICD-10-CM

## 2020-07-29 DIAGNOSIS — Z98.890 STATUS POST MEDIAL MENISCECTOMY OF RIGHT KNEE: Primary | ICD-10-CM

## 2020-07-29 DIAGNOSIS — M17.11 PRIMARY OSTEOARTHRITIS OF RIGHT KNEE: ICD-10-CM

## 2020-07-29 PROCEDURE — 99024 POSTOP FOLLOW-UP VISIT: CPT | Performed by: ORTHOPAEDIC SURGERY

## 2020-07-29 PROCEDURE — 99213 OFFICE O/P EST LOW 20 MIN: CPT | Performed by: INTERNAL MEDICINE

## 2020-07-29 PROCEDURE — 1036F TOBACCO NON-USER: CPT | Performed by: INTERNAL MEDICINE

## 2020-07-29 PROCEDURE — 3008F BODY MASS INDEX DOCD: CPT | Performed by: ORTHOPAEDIC SURGERY

## 2020-07-29 PROCEDURE — 3008F BODY MASS INDEX DOCD: CPT | Performed by: INTERNAL MEDICINE

## 2020-07-29 NOTE — PROGRESS NOTES
Assessment/Plan:  1  Status post medial meniscectomy of right knee     2  Primary osteoarthritis of right knee       52-year-old female presenting today 2 months status post right knee arthroscopic, partial medial meniscectomy chondroplasty of the medial femoral condyle done on 06/02/2020  Patient has made significant progress with her range of motion since her previous visit  She is also noting decreased pain about the right knee on a daily basis  She is to continue with formal physical therapy and transition to home exercise program as tolerated  May perform activities tolerated  Avoid painful maneuvers  Take OTC anti-inflammatories and ice p r n  for pain relief  At this time she will follow up on an as-needed basis  Subjective: 2 months status post right knee arthroscopic, partial medial meniscectomy chondroplasty of the medial femoral condyle    Patient ID: Kay Crawford is a 62 y o  female  52-year-old female presents to the office today for a postoperative visit for her right knee  She is now about 2 months status post right knee arthroscopy, partial medial meniscectomy and chondroplasty medial femoral condyle performed on 06/02/2020  Patient states that she has made good progress in formal PT/HEP since her last visit  She states that her pain has decreased significantly since her last visit  She only notes minimal pain about the right knee on a daily basis  She states that she does have a full dull, aching sensation diffusely about the knee after PT/HEP as well as ambulating up and down stairs but states that this has been improving over the past week  Overall, she states that she is happy with her progress so far postoperatively  She will continue with home exercise program   Denies numbness and tingling, fever, chills  Review of Systems   Constitutional: Positive for activity change (Increasing)  Negative for chills, fever and unexpected weight change     HENT: Negative for hearing loss, nosebleeds and sore throat  Eyes: Negative for pain, redness and visual disturbance  Respiratory: Negative for cough, shortness of breath and wheezing  Cardiovascular: Negative for chest pain, palpitations and leg swelling  Gastrointestinal: Negative for abdominal distention, nausea and vomiting  Endocrine: Negative for polydipsia and polyuria  Genitourinary: Negative for dysuria and hematuria  Musculoskeletal: Positive for arthralgias  Skin: Negative for rash and wound  Neurological: Negative for dizziness, numbness and headaches  Psychiatric/Behavioral: Negative for decreased concentration and suicidal ideas  Past Medical History:   Diagnosis Date    Arthritis     Asthma     Hypothyroidism     hypo    Pemphigus vulgaris     Thalassemia        Past Surgical History:   Procedure Laterality Date    BREAST BIOPSY Right     benign    COLONOSCOPY      NM KNEE SCOPE,MED/LAT MENISECTOMY Right 2020    Procedure: MENISCECTOMY MEDIAL;  Surgeon: Stephanie Hernadez DO;  [facility];  Service: Orthopedics       Family History   Problem Relation Age of Onset    Asthma Father     Hypertension Maternal Grandmother     Liver cancer Maternal Grandmother     Diabetes Maternal Grandmother     Cancer Family         multiple relatives    Seizures Sister     Asthma Brother     Hypertension Brother     Leukemia Maternal Uncle     Heart attack Cousin     Bone cancer Maternal Uncle     Substance Abuse Neg Hx     Mental illness Neg Hx        Social History     Occupational History    Not on file   Tobacco Use    Smoking status: Former Smoker     Years: 10 00     Last attempt to quit:      Years since quittin 5    Smokeless tobacco: Never Used   Substance and Sexual Activity    Alcohol use:  Yes     Alcohol/week: 1 0 standard drinks     Types: 1 Glasses of wine per week     Frequency: 2-4 times a month     Drinks per session: 1 or 2     Binge frequency: Less than monthly     Comment: socially     Drug use: Never    Sexual activity: Yes     Partners: Male         Current Outpatient Medications:     albuterol (PROVENTIL HFA,VENTOLIN HFA) 90 mcg/act inhaler, Inhale 2 puffs every 6 (six) hours as needed for wheezing, Disp: 1 Inhaler, Rfl: 3    ALPRAZolam (XANAX) 0 25 mg tablet, Take 1 tablet (0 25 mg total) by mouth daily at bedtime as needed for anxiety, Disp: 30 tablet, Rfl: 1    citalopram (CeleXA) 10 mg tablet, Take 1 tablet (10 mg total) by mouth daily, Disp: 90 tablet, Rfl: 1    diclofenac sodium (VOLTAREN) 1 %, Apply 2 g topically 4 (four) times a day, Disp: 100 g, Rfl: 1    ergocalciferol (VITAMIN D2) 50,000 units, Take 1 capsule (50,000 Units total) by mouth every 28 days, Disp: 3 capsule, Rfl: 3    fluticasone (FLONASE) 50 mcg/act nasal spray, instill 2 sprays into each nostril once daily, Disp: 16 g, Rfl: 3    levothyroxine 25 mcg tablet, Take 1 tablet (25 mcg total) by mouth daily, Disp: 90 tablet, Rfl: 1    meclizine (ANTIVERT) 25 mg tablet, Take 1 tablet (25 mg total) by mouth 3 (three) times a day as needed for dizziness or nausea, Disp: 30 tablet, Rfl: 0    mupirocin (BACTROBAN) 2 % ointment, Apply topically 3 (three) times a day, Disp: 22 g, Rfl: 0    traMADol (ULTRAM) 50 mg tablet, Take 1 tablet (50 mg total) by mouth every 6 (six) hours as needed for moderate pain or severe pain, Disp: 45 tablet, Rfl: 0    Allergies   Allergen Reactions    Iodine     Levaquin [Levofloxacin]     Shellfish-Derived Products        Objective:  Vitals:    07/29/20 0807   BP: 135/85   Pulse: 64   Temp: 97 5 °F (36 4 °C)       Body mass index is 30 76 kg/m²  Right Knee Exam     Tenderness   The patient is experiencing no tenderness       Range of Motion   Extension: 0   Flexion: 130     Tests   Varus: negative Valgus: negative  Drawer:  Anterior - negative        Other   Erythema: absent  Scars: present  Sensation: normal  Pulse: present  Effusion: no effusion present    Comments:  Portal sites are well healed  No crepitus with active or passive ROM  Patella tracks mid line and flat          Observations     Right Knee   Negative for effusion  Physical Exam   Constitutional: She is oriented to person, place, and time  She appears well-developed and well-nourished  Eyes: Pupils are equal, round, and reactive to light  Pulmonary/Chest: Effort normal and breath sounds normal    Musculoskeletal:        Right knee: She exhibits no effusion  Neurological: She is alert and oriented to person, place, and time  Skin: Skin is warm and dry  Psychiatric: She has a normal mood and affect  Her behavior is normal  Judgment and thought content normal    Nursing note and vitals reviewed      Scribe Attestation    I,:   Finesse Yanes am acting as a scribe while in the presence of the attending physician :        I,:   Zane Durham, DO personally performed the services described in this documentation    as scribed in my presence :

## 2020-07-29 NOTE — PROGRESS NOTES
Oscar Shaw  1960  [facility] [place] [facility] [address] [id number]    DISCUSSION/SUMMARY:    67-year-old female with a history of pemphigus vulgaris treated with Rituxan (rituximab) and IVIG (another institution) years ago - patient has been in remission since that time  Mrs Marta Youngblood was last seen about a year ago  More recently patient has had concerns about recurrence  There are number of issues  The skin abnormalities are not dramatic  As discussed previously, I believe patient should be seen by Dermatology prior to initiating any treatment  As a hematologist, I would be happy to help with Rituxan (rituximab) and/or IVIG if that was the indicated treatment  Patient did not want to go to a dermatologist   Mrs Marta Youngblood also stated that she is moving back to [place] within the week  It was not clear if patient thought she could get Rituxan (rituximab) and IVIG today/this afternoon (obviously this is not possible)  Recent CBC and CMP were okay/acceptable (patient has alpha thalassemia trait)  We discussed other options  Patient is to go for an SOHAIL and desmoglein antibody 1/3 titers now  This office will help get a copy of the results of the patient  Mrs Marta Youngblood can return to her prior hematologist (once again specifics are not clear but patient states that she may not be able to follow up with him because of an insurance change)  Otherwise patient will need to find a dermatologist and hematologist in [place] that can help her  At this time, return to this office is on a prn basis but we will be very happy to help Mrs Marta Youngblood if she decides to stay in [place]  Patient knows to call the hematology/oncology office if there are any other questions or concerns  Carefully review your medication list and verify that the list is accurate and up-to-date   Please call the hematology/oncology office if there are medications missing from the list, medications on the list that you are not currently taking or if there is a dosage or instruction that is different from how you're taking that medication  Patient goals and areas of care: Follow up with her prior hematologist in [place]  Barriers to care:  None  Patient is able to self-care   ______________________________________________________________________________________    Chief Complaint   Patient presents with    Follow-up     Pemphigus vulgaris     History of Present Illness:  52-year-old female previously diagnosed with pemphigus vulgaris treated with Rituxan (rituximab) at another institution [facility])  Treatment was given years ago  Patient was seen in consultation in July 2019 - no follow-up since then  Mrs Babak Morrison states that she has been under a great amount of stress recently  Patient believes that the pemphigus is returning  Patient feels lesions on her scalp that common go  No oral lesions  Fatigue is worse than before  No fevers, chills or sweats  No body aches  Appetite is good, weight is stable  Activities are baseline  No other GI,  or GYN issues  There is a note by Dr Antwan Conway from [facility] dated January 13, 2015  The assessment stated that patient had anemia, unspecified, refractory pemphigus vulgaris last treated in 2010  Patient had received a total of 8 weeks of rituximab and had also received IVIG  There had been resolution of the scalp lesions  Patient had a history of iron deficiency and had been given IV iron  Prior workup demonstrated alpha thalassemia  Previously patient had been on CellCept and steroids (discontinued years ago) because of 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  Review of Systems   Constitutional: Positive for fatigue  HENT: Negative  Eyes: Negative  Respiratory: Negative  Cardiovascular: Negative      Gastrointestinal: Negative  Endocrine: Negative  Genitourinary: Negative  Musculoskeletal: Negative  Skin: Negative  Allergic/Immunologic: Negative  Neurological: Negative  Hematological: Negative  Psychiatric/Behavioral: The patient is nervous/anxious  All other systems reviewed and are negative      Patient Active Problem List   Diagnosis    Mild intermittent asthma without complication    Acquired hypothyroidism    Vitamin D deficiency    Anxiety    Vitamin B12 deficiency    Mediterranean anemia    Mixed hyperlipidemia    Hypertriglyceridemia    Pemphigus vulgaris    DDD (degenerative disc disease), lumbar    Lumbar radiculopathy     Past Medical History:   Diagnosis Date    Arthritis     Asthma     Hypothyroidism     hypo    Pemphigus vulgaris     Thalassemia      Past surgical history:  No prior blood transfusions    Ob gyn:  No breast pathology, mammograms up-to-date, no postmenopausal bleeding    Past Surgical History:   Procedure Laterality Date    BREAST BIOPSY Right     benign    COLONOSCOPY      MT KNEE SCOPE,MED/LAT MENISECTOMY Right 6/2/2020    Procedure: MENISCECTOMY MEDIAL;  Surgeon: Stan Peterson DO;  Location[facility];  Service: Orthopedics     Family History   Problem Relation Age of Onset    Asthma Father     Hypertension Maternal Grandmother     Liver cancer Maternal Grandmother     Diabetes Maternal Grandmother     Cancer Family         multiple relatives    Seizures Sister     Asthma Brother     Hypertension Brother     Leukemia Maternal Uncle     Heart attack Cousin     Bone cancer Maternal Uncle     Substance Abuse Neg Hx     Mental illness Neg Hx     Family history:  2 children alive and well, no known familial or genetic diseases although various family members have had different forms of 2 cancers, other family members have had issues with asthma, diabetes and hypertension, patient does not know the specifics on the thalassemia history    Social History     Socioeconomic History    Marital status: Single     Spouse name: Not on file    Number of children: Not on file    Years of education: Not on file    Highest education level: Not on file   Occupational History    Not on file   Social Needs    Financial resource strain: Not on file    Food insecurity:     Worry: Not on file     Inability: Not on file    Transportation needs:     Medical: Not on file     Non-medical: Not on file   Tobacco Use    Smoking status: Former Smoker     Years: 10 00     Last attempt to quit:      Years since quittin 5    Smokeless tobacco: Never Used   Substance and Sexual Activity    Alcohol use:  Yes     Alcohol/week: 1 0 standard drinks     Types: 1 Glasses of wine per week     Frequency: 2-4 times a month     Drinks per session: 1 or 2     Binge frequency: Less than monthly     Comment: socially     Drug use: Never    Sexual activity: Yes     Partners: Male   Lifestyle    Physical activity:     Days per week: Not on file     Minutes per session: Not on file    Stress: Not on file   Relationships    Social connections:     Talks on phone: Not on file     Gets together: Not on file     Attends Alevism service: Not on file     Active member of club or organization: Not on file     Attends meetings of clubs or organizations: Not on file     Relationship status: Not on file    Intimate partner violence:     Fear of current or ex partner: Not on file     Emotionally abused: Not on file     Physically abused: Not on file     Forced sexual activity: Not on file   Other Topics Concern    Not on file   Social History Narrative    Not on file    Social history:  No tobacco, alcohol or drug abuse, no toxic exposure, 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    Current Outpatient Medications:     albuterol (PROVENTIL HFA,VENTOLIN HFA) 90 mcg/act inhaler, Inhale 2 puffs every 6 (six) hours as needed for wheezing, Disp: 1 Inhaler, Rfl: 3    ALPRAZolam (XANAX) 0 25 mg tablet, Take 1 tablet (0 25 mg total) by mouth daily at bedtime as needed for anxiety, Disp: 30 tablet, Rfl: 1    citalopram (CeleXA) 10 mg tablet, Take 1 tablet (10 mg total) by mouth daily, Disp: 90 tablet, Rfl: 1    diclofenac sodium (VOLTAREN) 1 %, Apply 2 g topically 4 (four) times a day, Disp: 100 g, Rfl: 1    ergocalciferol (VITAMIN D2) 50,000 units, Take 1 capsule (50,000 Units total) by mouth every 28 days, Disp: 3 capsule, Rfl: 3    fluticasone (FLONASE) 50 mcg/act nasal spray, instill 2 sprays into each nostril once daily, Disp: 16 g, Rfl: 3    levothyroxine 25 mcg tablet, Take 1 tablet (25 mcg total) by mouth daily, Disp: 90 tablet, Rfl: 1    meclizine (ANTIVERT) 25 mg tablet, Take 1 tablet (25 mg total) by mouth 3 (three) times a day as needed for dizziness or nausea, Disp: 30 tablet, Rfl: 0    mupirocin (BACTROBAN) 2 % ointment, Apply topically 3 (three) times a day, Disp: 22 g, Rfl: 0    traMADol (ULTRAM) 50 mg tablet, Take 1 tablet (50 mg total) by mouth every 6 (six) hours as needed for moderate pain or severe pain, Disp: 45 tablet, Rfl: 0    Allergies   Allergen Reactions    Iodine     Levaquin [Levofloxacin]     Shellfish-Derived Products        Vitals:    07/29/20 1137   BP: 140/80   Pulse: 72   Resp: 18   Temp: (!) 97 3 °F (36 3 °C)   SpO2: 97%     Physical Exam   Constitutional: She is oriented to person, place, and time  She appears well-developed and well-nourished  Well-nourished female, no respiratory distress   HENT:   Head: Normocephalic and atraumatic  Right Ear: External ear normal    Left Ear: External ear normal    Mouth/Throat: Oropharynx is clear and moist    Mucosa moist and pink, no exudate, teeth good condition, no blisters or ulcers   Eyes: Pupils are equal, round, and reactive to light  Conjunctivae and EOM are normal    Neck: Normal range of motion  Neck supple     Supple, no JVD   Cardiovascular: Normal rate, regular rhythm, normal heart sounds and intact distal pulses  Pulmonary/Chest: Effort normal and breath sounds normal    Good air entry bilaterally, clear   Abdominal: Soft  Bowel sounds are normal    Soft, nontender, +bowel sounds, no hepatosplenomegaly, no guarding, no rigidity or rebound   Musculoskeletal: Normal range of motion  Good range of motion in all 4 extremities, no pain or tenderness with palpation of joints, muscles or bones, some tenderness with pressure on right posterior lateral ribcage   Neurological: She is alert and oriented to person, place, and time  She has normal reflexes  Skin: Skin is warm  Skin is warm, moist, good color, no petechiae or ecchymoses, I cannot find any lesions on the scalp, there are few scabs on the back, forearms, no active bleeding, no signs of infection, no blistering, no cellulitis, no redness or inflammation, no open lesions    Just inside the umbilicus, the skin is slightly red and inflamed, no signs of infection, no ulceration   Psychiatric: She has a normal mood and affect   Her behavior is normal  Judgment and thought content normal    +anxious   Extremities:  No lower extremity edema bilaterally, no cords, pulses are 1+  Lymphatics:  No adenopathy in the neck, supraclavicular region, axilla and groin bilaterally    Labs    06/25/2030 WBC = 7 3 hemoglobin = 13 0 hematocrit = 42 MCV = 82 RDW = 14 3 platelet = 836 neutrophil = 52% BUN = 15 creatinine = 1 06 calcium = 8 9    05/29/2020 BUN = 20 creatinine = 0 84 LFTs WNL total protein = 7 6    06/04/2019 WBC = 6 5 hemoglobin = 13 0 hematocrit = 40 4 MCV = 77 RDW = 15 3 platelet = 001 neutrophil = 47% lymphocytes = 37% monocyte = 7% eosinophil = 8% BUN = 16 creatinine = 0 81 calcium = 9 8 total protein = 7 4 total bilirubin = 0 3 AST = 17 ALT = 17 TSH = 1 940 vitamin B12 = 760 iron = 42 ferritin = 386 = elevated ( ng/mL)

## 2020-07-30 ENCOUNTER — OFFICE VISIT (OUTPATIENT)
Dept: PHYSICAL THERAPY | Facility: CLINIC | Age: 60
End: 2020-07-30
Payer: COMMERCIAL

## 2020-07-30 DIAGNOSIS — S83.241A OTHER TEAR OF MEDIAL MENISCUS, CURRENT INJURY, RIGHT KNEE, INITIAL ENCOUNTER: Primary | ICD-10-CM

## 2020-07-30 DIAGNOSIS — M25.561 ACUTE PAIN OF RIGHT KNEE: ICD-10-CM

## 2020-07-30 DIAGNOSIS — M17.11 PRIMARY OSTEOARTHRITIS OF RIGHT KNEE: ICD-10-CM

## 2020-07-30 PROCEDURE — 97110 THERAPEUTIC EXERCISES: CPT

## 2020-07-30 PROCEDURE — 97112 NEUROMUSCULAR REEDUCATION: CPT

## 2020-07-30 NOTE — PROGRESS NOTES
Daily Note     Today's date: 2020  Patient name: Keenan De Santiago  : 1960  MRN: [id number]  Referring provider: Kahlil Peña DO  Dx:   Encounter Diagnosis     ICD-10-CM    1  Other tear of medial meniscus, current injury, right knee, initial encounter S83 241A    2  Acute pain of right knee M25 561    3  Primary osteoarthritis of right knee M17 11                   Subjective: pt reports no pain on arrival and has been pretty active at home  Reports she just saw the Dr and he was pleased with her progress thus far  Objective: See treatment diary below      Assessment: Tolerated treatment well  Patient exhibited good technique with therapeutic exercises increases in pain with R SLS       Plan: Continue per plan of care  Precautions: B/L knee OA  SOC: 6/3/2020  DOS: 2020  POC expiration: 2020  Daily Treatment Log  Date 7/15/2020 2020 7/27/20 7/30/20    Visit # 16   19    Manual        IASTM pes  8'      There Exer 30' 23' 30' 30'     Upright bike L1x5' L2x7' L2x7' L2x7'     Alt qset & Heel slides w/ board         SLR abd qset 2x10   2x10     SLR flex w/ qset 2x10 2x10 2x10 2x10     SLR adduction 2x10       Prone knee flexion 2x15   2x10     Prone quad set 3" 2x12   5" 2x10     LAQ  2# 2x10 2# 2x10 2# 2x10     TB hams        Stand B/L HR/TR 1x15 each    1x15 each      bridges    2x10     Leg press 45# 2x10 Upright 45# B/L  3x12 Upright 45# B/L  3x12 Upright 45# B/L  3x12     STS         DF gastroc stretch 20"x4 20"x4 20"x4 20"x4     FSU        NMRed 10' 10' 15' 10'    Supine NG knee ext w/ DF 2x10        WB ROM PD        Sidestep w/ TB ankles  Red 20'x2 R/L Red 20'x2 R/L     Alt cone taps        Feet on pball bridges 3" 2x10    3" 2x10      diag walk w TB @ knees        Long stepping wTB ankles  Red 20"x4 Red 20"x4     SLS  20"x2 10"10x 10"10x     Modalities        CP 6'  to end 5' 5' 5'                      Access Code: [email or web address]   URL: [email or web address]   Date: 06/18/2020   Prepared by:  Danny Brown      Exercises  · Supine Heel Slide - 10 reps - 2 sets - 3 hold - 2x daily - 7x weekly  · Prone Knee Flexion AROM - 10 reps - 2 sets - 3 hold - 2x daily - 7x weekly  · Prone Quadriceps Set - 10 reps - 2 sets - 3 hold - 2x daily - 7x weekly  · Supine Active Straight Leg Raise - 10 reps - 2 sets - 1x daily - 7x weekly  · Supine Bridge - 10 reps - 2 sets - 1x daily - 7x weekly  · Heel rises with counter support - 10 reps - 2 sets - 1x daily - 7x weekly  · Toe Raises with Counter Support - 10 reps - 2 sets - 1x daily - 7x weekly

## 2020-08-03 ENCOUNTER — OFFICE VISIT (OUTPATIENT)
Dept: PHYSICAL THERAPY | Facility: CLINIC | Age: 60
End: 2020-08-03
Payer: COMMERCIAL

## 2020-08-03 DIAGNOSIS — M17.11 PRIMARY OSTEOARTHRITIS OF RIGHT KNEE: ICD-10-CM

## 2020-08-03 DIAGNOSIS — M25.561 ACUTE PAIN OF RIGHT KNEE: ICD-10-CM

## 2020-08-03 DIAGNOSIS — S83.241A OTHER TEAR OF MEDIAL MENISCUS, CURRENT INJURY, RIGHT KNEE, INITIAL ENCOUNTER: Primary | ICD-10-CM

## 2020-08-03 LAB — ANA TITR SER IF: NEGATIVE {TITER}

## 2020-08-03 PROCEDURE — 97110 THERAPEUTIC EXERCISES: CPT

## 2020-08-03 PROCEDURE — 97112 NEUROMUSCULAR REEDUCATION: CPT

## 2020-08-03 NOTE — PROGRESS NOTES
Daily Note     Today's date: 8/3/2020  Patient name: Jose Mendieta  : 1960  MRN: [id number]  Referring provider: Chun Silva DO  Dx:   Encounter Diagnosis     ICD-10-CM    1  Other tear of medial meniscus, current injury, right knee, initial encounter  S83 241A    2  Acute pain of right knee  M25 561    3  Primary osteoarthritis of right knee  M17 11        Start Time: 1145  Stop Time: 1230  Total time in clinic (min): 45 minutes    Subjective: Pt reports no pain prior to session, She reports she experiences clicking in knee when ambulating stairs  Objective: See treatment diary below      Assessment: Tolerated treatment well  Patient exhibited good technique with therapeutic exercises Able to progress her exercises today with no c/o pain  Plan: Continue per plan of care        Precautions: B/L knee OA  SOC: 6/3/2020  DOS: 2020  POC expiration: 2020  Daily Treatment Log  Date 7/15/2020 2020 7/27/20 7/30/20 8/3/20   Visit # 16   19 20   Manual        IASTM pes  8'      There Exer 30' 23' 30' 30' 30'    Upright bike L1x5' L2x7' L2x7' L2x7'  L2x8'   Alt qset & Heel slides w/ board         SLR abd qset 2x10   2x10  NP   SLR flex w/ qset 2x10 2x10 2x10 2x10  1# 2x10   SLR adduction 2x10       Prone knee flexion 2x15   2x10  NP   Prone quad set 3" 2x12   5" 2x10  NP   LAQ  2# 2x10 2# 2x10 2# 2x10  3# 2x10   TB hams        Stand B/L HR/TR 1x15 each    1x15 each      bridges    2x10     Leg press 45# 2x10 Upright 45# B/L  3x12 Upright 45# B/L  3x12 Upright 45# B/L  3x12  Upright 50# B/L  2x10   STS         Standing HS str on step     6" step 10"x10   DF gastroc stretch 20"x4 20"x4 20"x4 20"x4  10"x10 slant board   FSU        NMRed 10' 10' 15' 10' 14'   Supine NG knee ext w/ DF 2x10        WB ROM PD        Sidestep w/ TB ankles  Red 20'x2 R/L Red 20'x2 R/L     Alt cone taps        Feet on pball bridges 3" 2x10    3" 2x10      diag walk w TB @ knees        Long stepping wTB ankles  Red 20"x4 Red 20"x4     Eccentric step down     4" step 20x   Step ups     6" step 20x focus on glute drive   SLS  35"R6 21"24B 10"10x  10"x10 Airex   Modalities        CP 6'  to end 5' 5' 5' 5' post                     Access Code: [email or web address]   URL: [email or web address]   Date: 06/18/2020   Prepared by:  Christiana Bhardwaj      Exercises  · Supine Heel Slide - 10 reps - 2 sets - 3 hold - 2x daily - 7x weekly  · Prone Knee Flexion AROM - 10 reps - 2 sets - 3 hold - 2x daily - 7x weekly  · Prone Quadriceps Set - 10 reps - 2 sets - 3 hold - 2x daily - 7x weekly  · Supine Active Straight Leg Raise - 10 reps - 2 sets - 1x daily - 7x weekly  · Supine Bridge - 10 reps - 2 sets - 1x daily - 7x weekly  · Heel rises with counter support - 10 reps - 2 sets - 1x daily - 7x weekly  · Toe Raises with Counter Support - 10 reps - 2 sets - 1x daily - 7x weekly

## 2020-08-07 ENCOUNTER — OFFICE VISIT (OUTPATIENT)
Dept: PHYSICAL THERAPY | Facility: CLINIC | Age: 60
End: 2020-08-07
Payer: COMMERCIAL

## 2020-08-07 ENCOUNTER — TELEPHONE (OUTPATIENT)
Dept: HEMATOLOGY ONCOLOGY | Facility: CLINIC | Age: 60
End: 2020-08-07

## 2020-08-07 DIAGNOSIS — S83.241A OTHER TEAR OF MEDIAL MENISCUS, CURRENT INJURY, RIGHT KNEE, INITIAL ENCOUNTER: Primary | ICD-10-CM

## 2020-08-07 DIAGNOSIS — M17.11 PRIMARY OSTEOARTHRITIS OF RIGHT KNEE: ICD-10-CM

## 2020-08-07 DIAGNOSIS — M25.561 ACUTE PAIN OF RIGHT KNEE: ICD-10-CM

## 2020-08-07 PROCEDURE — 97112 NEUROMUSCULAR REEDUCATION: CPT

## 2020-08-07 PROCEDURE — 97110 THERAPEUTIC EXERCISES: CPT

## 2020-08-07 NOTE — TELEPHONE ENCOUNTER
Patient called requesting lab results      Labs draw date: 07/29  Labs drawn at: [facility]   Patient's call back # [phone]

## 2020-08-07 NOTE — TELEPHONE ENCOUNTER
Patient called back to follow up on the request for he 7/30/2020 blood work results, advised her that all results have not finalized  Patient is requesting the results of what has finalized  Best call back [phone]

## 2020-08-07 NOTE — PROGRESS NOTES
Daily Note     Today's date: 2020  Patient name: Chanda Lenz  : 1960  MRN: [id number]  Referring provider: Nadine Avila DO  Dx:   Encounter Diagnosis     ICD-10-CM    1  Other tear of medial meniscus, current injury, right knee, initial encounter  S83 241A    2  Acute pain of right knee  M25 561    3  Primary osteoarthritis of right knee  M17 11        Start Time: 1100  Stop Time: 1150  Total time in clinic (min): 50 minutes    Subjective: "Yesterday I over-did it running errands, and going up/down stairs "  She reports she experiences clicking in knee when ambulating stairs  Objective: See treatment diary below    No pain end of session  Assessment: Tolerated treatment well  Patient exhibited good technique with therapeutic exercises Pt continues to need momentum to get up 6" step, so UE rail used for step ups  Good eccentric control coming down 4" step  Pt was provided brief description of desensitization program         Plan: Continue per plan of care        Precautions: B/L knee OA  SOC: 6/3/2020  DOS: 2020  POC expiration: 2020  Daily Treatment Log  Date 7/15/2020 2020 7/27/20 7/30/20 8/3/20 8/7/20   Visit # 16   19 20 21   Manual         IASTM pes  8'       There Exer 30' 23' 30' 30' 30'     Upright bike L1x5' L2x7' L2x7' L2x7'  L2x8' L2 7'   Alt qset & Heel slides w/ board          SLR abd qset 2x10   2x10  NP    SLR flex w/ qset 2x10 2x10 2x10 2x10  1# 2x10 1# 2x10   SLR adduction 2x10        Prone knee flexion 2x15   2x10  NP    Prone quad set 3" 2x12   5" 2x10  NP    LAQ  2# 2x10 2# 2x10 2# 2x10  3# 2x10 3# 2x10   TB hams         Stand B/L HR/TR 1x15 each    1x15 each       bridges    2x10      Leg press 45# 2x10 Upright 45# B/L  3x12 Upright 45# B/L  3x12 Upright 45# B/L  3x12  Upright 50# B/L  2x10 Upright 50# B/L  2x10   STS          Standing HS str on step     6" step 10"x10 6" step 10"x10   DF gastroc stretch 20"x4 20"x4 20"x4 20"x4  10"x10 slant board 10"x10 slant board            NMRed 10' 10' 15' 10' 14'    Supine NG knee ext w/ DF 2x10         WB ROM PD         Sidestep w/ TB ankles  Red 20'x2 R/L Red 20'x2 R/L      Alt cone taps         Feet on pball bridges 3" 2x10    3" 2x10       diag walk w TB @ knees         Long stepping wTB ankles  Red 20"x4 Red 20"x4      Eccentric step down     4" step 20x 4" step 20x   Step ups     6" step 20x focus on glute drive 6" step 54B focus on glute drive   SLS  27"H2 70"75V 10"10x  10"x10 Airex 10"x10 Airex   Modalities         CP 6'  to end 5' 5' 5' 5' post 5' post            Pt education:       Briefly described desensitization program, start with cotton or tissue  Gradually progress texture, pressure     Access Code: [email or web address]   URL: [employer]/   Date: 06/18/2020   Prepared by:  Gypsy Rodrigues      Exercises  · Supine Heel Slide - 10 reps - 2 sets - 3 hold - 2x daily - 7x weekly  · Prone Knee Flexion AROM - 10 reps - 2 sets - 3 hold - 2x daily - 7x weekly  · Prone Quadriceps Set - 10 reps - 2 sets - 3 hold - 2x daily - 7x weekly  · Supine Active Straight Leg Raise - 10 reps - 2 sets - 1x daily - 7x weekly  · Supine Bridge - 10 reps - 2 sets - 1x daily - 7x weekly  · Heel rises with counter support - 10 reps - 2 sets - 1x daily - 7x weekly  · Toe Raises with Counter Support - 10 reps - 2 sets - 1x daily - 7x weekly

## 2020-08-07 NOTE — TELEPHONE ENCOUNTER
Please call patient and advised all of her 7/30/20 lab results are not back yet  Please call patient with results when final       Emotional support provided  Patient advised to call back on Wednesday if she has not heard from us  Patient verbalized understanding of above

## 2020-08-10 DIAGNOSIS — M17.0 BILATERAL PRIMARY OSTEOARTHRITIS OF KNEE: ICD-10-CM

## 2020-08-10 DIAGNOSIS — M25.561 CHRONIC PAIN OF BOTH KNEES: ICD-10-CM

## 2020-08-10 DIAGNOSIS — G89.29 CHRONIC PAIN OF BOTH KNEES: ICD-10-CM

## 2020-08-10 DIAGNOSIS — M25.562 CHRONIC PAIN OF BOTH KNEES: ICD-10-CM

## 2020-08-11 ENCOUNTER — OFFICE VISIT (OUTPATIENT)
Dept: PHYSICAL THERAPY | Facility: CLINIC | Age: 60
End: 2020-08-11
Payer: COMMERCIAL

## 2020-08-11 DIAGNOSIS — S83.241A OTHER TEAR OF MEDIAL MENISCUS, CURRENT INJURY, RIGHT KNEE, INITIAL ENCOUNTER: Primary | ICD-10-CM

## 2020-08-11 DIAGNOSIS — M25.561 ACUTE PAIN OF RIGHT KNEE: ICD-10-CM

## 2020-08-11 DIAGNOSIS — M17.11 PRIMARY OSTEOARTHRITIS OF RIGHT KNEE: ICD-10-CM

## 2020-08-11 PROCEDURE — 97110 THERAPEUTIC EXERCISES: CPT | Performed by: PHYSICAL THERAPIST

## 2020-08-11 NOTE — PROGRESS NOTES
PT Re-Evaluation     Today's date: 2020  Patient name: Keenan De Santiago  : 1960  MRN: [id number]  Referring provider: Kahlil Peña DO  Dx:   Encounter Diagnosis     ICD-10-CM    1  Other tear of medial meniscus, current injury, right knee, initial encounter  S83 241A    2  Acute pain of right knee  M25 561    3  Primary osteoarthritis of right knee  M17 11                   Assessment  Assessment details: 6/3/2020: Keenan De Santiago is a 62 y o  female who presents with pain, decreased strength, decreased ROM, decreased joint mobility, joint effusion and ambulatory dysfunction  Due to these impairments, patient has difficulty performing ADL's, recreational activities, work-related activities, engaging in social activities, ambulation, stair negotiation, lifting/carrying, transfers, reaching  Patient's clinical presentation is consistent with their referring diagnosis of Other tear of medial meniscus, current injury, right knee, initial encounter and Acute pain of right knee and Primary osteoarthritis of right knee  Plan: Ambulatory referral to Physical Therapy  Patient has been educated in post-op contraindications / precautions, wound care including CP and LE elevation, gait training w/ B/L crutches level surfaces and stairs/curbs, weight bearing status, home exercise program and plan of care  Discussed w/ pt some debridement was done to address OA, but she may not be pain free after she completes therapy due to OA  She understands at some point she may need TKA  Patient would benefit from skilled physical therapy services to address their aforementioned functional limitations and progress towards prior level of function and independence with home exercise program    2020: Pt is progressing well w/ ROM and strength R knee w/ minimal edema  She remains limited, as expected, with tolerance to weight bearing activity   Her c/o R LE numbness/paresthesias are not affected by lumbar mechanical assessment or nerve glides, but do seem to respond to fibular head mobilization today  This numbness reportedly began when she injured her knee  Pt is progressing well and will benefit from continued PT as per original POC to further advance toward return of function and LTG's   7/23/2020: Pt demonstrates improving ROM, balance and strength, but is still having issues w/ knee extension strength and tolerance of terminal knee extension  Her R foot numbness resolved about a week ago  Mild edema remains  Pt will likely benefit from continued PT an additional 4 weeks to maximize progress  Pt is aware OA in her knee remains  8/11/2020: Pt demonstrates full ROM and improved strength, but knee extension remains weak/painful  Minor edema continues w/ TTP MCL, pes anserines and medial joint line  Palpable nodule posterior knee is consistent w/ a Baker's Cyst  Anterior/medial knee pain does respond to knee ext w/ OP indicating mechanical directional preference  Continued symptom irritability is consistent w/ underlying OA  Pt will benefit from continued PT through end of POC to maximize recovery towards PLOF  Impairments: activity intolerance, impaired physical strength, pain with function and weight-bearing intolerance  Functional limitations: stairs now reciprocal w/ rail but w/ limited tolerance; pt can partially squat (1/2); limited nikki to prolonged weight bearing activity  Understanding of Dx/Px/POC: excellent   Prognosis: good    Goals  Short Term Goals: Target Date 4 weeks (7/1/2020) - partially met  1  Initiate and advance HEP - met  2 Improve AROM R knee to be equal to L w/o pain to prepare for reciprocal stairs w/o c/o  - met  3  Improve R LE strength to 4+/5 to prepare for functional LTG  - partially met  4  D/C assistive device for ambulation  - met  5  Pt to report ease w/ all transfers, bed mobility and be able to resume driving  - met    Long Term Goals: Target Date (8/26/2020)  1   Indep with HEP  2  Pt to tolerate reciprocal stairs w/o handrail w/ pain 0-2/10 - met (1-2 flights only)  3  Pt to tolerate prolonged walking on uneven terrain w/ pain 0-3/10  - not met  4  Improve balance such that pt can maintain R/L SLS x 30"  - met  5  Pt to be able to RTW  - progressing toward  6  Improve FOTO to 57 or better  - improved to 52    Plan  Plan details:     Patient would benefit from: PT eval and skilled physical therapy  Planned modality interventions: cryotherapy, thermotherapy: hydrocollator packs and unattended electrical stimulation  Planned therapy interventions: manual therapy, neuromuscular re-education, therapeutic activities, therapeutic exercise, home exercise program, patient education, stretching, functional ROM exercises and balance/weight bearing training  Frequency: 3x week (3x week x 1 month; then BIW prn)  Plan of Care beginning date: 6/3/2020  Plan of Care expiration date: 8/26/2020  Treatment plan discussed with: patient        Subjective Evaluation    History of Present Illness  Date of surgery: 6/2/2020  Mechanism of injury: 6/3/2020: Pt originally injured her R knee in February 2020, and MRI confirmed a meniscus tear, but pt's symptoms improved to minimal discomfort until late May  She was standing in her kitchen, changed direction and felt a snap in her knee  She states she couldn't walk after that  Her primary c/o is R medial knee pain  She also reports tingling in her R foot/heel which started before her surgery  3/24/2020: pt reports her knee is improving  She has some increased soreness today after walking a lot yesterday w/o assistive device including up/down stairs reciprocally  She c/o continued numbness and paresthesias R LE from prox/post calf into her foot (constant)  7/23/2020: One month post-op menisectomy/debridement, pt reports some improvement, but is struggling with tolerance to prolonged walking especially on uneven terrain leading to pain/swelling   She continues w/ pain w/ terminal knee extension both in open and closed chain  She has limited tolerance to reciprocal stairs  Pain is also worse at night   2020: Pt reports her knee is generally better  Stairs are intermittently reciprocal, but she does c/o intermittent (audible) "snapping" w/ stairs  She has intermittent medial joint line pain w/ walking and continued feeling of fullness and tightness posteriorly  With testing today, anterior joint line pain and audible snapping is reduced w/ repeated knee ext w/ self overpressure  Pt did catch her toes of her R foot on her L heel yesterday, creating valgus stress of knee joint which was briefly very painful    Quality of life: fair    Pain  Current pain ratin  At best pain ratin  At worst pain ratin  Location: medial knee  Quality: sharp and dull ache (stiffness, fullness posterior; intermittent snapping)  Relieving factors: rest and ice  Aggravating factors: standing, walking and stair climbing  Progression: worsening    Social Support  Steps to enter house: yes (1 entrance 4 steps/no rail; 1 entrance w/ steps/rail)    Life stress: 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 - not working now due to injury      Diagnostic Tests  X-ray: abnormal (signficant OA)  MRI studies: abnormal  Treatments  Previous treatment: physical therapy  Current treatment: physical therapy  Patient Goals  Patient goals for therapy: decreased edema, decreased pain, return to work and return to [employer] activities  Patient goal: walk w/o crutches w/o limitation (met), resume hiking (not met)        Objective  A/PROM:   R  R  R  R  L      2020 2020 2020 6/3/2020 6/3/2020  Knee flex supprone  121/132 120//103 60/50  120/110  Knee ext Qset/SLR  0/0  0/0  -2/-5  -5/-12  0/0    MMT:    R  R  R  R  L     2020 2020 2020 6/3/2020 6/3/2020  Hip flex   5/5  5/5  5/5  4+/5  4+/5  Hip abd  5/5  4+/5  4+/5  3/5  4+/5  Hip ER   5/5  4+/5  4/5  3/5*  4+/5  Hip IR   5/5  4+/5  4/5  3+/5*  4+/5  Knee ext  4-/5*  4-/5*  4-/5  3-/5*  4+/5  Knee flex  4+/5  4+/5  4/5  3/5  5/5    Balance:    7/23/2020 6/24/2020 6/3/2020  Tandem R posterior w/ EO: 30"(+)  30" (+)  NT  Tandem L posterior w/ EO: 30"(+)  30" (+)  NT  SLS R w/ EO:   30"(+)  NT  NT  SLS L w/ EO:   30"(+)  NT  NT    Observation: 8/11/2020 - toe out gait pattern which pt can correct w/ verbal cues  No assistive device  Full extension now w/ normal heel strike  Trace edema remains  7/23/2020 - Pt's wound fully healed, minor diffuse edema throughout R knee  Pt ambulates w/o Assistive device w/ minor restriction of heel strike due to remaining extension limitation  Joint line R 41 7 cm vs L 41 3 cm 7/23/2020    Palpation: TTP R medial joint line, pes anserines and MCL  Mechanical assessment: pain w/ ambulation and fwd step up dec/B after rep knee ext w/ self OP 2x10  Precautions: B/L knee OA  SOC: 6/3/2020  DOS: 6/2/2020  POC expiration: 8/26/2020  Daily Treatment Log  Date 8/11/2020  RE/FOTO        Visit # 22        Manual         Ktape R pes 5'        There Exer 35'         Upright bike L2x8''        SLR flex w/ qset 1# 2x10        SLR adduction         Prone knee flexion W/ SOS 20"x4        LAQ         TB hams         HR off step 2x10        bridges 2x10        Supine leg press 50# B/L  2x10        STS          Standing HS str on step         stand gastroc stretch 20"x4        Rep knee ext w/ OP sitting 1x10; 2x        ROM/MMT 10'        NMRed 5'        Sidestep w/ TB ankles         Feet on pball bridges         diag walk w TB @ knees         Eccentric step down         Step ups w/ hi knee 6" 2x10        SLS         Modalities         CP 5'                            Access Code: [email or web address]   URL: [email or web address]   Date: 06/18/2020   Prepared by:  Scar Liv      Exercises  · Supine Heel Slide - 10 reps - 2 sets - 3 hold - 2x daily - 7x weekly  · Prone Knee Flexion AROM - 10 reps - 2 sets - 3 hold - 2x daily - 7x weekly  · Prone Quadriceps Set - 10 reps - 2 sets - 3 hold - 2x daily - 7x weekly  · Supine Active Straight Leg Raise - 10 reps - 2 sets - 1x daily - 7x weekly  · Supine Bridge - 10 reps - 2 sets - 1x daily - 7x weekly  · Heel rises with counter support - 10 reps - 2 sets - 1x daily - 7x weekly  · Toe Raises with Counter Support - 10 reps - 2 sets - 1x daily - 7x weekly

## 2020-08-13 ENCOUNTER — OFFICE VISIT (OUTPATIENT)
Dept: PHYSICAL THERAPY | Facility: CLINIC | Age: 60
End: 2020-08-13
Payer: COMMERCIAL

## 2020-08-13 ENCOUNTER — TELEPHONE (OUTPATIENT)
Dept: HEMATOLOGY ONCOLOGY | Facility: MEDICAL CENTER | Age: 60
End: 2020-08-13

## 2020-08-13 ENCOUNTER — TELEPHONE (OUTPATIENT)
Dept: HEMATOLOGY ONCOLOGY | Facility: CLINIC | Age: 60
End: 2020-08-13

## 2020-08-13 DIAGNOSIS — S83.241A OTHER TEAR OF MEDIAL MENISCUS, CURRENT INJURY, RIGHT KNEE, INITIAL ENCOUNTER: Primary | ICD-10-CM

## 2020-08-13 DIAGNOSIS — M17.11 PRIMARY OSTEOARTHRITIS OF RIGHT KNEE: ICD-10-CM

## 2020-08-13 DIAGNOSIS — M25.561 ACUTE PAIN OF RIGHT KNEE: ICD-10-CM

## 2020-08-13 PROCEDURE — 97110 THERAPEUTIC EXERCISES: CPT

## 2020-08-13 NOTE — PROGRESS NOTES
Daily Note     Today's date: 2020  Patient name: Ivania Nicole  : 1960  MRN: [id number]  Referring provider: Magalys Melendrez DO  Dx:   Encounter Diagnosis     ICD-10-CM    1  Other tear of medial meniscus, current injury, right knee, initial encounter  S83 241A    2  Acute pain of right knee  M25 561    3  Primary osteoarthritis of right knee  M17 11                   Subjective: pt reports she is feeling okay today, "yesterday was was a great day" she was doing a lot of walking and even weeding her garden with minimal bending, unsure if it was the tape that influenced this but she was feeling good  Objective: See treatment diary below      Assessment: Tolerated treatment well  Patient exhibited good technique with therapeutic exercises pt able to progress through session today with no complaints of pain, finding relief from the kinesio tape applied last visit       Plan: Continue per plan of care  Precautions: B/L knee OA  SOC: 6/3/2020  DOS: 2020  POC expiration: 2020  Daily Treatment Log  Date 2020  RE/FOTO 20       Visit # 22 23       Manual         Ktape R pes 5' Applied        There Exer 35' 40'        Upright bike L2x8'' L2x8''        SLR flex w/ qset 1# 2x10 1# 2x10        SLR adduction         Prone knee flexion W/ SOS 20"x4 W/ SOS 20"x5       LAQ  3# 2x10        TB hams         HR off step 2x10 2x10        bridges 2x10 2x12       Supine leg press 50# B/L  2x10 50# B/L  2x10        STS          Standing HS str on step         stand gastroc stretch 20"x4 20"x4        Rep knee ext w/ OP sitting 1x10; 2x 1x10; x2        ROM/MMT 10'        NMRed 5' 5'       Sidestep w/ TB ankles         Feet on pball bridges         diag walk w TB @ knees         Eccentric step down         Step ups w/ hi knee 6" 2x10 6" 2x10 R/L        SLS         Modalities         CP 5' 5'                           Access Code: [email or web address]   URL: [employer]  [email or web address]   Date: 2020 Prepared by:  Ivania Brown      Exercises  · Supine Heel Slide - 10 reps - 2 sets - 3 hold - 2x daily - 7x weekly  · Prone Knee Flexion AROM - 10 reps - 2 sets - 3 hold - 2x daily - 7x weekly  · Prone Quadriceps Set - 10 reps - 2 sets - 3 hold - 2x daily - 7x weekly  · Supine Active Straight Leg Raise - 10 reps - 2 sets - 1x daily - 7x weekly  · Supine Bridge - 10 reps - 2 sets - 1x daily - 7x weekly  · Heel rises with counter support - 10 reps - 2 sets - 1x daily - 7x weekly  · Toe Raises with Counter Support - 10 reps - 2 sets - 1x daily - 7x weekly

## 2020-08-13 NOTE — TELEPHONE ENCOUNTER
[facility] lab leila DESMOGLEIN 1 AND 3 still pending  Results will be available after 8/22

## 2020-08-13 NOTE — TELEPHONE ENCOUNTER
Patient called requesting lab results      Labs draw date: 07/29  Labs drawn at: [facility]   Patient's call back # [phone]

## 2020-08-13 NOTE — TELEPHONE ENCOUNTER
Susan Tate from [address] and blood specialist called in requesting most recent office notes and all labs - a good call back number is [phone] stated that he will fax over the medical Release form to Dr Carissa Mcghee office

## 2020-08-14 ENCOUNTER — APPOINTMENT (OUTPATIENT)
Dept: PHYSICAL THERAPY | Facility: CLINIC | Age: 60
End: 2020-08-14
Payer: COMMERCIAL

## 2020-08-18 ENCOUNTER — OFFICE VISIT (OUTPATIENT)
Dept: PHYSICAL THERAPY | Facility: CLINIC | Age: 60
End: 2020-08-18
Payer: COMMERCIAL

## 2020-08-18 DIAGNOSIS — S83.241A OTHER TEAR OF MEDIAL MENISCUS, CURRENT INJURY, RIGHT KNEE, INITIAL ENCOUNTER: Primary | ICD-10-CM

## 2020-08-18 DIAGNOSIS — M17.11 PRIMARY OSTEOARTHRITIS OF RIGHT KNEE: ICD-10-CM

## 2020-08-18 DIAGNOSIS — M25.561 ACUTE PAIN OF RIGHT KNEE: ICD-10-CM

## 2020-08-18 PROCEDURE — 97110 THERAPEUTIC EXERCISES: CPT

## 2020-08-18 NOTE — PROGRESS NOTES
Daily Note     Today's date: 2020  Patient name: Kirk Kruse  : 1960  MRN: [id number]  Referring provider: Alfa Thomason DO  Dx:   Encounter Diagnosis     ICD-10-CM    1  Other tear of medial meniscus, current injury, right knee, initial encounter  S83 115A    2  Acute pain of right knee  M25 561    3  Primary osteoarthritis of right knee  M17 11                   Subjective: pt reports things have been going well, she was able to get on her hands and knees on her bed yesterday with no difficulties  Pt inquired about starting to walk    Pt arrived 5 min late       Objective: See treatment diary below      Assessment: Tolerated treatment well  Patient exhibited good technique with therapeutic exercises  Pt education to slowly start adding prolonged walking to her daily routine and listen to her body, if she is very sore after than to decrease and slowly work up to her tolerance, pt with a good understanding  Plan: Continue per plan of care        Precautions: B/L knee OA  SOC: 6/3/2020  DOS: 2020  POC expiration: 2020  Daily Treatment Log  Date 2020  RE/FOTO 20      Visit # 22 23 24      Manual         Ktape R pes 5' Applied  Applied       There Exer 35' 40' 40'       Upright bike L2x8'' L2x8''  L2x5'       SLR flex w/ qset 1# 2x10 1# 2x10  1# 2x10        Alt leg lowering    Knees bent 1x10 R/L       SLR adduction   Abd/add  2x10 each      Prone knee flexion W/ SOS 20"x4 W/ SOS 20"x5 W/ SOS 20"x5       LAQ  3# 2x10  3# 2x10       TB hams         HR off step 2x10 2x10  2x10       bridges 2x10 2x12 W/ abd 2x12       Supine leg press 50# B/L  2x10 50# B/L  2x10  50# B/L  2x15        STS          Standing HS str on step         stand gastroc stretch 20"x4 20"x4        Rep knee ext w/ OP sitting 1x10; 2x 1x10; x2  1x10       ROM/MMT 10'        NMRed 5' 5'       Sidestep w/ TB ankles         Feet on pball bridges         diag walk w TB @ knees         Eccentric step down Step ups w/ hi knee 6" 2x10 6" 2x10 R/L        SLS         Modalities         CP 5' 5' 5'                          Access Code: [email or web address]   URL: [employer]/   Date: 06/18/2020   Prepared by:  Rancho Pacheco      Exercises  · Supine Heel Slide - 10 reps - 2 sets - 3 hold - 2x daily - 7x weekly  · Prone Knee Flexion AROM - 10 reps - 2 sets - 3 hold - 2x daily - 7x weekly  · Prone Quadriceps Set - 10 reps - 2 sets - 3 hold - 2x daily - 7x weekly  · Supine Active Straight Leg Raise - 10 reps - 2 sets - 1x daily - 7x weekly  · Supine Bridge - 10 reps - 2 sets - 1x daily - 7x weekly  · Heel rises with counter support - 10 reps - 2 sets - 1x daily - 7x weekly  · Toe Raises with Counter Support - 10 reps - 2 sets - 1x daily - 7x weekly

## 2020-08-20 ENCOUNTER — OFFICE VISIT (OUTPATIENT)
Dept: PHYSICAL THERAPY | Facility: CLINIC | Age: 60
End: 2020-08-20
Payer: COMMERCIAL

## 2020-08-20 DIAGNOSIS — M17.11 PRIMARY OSTEOARTHRITIS OF RIGHT KNEE: ICD-10-CM

## 2020-08-20 DIAGNOSIS — S83.241A OTHER TEAR OF MEDIAL MENISCUS, CURRENT INJURY, RIGHT KNEE, INITIAL ENCOUNTER: Primary | ICD-10-CM

## 2020-08-20 DIAGNOSIS — M25.561 ACUTE PAIN OF RIGHT KNEE: ICD-10-CM

## 2020-08-20 LAB
Lab: 91.9 U/ML
Lab: ABNORMAL U/ML

## 2020-08-20 PROCEDURE — 97112 NEUROMUSCULAR REEDUCATION: CPT | Performed by: PHYSICAL THERAPIST

## 2020-08-20 PROCEDURE — 97110 THERAPEUTIC EXERCISES: CPT | Performed by: PHYSICAL THERAPIST

## 2020-08-20 NOTE — PROGRESS NOTES
Daily Note     Today's date: 2020     Patient name: Chanda Lenz  : 1960  MRN: [id number]  Referring provider: Nadine Avila DO  Dx:   Encounter Diagnosis     ICD-10-CM    1  Other tear of medial meniscus, current injury, right knee, initial encounter  S83 241A    2  Acute pain of right knee  M25 561    3  Primary osteoarthritis of right knee  M17 11                   Subjective: pt reports she can kneel on her bed, get OOB and get up from a chair without the pain she had  She still has pain if there's a sudden valgus stress on her knee  Objective: See treatment diary below      Assessment: Tolerated treatment well  Patient demonstrated fatigue post treatment and full ROM  Pt appropriate for D/C next week  Plan: Continue per plan of care  D/C next week       Precautions: B/L knee OA  SOC: 6/3/2020  DOS: 2020  POC expiration: 2020  Daily Treatment Log  Date 2020  RE/FOTO 20     Visit # 22 23 24 25     Manual         Ktape R pes 5' Applied  Applied  applied     There Exer 35' 40' 40' 25'      Upright bike L2x8'' L2x8''  L2x5'  L2x7'     SLR flex w/ qset 1# 2x10 1# 2x10  1# 2x10        SLR adduction   Abd/add  2x10 each      Prone knee flexion W/ SOS 20"x4 W/ SOS 20"x5 W/ SOS 20"x5  W/ SOS 20"x5     LAQ  3# 2x10  3# 2x10       Ham stretch    20"x3     HR off step 2x10 2x10  2x10       bridges 2x10 2x12 W/ abd 2x12  Red 2x12     Supine leg press 50# B/L  2x10 50# B/L  2x10  50# B/L  2x15   55# B/L  2x12     STS          stand gastroc stretch 20"x4 20"x4        Rep knee ext w/ OP sitting 1x10; 2x 1x10; x2  1x10       ROM/MMT 10'        NMRed 5' 5'  10'     Sidestep w/ TB ankles         Feet on pball bridges    5" 2x10     diag walk w TB @ knees         Sup alt leg lowering   Knees bent  1x10 R/L Knees straight  2x5 R/L     Step ups w/ hi knee 6" 2x10 6" 2x10 R/L        Elbow plank    5"x10     Modalities         CP 5' 5' 5' 5'                         Access Code: [email or web address]   URL: [employer]/   Date: 06/18/2020   Prepared by:  Libby Marin      Exercises  · Supine Heel Slide - 10 reps - 2 sets - 3 hold - 2x daily - 7x weekly  · Prone Knee Flexion AROM - 10 reps - 2 sets - 3 hold - 2x daily - 7x weekly  · Prone Quadriceps Set - 10 reps - 2 sets - 3 hold - 2x daily - 7x weekly  · Supine Active Straight Leg Raise - 10 reps - 2 sets - 1x daily - 7x weekly  · Supine Bridge - 10 reps - 2 sets - 1x daily - 7x weekly  · Heel rises with counter support - 10 reps - 2 sets - 1x daily - 7x weekly  · Toe Raises with Counter Support - 10 reps - 2 sets - 1x daily - 7x weekly

## 2020-08-21 ENCOUNTER — APPOINTMENT (OUTPATIENT)
Dept: PHYSICAL THERAPY | Facility: CLINIC | Age: 60
End: 2020-08-21
Payer: COMMERCIAL

## 2020-08-24 ENCOUNTER — OFFICE VISIT (OUTPATIENT)
Dept: PHYSICAL THERAPY | Facility: CLINIC | Age: 60
End: 2020-08-24
Payer: COMMERCIAL

## 2020-08-24 DIAGNOSIS — M17.11 PRIMARY OSTEOARTHRITIS OF RIGHT KNEE: ICD-10-CM

## 2020-08-24 DIAGNOSIS — S83.241A OTHER TEAR OF MEDIAL MENISCUS, CURRENT INJURY, RIGHT KNEE, INITIAL ENCOUNTER: Primary | ICD-10-CM

## 2020-08-24 DIAGNOSIS — M25.561 ACUTE PAIN OF RIGHT KNEE: ICD-10-CM

## 2020-08-24 PROCEDURE — 97110 THERAPEUTIC EXERCISES: CPT

## 2020-08-24 PROCEDURE — 97112 NEUROMUSCULAR REEDUCATION: CPT

## 2020-08-24 NOTE — PROGRESS NOTES
Daily Note     Today's date: 2020  Patient name: Ester Santos  : 1960  MRN: [id number]  Referring provider: Kayy Frazier DO  Dx:   Encounter Diagnosis     ICD-10-CM    1  Other tear of medial meniscus, current injury, right knee, initial encounter  S83 241A    2  Acute pain of right knee  M25 561    3  Primary osteoarthritis of right knee  M17 11                   Subjective: pt reports her knee has been feeling good, she was baby sitting yesterday marching and doing things she doesn't normally do and she is a little sore today  She reports she is going to have to get treatments for her current autoimmune flare up  Objective: See treatment diary below      Assessment: Tolerated treatment well  Patient exhibited good technique with therapeutic exercises pt with increases in lateral R knee pain with leg press today  Plan: Continue per plan of care        Precautions: B/L knee OA  SOC: 6/3/2020  DOS: 2020  POC expiration: 2020  Daily Treatment Log  Date 2020  RE/FOTO 20    Visit # 22 23 24 25 26    Manual         Ktape R pes 5' Applied  Applied  applied Applied     There Exer 35' 36' 40' 25' 30'     Upright bike L2x8'' L2x8''  L2x5'  L2x7' L2x6'    SLR flex w/ qset 1# 2x10 1# 2x10  1# 2x10    1# 2x10     SLR adduction   Abd/add  2x10 each  1# 2x10 each     Prone knee flexion W/ SOS 20"x4 W/ SOS 20"x5 W/ SOS 20"x5  W/ SOS 20"x5 W/ SOS 20"x4     LAQ  3# 2x10  3# 2x10   3# 2x10      Ham stretch    20"x3 20"x3      HR off step 2x10 2x10  2x10   2x10     bridges 2x10 2x12 W/ abd 2x12  Red 2x12     Supine leg press 50# B/L  2x10 50# B/L  2x10  50# B/L  2x15   55# B/L  2x12 55# B/L  2x15  50#   STS          stand gastroc stretch 20"x4 20"x4        Rep knee ext w/ OP sitting 1x10; 2x 1x10; x2  1x10   1x10     ROM/MMT 10'        NMRed 5' 5'  10' 10'    Sidestep w/ TB ankles         Feet on pball bridges    5" 2x10 5" 2x10     diag walk w TB @ knees Sup alt leg lowering   Knees bent  1x10 R/L Knees straight  2x5 R/L     Step ups w/ hi knee 6" 2x10 6" 2x10 R/L    6" 2x10 R/L      Elbow plank    5"x10 20"x2     WB balance      AP/ML 1' each    Modalities         CP 5' 5' 5' 5' 5' post session                         Access Code: [email or web address]   URL: [email or web address]   Date: 06/18/2020   Prepared by:  Janie Gann      Exercises  · Supine Heel Slide - 10 reps - 2 sets - 3 hold - 2x daily - 7x weekly  · Prone Knee Flexion AROM - 10 reps - 2 sets - 3 hold - 2x daily - 7x weekly  · Prone Quadriceps Set - 10 reps - 2 sets - 3 hold - 2x daily - 7x weekly  · Supine Active Straight Leg Raise - 10 reps - 2 sets - 1x daily - 7x weekly  · Supine Bridge - 10 reps - 2 sets - 1x daily - 7x weekly  · Heel rises with counter support - 10 reps - 2 sets - 1x daily - 7x weekly  · Toe Raises with Counter Support - 10 reps - 2 sets - 1x daily - 7x weekly

## 2020-08-25 ENCOUNTER — TELEPHONE (OUTPATIENT)
Dept: FAMILY MEDICINE CLINIC | Facility: CLINIC | Age: 60
End: 2020-08-25

## 2020-08-25 NOTE — TELEPHONE ENCOUNTER
I called Milly to advise her she did not show up for her scheduled appointment  She said she is received IV at a "clin" as her auto-immune disease has returned  She wanted to speak to you right away  I told her you were with patients all day  She asked that you contact her  I advised I am not sure if that will be possible  I told her it would be best if she schedules an appointment      Jan Arce

## 2020-08-25 NOTE — TELEPHONE ENCOUNTER
She needs to schedule appt  She had appt scheduled today which she missed   Her appt today was supposed to be discuss her specialist appt, plan, most recent issues, etc

## 2020-08-26 ENCOUNTER — APPOINTMENT (OUTPATIENT)
Dept: PHYSICAL THERAPY | Facility: CLINIC | Age: 60
End: 2020-08-26
Payer: COMMERCIAL

## 2020-08-27 ENCOUNTER — OFFICE VISIT (OUTPATIENT)
Dept: PHYSICAL THERAPY | Facility: CLINIC | Age: 60
End: 2020-08-27
Payer: COMMERCIAL

## 2020-08-27 DIAGNOSIS — S83.241A OTHER TEAR OF MEDIAL MENISCUS, CURRENT INJURY, RIGHT KNEE, INITIAL ENCOUNTER: Primary | ICD-10-CM

## 2020-08-27 DIAGNOSIS — M17.11 PRIMARY OSTEOARTHRITIS OF RIGHT KNEE: ICD-10-CM

## 2020-08-27 DIAGNOSIS — M25.561 ACUTE PAIN OF RIGHT KNEE: ICD-10-CM

## 2020-08-27 PROCEDURE — 97110 THERAPEUTIC EXERCISES: CPT | Performed by: PHYSICAL THERAPIST

## 2020-08-27 PROCEDURE — 97112 NEUROMUSCULAR REEDUCATION: CPT | Performed by: PHYSICAL THERAPIST

## 2020-08-27 NOTE — PROGRESS NOTES
PT Discharge    Today's date: 2020  Patient name: Keenan De Santiago  : 1960  MRN: [id number]  Referring provider: Kahlil Peña DO  Dx:   Encounter Diagnosis     ICD-10-CM    1  Other tear of medial meniscus, current injury, right knee, initial encounter  S83 241A    2  Acute pain of right knee  M25 561    3  Primary osteoarthritis of right knee  M17 11                   Assessment  Assessment details: 6/3/2020: Keenan De Santiago is a 62 y o  female who presents with pain, decreased strength, decreased ROM, decreased joint mobility, joint effusion and ambulatory dysfunction  Due to these impairments, patient has difficulty performing ADL's, recreational activities, work-related activities, engaging in social activities, ambulation, stair negotiation, lifting/carrying, transfers, reaching  Patient's clinical presentation is consistent with their referring diagnosis of Other tear of medial meniscus, current injury, right knee, initial encounter and Acute pain of right knee and Primary osteoarthritis of right knee  Plan: Ambulatory referral to Physical Therapy  Patient has been educated in post-op contraindications / precautions, wound care including CP and LE elevation, gait training w/ B/L crutches level surfaces and stairs/curbs, weight bearing status, home exercise program and plan of care  Discussed w/ pt some debridement was done to address OA, but she may not be pain free after she completes therapy due to OA  She understands at some point she may need TKA  Patient would benefit from skilled physical therapy services to address their aforementioned functional limitations and progress towards prior level of function and independence with home exercise program    2020: Pt is progressing well w/ ROM and strength R knee w/ minimal edema  She remains limited, as expected, with tolerance to weight bearing activity   Her c/o R LE numbness/paresthesias are not affected by lumbar mechanical assessment or nerve glides, but do seem to respond to fibular head mobilization today  This numbness reportedly began when she injured her knee  Pt is progressing well and will benefit from continued PT as per original POC to further advance toward return of function and LTG's   7/23/2020: Pt demonstrates improving ROM, balance and strength, but is still having issues w/ knee extension strength and tolerance of terminal knee extension  Her R foot numbness resolved about a week ago  Mild edema remains  Pt will likely benefit from continued PT an additional 4 weeks to maximize progress  Pt is aware OA in her knee remains  8/27/2020: Pt has met most goals  Strength is full except she still has quad strength MMT limited by ERP terminal knee extension, limited tolerance to several flights of stairs or deep squatting, but does realize underlying OA does remain  She is appropriate to D/C to HEP which was updated today  ROM is full  Impairments: activity intolerance, impaired physical strength and pain with function  Functional limitations: continued limited tolerance to several flights of recip stairs and deep squattingUnderstanding of Dx/Px/POC: excellent   Prognosis: good    Goals  Short Term Goals: Target Date 4 weeks (7/1/2020) -  met  1  Initiate and advance HEP - met  2 Improve AROM R knee to be equal to L w/o pain to prepare for reciprocal stairs w/o c/o  - met 1 flight  3  Improve R LE strength to 4+/5 to prepare for functional LTG  - met  4  D/C assistive device for ambulation  - met  5  Pt to report ease w/ all transfers, bed mobility and be able to resume driving  - met    Long Term Goals: Target Date (8/26/2020)  1  Indep with HEP  2  Pt to tolerate reciprocal stairs w/o handrail w/ pain 0-2/10 - met (1-2 flights only)  3  Pt to tolerate prolonged walking on uneven terrain w/ pain 0-3/10  - met  4  Improve balance such that pt can maintain R/L SLS x 30"  - met  5  Pt to be able to RTW  - met   6   Improve FOTO to 57 or better  -  met    Plan  Plan details:     Plan of Care beginning date: 6/3/2020  Plan of Care expiration date: 2020  Treatment plan discussed with: patient        Subjective Evaluation    History of Present Illness  Date of surgery: 2020  Mechanism of injury: 6/3/2020: Pt originally injured her R knee in 2020, and MRI confirmed a meniscus tear, but pt's symptoms improved to minimal discomfort until late May  She was standing in her kitchen, changed direction and felt a snap in her knee  She states she couldn't walk after that  Her primary c/o is R medial knee pain  She also reports tingling in her R foot/heel which started before her surgery  3/24/2020: pt reports her knee is improving  She has some increased soreness today after walking a lot yesterday w/o assistive device including up/down stairs reciprocally  She c/o continued numbness and paresthesias R LE from prox/post calf into her foot (constant)  2020: One month post-op menisectomy/debridement, pt reports some improvement, but is struggling with tolerance to prolonged walking especially on uneven terrain leading to pain/swelling  She continues w/ pain w/ terminal knee extension both in open and closed chain  She has limited tolerance to reciprocal stairs  Pain is also worse at night    Quality of life: fair    Pain  Current pain ratin  At best pain ratin  At worst pain ratin  Location: medial knee  Quality: sharp (stiffness)  Relieving factors: rest and ice  Aggravating factors: standing, walking and stair climbing  Progression: worsening    Social Support  Steps to enter house: yes (1 entrance 4 steps/no rail; 1 entrance w/ steps/rail)    Life stress: 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 - not working now due to injury      Diagnostic Tests  X-ray: abnormal (signficant OA)  MRI studies: abnormal  Treatments  Previous treatment: physical therapy  Current treatment: physical therapy  Patient Goals  Patient goals for therapy: decreased edema, decreased pain, return to work and return to sport/leisure activities  Patient goal: walk w/o crutches w/o limitation (met), resume hiking (not met)        Objective  A/PROM:   R  R  R  R  L      8/27/2020 7/23/2020 6/24/2020 6/3/2020 6/3/2020  Knee flex supprone  125/110 120//103 60/50  120/110  Knee ext Qset/SLR  0/0  0/0  -2/-5  -5/-12  0/0    MMT:    R  R  R  R  L     8/27/2020 7/23/2020 6/24/2020 6/3/2020 6/3/2020  Hip flex   5/5  5/5  5/5  4+/5  4+/5  Hip abd  5/5  4+/5  4+/5  3/5  4+/5  Hip ER   5/5  4+/5  4/5  3/5*  4+/5  Hip IR   5/5  4+/5  4/5  3+/5*  4+/5  Knee ext  4+/5*  4-/5*  4-/5  3-/5*  4+/5  Knee flex  5/5  4+/5  4/5  3/5  5/5      Balance:    7/23/2020 6/24/2020 6/3/2020  Tandem R posterior w/ EO: 30"(+)  30" (+)  NT  Tandem L posterior w/ EO: 30"(+)  30" (+)  NT  SLS R w/ EO:   30"(+)  NT  NT  SLS L w/ EO:   30"(+)  NT  NT    Observation: Pt's wound fully healed, minor diffuse edema throughout R knee  Pt ambulates w/o Assistive device w/ normal gait pattern - no longer flexed knee gait   Joint line R 41 5 cm vs L 41 3 cm 8/27/2020              Precautions: B/L knee OA  SOC: 6/3/2020  DOS: 6/2/2020  POC expiration: 8/26/2020  Daily Treatment Log  Date 8/11/2020  RE/FOTO 8/13/20 8/18/20 8/20/2020 8/24/20 8/27/2020  FOTO/DC   Visit # 22 23 24 25 26 27   Manual         Ktape R pes 5' Applied  Applied  applied Applied  applied   There Exer 35' 40' 40' 25' 30' 25'    Upright bike L2x8'' L2x8''  L2x5'  L2x7' L2x6' L2x7'   SLR flex w/ qset 1# 2x10 1# 2x10  1# 2x10    1# 2x10  1# 2x10   SLR adduction   Abd/add  2x10 each  1# 2x10 each     Prone knee flexion W/ SOS 20"x4 W/ SOS 20"x5 W/ SOS 20"x5  W/ SOS 20"x5 W/ SOS 20"x4  W/ SOS 20"x4   LAQ  3# 2x10  3# 2x10   3# 2x10      Ham stretch    20"x3 20"x3      HR off step 2x10 2x10  2x10   2x10  2x12   bridges 2x10 2x12 W/ abd 2x12  Red 2x12     Supine leg press 50# B/L  2x10 50# B/L  2x10  50# B/L  2x15 55# B/L  2x12 55# B/L  2x15  55# B/L  2x15   STS       2X10   stand gastroc stretch 20"x4 20"x4        Rep knee ext w/ OP sitting 1x10; 2x 1x10; x2  1x10   1x10     HEP      Updated 5'   ROM/MMT 10'     10'   NMRed 5' 5'  10' 10' 20'   TKE w/ ball against wall      5"x10   Feet on pball bridges    5" 2x10 5" 2x10  Bridge w/ abd grn 2x10   diag walk w TB @ knees      20'x4 grn   Sup alt leg lowering   Knees bent  1x10 R/L Knees straight  2x5 R/L     Step ups w/ hi knee 6" 2x10 6" 2x10 R/L    6" 2x10 R/L   8" 2x10 R/L   Elbow plank    5"x10 20"x2  20"x2   WB balance      AP/ML 1' each    Modalities         CP 5' 5' 5' 5' 5' post session  5' post session                     Access Code: [email or web address]   URL: [email or web address]   Date: 08/27/2020   Prepared by:  Christiana Bhardwaj      Exercises  · Bridge with Hip Abduction and Resistance - Ground Touches - 10 reps - 2 sets - 1x daily - 7x weekly  · Standard Plank - 3 reps - 1 sets - 20 hold - 1x daily - 7x weekly  · Supine Active Straight Leg Raise - 10 reps - 2 sets - 1x daily - 7x weekly  · Prone Quadriceps Stretch with Strap - 3 reps - 1 sets - 20 hold - 1x daily - 7x weekly  · Standing Bilateral Heel Raise on Step - 10 reps - 2 sets - 1x daily - 7x weekly  · Standing Terminal Knee Extension at Wall with Ball - 10 reps - 2 sets - 5 hold - 1x daily - 7x weekly  · Side Stepping with Resistance at Feet - 10 reps - 2 sets - 1x daily - 7x weekly

## 2020-09-03 DIAGNOSIS — E03.9 ACQUIRED HYPOTHYROIDISM: ICD-10-CM

## 2020-09-04 DIAGNOSIS — G89.29 CHRONIC PAIN OF BOTH KNEES: ICD-10-CM

## 2020-09-04 DIAGNOSIS — M25.561 CHRONIC PAIN OF BOTH KNEES: ICD-10-CM

## 2020-09-04 DIAGNOSIS — M17.0 BILATERAL PRIMARY OSTEOARTHRITIS OF KNEE: ICD-10-CM

## 2020-09-04 DIAGNOSIS — M25.562 CHRONIC PAIN OF BOTH KNEES: ICD-10-CM

## 2020-09-04 RX ORDER — LEVOTHYROXINE SODIUM 0.03 MG/1
TABLET ORAL
Qty: 90 TABLET | Refills: 1 | Status: SHIPPED | OUTPATIENT
Start: 2020-09-04 | End: 2020-09-10 | Stop reason: SDUPTHER

## 2020-09-10 ENCOUNTER — OFFICE VISIT (OUTPATIENT)
Dept: FAMILY MEDICINE CLINIC | Facility: CLINIC | Age: 60
End: 2020-09-10
Payer: COMMERCIAL

## 2020-09-10 VITALS
SYSTOLIC BLOOD PRESSURE: 138 MMHG | HEART RATE: 66 BPM | RESPIRATION RATE: 18 BRPM | HEIGHT: 62 IN | WEIGHT: 171 LBS | BODY MASS INDEX: 31.47 KG/M2 | TEMPERATURE: 98 F | DIASTOLIC BLOOD PRESSURE: 72 MMHG

## 2020-09-10 DIAGNOSIS — E03.9 ACQUIRED HYPOTHYROIDISM: ICD-10-CM

## 2020-09-10 DIAGNOSIS — E78.1 HYPERTRIGLYCERIDEMIA: ICD-10-CM

## 2020-09-10 DIAGNOSIS — F41.9 ANXIETY: ICD-10-CM

## 2020-09-10 DIAGNOSIS — E78.2 MIXED HYPERLIPIDEMIA: ICD-10-CM

## 2020-09-10 DIAGNOSIS — Z00.00 ANNUAL PHYSICAL EXAM: Primary | ICD-10-CM

## 2020-09-10 DIAGNOSIS — E55.9 VITAMIN D DEFICIENCY: ICD-10-CM

## 2020-09-10 DIAGNOSIS — Z12.11 SCREENING FOR MALIGNANT NEOPLASM OF COLON: ICD-10-CM

## 2020-09-10 PROCEDURE — 3725F SCREEN DEPRESSION PERFORMED: CPT | Performed by: NURSE PRACTITIONER

## 2020-09-10 PROCEDURE — 99396 PREV VISIT EST AGE 40-64: CPT | Performed by: NURSE PRACTITIONER

## 2020-09-10 PROCEDURE — 36415 COLL VENOUS BLD VENIPUNCTURE: CPT | Performed by: NURSE PRACTITIONER

## 2020-09-10 RX ORDER — LEVOTHYROXINE SODIUM 0.03 MG/1
25 TABLET ORAL DAILY
Qty: 90 TABLET | Refills: 1 | Status: SHIPPED | OUTPATIENT
Start: 2020-09-10 | End: 2021-04-27 | Stop reason: SDUPTHER

## 2020-09-10 RX ORDER — CITALOPRAM 10 MG/1
10 TABLET ORAL DAILY
Qty: 90 TABLET | Refills: 1 | Status: SHIPPED | OUTPATIENT
Start: 2020-09-10 | End: 2021-01-11

## 2020-09-10 NOTE — PATIENT INSTRUCTIONS
Heart healthy diet- limit red meat, limit saturated fat, moderate salt intake, limit junk food, etc    Regular exercise  Stress management  Routine labwork and screenings as ordered     Re-schedule colonoscopy as discussed

## 2020-09-10 NOTE — PROGRESS NOTES
[facility] HEALTH MAINTENANCE OFFICE VISIT  [facility] - [facility]    NAME: Jose Mendieta  AGE: 62 y o  SEX: female  : 1960     DATE: 9/10/2020    Assessment and Plan   1  Annual physical exam  Heart healthy diet- limit red meat, limit saturated fat, moderate salt intake, limit junk food, etc    Regular exercise  Stress management  Routine labwork and screenings as ordered  - CBC and differential  - Comprehensive metabolic panel  - Lipid panel  - TSH, 3rd generation  - Vitamin D 25 hydroxy  2  Vitamin D deficiency  - CBC and differential  - Comprehensive metabolic panel  - Vitamin D 25 hydroxy  3  Acquired hypothyroidism  - CBC and differential  - Comprehensive metabolic panel  - TSH, 3rd generation  - levothyroxine 25 mcg tablet; Take 1 tablet (25 mcg total) by mouth daily  Dispense: 90 tablet; Refill: 1  4  Mixed hyperlipidemia  - CBC and differential  - Comprehensive metabolic panel  - Lipid panel  5  Hypertriglyceridemia  - CBC and differential  - Comprehensive metabolic panel  - Lipid panel  6  Anxiety  Stress management  Activities to divert attention when possible  Conscious breathing techniques as discussed  Coping mechanisms and strategies vary from person to person so try to utilize strategies that you think may work for you (such as meditation, music, etc  )  Consider or continue counseling as discussed  Anti anxiety/depression medications as prescribed  - citalopram (CeleXA) 10 mg tablet; Take 1 tablet (10 mg total) by mouth daily  Dispense: 90 tablet; Refill:   7  Screening for malignant neoplasm of colon  - Ambulatory referral to Gastroenterology; Future    Patient was counseled regarding instructions for management which included: impression/diagnosis, risk/benefits of treatment options, importance of compliance with treatment, risk factor reduction, and prognosis     I have reviewed the instructions with the patient answering all questions and patient verbalized understanding  · Patient Counseling:   · Nutrition: Stressed importance of a well balanced diet, moderation of sodium/saturated fat, caloric balance and sufficient intake of fiber  · Exercise: Stressed the importance of regular exercise with a goal of 150 minutes per week  · Dental Health: Discussed daily flossing and brushing and regular dental visits   · Injury Prevention: Discussed Safety Belts, Safety Helmets, and Smoke Detectors    · Immunizations reviewed: Risks and Benefits discussed and Declined recommended vaccinations  · Discussed benefits of:  Colon Cancer Screening, Mammogram  and Screening labs   BMI Counseling: Body mass index is 31 28 kg/m²  Discussed with patient's BMI with her  The BMI is above normal  Nutrition recommendations include reducing portion sizes, decreasing overall calorie intake, moderation in carbohydrate intake, reducing intake of saturated fat and trans fat and reducing intake of cholesterol  Exercise recommendations include exercising 3-5 times per week  Chief Complaint     Chief Complaint   Patient presents with    Annual Exam       History of Present Illness     Here for annual physical   Seeing hematology for treatment of pemphigus in [address] [place] [address],  [address], weekly infusions  Needs med refills  No other specific concerns or issues  Well Adult Physical   Patient here for a comprehensive physical exam       Diet and Physical Activity  Diet: well balanced diet  Exercise: infrequently      Depression Screen  PHQ-9 Depression Screening    PHQ-9:    Frequency of the following problems over the past two weeks:       Little interest or pleasure in doing things:  0 - not at all  Feeling down, depressed, or hopeless:  0 - not at all  PHQ-2 Score:  0        Depression Screening Follow-up Plan: Patient's depression screening was negative with a PHQ-2 score of 0          General Health  Hearing: Normal:  bilateral  Vision: goes for regular eye exams, most recent eye exam <1 year and wears glasses  Dental: regular dental visits  Vision Screening (09/10/2020)   Edited by: Rani Lenz    Right eye  Left eye  Both eyes    Without correction  20/50  20/50  20/30        Reproductive Health  No issues  and Follows with gynecologist      The following portions of the patient's history were reviewed and updated as appropriate: allergies, current medications, past family history, past medical history, past social history, past surgical history and problem list     Review of Systems     Review of Systems   Constitutional: Negative for chills, diaphoresis, fatigue and fever  HENT: Negative for congestion, ear pain, sinus pressure, sinus pain and sore throat  Eyes: Negative for visual disturbance  Respiratory: Negative for cough, chest tightness, shortness of breath and wheezing  Cardiovascular: Negative for chest pain, palpitations and leg swelling  Gastrointestinal: Negative for abdominal distention, abdominal pain, diarrhea and nausea  Endocrine: Negative for cold intolerance, heat intolerance, polydipsia, polyphagia and polyuria  Genitourinary: Negative for dysuria, frequency and urgency  Musculoskeletal: Negative for arthralgias, back pain and myalgias  Skin: Negative for color change and pallor  Allergic/Immunologic: Positive for immunocompromised state  Neurological: Negative for dizziness, weakness and headaches  Hematological: Negative for adenopathy  Psychiatric/Behavioral: Negative for agitation  The patient is not nervous/anxious  All other systems reviewed and are negative        Past Medical History     Past Medical History:   Diagnosis Date    Arthritis     Asthma     Hypothyroidism     hypo    Pemphigus vulgaris     Thalassemia        Past Surgical History     Past Surgical History:   Procedure Laterality Date    BREAST BIOPSY Right     benign    COLONOSCOPY      ND KNEE SCOPE,MED/LAT MENISECTOMY Right 2020    Procedure: MENISCECTOMY MEDIAL;  Surgeon: Radha Santacruz DO;  Location: [facility];  Service: Orthopedics       Social History     Social History     Socioeconomic History    Marital status: Single     Spouse name: None    Number of children: None    Years of education: None    Highest education level: None   Occupational History    None   Social Needs    Financial resource strain: None    Food insecurity     Worry: None     Inability: None    Transportation needs     Medical: None     Non-medical: None   Tobacco Use    Smoking status: Former Smoker     Years: 10 00     Last attempt to quit:      Years since quittin 7    Smokeless tobacco: Never Used   Substance and Sexual Activity    Alcohol use:  Yes     Alcohol/week: 1 0 standard drinks     Types: 1 Glasses of wine per week     Frequency: 2-4 times a month     Drinks per session: 1 or 2     Binge frequency: Less than monthly     Comment: socially     Drug use: Never    Sexual activity: Yes     Partners: Male   Lifestyle    Physical activity     Days per week: None     Minutes per session: None    Stress: None   Relationships    Social connections     Talks on phone: None     Gets together: None     Attends Confucianism service: None     Active member of club or organization: None     Attends meetings of clubs or organizations: None     Relationship status: None    Intimate partner violence     Fear of current or ex partner: None     Emotionally abused: None     Physically abused: None     Forced sexual activity: None   Other Topics Concern    None   Social History Narrative    None       Family History     Family History   Problem Relation Age of Onset    Asthma Father     Hypertension Maternal Grandmother     Liver cancer Maternal Grandmother     Diabetes Maternal Grandmother     Cancer Family         multiple relatives    Seizures Sister     Asthma Brother     Hypertension Brother     Leukemia Maternal Uncle     Heart attack Cousin     Bone cancer Maternal Uncle     Substance Abuse Neg Hx     Mental illness Neg Hx        Current Medications       Current Outpatient Medications:     albuterol (PROVENTIL HFA,VENTOLIN HFA) 90 mcg/act inhaler, Inhale 2 puffs every 6 (six) hours as needed for wheezing, Disp: 1 Inhaler, Rfl: 3    ALPRAZolam (XANAX) 0 25 mg tablet, Take 1 tablet (0 25 mg total) by mouth daily at bedtime as needed for anxiety, Disp: 30 tablet, Rfl: 1    citalopram (CeleXA) 10 mg tablet, Take 1 tablet (10 mg total) by mouth daily, Disp: 90 tablet, Rfl: 1    ergocalciferol (VITAMIN D2) 50,000 units, Take 1 capsule (50,000 Units total) by mouth every 28 days, Disp: 3 capsule, Rfl: 3    fluticasone (FLONASE) 50 mcg/act nasal spray, instill 2 sprays into each nostril once daily, Disp: 16 g, Rfl: 3    levothyroxine 25 mcg tablet, Take 1 tablet (25 mcg total) by mouth daily, Disp: 90 tablet, Rfl: 1     Allergies     Allergies   Allergen Reactions    Iodine     Levaquin [Levofloxacin]     Shellfish-Derived Products        Objective     /72   Pulse 66   Temp 98 °F (36 7 °C) (Temporal)   Resp 18   Ht 5' 2" (1 575 m)   Wt 77 6 kg (171 lb)   BMI 31 28 kg/m²      Physical Exam  Constitutional:       General: She is not in acute distress  Appearance: She is well-developed  She is not ill-appearing  HENT:      Head: Normocephalic and atraumatic  Right Ear: Tympanic membrane, ear canal and external ear normal       Left Ear: Tympanic membrane, ear canal and external ear normal    Eyes:      General: No scleral icterus  Pupils: Pupils are equal, round, and reactive to light  Neck:      Musculoskeletal: Normal range of motion and neck supple  Thyroid: No thyromegaly  Cardiovascular:      Rate and Rhythm: Normal rate and regular rhythm  Heart sounds: Normal heart sounds  No murmur  Pulmonary:      Effort: Pulmonary effort is normal  No respiratory distress        Breath sounds: Normal breath sounds  No wheezing or rales  Abdominal:      General: Bowel sounds are normal  There is no distension  Palpations: Abdomen is soft  Tenderness: There is no abdominal tenderness  Musculoskeletal: Normal range of motion  Right lower leg: No edema  Left lower leg: No edema  Lymphadenopathy:      Cervical: No cervical adenopathy  Skin:     General: Skin is warm and dry  Neurological:      Mental Status: She is alert and oriented to person, place, and time  Cranial Nerves: No cranial nerve deficit  Sensory: No sensory deficit  Motor: No weakness  Coordination: Coordination normal       Gait: Gait normal    Psychiatric:         Mood and Affect: Mood normal          Behavior: Behavior normal          Thought Content:  Thought content normal          Judgment: Judgment normal             Visual Acuity Screening    Right eye Left eye Both eyes   Without correction: 20/50 20/50 20/30   With correction:              Neida Hampton 83

## 2020-09-11 ENCOUNTER — OFFICE VISIT (OUTPATIENT)
Dept: OBGYN CLINIC | Facility: CLINIC | Age: 60
End: 2020-09-11
Payer: COMMERCIAL

## 2020-09-11 VITALS
HEIGHT: 62 IN | SYSTOLIC BLOOD PRESSURE: 136 MMHG | DIASTOLIC BLOOD PRESSURE: 84 MMHG | WEIGHT: 171 LBS | HEART RATE: 60 BPM | TEMPERATURE: 97.7 F | BODY MASS INDEX: 31.47 KG/M2

## 2020-09-11 DIAGNOSIS — M25.561 CHRONIC PAIN OF BOTH KNEES: ICD-10-CM

## 2020-09-11 DIAGNOSIS — M17.0 BILATERAL PRIMARY OSTEOARTHRITIS OF KNEE: Primary | ICD-10-CM

## 2020-09-11 DIAGNOSIS — G89.29 CHRONIC PAIN OF BOTH KNEES: ICD-10-CM

## 2020-09-11 DIAGNOSIS — M25.562 CHRONIC PAIN OF BOTH KNEES: ICD-10-CM

## 2020-09-11 DIAGNOSIS — E55.9 VITAMIN D DEFICIENCY: ICD-10-CM

## 2020-09-11 LAB
25(OH)D3+25(OH)D2 SERPL-MCNC: 21.2 NG/ML (ref 30–100)
ALBUMIN SERPL-MCNC: 4.4 G/DL (ref 3.8–4.9)
ALBUMIN/GLOB SERPL: 1.6 {RATIO} (ref 1.2–2.2)
ALP SERPL-CCNC: 61 IU/L (ref 39–117)
ALT SERPL-CCNC: 72 IU/L (ref 0–32)
AST SERPL-CCNC: 24 IU/L (ref 0–40)
BASOPHILS # BLD AUTO: 0.1 X10E3/UL (ref 0–0.2)
BASOPHILS NFR BLD AUTO: 1 %
BILIRUB SERPL-MCNC: <0.2 MG/DL (ref 0–1.2)
BUN SERPL-MCNC: 15 MG/DL (ref 6–24)
BUN/CREAT SERPL: 18 (ref 9–23)
CALCIUM SERPL-MCNC: 9.6 MG/DL (ref 8.7–10.2)
CHLORIDE SERPL-SCNC: 101 MMOL/L (ref 96–106)
CHOLEST SERPL-MCNC: 228 MG/DL (ref 100–199)
CHOLEST/HDLC SERPL: 4.9 RATIO (ref 0–4.4)
CO2 SERPL-SCNC: 23 MMOL/L (ref 20–29)
CREAT SERPL-MCNC: 0.85 MG/DL (ref 0.57–1)
EOSINOPHIL # BLD AUTO: 0.2 X10E3/UL (ref 0–0.4)
EOSINOPHIL NFR BLD AUTO: 2 %
ERYTHROCYTE [DISTWIDTH] IN BLOOD BY AUTOMATED COUNT: 15 % (ref 11.7–15.4)
GLOBULIN SER-MCNC: 2.8 G/DL (ref 1.5–4.5)
GLUCOSE SERPL-MCNC: 87 MG/DL (ref 65–99)
HCT VFR BLD AUTO: 40.2 % (ref 34–46.6)
HDLC SERPL-MCNC: 47 MG/DL
HGB BLD-MCNC: 13.2 G/DL (ref 11.1–15.9)
IMM GRANULOCYTES # BLD: 0 X10E3/UL (ref 0–0.1)
IMM GRANULOCYTES NFR BLD: 0 %
LDLC SERPL CALC-MCNC: 129 MG/DL (ref 0–99)
LDLC SERPL DIRECT ASSAY-MCNC: 127 MG/DL (ref 0–99)
LYMPHOCYTES # BLD AUTO: 2.6 X10E3/UL (ref 0.7–3.1)
LYMPHOCYTES NFR BLD AUTO: 36 %
MCH RBC QN AUTO: 25 PG (ref 26.6–33)
MCHC RBC AUTO-ENTMCNC: 32.8 G/DL (ref 31.5–35.7)
MCV RBC AUTO: 76 FL (ref 79–97)
MONOCYTES # BLD AUTO: 0.5 X10E3/UL (ref 0.1–0.9)
MONOCYTES NFR BLD AUTO: 7 %
NEUTROPHILS # BLD AUTO: 3.7 X10E3/UL (ref 1.4–7)
NEUTROPHILS NFR BLD AUTO: 54 %
PLATELET # BLD AUTO: 229 X10E3/UL (ref 150–450)
POTASSIUM SERPL-SCNC: 3.9 MMOL/L (ref 3.5–5.2)
PROT SERPL-MCNC: 7.2 G/DL (ref 6–8.5)
RBC # BLD AUTO: 5.27 X10E6/UL (ref 3.77–5.28)
SL AMB EGFR AFRICAN AMERICAN: 87 ML/MIN/1.73
SL AMB EGFR NON AFRICAN AMERICAN: 75 ML/MIN/1.73
SL AMB VLDL CHOLESTEROL CALC: 52 MG/DL (ref 5–40)
SODIUM SERPL-SCNC: 140 MMOL/L (ref 134–144)
TRIGL SERPL-MCNC: 294 MG/DL (ref 0–149)
TSH SERPL DL<=0.005 MIU/L-ACNC: 4.24 UIU/ML (ref 0.45–4.5)
WBC # BLD AUTO: 7 X10E3/UL (ref 3.4–10.8)

## 2020-09-11 PROCEDURE — 99213 OFFICE O/P EST LOW 20 MIN: CPT | Performed by: ORTHOPAEDIC SURGERY

## 2020-09-11 PROCEDURE — 1036F TOBACCO NON-USER: CPT | Performed by: ORTHOPAEDIC SURGERY

## 2020-09-11 RX ORDER — ERGOCALCIFEROL 1.25 MG/1
50000 CAPSULE ORAL WEEKLY
Qty: 12 CAPSULE | Refills: 1 | Status: SHIPPED | OUTPATIENT
Start: 2020-09-11 | End: 2021-04-27 | Stop reason: SDUPTHER

## 2020-09-11 NOTE — PROGRESS NOTES
Assessment/Plan:  1  Bilateral primary osteoarthritis of knee  Injection procedure prior authorization   2  Chronic pain of both knees  Injection procedure prior authorization     Danna Bonilla is a pleasant 52-year-old female very well known to our practice for her activity related bilateral knee pain  She did well after right knee arthroscopy partial meniscectomy, but she has noted a recurrence of her bilateral knee discomfort, primarily of the medial joint line  She has already failed other forms of conservative treatment, and has done very well with viscosupplementation in the past   Therefore, we would like to submit for bilateral knee viscosupplementation authorization  We will bring her back for the injections once they are approved  She expressed understanding all of her questions were addressed    Subjective:  Bilateral knee follow-up    Patient ID: Teri Reddy is a 62 y o  female  Danna Bonilla is a pleasant 52-year-old female well known to our practice for her activity related bilateral knee pain  She underwent a right knee arthroscopy approximately 3 months ago and did very well in her recovery  However, she continues to have an achy pain the in both knees, especially along the medial and anterior aspects of the knees which is worse with activity  She denies any locking or feelings of mechanical symptoms  She has done well with physical therapy postoperatively, however her achy discomfort persists  Review of Systems   Constitutional: Negative  HENT: Negative  Eyes: Negative  Respiratory: Negative  Cardiovascular: Negative  Gastrointestinal: Negative  Endocrine: Negative  Genitourinary: Negative  Musculoskeletal: Positive for arthralgias, joint swelling and myalgias  Skin: Negative  Allergic/Immunologic: Negative  Neurological: Negative  Hematological: Negative  Psychiatric/Behavioral: Negative          Past Medical History:   Diagnosis Date    Arthritis     Asthma     Hypothyroidism     hypo    Pemphigus vulgaris     Thalassemia        Past Surgical History:   Procedure Laterality Date    BREAST BIOPSY Right     benign    COLONOSCOPY      [place] SCOPE,MED/LAT MENISECTOMY Right 2020    Procedure: MENISCECTOMY MEDIAL;  Surgeon: Lucie Zheng DO;  Location: [facility];  Service: Orthopedics       Family History   Problem Relation Age of Onset    Asthma Father     Hypertension Maternal Grandmother     Liver cancer Maternal Grandmother     Diabetes Maternal Grandmother     Cancer Family         multiple relatives    Seizures Sister     Asthma Brother     Hypertension Brother     Leukemia Maternal Uncle     Heart attack Cousin     Bone cancer Maternal Uncle     Substance Abuse Neg Hx     Mental illness Neg Hx        Social History     Occupational History    Not on file   Tobacco Use    Smoking status: Former Smoker     Years: 10 00     Last attempt to quit:      Years since quittin 7    Smokeless tobacco: Never Used   Substance and Sexual Activity    Alcohol use:  Yes     Alcohol/week: 1 0 standard drinks     Types: 1 Glasses of wine per week     Frequency: 2-4 times a month     Drinks per session: 1 or 2     Binge frequency: Less than monthly     Comment: socially     Drug use: Never    Sexual activity: Yes     Partners: Male         Current Outpatient Medications:     albuterol (PROVENTIL HFA,VENTOLIN HFA) 90 mcg/act inhaler, Inhale 2 puffs every 6 (six) hours as needed for wheezing, Disp: 1 Inhaler, Rfl: 3    ALPRAZolam (XANAX) 0 25 mg tablet, Take 1 tablet (0 25 mg total) by mouth daily at bedtime as needed for anxiety, Disp: 30 tablet, Rfl: 1    citalopram (CeleXA) 10 mg tablet, Take 1 tablet (10 mg total) by mouth daily, Disp: 90 tablet, Rfl: 1    ergocalciferol (VITAMIN D2) 50,000 units, Take 1 capsule (50,000 Units total) by mouth once a week, Disp: 12 capsule, Rfl: 1    fluticasone (FLONASE) 50 mcg/act nasal spray, instill 2 sprays into each nostril once daily, Disp: 16 g, Rfl: 3    levothyroxine 25 mcg tablet, Take 1 tablet (25 mcg total) by mouth daily, Disp: 90 tablet, Rfl: 1    Allergies   Allergen Reactions    Iodine     Levaquin [Levofloxacin]     Shellfish-Derived Products        Objective:  Vitals:    09/11/20 0944   BP: 136/84   Pulse: 60   Temp: 97 7 °F (36 5 °C)       Body mass index is 31 28 kg/m²  Right Knee Exam     Muscle Strength   The patient has normal right knee strength  Tenderness   The patient is experiencing tenderness in the medial joint line and patella  Range of Motion   Extension:  0 normal   Flexion:  130 normal     Tests   Davida:  Medial - negative Lateral - negative  Varus: negative Valgus: negative  Drawer:  Anterior - negative    Posterior - negative  Patellar apprehension: negative    Other   Erythema: absent  Scars: present (Well-healed arthroscopic incision portals)  Sensation: normal  Pulse: present  Swelling: none  Effusion: no effusion present    Comments:  Portal sites are well healed  No crepitus with active or passive ROM  Patella tracks mid line and flat  Discomfort medial joint line and MCL      Left Knee Exam     Muscle Strength   The patient has normal left knee strength  Tenderness   The patient is experiencing tenderness in the patella and medial joint line  Range of Motion   Extension:  0 normal   Flexion:  130 normal     Tests   Davida:  Medial - negative Lateral - negative  Varus: negative Valgus: negative  Drawer:  Anterior - negative     Posterior - negative  Patellar apprehension: negative    Other   Erythema: absent  Scars: absent  Sensation: normal  Pulse: present  Swelling: none  Effusion: no effusion present    Comments:  Negative patellofemoral grind  Knee is stable ligamentous exam  No erythema effusion or warmth  Medial joint line MCL discomfort          Observations   Left Knee   Negative for effusion       Right Knee   Negative for effusion  Physical Exam  Vitals signs and nursing note reviewed  Constitutional:       Appearance: She is well-developed  Comments: Body mass index is 31 28 kg/m²  HENT:      Head: Normocephalic and atraumatic  Neck:      Musculoskeletal: Normal range of motion  Pulmonary:      Effort: Pulmonary effort is normal    Musculoskeletal:      Right knee: She exhibits no effusion  Left knee: She exhibits no effusion  Comments: See ortho exam   Skin:     General: Skin is warm and dry  Neurological:      Mental Status: She is alert and oriented to person, place, and time  Psychiatric:         Behavior: Behavior normal          Thought Content:  Thought content normal          Judgment: Judgment normal

## 2020-09-28 ENCOUNTER — TELEPHONE (OUTPATIENT)
Dept: OBGYN CLINIC | Facility: HOSPITAL | Age: 60
End: 2020-09-28

## 2020-09-28 NOTE — TELEPHONE ENCOUNTER
Patient is calling because she is having a lot of pain & discomfort on her rt knee  Patient is stating that when she saw Dr Stew Mcnair she did not have this pain  Patient went for her first chemo treatment & she was injected with a steroid & she felt great but then her knee swelled up  Patient stopped the physical therapy  Patient would like to talk to Dr Stew Mcnair about her pain     [phone]

## 2020-11-05 ENCOUNTER — TELEPHONE (OUTPATIENT)
Dept: OBGYN CLINIC | Facility: CLINIC | Age: 60
End: 2020-11-05

## 2020-11-23 ENCOUNTER — TELEPHONE (OUTPATIENT)
Dept: RHEUMATOLOGY | Facility: CLINIC | Age: 60
End: 2020-11-23

## 2020-12-05 DIAGNOSIS — J45.20 MILD INTERMITTENT ASTHMA WITHOUT COMPLICATION: ICD-10-CM

## 2020-12-07 ENCOUNTER — OFFICE VISIT (OUTPATIENT)
Dept: CARDIOLOGY CLINIC | Facility: CLINIC | Age: 60
End: 2020-12-07
Payer: COMMERCIAL

## 2020-12-07 VITALS
TEMPERATURE: 100 F | HEIGHT: 62 IN | WEIGHT: 172 LBS | BODY MASS INDEX: 31.65 KG/M2 | SYSTOLIC BLOOD PRESSURE: 148 MMHG | DIASTOLIC BLOOD PRESSURE: 84 MMHG | HEART RATE: 77 BPM | OXYGEN SATURATION: 98 %

## 2020-12-07 DIAGNOSIS — E78.2 MIXED HYPERLIPIDEMIA: Primary | ICD-10-CM

## 2020-12-07 DIAGNOSIS — I77.9 BILATERAL CAROTID ARTERY DISEASE, UNSPECIFIED TYPE (HCC): ICD-10-CM

## 2020-12-07 DIAGNOSIS — E78.1 HYPERTRIGLYCERIDEMIA: ICD-10-CM

## 2020-12-07 PROCEDURE — 93000 ELECTROCARDIOGRAM COMPLETE: CPT | Performed by: INTERNAL MEDICINE

## 2020-12-07 PROCEDURE — 99214 OFFICE O/P EST MOD 30 MIN: CPT | Performed by: INTERNAL MEDICINE

## 2020-12-07 PROCEDURE — 3008F BODY MASS INDEX DOCD: CPT | Performed by: INTERNAL MEDICINE

## 2020-12-07 PROCEDURE — 1036F TOBACCO NON-USER: CPT | Performed by: INTERNAL MEDICINE

## 2020-12-07 RX ORDER — ALBUTEROL SULFATE 90 UG/1
AEROSOL, METERED RESPIRATORY (INHALATION)
Qty: 8.5 G | Refills: 3 | Status: SHIPPED | OUTPATIENT
Start: 2020-12-07 | End: 2021-03-11

## 2020-12-07 RX ORDER — CHLORAL HYDRATE 500 MG
CAPSULE ORAL
Qty: 360 CAPSULE | Refills: 3
Start: 2020-12-07 | End: 2022-03-04

## 2020-12-07 RX ORDER — ASPIRIN 81 MG/1
81 TABLET ORAL DAILY
Qty: 90 TABLET | Refills: 3 | Status: SHIPPED | OUTPATIENT
Start: 2020-12-07 | End: 2022-03-15 | Stop reason: ALTCHOICE

## 2020-12-07 RX ORDER — FENOFIBRATE 48 MG/1
48 TABLET, COATED ORAL DAILY
Qty: 30 TABLET | Refills: 6 | Status: SHIPPED | OUTPATIENT
Start: 2020-12-07 | End: 2021-02-05 | Stop reason: DRUGHIGH

## 2020-12-08 ENCOUNTER — TELEPHONE (OUTPATIENT)
Dept: CARDIOLOGY CLINIC | Facility: CLINIC | Age: 60
End: 2020-12-08

## 2020-12-08 DIAGNOSIS — E78.1 HYPERTRIGLYCERIDEMIA: Primary | ICD-10-CM

## 2020-12-10 RX ORDER — FENOFIBRATE 54 MG/1
54 TABLET ORAL DAILY
Qty: 90 TABLET | Refills: 1 | Status: SHIPPED | OUTPATIENT
Start: 2020-12-10 | End: 2021-04-26

## 2020-12-15 PROBLEM — Z87.09 HISTORY OF UPPER RESPIRATORY INFECTION: Status: RESOLVED | Noted: 2017-11-08 | Resolved: 2020-12-15

## 2020-12-17 ENCOUNTER — TELEPHONE (OUTPATIENT)
Dept: FAMILY MEDICINE CLINIC | Facility: CLINIC | Age: 60
End: 2020-12-17

## 2021-01-11 DIAGNOSIS — F41.9 ANXIETY: ICD-10-CM

## 2021-01-11 RX ORDER — CITALOPRAM 10 MG/1
TABLET ORAL
Qty: 90 TABLET | Refills: 1 | Status: SHIPPED | OUTPATIENT
Start: 2021-01-11 | End: 2021-04-27 | Stop reason: SDUPTHER

## 2021-01-28 LAB — HCV AB SER-ACNC: <0.1

## 2021-01-29 ENCOUNTER — OFFICE VISIT (OUTPATIENT)
Dept: OBGYN CLINIC | Facility: CLINIC | Age: 61
End: 2021-01-29
Payer: COMMERCIAL

## 2021-01-29 VITALS — BODY MASS INDEX: 32.02 KG/M2 | WEIGHT: 174 LBS | HEIGHT: 62 IN

## 2021-01-29 DIAGNOSIS — M25.562 CHRONIC PAIN OF BOTH KNEES: ICD-10-CM

## 2021-01-29 DIAGNOSIS — G89.29 CHRONIC PAIN OF BOTH KNEES: ICD-10-CM

## 2021-01-29 DIAGNOSIS — M25.561 CHRONIC PAIN OF BOTH KNEES: ICD-10-CM

## 2021-01-29 DIAGNOSIS — M17.0 BILATERAL PRIMARY OSTEOARTHRITIS OF KNEE: Primary | ICD-10-CM

## 2021-01-29 PROCEDURE — 20610 DRAIN/INJ JOINT/BURSA W/O US: CPT | Performed by: ORTHOPAEDIC SURGERY

## 2021-01-29 PROCEDURE — 3008F BODY MASS INDEX DOCD: CPT | Performed by: INTERNAL MEDICINE

## 2021-01-29 RX ORDER — HYALURONATE SODIUM 10 MG/ML
20 SYRINGE (ML) INTRAARTICULAR
Status: COMPLETED | OUTPATIENT
Start: 2021-01-29 | End: 2021-01-29

## 2021-01-29 RX ADMIN — Medication 20 MG: at 10:58

## 2021-01-29 NOTE — PROGRESS NOTES
Assessment/Plan:  1  Bilateral primary osteoarthritis of knee  Large joint arthrocentesis: bilateral knee   2  Chronic pain of both knees  Large joint arthrocentesis: bilateral knee     Scribe Attestation    I,:  Maeve Stover am acting as a scribe while in the presence of the attending physician :       I,:  Pawel Deras DO personally performed the services described in this documentation    as scribed in my presence :         Dale Palmer is a 27-year-old female well known to the practice who returns today for administration of the 1st Euflexxa injection in the series for her bilateral knees  After discussing the risks and benefits, she did elect to proceed  The injections were administered to her knees bilaterally and were well tolerated by the patient  Post injection instructions were provided  We will see her back in one week for administration of the 2nd injections in the series  Large joint arthrocentesis: bilateral knee  Universal Protocol:  Consent: Verbal consent obtained  Risks and benefits: risks, benefits and alternatives were discussed  Consent given by: patient  Patient understanding: patient states understanding of the procedure being performed  Site marked: the operative site was marked  Patient identity confirmed: verbally with patient    Supporting Documentation  Indications: pain   Procedure Details  Location: knee - bilateral knee  Preparation: Patient was prepped and draped in the usual sterile fashion  Needle size: 20 G  Ultrasound guidance: no  Approach: anterolateral    Medications (Right): 20 mg Sodium Hyaluronate 20 MG/2MLSpecialty Pharmacy Supplied (Right): for right side  Medications (Left): 20 mg Sodium Hyaluronate 20 MG/2ML   Specialty Pharmacy Supplied (Left): for left side  Patient tolerance: patient tolerated the procedure well with no immediate complications  Dressing:  Sterile dressing applied          Subjective:  Bilateral knee Euflexxa 1       Patient ID: Sirisha Federico yeboah a 62 y o  female who returns today for administration of the 1st Euflexxa injection in her series for her bilateral knees  These have been beneficial for her in the past   She continues to experience severe activity related pain diffusely about the knees  She denies any new injury or trauma  Review of Systems   Constitutional: Positive for activity change  HENT: Negative  Eyes: Negative  Respiratory: Negative  Cardiovascular: Negative  Gastrointestinal: Negative  Endocrine: Negative  Musculoskeletal: Positive for arthralgias  Skin: Negative  Neurological: Negative  Psychiatric/Behavioral: Negative  Past Medical History:   Diagnosis Date    Arthritis     Asthma     Hypothyroidism     hypo    Pemphigus vulgaris     Thalassemia        Past Surgical History:   Procedure Laterality Date    BREAST BIOPSY Right     benign    COLONOSCOPY      [place] KNEE SCOPE,MED/LAT MENISECTOMY Right 2020    Procedure: MENISCECTOMY MEDIAL;  Surgeon: Jonnathan Clemente DO;  Location: [facility];  Service: Orthopedics       Family History   Problem Relation Age of Onset    Asthma Father     Hypertension Maternal Grandmother     Liver cancer Maternal Grandmother     Diabetes Maternal Grandmother     Cancer Family         multiple relatives    Seizures Sister     Asthma Brother     Hypertension Brother     Leukemia Maternal Uncle     Heart attack Cousin     Bone cancer Maternal Uncle     Substance Abuse Neg Hx     Mental illness Neg Hx        Social History     Occupational History    Not on file   Tobacco Use    Smoking status: Former Smoker     Years: 10 00     Quit date:      Years since quittin 0    Smokeless tobacco: Never Used   Substance and Sexual Activity    Alcohol use:  Yes     Alcohol/week: 1 0 standard drinks     Types: 1 Glasses of wine per week     Frequency: 2-4 times a month     Drinks per session: 1 or 2     Binge frequency: Less than monthly     Comment: socially     Drug use: Never    Sexual activity: Yes     Partners: Male         Current Outpatient Medications:     albuterol (PROVENTIL HFA,VENTOLIN HFA) 90 mcg/act inhaler, inhale 2 puffs by mouth and INTO THE LUNGS every 6 hours if needed for wheezing, Disp: 8 5 g, Rfl: 3    ALPRAZolam (XANAX) 0 25 mg tablet, Take 1 tablet (0 25 mg total) by mouth daily at bedtime as needed for anxiety, Disp: 30 tablet, Rfl: 1    aspirin (ECOTRIN LOW STRENGTH) 81 mg EC tablet, Take 1 tablet (81 mg total) by mouth daily, Disp: 90 tablet, Rfl: 3    citalopram (CeleXA) 10 mg tablet, take 1 tablet by mouth once daily, Disp: 90 tablet, Rfl: 1    ergocalciferol (VITAMIN D2) 50,000 units, Take 1 capsule (50,000 Units total) by mouth once a week, Disp: 12 capsule, Rfl: 1    fenofibrate (TRICOR) 48 mg tablet, Take 1 tablet (48 mg total) by mouth daily, Disp: 30 tablet, Rfl: 6    fenofibrate (TRICOR) 54 MG tablet, Take 1 tablet (54 mg total) by mouth daily, Disp: 90 tablet, Rfl: 1    fluticasone (FLONASE) 50 mcg/act nasal spray, instill 2 sprays into each nostril once daily, Disp: 16 g, Rfl: 3    levothyroxine 25 mcg tablet, Take 1 tablet (25 mcg total) by mouth daily, Disp: 90 tablet, Rfl: 1    Omega-3 Fatty Acids (fish oil) 1,000 mg, Take two tablet twice a day, Disp: 360 capsule, Rfl: 3    Allergies   Allergen Reactions    Iodine     Levaquin [Levofloxacin]     Shellfish-Derived Products        Objective: There were no vitals filed for this visit  Body mass index is 31 83 kg/m²  Right Knee Exam     Muscle Strength   The patient has normal right knee strength  Tenderness   The patient is experiencing tenderness in the medial joint line and patella      Range of Motion   Extension:  0 normal   Flexion:  130 normal     Tests   Davida:  Medial - negative Lateral - negative  Varus: negative Valgus: negative  Drawer:  Anterior - negative    Posterior - negative  Patellar apprehension: negative    Other   Erythema: absent  Scars: present (Well-healed arthroscopic incision portals)  Sensation: normal  Pulse: present  Swelling: none  Effusion: no effusion present    Comments:  Portal sites are well healed  No crepitus with active or passive ROM  Patella tracks mid line and flat  Discomfort medial joint line and MCL      Left Knee Exam     Muscle Strength   The patient has normal left knee strength  Tenderness   The patient is experiencing tenderness in the patella and medial joint line  Range of Motion   Extension:  0 normal   Flexion:  130 normal     Tests   Davida:  Medial - negative Lateral - negative  Varus: negative Valgus: negative  Drawer:  Anterior - negative     Posterior - negative  Patellar apprehension: negative    Other   Erythema: absent  Scars: absent  Sensation: normal  Pulse: present  Swelling: none  Effusion: no effusion present    Comments:  Negative patellofemoral grind  Knee is stable ligamentous exam  No erythema effusion or warmth  Medial joint line MCL discomfort          Observations   Left Knee   Negative for effusion  Right Knee   Negative for effusion  Physical Exam  Vitals signs and nursing note reviewed  Musculoskeletal:      Right knee: She exhibits no effusion  Left knee: She exhibits no effusion

## 2021-02-04 ENCOUNTER — OFFICE VISIT (OUTPATIENT)
Dept: OBGYN CLINIC | Facility: CLINIC | Age: 61
End: 2021-02-04
Payer: COMMERCIAL

## 2021-02-04 VITALS — BODY MASS INDEX: 32.02 KG/M2 | HEIGHT: 62 IN | WEIGHT: 174 LBS

## 2021-02-04 DIAGNOSIS — M17.0 BILATERAL PRIMARY OSTEOARTHRITIS OF KNEE: Primary | ICD-10-CM

## 2021-02-04 PROCEDURE — 3008F BODY MASS INDEX DOCD: CPT | Performed by: NURSE PRACTITIONER

## 2021-02-04 PROCEDURE — 20610 DRAIN/INJ JOINT/BURSA W/O US: CPT | Performed by: ORTHOPAEDIC SURGERY

## 2021-02-04 RX ORDER — HYALURONATE SODIUM 10 MG/ML
20 SYRINGE (ML) INTRAARTICULAR
Status: COMPLETED | OUTPATIENT
Start: 2021-02-04 | End: 2021-02-04

## 2021-02-04 RX ADMIN — Medication 20 MG: at 08:27

## 2021-02-04 NOTE — PROGRESS NOTES
Assessment/Plan:  1  Bilateral primary osteoarthritis of knee  Large joint arthrocentesis: R knee    Large joint arthrocentesis: L knee     Scribe Attestation    I,:  Josette Goldmann am acting as a scribe while in the presence of the attending physician :       I,:  Scott Cool DO personally performed the services described in this documentation    as scribed in my presence :         Ortiz Cortez is a 46-year-old female who presents to the office today for #2 of 3 Euflexxa injections for her bilateral knees  She did consent to and underwent these injections to her knees bilaterally and tolerated the procedure well without difficulty or complications  Post injection instructions were provided  She will follow up in 1 week for the 3rd injections in the series  Large joint arthrocentesis: R knee  Universal Protocol:  Consent: Verbal consent obtained  Risks and benefits: risks, benefits and alternatives were discussed  Consent given by: patient  Time out: Immediately prior to procedure a "time out" was called to verify the correct patient, procedure, equipment, support staff and site/side marked as required  Timeout called at: 2/4/2021 8:26 AM   Site marked: the operative site was marked  Patient identity confirmed: verbally with patient    Supporting Documentation  Indications: pain   Procedure Details  Location: knee - R knee  Preparation: Patient was prepped and draped in the usual sterile fashion  Needle size: 22 G  Ultrasound guidance: no  Approach: anterolateral  Medications administered: 20 mg Sodium Hyaluronate 20 MG/2ML  Specialty Pharmacy Supplied: received medications from pharmacy  Patient tolerance: patient tolerated the procedure well with no immediate complications  Dressing:  Sterile dressing applied    Large joint arthrocentesis: L knee  Universal Protocol:  Consent: Verbal consent obtained    Risks and benefits: risks, benefits and alternatives were discussed  Consent given by: patient  Time out: Immediately prior to procedure a "time out" was called to verify the correct patient, procedure, equipment, support staff and site/side marked as required  Timeout called at: 2/4/2021 8:27 AM   Site marked: the operative site was marked  Patient identity confirmed: verbally with patient    Supporting Documentation  Indications: pain   Procedure Details  Location: knee - L knee  Preparation: Patient was prepped and draped in the usual sterile fashion  Needle size: 22 G  Ultrasound guidance: no  Approach: anterolateral  Medications administered: 20 mg Sodium Hyaluronate 20 MG/2ML  Specialty Pharmacy Supplied: received medications from pharmacy  Patient tolerance: patient tolerated the procedure well with no immediate complications  Dressing:  Sterile dressing applied          Subjective: Bilateral knee #2 Euflexxa  Patient ID: Josephine Garg is a 62 y o  female who presents to the office today for #2 of 3 Euflexxa injections for her bilateral knees  She has tolerated the series well thus far  Review of Systems   Constitutional: Positive for activity change  Negative for chills, fever and unexpected weight change  HENT: Negative for hearing loss, nosebleeds and sore throat  Eyes: Negative for pain, redness and visual disturbance  Respiratory: Negative for cough, shortness of breath and wheezing  Cardiovascular: Negative for chest pain, palpitations and leg swelling  Gastrointestinal: Negative for abdominal pain, nausea and vomiting  Endocrine: Negative for polydipsia and polyuria  Genitourinary: Negative for dysuria and hematuria  Musculoskeletal: Positive for arthralgias  See HPI   Skin: Negative for rash and wound  Neurological: Negative for dizziness, numbness and headaches  Psychiatric/Behavioral: Negative for decreased concentration and suicidal ideas  The patient is not nervous/anxious            Past Medical History:   Diagnosis Date    Arthritis     Asthma     Hypothyroidism     hypo    Pemphigus vulgaris     Thalassemia        Past Surgical History:   Procedure Laterality Date    BREAST BIOPSY Right     benign    COLONOSCOPY      [place] KNEE SCOPE,MED/LAT MENISECTOMY Right 2020    Procedure: MENISCECTOMY MEDIAL;  Surgeon: Mariangel Foote DO;  Location: [facility];  Service: Orthopedics       Family History   Problem Relation Age of Onset    Asthma Father     Hypertension Maternal Grandmother     Liver cancer Maternal Grandmother     Diabetes Maternal Grandmother     Cancer Family         multiple relatives    Seizures Sister     Asthma Brother     Hypertension Brother     Leukemia Maternal Uncle     Heart attack Cousin     Bone cancer Maternal Uncle     Substance Abuse Neg Hx     Mental illness Neg Hx        Social History     Occupational History    Not on file   Tobacco Use    Smoking status: Former Smoker     Years: 10 00     Quit date:      Years since quittin 1    Smokeless tobacco: Never Used   Substance and Sexual Activity    Alcohol use:  Yes     Alcohol/week: 1 0 standard drinks     Types: 1 Glasses of wine per week     Frequency: 2-4 times a month     Drinks per session: 1 or 2     Binge frequency: Less than monthly     Comment: socially     Drug use: Never    Sexual activity: Yes     Partners: Male         Current Outpatient Medications:     albuterol (PROVENTIL HFA,VENTOLIN HFA) 90 mcg/act inhaler, inhale 2 puffs by mouth and INTO THE LUNGS every 6 hours if needed for wheezing, Disp: 8 5 g, Rfl: 3    ALPRAZolam (XANAX) 0 25 mg tablet, Take 1 tablet (0 25 mg total) by mouth daily at bedtime as needed for anxiety, Disp: 30 tablet, Rfl: 1    aspirin (ECOTRIN LOW STRENGTH) 81 mg EC tablet, Take 1 tablet (81 mg total) by mouth daily, Disp: 90 tablet, Rfl: 3    citalopram (CeleXA) 10 mg tablet, take 1 tablet by mouth once daily, Disp: 90 tablet, Rfl: 1    ergocalciferol (VITAMIN D2) 50,000 units, Take 1 capsule (50,000 Units total) by mouth once a week, Disp: 12 capsule, Rfl: 1    fenofibrate (TRICOR) 48 mg tablet, Take 1 tablet (48 mg total) by mouth daily, Disp: 30 tablet, Rfl: 6    fenofibrate (TRICOR) 54 MG tablet, Take 1 tablet (54 mg total) by mouth daily, Disp: 90 tablet, Rfl: 1    fluticasone (FLONASE) 50 mcg/act nasal spray, instill 2 sprays into each nostril once daily, Disp: 16 g, Rfl: 3    levothyroxine 25 mcg tablet, Take 1 tablet (25 mcg total) by mouth daily, Disp: 90 tablet, Rfl: 1    Omega-3 Fatty Acids (fish oil) 1,000 mg, Take two tablet twice a day, Disp: 360 capsule, Rfl: 3    Allergies   Allergen Reactions    Iodine     Levaquin [Levofloxacin]     Shellfish-Derived Products        Objective: There were no vitals filed for this visit  Body mass index is 31 83 kg/m²  Right Knee Exam     Muscle Strength   The patient has normal right knee strength  Tenderness   The patient is experiencing tenderness in the medial joint line and patella  Range of Motion   Extension:  0 normal   Flexion:  130 normal     Tests   Davida:  Medial - negative Lateral - negative  Varus: negative Valgus: negative  Drawer:  Anterior - negative    Posterior - negative  Patellar apprehension: negative    Other   Erythema: absent  Scars: present (Well-healed arthroscopic incision portals)  Sensation: normal  Pulse: present  Swelling: none    Comments:  Portal sites are well healed  No crepitus with active or passive ROM  Patella tracks mid line and flat  Discomfort medial joint line and MCL      Left Knee Exam     Muscle Strength   The patient has normal left knee strength  Tenderness   The patient is experiencing tenderness in the patella and medial joint line      Range of Motion   Extension:  0 normal   Flexion:  130 normal     Tests   Davida:  Medial - negative Lateral - negative  Varus: negative Valgus: negative  Drawer:  Anterior - negative     Posterior - negative  Patellar apprehension: negative    Other Erythema: absent  Scars: absent  Sensation: normal  Pulse: present  Swelling: none    Comments:  Negative patellofemoral grind  Knee is stable ligamentous exam  No erythema effusion or warmth  Medial joint line MCL discomfort            Physical Exam  Vitals signs and nursing note reviewed

## 2021-02-05 ENCOUNTER — TELEMEDICINE (OUTPATIENT)
Dept: FAMILY MEDICINE CLINIC | Facility: CLINIC | Age: 61
End: 2021-02-05
Payer: COMMERCIAL

## 2021-02-05 DIAGNOSIS — Z20.822 EXPOSURE TO COVID-19 VIRUS: ICD-10-CM

## 2021-02-05 DIAGNOSIS — Z20.822 SUSPECTED COVID-19 VIRUS INFECTION: Primary | ICD-10-CM

## 2021-02-05 DIAGNOSIS — Z20.822 SUSPECTED COVID-19 VIRUS INFECTION: ICD-10-CM

## 2021-02-05 PROCEDURE — 99212 OFFICE O/P EST SF 10 MIN: CPT | Performed by: NURSE PRACTITIONER

## 2021-02-05 PROCEDURE — U0003 INFECTIOUS AGENT DETECTION BY NUCLEIC ACID (DNA OR RNA); SEVERE ACUTE RESPIRATORY SYNDROME CORONAVIRUS 2 (SARS-COV-2) (CORONAVIRUS DISEASE [COVID-19]), AMPLIFIED PROBE TECHNIQUE, MAKING USE OF HIGH THROUGHPUT TECHNOLOGIES AS DESCRIBED BY CMS-2020-01-R: HCPCS | Performed by: NURSE PRACTITIONER

## 2021-02-05 PROCEDURE — U0005 INFEC AGEN DETEC AMPLI PROBE: HCPCS | Performed by: NURSE PRACTITIONER

## 2021-02-05 NOTE — PATIENT INSTRUCTIONS
Refer to [facility] for specific information and answers to many frequently asked questions  You are being tested for Covid19  If Covid-19 test is positive, self isolation for 10 days from onset of symptoms and 24 hours with no symptoms and no fever without use of anti-pyrectic medications (ie: Tylenol, Ibuprofen, Advil, etc ) before discontinuing self isolation and/or return to work or normal activities  If you test positive, even with no symptoms, you need to isolate for a full 10 days after positive test    While in self isolation you should be using a private bathroom, sleeping area, mask in the home if any other people are present, no shared eating utensils, etc    Deep breathing exercises to expand lung fields  Monitor temperature daily or if you feel you have a fever  Tylenol or Advil/Ibuprofen is appropriate for fever control  Check pulse oximetry,  if able,  daily and if you are having any increased shortness of breath or chest tightness  Call the office if you are having any increased respiratory symptoms or if pulse oximetry is persistently less than 92%  Current recommendations for treatment include daily multivitamin, vitamin C, zinc, and vitamin D supplementation  10 day quarantine is recommended for any high risk exposures , starting on date of initial exposure  High risk exposure is defined as being within 6 feet for 15 minutes cumulatively over 24 hours with a Covid positive patient unmasked  Social distancing is imperative  The use of a mask in public places is recommended going forward       CDC emergency warning signs for when to seek medical attention immediately:  - difficulty breathing or shortness of breath  - persistent pain or pressure in the chest   - new confusion or inability to arouse  - bluish lips or face   If you should need to seek medical care, you should notify the ED or EMS that you are under investigation and/or positive for COVID-19 prior and wear a facemask if possible, scarf or bandana

## 2021-02-05 NOTE — PROGRESS NOTES
COVID-19 Virtual Visit     Assessment/Plan:  1  Suspected COVID-19 virus infection  - Novel Coronavirus (Covid-19),PCR [facility] - Collected at [place] [id number] or [facility]; Future  2  Exposure to COVID-19 virus  - Novel Coronavirus (Covid-19),PCR [facility] - Collected at [place] [address] or [facility]; Future    Refer to [facility]  gov for specific information and answers to many frequently asked questions  You are being tested for Covid19  If Covid-19 test is positive, self isolation for 10 days from onset of symptoms and 24 hours with no symptoms and no fever without use of anti-pyrectic medications (ie: Tylenol, Ibuprofen, Advil, etc ) before discontinuing self isolation and/or return to work or normal activities  If you test positive, even with no symptoms, you need to isolate for a full 10 days after positive test    While in self isolation you should be using a private bathroom, sleeping area, mask in the home if any other people are present, no shared eating utensils, etc    Deep breathing exercises to expand lung fields  Monitor temperature daily or if you feel you have a fever  Tylenol or Advil/Ibuprofen is appropriate for fever control  Check pulse oximetry,  if able,  daily and if you are having any increased shortness of breath or chest tightness  Call the office if you are having any increased respiratory symptoms or if pulse oximetry is persistently less than 92%  Current recommendations for treatment include daily multivitamin, vitamin C, zinc, and vitamin D supplementation  10 day quarantine is recommended for any high risk exposures , starting on date of initial exposure  High risk exposure is defined as being within 6 feet for 15 minutes cumulatively over 24 hours with a Covid positive patient unmasked  Social distancing is imperative  The use of a mask in public places is recommended going forward       [facility] emergency warning signs for when to seek medical attention immediately:  - difficulty breathing or shortness of breath  - persistent pain or pressure in the chest   - new confusion or inability to arouse  - bluish lips or face   If you should need to seek medical care, you should notify the ED or EMS that you are under investigation and/or positive for COVID-19 prior and wear a facemask if possible, scarf or bandana     Problem List Items Addressed This Visit     None      Visit Diagnoses     Suspected COVID-19 virus infection    -  Primary    Relevant Orders    Novel Coronavirus (Covid-19),PCR [facility] - Collected at [facility] or [facility]    Exposure to COVID-19 virus        Relevant Orders    Novel Coronavirus (Covid-19),PCR [facility] - Collected at [facility] or [facility]         Disposition:     I referred patient to one of our centralized sites for a COVID-19 swab  I have spent 15 minutes directly with the patient  Greater than 50% of this time was spent in counseling/coordination of care regarding: instructions for management, patient and family education, importance of treatment compliance and impressions  Encounter provider HAYDEE Nina    Provider located at [place] [address]  [phone] [facility] [id number]    Recent Visits  No visits were found meeting these conditions  Showing recent visits within past 7 days and meeting all other requirements     Today's Visits  Date Type Provider Dept   02/05/21 Telemedicine HAYDEE Nina Pg   Showing today's visits and meeting all other requirements     Future Appointments  No visits were found meeting these conditions  Showing future appointments within next 150 days and meeting all other requirements      This virtual check-in was done via [employer] and patient was informed that this is a secure, HIPAA-compliant platform  She agrees to proceed      Patient agrees to participate in a virtual check in via telephone or video visit instead of presenting to the office to address urgent/immediate medical needs  Patient is aware this is a billable service  After connecting through [facility], the patient was identified by name and date of birth  Ana Lilia Duran was informed that this was a telemedicine visit and that the exam was being conducted confidentially over secure lines  My office door was closed  No one else was in the room  Ana Lilia Duran acknowledged consent and understanding of privacy and security of the telemedicine visit  I informed the patient that I have reviewed her record in Epic and presented the opportunity for her to ask any questions regarding the visit today  The patient agreed to participate  Subjective:   Ana Lilia Duran is a 62 y o  female who is concerned about COVID-19  Patient's symptoms include chills, nasal congestion and headache  Patient denies fever, fatigue, malaise, rhinorrhea, sore throat, anosmia, loss of taste, cough, shortness of breath, chest tightness, abdominal pain, nausea, vomiting, diarrhea and myalgias  Date of symptom onset: 2/3/2021  Date of exposure: 1/29/2021    Exposure:   Contact with a person who is under investigation (PUI) for or who is positive for COVID-19 within the last 14 days?: Yes    Hospitalized recently for fever and/or lower respiratory symptoms?: No      Currently a healthcare worker that is involved in direct patient care?: No      Works in a special setting where the risk of COVID-19 transmission may be high? (this may include long-term care, correctional and half-way facilities; homeless shelters; assisted-living facilities and group homes ): No      Resident in a special setting where the risk of COVID-19 transmission may be high? (this may include long-term care, correctional and half-way facilities; homeless shelters; assisted-living facilities and group homes ): No      Pt with symptoms for 2 days  Headache, mild and nasal congestion  A little achey  Intermittent chills  No fever  No sob     Was with friends one week ago , dinner, unmasked and found out today that they tested positive  Lab Results   Component Value Date    SARSCOV2 Not Detected 05/29/2020     Past Medical History:   Diagnosis Date    Arthritis     Asthma     Hypothyroidism     hypo    Pemphigus vulgaris     Thalassemia      Past Surgical History:   Procedure Laterality Date    BREAST BIOPSY Right     benign    COLONOSCOPY      [place] KNEE SCOPE,MED/LAT MENISECTOMY Right 6/2/2020    Procedure: MENISCECTOMY MEDIAL;  Surgeon: Marino Galvez DO;  Location: [facility];  Service: Orthopedics     Current Outpatient Medications   Medication Sig Dispense Refill    albuterol (PROVENTIL HFA,VENTOLIN HFA) 90 mcg/act inhaler inhale 2 puffs by mouth and INTO THE LUNGS every 6 hours if needed for wheezing 8 5 g 3    ALPRAZolam (XANAX) 0 25 mg tablet Take 1 tablet (0 25 mg total) by mouth daily at bedtime as needed for anxiety 30 tablet 1    aspirin (ECOTRIN LOW STRENGTH) 81 mg EC tablet Take 1 tablet (81 mg total) by mouth daily 90 tablet 3    citalopram (CeleXA) 10 mg tablet take 1 tablet by mouth once daily 90 tablet 1    ergocalciferol (VITAMIN D2) 50,000 units Take 1 capsule (50,000 Units total) by mouth once a week 12 capsule 1    fenofibrate (TRICOR) 54 MG tablet Take 1 tablet (54 mg total) by mouth daily 90 tablet 1    fluticasone (FLONASE) 50 mcg/act nasal spray instill 2 sprays into each nostril once daily 16 g 3    immune globulin, human, Infuse into a venous catheter every 30 (thirty) days      levothyroxine 25 mcg tablet Take 1 tablet (25 mcg total) by mouth daily 90 tablet 1    Omega-3 Fatty Acids (fish oil) 1,000 mg Take two tablet twice a day (Patient taking differently: 4,000 mg daily Take two tablet twice a day) 360 capsule 3    RUTIN PO Take by mouth once a week       No current facility-administered medications for this visit        Allergies   Allergen Reactions    Iodine Itching and Vomiting    Shellfish-Derived Products Itching and Vomiting    Levaquin [Levofloxacin] Rash       Review of Systems   Constitutional: Positive for chills  Negative for fatigue and fever  HENT: Positive for congestion  Negative for rhinorrhea and sore throat  Respiratory: Negative for cough, chest tightness and shortness of breath  Gastrointestinal: Negative for abdominal pain, diarrhea, nausea and vomiting  Musculoskeletal: Negative for myalgias  Neurological: Positive for headaches  Objective: There were no vitals filed for this visit  Physical Exam  Constitutional:       General: She is not in acute distress  Appearance: She is not ill-appearing  HENT:      Head: Normocephalic  Nose: Congestion present  Eyes:      General: No scleral icterus  Pulmonary:      Effort: Pulmonary effort is normal  No respiratory distress  Comments: No conversational dyspnea  Skin:     Coloration: Skin is not jaundiced or pale  Neurological:      Mental Status: She is alert and oriented to person, place, and time  Psychiatric:         Mood and Affect: Mood normal          Behavior: Behavior normal          Thought Content: Thought content normal          Judgment: Judgment normal        VIRTUAL VISIT DISCLAIMER    Hanna Pappas acknowledges that she has consented to an online visit or consultation  She understands that the online visit is based solely on information provided by her, and that, in the absence of a face-to-face physical evaluation by the physician, the diagnosis she receives is both limited and provisional in terms of accuracy and completeness  This is not intended to replace a full medical face-to-face evaluation by the physician  Hanna Pappas understands and accepts these terms

## 2021-02-06 LAB — SARS-COV-2 RNA RESP QL NAA+PROBE: NEGATIVE

## 2021-02-08 ENCOUNTER — TELEMEDICINE (OUTPATIENT)
Dept: FAMILY MEDICINE CLINIC | Facility: CLINIC | Age: 61
End: 2021-02-08
Payer: COMMERCIAL

## 2021-02-08 VITALS — TEMPERATURE: 99.6 F

## 2021-02-08 DIAGNOSIS — J06.9 VIRAL URI: Primary | ICD-10-CM

## 2021-02-08 PROCEDURE — 99213 OFFICE O/P EST LOW 20 MIN: CPT | Performed by: NURSE PRACTITIONER

## 2021-02-08 PROCEDURE — 1036F TOBACCO NON-USER: CPT | Performed by: NURSE PRACTITIONER

## 2021-02-08 NOTE — PROGRESS NOTES
Virtual Regular Visit      Assessment/Plan:  1  Viral URI  Symptoms resolving  Monitor  Symptomatic management  Recent Results (from the past 672 hour(s))   Novel Coronavirus (Covid-19),PCR [facility] - Collected at   [facility] [address] or [facility]    Collection Time: 02/05/21  2:45 PM    Specimen: Nose; Nares   Result Value Ref Range    SARS-CoV-2 Negative Negative         Problem List Items Addressed This Visit     None               Reason for visit is   Chief Complaint   Patient presents with    COVID-19     Covid f/u    Virtual Regular Visit        Encounter provider HAYDEE Cox    Provider located at [facility]  [address] [address]      Recent Visits  Date Type Provider Dept   02/05/21 Samantha Floyd recent visits within past 7 days and meeting all other requirements     Today's Visits  Date Type Provider Dept   02/08/21 Telemedicine HAYDEE Cox Pg   Showing today's visits and meeting all other requirements     Future Appointments  No visits were found meeting these conditions  Showing future appointments within next 150 days and meeting all other requirements        The patient was identified by name and date of birth  Shasha Hester was informed that this is a telemedicine visit and that the visit is being conducted through [facility] and patient was informed that this is a secure, HIPAA-compliant platform  She agrees to proceed     My office door was closed  No one else was in the room  She acknowledged consent and understanding of privacy and security of the video platform  The patient has agreed to participate and understands they can discontinue the visit at any time  Patient is aware this is a billable service  Subjective  Shasha Hester is a 62 y o  female, follow up      Follow up on headache and congestion  Feels better   Reports has been getting mild headaches but no other symptoms  No fever  No other uri symptoms currently  covid test was negative  Past Medical History:   Diagnosis Date    Arthritis     Asthma     Hypothyroidism     hypo    Pemphigus vulgaris     Thalassemia        Past Surgical History:   Procedure Laterality Date    BREAST BIOPSY Right     benign    COLONOSCOPY      [place] KNEE SCOPE,MED/LAT MENISECTOMY Right 6/2/2020    Procedure: MENISCECTOMY MEDIAL;  Surgeon: Soo Glaser DO;  Location: [facility];  Service: Orthopedics       Current Outpatient Medications   Medication Sig Dispense Refill    albuterol (PROVENTIL HFA,VENTOLIN HFA) 90 mcg/act inhaler inhale 2 puffs by mouth and INTO THE LUNGS every 6 hours if needed for wheezing 8 5 g 3    ALPRAZolam (XANAX) 0 25 mg tablet Take 1 tablet (0 25 mg total) by mouth daily at bedtime as needed for anxiety 30 tablet 1    aspirin (ECOTRIN LOW STRENGTH) 81 mg EC tablet Take 1 tablet (81 mg total) by mouth daily 90 tablet 3    citalopram (CeleXA) 10 mg tablet take 1 tablet by mouth once daily 90 tablet 1    ergocalciferol (VITAMIN D2) 50,000 units Take 1 capsule (50,000 Units total) by mouth once a week 12 capsule 1    fenofibrate (TRICOR) 54 MG tablet Take 1 tablet (54 mg total) by mouth daily 90 tablet 1    fluticasone (FLONASE) 50 mcg/act nasal spray instill 2 sprays into each nostril once daily 16 g 3    levothyroxine 25 mcg tablet Take 1 tablet (25 mcg total) by mouth daily 90 tablet 1    Omega-3 Fatty Acids (fish oil) 1,000 mg Take two tablet twice a day (Patient taking differently: 4,000 mg daily Take two tablet twice a day) 360 capsule 3     No current facility-administered medications for this visit  Allergies   Allergen Reactions    Iodine Itching and Vomiting    Shellfish-Derived Products Itching and Vomiting    Levaquin [Levofloxacin] Rash       Review of Systems   Constitutional: Negative for fever  HENT: Positive for congestion   Negative for sinus pressure, sinus pain and sore throat  Respiratory: Negative for cough and shortness of breath  Gastrointestinal: Negative for diarrhea, nausea and vomiting  Musculoskeletal: Negative for myalgias  Neurological: Positive for headaches (mild, intermittent)  Hematological: Negative for adenopathy  Video Exam    Vitals:    02/08/21 0820   Temp: 99 6 °F (37 6 °C)   TempSrc: Oral       Physical Exam  Constitutional:       General: She is not in acute distress  Appearance: She is not ill-appearing  Eyes:      General: No scleral icterus  Pulmonary:      Effort: Pulmonary effort is normal  No respiratory distress  Skin:     Coloration: Skin is not jaundiced or pale  Neurological:      Mental Status: She is alert and oriented to person, place, and time  Psychiatric:         Mood and Affect: Mood normal          Behavior: Behavior normal          Thought Content: Thought content normal          Judgment: Judgment normal           I spent 10 minutes with patient today in which greater than 50% of the time was spent in counseling/coordination of care regarding impressions, test results, symptom management  VIRTUAL VISIT DISCLAIMER    Néstor Grace acknowledges that she has consented to an online visit or consultation  She understands that the online visit is based solely on information provided by her, and that, in the absence of a face-to-face physical evaluation by the physician, the diagnosis she receives is both limited and provisional in terms of accuracy and completeness  This is not intended to replace a full medical face-to-face evaluation by the physician  Néstor Grace understands and accepts these terms

## 2021-02-11 ENCOUNTER — OFFICE VISIT (OUTPATIENT)
Dept: OBGYN CLINIC | Facility: CLINIC | Age: 61
End: 2021-02-11
Payer: COMMERCIAL

## 2021-02-11 VITALS — BODY MASS INDEX: 31.83 KG/M2 | WEIGHT: 174 LBS

## 2021-02-11 DIAGNOSIS — M17.0 BILATERAL PRIMARY OSTEOARTHRITIS OF KNEE: Primary | ICD-10-CM

## 2021-02-11 PROCEDURE — 20610 DRAIN/INJ JOINT/BURSA W/O US: CPT | Performed by: ORTHOPAEDIC SURGERY

## 2021-02-11 RX ORDER — HYALURONATE SODIUM 10 MG/ML
20 SYRINGE (ML) INTRAARTICULAR
Status: COMPLETED | OUTPATIENT
Start: 2021-02-11 | End: 2021-02-11

## 2021-02-11 RX ADMIN — Medication 20 MG: at 08:50

## 2021-02-11 NOTE — PROGRESS NOTES
Assessment/Plan:  1  Bilateral primary osteoarthritis of knee  Large joint arthrocentesis: R knee    Large joint arthrocentesis: L knee       Scribe Attestation    I,:  Anil Young am acting as a scribe while in the presence of the attending physician :       I,:  Pawel Deras DO personally performed the services described in this documentation    as scribed in my presence :         Dale Palmer is a 60-year-old female who presents to the office today for #3 of 3 Euflexxa injections for her bilateral knees  She did consent to and underwent these injections to her knees bilaterally and tolerated the procedure well without difficulty or complications  Post injection instructions were provided  She will follow up in 6 months for repeat clinical evaluation  Large joint arthrocentesis: R knee  Universal Protocol:  Consent: Verbal consent obtained  Risks and benefits: risks, benefits and alternatives were discussed  Consent given by: patient  Timeout called at: 2/11/2021 8:49 AM   Site marked: the operative site was marked  Patient identity confirmed: verbally with patient    Supporting Documentation  Indications: pain   Procedure Details  Location: knee - R knee  Preparation: Patient was prepped and draped in the usual sterile fashion  Needle size: 20 G  Ultrasound guidance: no  Approach: anterolateral  Medications administered: 20 mg Sodium Hyaluronate 20 MG/2ML  Specialty Pharmacy Supplied: received medications from pharmacy  Patient tolerance: patient tolerated the procedure well with no immediate complications  Dressing:  Sterile dressing applied    Large joint arthrocentesis: L knee  Universal Protocol:  Consent: Verbal consent obtained  Risks and benefits: risks, benefits and alternatives were discussed  Consent given by: patient  Time out: Immediately prior to procedure a "time out" was called to verify the correct patient, procedure, equipment, support staff and site/side marked as required    Timeout called at: 2/11/2021 8:50 AM   Site marked: the operative site was marked  Patient identity confirmed: verbally with patient    Supporting Documentation  Indications: pain   Procedure Details  Location: knee - L knee  Preparation: Patient was prepped and draped in the usual sterile fashion  Needle size: 20 G  Ultrasound guidance: no  Approach: anterolateral  Medications administered: 20 mg Sodium Hyaluronate 20 MG/2ML  Specialty Pharmacy Supplied: received medications from pharmacy  Patient tolerance: patient tolerated the procedure well with no immediate complications  Dressing:  Sterile dressing applied          Subjective: Bilateral knee #3 Euflexxa    Patient ID: Cindy Azevedo is a 62 y o  female who presents to the office today to #3 of 3 Euflexxa injections for her bilateral knees  She has tolerated the series well thus far  Review of Systems   Constitutional: Positive for activity change  Negative for chills, fever and unexpected weight change  HENT: Negative for hearing loss, nosebleeds and sore throat  Eyes: Negative for pain, redness and visual disturbance  Respiratory: Negative for cough, shortness of breath and wheezing  Cardiovascular: Negative for chest pain, palpitations and leg swelling  Gastrointestinal: Negative for abdominal pain, nausea and vomiting  Endocrine: Negative for polydipsia and polyuria  Genitourinary: Negative for dysuria and hematuria  Musculoskeletal:        See HPI   Skin: Negative for rash and wound  Neurological: Negative for dizziness, numbness and headaches  Psychiatric/Behavioral: Negative for decreased concentration and suicidal ideas  The patient is not nervous/anxious            Past Medical History:   Diagnosis Date    Arthritis     Asthma     Hypothyroidism     hypo    Pemphigus vulgaris     Thalassemia        Past Surgical History:   Procedure Laterality Date    BREAST BIOPSY Right     benign    COLONOSCOPY      MO KNEE SCOPE,MED/LAT MENISECTOMY Right 2020    Procedure: MENISCECTOMY MEDIAL;  Surgeon: Marino Galvez DO;  Location: [facility];  Service: Orthopedics       Family History   Problem Relation Age of Onset    Asthma Father     Hypertension Maternal Grandmother     Liver cancer Maternal Grandmother     Diabetes Maternal Grandmother     Cancer Family         multiple relatives    Seizures Sister     Asthma Brother     Hypertension Brother     Leukemia Maternal Uncle     Heart attack Cousin     Bone cancer Maternal Uncle     Substance Abuse Neg Hx     Mental illness Neg Hx        Social History     Occupational History    Not on file   Tobacco Use    Smoking status: Former Smoker     Years: 10 00     Quit date:      Years since quittin     Smokeless tobacco: Never Used   Substance and Sexual Activity    Alcohol use:  Yes     Alcohol/week: 1 0 standard drinks     Types: 1 Glasses of wine per week     Frequency: 2-4 times a month     Drinks per session: 1 or 2     Binge frequency: Less than monthly     Comment: socially     Drug use: Never    Sexual activity: Yes     Partners: Male         Current Outpatient Medications:     albuterol (PROVENTIL HFA,VENTOLIN HFA) 90 mcg/act inhaler, inhale 2 puffs by mouth and INTO THE LUNGS every 6 hours if needed for wheezing, Disp: 8 5 g, Rfl: 3    ALPRAZolam (XANAX) 0 25 mg tablet, Take 1 tablet (0 25 mg total) by mouth daily at bedtime as needed for anxiety, Disp: 30 tablet, Rfl: 1    aspirin (ECOTRIN LOW STRENGTH) 81 mg EC tablet, Take 1 tablet (81 mg total) by mouth daily, Disp: 90 tablet, Rfl: 3    citalopram (CeleXA) 10 mg tablet, take 1 tablet by mouth once daily, Disp: 90 tablet, Rfl: 1    ergocalciferol (VITAMIN D2) 50,000 units, Take 1 capsule (50,000 Units total) by mouth once a week, Disp: 12 capsule, Rfl: 1    fenofibrate (TRICOR) 54 MG tablet, Take 1 tablet (54 mg total) by mouth daily, Disp: 90 tablet, Rfl: 1    fluticasone (FLONASE) 50 mcg/act nasal spray, instill 2 sprays into each nostril once daily, Disp: 16 g, Rfl: 3    levothyroxine 25 mcg tablet, Take 1 tablet (25 mcg total) by mouth daily, Disp: 90 tablet, Rfl: 1    Omega-3 Fatty Acids (fish oil) 1,000 mg, Take two tablet twice a day (Patient taking differently: 4,000 mg daily Take two tablet twice a day), Disp: 360 capsule, Rfl: 3    Allergies   Allergen Reactions    Iodine Itching and Vomiting    Shellfish-Derived Products Itching and Vomiting    Levaquin [Levofloxacin] Rash       Objective: There were no vitals filed for this visit  Body mass index is 31 83 kg/m²  Right Knee Exam     Muscle Strength   The patient has normal right knee strength  Tenderness   The patient is experiencing tenderness in the medial joint line and patella  Range of Motion   Extension: 0   Flexion: 130     Tests   Varus: negative Valgus: negative  Lachman:  Anterior - negative    Posterior - negative  Drawer:  Anterior - negative    Posterior - negative    Other   Erythema: absent  Scars: present (Well healed arthroscopic incision portals)  Sensation: normal  Pulse: present  Swelling: none    Comments:    No crepitus with active or passive ROM  Patella tracks mid line and flat  Discomfort medial joint line and MCL      Left Knee Exam     Muscle Strength   The patient has normal left knee strength  Tenderness   The patient is experiencing tenderness in the patella and medial joint line  Range of Motion   Extension: 0   Flexion: 130     Tests   Varus: negative Valgus: negative  Lachman:  Anterior - negative    Posterior - negative  Drawer:  Anterior - negative     Posterior - negative  Patellar apprehension: negative    Other   Erythema: absent  Scars: absent  Sensation: normal  Pulse: present  Swelling: none    Comments:    Negative patellofemoral grind  Knee is stable ligamentous exam  No erythema effusion or warmth  Medial joint line MCL discomfort            Physical Exam  Vitals signs reviewed  Constitutional:       Appearance: Normal appearance  She is well-developed  HENT:      Head: Normocephalic and atraumatic  Eyes:      General:         Right eye: No discharge  Left eye: No discharge  Extraocular Movements: Extraocular movements intact  Conjunctiva/sclera: Conjunctivae normal       Pupils: Pupils are equal, round, and reactive to light  Neck:      Musculoskeletal: Normal range of motion and neck supple  Cardiovascular:      Rate and Rhythm: Normal rate  Pulmonary:      Effort: Pulmonary effort is normal  No respiratory distress  Musculoskeletal:      Comments: As noted in HPI  Skin:     General: Skin is warm and dry  Neurological:      General: No focal deficit present  Mental Status: She is alert and oriented to person, place, and time     Psychiatric:         Mood and Affect: Mood normal          Behavior: Behavior normal

## 2021-03-11 DIAGNOSIS — J45.20 MILD INTERMITTENT ASTHMA WITHOUT COMPLICATION: ICD-10-CM

## 2021-03-11 RX ORDER — ALBUTEROL SULFATE 90 UG/1
AEROSOL, METERED RESPIRATORY (INHALATION)
Qty: 8.5 G | Refills: 3 | Status: SHIPPED | OUTPATIENT
Start: 2021-03-11 | End: 2022-03-15 | Stop reason: SDUPTHER

## 2021-03-19 LAB
CHOLEST SERPL-MCNC: 188 MG/DL (ref 100–199)
HDLC SERPL-MCNC: 59 MG/DL
LDLC SERPL CALC-MCNC: 110 MG/DL (ref 0–99)
LDLC/HDLC SERPL: 1.9 RATIO (ref 0–3.2)
SL AMB VLDL CHOLESTEROL CALC: 19 MG/DL (ref 5–40)
TRIGL SERPL-MCNC: 104 MG/DL (ref 0–149)

## 2021-03-29 LAB — EXT SARS-COV-2: NOT DETECTED

## 2021-04-01 ENCOUNTER — TELEPHONE (OUTPATIENT)
Dept: FAMILY MEDICINE CLINIC | Facility: CLINIC | Age: 61
End: 2021-04-01

## 2021-04-01 NOTE — TELEPHONE ENCOUNTER
Pt called us for help getting her Covid results done on 3/29/21 at a [facility] in [place] via [facility]  I called [facility] and spoke with MIGEL REYNOSO [facility]  Covid results show as not detected  Pt aware and understands  No further action needed  Yes

## 2021-04-02 ENCOUNTER — OFFICE VISIT (OUTPATIENT)
Dept: CARDIOLOGY CLINIC | Facility: CLINIC | Age: 61
End: 2021-04-02
Payer: COMMERCIAL

## 2021-04-02 VITALS
OXYGEN SATURATION: 98 % | HEIGHT: 62 IN | SYSTOLIC BLOOD PRESSURE: 146 MMHG | HEART RATE: 68 BPM | TEMPERATURE: 97.9 F | BODY MASS INDEX: 32.39 KG/M2 | WEIGHT: 176 LBS | DIASTOLIC BLOOD PRESSURE: 74 MMHG

## 2021-04-02 DIAGNOSIS — E78.2 MIXED HYPERLIPIDEMIA: Primary | ICD-10-CM

## 2021-04-02 DIAGNOSIS — E78.1 HYPERTRIGLYCERIDEMIA: ICD-10-CM

## 2021-04-02 PROCEDURE — 99214 OFFICE O/P EST MOD 30 MIN: CPT | Performed by: INTERNAL MEDICINE

## 2021-04-02 NOTE — PROGRESS NOTES
[facility]  [address]  [address]   May, [address]  Cardiology Follow-up    Kay Manual  [id number]  1960        1  Mixed hyperlipidemia     2  Hypertriglyceridemia        Discussion/Summary:   Elevated triglycerides with hyperlipidemia- low-carbohydrate diet, exercise, a Omega 3 supplementation 2000 mg twice a day  Improved    Elevated blood pressure- monitor  Low salt diet    Shortness of breath with exertion + family history of premature coronary artery disease- exercise treadmill stress test to rule out ischemia    Palpitations- holter negative for significant arrhtyhmia    Hypothyroidism currently on levothyroxine 25 mcg daily    Mild intermittent asthma without complications    Vitamin-D deficiency    Pemphigus vulgaris treated with Rituxan    Elevated ferritin    Mild carotid artery disease- aspirin 81mg + lipid controlled    Smoking hx      HPI:   81-year-old woman with a former history of smoking, no prior cardiac events, elevated triglycerides presents secondary to exertional shortness of breath plus neck discomfort  She has noted had increased dyspnea with mild exertion  She has also noted pulsations in her bilateral carotid distribution  She denies having prior history of myocardial infarction  She denies having prior history of atrial fibrillation  She denies having prior history of syncope  [facility]    12/02/2019: We reviewed through her recent exercise stress test   It was negative for evidence of exercise-induced ischemia  She still feels some pulsations in her bilateral carotid distribution  She has concerning sick include given her family history for CVA  12/07/2020: We reviewed through her recent blood work  She denies having chest pain  She is under significant stress  She has a sick family member  No major changes in her EKG  We discussed about dietary modifications      04/02/2021:  Her lipids are improved  She had some myalgias with fenofibrate  She will wants to try conservative management  She will have tight dietary modification  Social Hx  Live with friends  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  Former smoker- 20 years ago  Social alcohol  Sleep-wake up early morning  Stress test 10 years    Family Hx  Both grandmother-CVA  Cousin- MI at young age      Past Medical History:   Diagnosis Date    Arthritis     Asthma     Hypothyroidism     hypo    Pemphigus vulgaris     Thalassemia      Social History     Socioeconomic History    Marital status: /Civil Union     Spouse name: Not on file    Number of children: Not on file    Years of education: Not on file    Highest education level: Not on file   Occupational History    Not on file   Social Needs    Financial resource strain: Not on file    Food insecurity     Worry: Not on file     Inability: Not on file   [employer] needs     Medical: Not on file     Non-medical: Not on file   Tobacco Use    Smoking status: Former Smoker     Years: 10 00     Quit date:      Years since quittin 2    Smokeless tobacco: Never Used   Substance and Sexual Activity    Alcohol use:  Yes     Alcohol/week: 1 0 standard drinks     Types: 1 Glasses of wine per week     Frequency: 2-4 times a month     Drinks per session: 1 or 2     Binge frequency: Less than monthly     Comment: socially     Drug use: Never    Sexual activity: Yes     Partners: Male   Lifestyle    Physical activity     Days per week: Not on file     Minutes per session: Not on file    Stress: Not on file   Relationships    Social connections     Talks on phone: Not on file     Gets together: Not on file     Attends Advent service: Not on file     Active member of club or organization: Not on file     Attends meetings of clubs or organizations: Not on file     Relationship status: Not on file    Intimate partner violence     Fear of current or ex partner: Not on file     Emotionally abused: Not on file     Physically abused: Not on file     Forced sexual activity: Not on file   Other Topics Concern    Not on file   Social History Narrative    Not on file      Family History   Problem Relation Age of Onset    Asthma Father     Hypertension Maternal Grandmother     Liver cancer Maternal Grandmother     Diabetes Maternal Grandmother     Cancer Family         multiple relatives    Seizures Sister     Asthma Brother     Hypertension Brother     Leukemia Maternal Uncle     Heart attack Cousin     Bone cancer Maternal Uncle     Substance Abuse Neg Hx     Mental illness Neg Hx      Past Surgical History:   Procedure Laterality Date    BREAST BIOPSY Right     benign    COLONOSCOPY      AK KNEE SCOPE,MED/LAT MENISECTOMY Right 6/2/2020    Procedure: MENISCECTOMY MEDIAL;  Surgeon: Corinne Henry DO;  Location: [facility];  Service: Orthopedics       Current Outpatient Medications:     albuterol (PROVENTIL HFA,VENTOLIN HFA) 90 mcg/act inhaler, inhale 2 puffs by mouth and INTO THE LUNGS every 6 hours if needed for wheezing, Disp: 8 5 g, Rfl: 3    ALPRAZolam (XANAX) 0 25 mg tablet, Take 1 tablet (0 25 mg total) by mouth daily at bedtime as needed for anxiety, Disp: 30 tablet, Rfl: 1    aspirin (ECOTRIN LOW STRENGTH) 81 mg EC tablet, Take 1 tablet (81 mg total) by mouth daily, Disp: 90 tablet, Rfl: 3    citalopram (CeleXA) 10 mg tablet, take 1 tablet by mouth once daily, Disp: 90 tablet, Rfl: 1    ergocalciferol (VITAMIN D2) 50,000 units, Take 1 capsule (50,000 Units total) by mouth once a week, Disp: 12 capsule, Rfl: 1    levothyroxine 25 mcg tablet, Take 1 tablet (25 mcg total) by mouth daily, Disp: 90 tablet, Rfl: 1    Omega-3 Fatty Acids (fish oil) 1,000 mg, Take two tablet twice a day (Patient taking differently: 4,000 mg daily Take two tablet twice a day), Disp: 360 capsule, Rfl: 3    fenofibrate (TRICOR) 54 MG tablet, Take 1 tablet (54 mg total) by mouth daily (Patient not taking: Reported on 4/2/2021), Disp: 90 tablet, Rfl: 1    fluticasone (FLONASE) 50 mcg/act nasal spray, instill 2 sprays into each nostril once daily, Disp: 16 g, Rfl: 3  Allergies   Allergen Reactions    Iodine - Food Allergy Itching and Vomiting    Shellfish-Derived Products - Food Allergy Itching and Vomiting    Levaquin [Levofloxacin] Rash     Vitals:    04/02/21 1518   BP: 146/74   BP Location: Right arm   Patient Position: Sitting   Cuff Size: Standard   Pulse: 68   Temp: 97 9 °F (36 6 °C)   SpO2: 98%   Weight: 79 8 kg (176 lb)   Height: 5' 2" (1 575 m)       Review of Systems:   Review of Systems   Constitutional: Negative  Negative for activity change, appetite change, chills, diaphoresis, fatigue, fever and unexpected weight change  HENT: Negative  Negative for congestion, dental problem, drooling, ear discharge, ear pain, facial swelling, hearing loss, mouth sores, nosebleeds, postnasal drip, rhinorrhea, sinus pressure, sinus pain, sneezing, sore throat, tinnitus, trouble swallowing and voice change  Eyes: Negative  Negative for photophobia, pain, redness, itching and visual disturbance  Respiratory: Negative  Negative for apnea, cough, choking, chest tightness, shortness of breath, wheezing and stridor  Cardiovascular: Negative for chest pain, palpitations and leg swelling  Gastrointestinal: Negative  Negative for abdominal distention, abdominal pain, anal bleeding, blood in stool, constipation, diarrhea, nausea, rectal pain and vomiting  Endocrine: Negative  Negative for cold intolerance, heat intolerance, polydipsia, polyphagia and polyuria  Genitourinary: Negative  Negative for decreased urine volume, difficulty urinating, dyspareunia, dysuria, enuresis, flank pain, frequency, genital sores, hematuria, menstrual problem, pelvic pain, urgency, vaginal bleeding, vaginal discharge and vaginal pain  Musculoskeletal: Negative    Negative for arthralgias, back pain, gait problem, joint swelling, myalgias, neck pain and neck stiffness  Skin: Negative  Negative for color change, pallor, rash and wound  Allergic/Immunologic: Negative  Negative for environmental allergies, food allergies and immunocompromised state  Neurological: Positive for headaches  Negative for dizziness, tremors, seizures, syncope, facial asymmetry, speech difficulty, weakness, light-headedness and numbness  Hematological: Negative  Negative for adenopathy  Does not bruise/bleed easily  Psychiatric/Behavioral: Negative  Negative for agitation, behavioral problems, confusion, decreased concentration, dysphoric mood, hallucinations, self-injury, sleep disturbance and suicidal ideas  The patient is not nervous/anxious and is not hyperactive  All other systems reviewed and are negative  Vitals:    04/02/21 1518   BP: 146/74   BP Location: Right arm   Patient Position: Sitting   Cuff Size: Standard   Pulse: 68   Temp: 97 9 °F (36 6 °C)   SpO2: 98%   Weight: 79 8 kg (176 lb)   Height: 5' 2" (1 575 m)     Physical Examination:   Physical Exam  Constitutional:       General: She is not in acute distress  Appearance: She is well-developed  She is not diaphoretic  HENT:      Head: Normocephalic and atraumatic  Right Ear: External ear normal       Left Ear: External ear normal    Eyes:      General: No scleral icterus  Right eye: No discharge  Left eye: No discharge  Conjunctiva/sclera: Conjunctivae normal       Pupils: Pupils are equal, round, and reactive to light  Neck:      Musculoskeletal: Normal range of motion and neck supple  Thyroid: No thyromegaly  Vascular: No JVD  Trachea: No tracheal deviation  Cardiovascular:      Rate and Rhythm: Normal rate and regular rhythm  Heart sounds: No murmur  No friction rub  Gallop present  Pulmonary:      Effort: Pulmonary effort is normal  No respiratory distress  Breath sounds: Normal breath sounds  No stridor  No wheezing or rales  Chest:      Chest wall: No tenderness  Abdominal:      General: Bowel sounds are normal  There is no distension  Palpations: Abdomen is soft  There is no mass  Tenderness: There is no abdominal tenderness  There is no guarding or rebound  Musculoskeletal: Normal range of motion  General: No tenderness or deformity  Skin:     General: Skin is warm and dry  Coloration: Skin is not pale  Findings: No erythema or rash  Neurological:      Mental Status: She is alert and oriented to person, place, and time  Cranial Nerves: No cranial nerve deficit  Motor: No abnormal muscle tone  Coordination: Coordination normal       Deep Tendon Reflexes: Reflexes are normal and symmetric  Reflexes normal    Psychiatric:         Behavior: Behavior normal          Thought Content:  Thought content normal          Judgment: Judgment normal          Labs:     Lab Results   Component Value Date    WBC 7 0 09/10/2020    HGB 13 2 09/10/2020    HCT 40 2 09/10/2020    MCV 76 (L) 09/10/2020    RDW 15 0 09/10/2020     09/10/2020     BMP:  Lab Results   Component Value Date    SODIUM 140 09/10/2020    K 3 9 09/10/2020     09/10/2020    CO2 23 09/10/2020    BUN 15 09/10/2020    CREATININE 0 85 09/10/2020    GLUC 87 09/10/2020    CALCIUM 8 9 06/25/2020    EGFR 58 06/25/2020     LFT:  Lab Results   Component Value Date    AST 24 09/10/2020    ALT 72 (H) 09/10/2020    ALKPHOS 63 05/29/2020    TP 7 2 09/10/2020    ALB 4 4 09/10/2020      No results found for: MEA5ECOEFCDR  No results found for: HGBA1C  Lipid Profile:   Lab Results   Component Value Date    CHOLESTEROL 188 03/18/2021    HDL 59 03/18/2021    LDLCALC 110 (H) 03/18/2021    TRIG 104 03/18/2021     Lab Results   Component Value Date    CHOLESTEROL 188 03/18/2021    CHOLESTEROL 228 (H) 09/10/2020     No results found for: CKTOTAL, CKMB, CKMBINDEX, TROPONINI  No results found for: NTBNP   Recent Results (from the past 672 hour(s))   Lipid Panel with Direct LDL reflex    Collection Time: 03/18/21 12:44 PM   Result Value Ref Range    Cholesterol, Total 188 100 - 199 mg/dL    Triglycerides 104 0 - 149 mg/dL    HDL 59 >39 mg/dL    VLDL Cholesterol Calculated 19 5 - 40 mg/dL    LDL Calculated 110 (H) 0 - 99 mg/dL    LDl/HDL Ratio 1 9 0 0 - 3 2 ratio   Novel Coronavirus (COVID-19), PCR [facility]    Collection Time: 03/29/21 12:00 AM    Specimen: Nasopharyngeal Swab   Result Value Ref Range    SARS-COV-2 Not Detected Not Detected, Negative, Inconclusive       Imaging & Testing   I have personally reviewed pertinent reports  Cardiac Testing   No results found for this or any previous visit  EKG: Personally reviewed  Normal sinus rhythm no acute st[facility] [address] [place] [address]  [phone]  Please call with any questions or suggestions    Counseling :  A description of the counseling:   Goals and Barriers:  Patient's ability to self care:  Medication side effect reviewed with patient in detail and all their questions answered  "Portions of the record may have been created with voice recognition software  Occasional wrong word or "sound a like" substitutions may have occurred due to the inherent limitations of voice recognition software  Read the chart carefully and recognize, using context, where substitutions have occurred   Please call if you have any questions  "

## 2021-04-08 ENCOUNTER — TELEPHONE (OUTPATIENT)
Dept: OBGYN CLINIC | Facility: CLINIC | Age: 61
End: 2021-04-08

## 2021-04-08 NOTE — TELEPHONE ENCOUNTER
[facility] is calling stating they cannot find this patient having [id number]  Not sure where to go from here      Shahrzda Daniel   BPYEN:[phone] ext [phone]

## 2021-04-08 NOTE — TELEPHONE ENCOUNTER
I called more and spoke to Aroldo CHOE, I provided him with the insurance information again and he stated it matched what they had  I then asked why they were requesting insurance information again if we hadn't submitted another request for euflexxa  He Messaged the rep that was originally working on the case (cayetano from the previous message) and she had told him that they were having an issue with the claim being rejected from when we got them back in January  He also explained that if they kept having trouble they would reach back out to us      I asked for a call ref# he provided me with his name and employee id #  Aroldo CHOE  ID# [id number]

## 2021-04-12 ENCOUNTER — TELEPHONE (OUTPATIENT)
Dept: OBGYN CLINIC | Facility: CLINIC | Age: 61
End: 2021-04-12

## 2021-04-12 NOTE — TELEPHONE ENCOUNTER
[facility] pharmacy calling , patient requested refill on euflexxa medication       Script can be faxed to [phone]

## 2021-04-13 NOTE — TELEPHONE ENCOUNTER
Patient just had bilateral Euflexxa Injections with Dr Oxana Gomez her last one was in February of this year 2021  Looks to be a [facility] patient  I will call patient today to advise

## 2021-04-13 NOTE — TELEPHONE ENCOUNTER
Patient called back and advised she never called for a refill Euflexxa, she just got finished with them

## 2021-04-14 ENCOUNTER — OFFICE VISIT (OUTPATIENT)
Dept: OBGYN CLINIC | Facility: CLINIC | Age: 61
End: 2021-04-14
Payer: COMMERCIAL

## 2021-04-14 ENCOUNTER — APPOINTMENT (OUTPATIENT)
Dept: RADIOLOGY | Facility: CLINIC | Age: 61
End: 2021-04-14
Payer: COMMERCIAL

## 2021-04-14 VITALS
HEIGHT: 62 IN | WEIGHT: 177.4 LBS | HEART RATE: 59 BPM | DIASTOLIC BLOOD PRESSURE: 76 MMHG | SYSTOLIC BLOOD PRESSURE: 128 MMHG | BODY MASS INDEX: 32.65 KG/M2

## 2021-04-14 DIAGNOSIS — M25.562 LEFT KNEE PAIN, UNSPECIFIED CHRONICITY: ICD-10-CM

## 2021-04-14 DIAGNOSIS — M25.462 EFFUSION OF LEFT KNEE: ICD-10-CM

## 2021-04-14 DIAGNOSIS — M25.562 CHRONIC PAIN OF LEFT KNEE: ICD-10-CM

## 2021-04-14 DIAGNOSIS — G89.29 CHRONIC PAIN OF LEFT KNEE: ICD-10-CM

## 2021-04-14 DIAGNOSIS — M17.0 BILATERAL PRIMARY OSTEOARTHRITIS OF KNEE: Primary | ICD-10-CM

## 2021-04-14 PROCEDURE — 73562 X-RAY EXAM OF KNEE 3: CPT

## 2021-04-14 PROCEDURE — 99213 OFFICE O/P EST LOW 20 MIN: CPT | Performed by: PHYSICIAN ASSISTANT

## 2021-04-14 NOTE — PROGRESS NOTES
Assessment/Plan:  1  Bilateral primary osteoarthritis of knee  Diclofenac Sodium (VOLTAREN) 1 %   2  Chronic pain of left knee  XR knee 3 vw left non injury    Diclofenac Sodium (VOLTAREN) 1 %   3  Effusion of left knee  Diclofenac Sodium (VOLTAREN) 1 %      Rosa Carlson is a very pleasant 28-year-old female very well known to our practice for her bilateral osteoarthritis and activity related discomfort  Updated imaging today of her left knee show significant progression of her arthritis which remains mild-to-moderate primarily in the medial patellofemoral compartments  She does have a slight effusion on exam today  By her history it sounds as if she had an arthritic Baker cyst over the past 5 days  However she has seen significant improvement in her symptoms since last night and the effusion has begun to resolve  We discussed that it is too early to repeat viscosupplementation  I also recommended against a cortisone injection today as her symptoms have been improving  We discussed continuing conservative treatment with ice, Voltaren gel up to 4 times a day, and oral ibuprofen as needed  She expressed understanding and agreement with this plan  She will notify our office if she has recurrence of her symptoms, at which time she is eligible to return at any time for a cortisone injection  All questions addressed    Subjective: Left knee follow-up    Patient ID: Cosmo Regan is a 62 y o  female  Rosa Carlson is a very pleasant 28-year-old female very well known to our practice for her activity related bilateral knee pain with known underlying osteoarthritis  She recently completed the Euflexxa series in February of this year, which she states did provide some relief  She is presenting today with a recurrence of left knee pain and swelling  She denies any acute injuries, but over past 5 days noted worsening discomfort and swelling with activity in her left knee  This is made it difficult to ambulate    She denies any mechanical symptoms  She did begin taking ibuprofen and use previously prescribed Voltaren gel last night, and noted that the pain and swelling her left knee has improved this morning  Review of Systems   Constitutional: Positive for activity change  HENT: Negative  Eyes: Negative  Respiratory: Negative  Cardiovascular: Negative  Gastrointestinal: Negative  Endocrine: Negative  Genitourinary: Negative  Musculoskeletal: Positive for arthralgias, back pain, gait problem, joint swelling and myalgias  Skin: Negative  Allergic/Immunologic: Negative  Hematological: Negative  Psychiatric/Behavioral: Negative  Past Medical History:   Diagnosis Date    Arthritis     Asthma     Hypothyroidism     hypo    Pemphigus vulgaris     Thalassemia        Past Surgical History:   Procedure Laterality Date    BREAST BIOPSY Right     benign    COLONOSCOPY      AL KNEE SCOPE,MED/LAT MENISECTOMY Right 2020    Procedure: MENISCECTOMY MEDIAL;  Surgeon: Percy Spaulding DO;  Location: [facility];  Service: Orthopedics       Family History   Problem Relation Age of Onset    Asthma Father     Hypertension Maternal Grandmother     Liver cancer Maternal Grandmother     Diabetes Maternal Grandmother     Cancer Family         multiple relatives    Seizures Sister     Asthma Brother     Hypertension Brother     Leukemia Maternal Uncle     Heart attack Cousin     Bone cancer Maternal Uncle     Substance Abuse Neg Hx     Mental illness Neg Hx        Social History     Occupational History    Not on file   Tobacco Use    Smoking status: Former Smoker     Years: 10 00     Quit date:      Years since quittin 2    Smokeless tobacco: Never Used   Substance and Sexual Activity    Alcohol use:  Yes     Alcohol/week: 1 0 standard drinks     Types: 1 Glasses of wine per week     Frequency: 2-4 times a month     Drinks per session: 1 or 2     Binge frequency: Less than monthly Comment: socially     Drug use: Never    Sexual activity: Yes     Partners: Male         Current Outpatient Medications:     albuterol (PROVENTIL HFA,VENTOLIN HFA) 90 mcg/act inhaler, inhale 2 puffs by mouth and INTO THE LUNGS every 6 hours if needed for wheezing, Disp: 8 5 g, Rfl: 3    ALPRAZolam (XANAX) 0 25 mg tablet, Take 1 tablet (0 25 mg total) by mouth daily at bedtime as needed for anxiety, Disp: 30 tablet, Rfl: 1    aspirin (ECOTRIN LOW STRENGTH) 81 mg EC tablet, Take 1 tablet (81 mg total) by mouth daily, Disp: 90 tablet, Rfl: 3    citalopram (CeleXA) 10 mg tablet, take 1 tablet by mouth once daily, Disp: 90 tablet, Rfl: 1    ergocalciferol (VITAMIN D2) 50,000 units, Take 1 capsule (50,000 Units total) by mouth once a week, Disp: 12 capsule, Rfl: 1    levothyroxine 25 mcg tablet, Take 1 tablet (25 mcg total) by mouth daily, Disp: 90 tablet, Rfl: 1    Omega-3 Fatty Acids (fish oil) 1,000 mg, Take two tablet twice a day (Patient taking differently: 4,000 mg daily Take two tablet twice a day), Disp: 360 capsule, Rfl: 3    Diclofenac Sodium (VOLTAREN) 1 %, Apply 2 g topically 4 (four) times a day, Disp: 1 Tube, Rfl: 1    fenofibrate (TRICOR) 54 MG tablet, Take 1 tablet (54 mg total) by mouth daily (Patient not taking: Reported on 4/2/2021), Disp: 90 tablet, Rfl: 1    fluticasone (FLONASE) 50 mcg/act nasal spray, instill 2 sprays into each nostril once daily, Disp: 16 g, Rfl: 3    Allergies   Allergen Reactions    Iodine - Food Allergy Itching and Vomiting    Shellfish-Derived Products - Food Allergy Itching and Vomiting    Levaquin [Levofloxacin] Rash       Objective:  Vitals:    04/14/21 1013   BP: 128/76   Pulse: 59       Body mass index is 32 45 kg/m²  Left Knee Exam     Muscle Strength   The patient has normal left knee strength  Tenderness   The patient is experiencing tenderness in the patella and medial joint line      Range of Motion   Extension: 0   Flexion: 130     Tests Varus: negative Valgus: negative  Lachman:  Anterior - negative    Posterior - negative  Drawer:  Anterior - negative     Posterior - negative  Patellar apprehension: negative    Other   Erythema: absent  Scars: absent  Sensation: normal  Pulse: present  Swelling: none  Effusion: effusion (scant) present    Comments:  Negative patellofemoral grind  Knee is stable ligamentous exam  No erythema effusion or warmth  Medial joint line MCL discomfort          Observations   Left Knee   Positive for effusion (scant)  Physical Exam  Vitals signs and nursing note reviewed  Constitutional:       Appearance: She is well-developed  Comments: Body mass index is 32 45 kg/m²  HENT:      Head: Normocephalic and atraumatic  Right Ear: External ear normal       Left Ear: External ear normal    Neck:      Musculoskeletal: Normal range of motion  Cardiovascular:      Rate and Rhythm: Normal rate  Pulmonary:      Effort: Pulmonary effort is normal    Abdominal:      Palpations: Abdomen is soft  Musculoskeletal:      Left knee: She exhibits effusion (scant)  Comments: See ortho exam   Skin:     General: Skin is warm and dry  Neurological:      Mental Status: She is alert and oriented to person, place, and time  Psychiatric:         Behavior: Behavior normal          Thought Content: Thought content normal          Judgment: Judgment normal          I have personally reviewed pertinent films in PACS  Of the updated weight-bearing x-rays taken today of her left knee which demonstrate mild to moderate degenerative changes with medial joint space narrowing and early sclerosis  She also has osteophytosis, primarily of the medial and patellofemoral compartments    There is no acute fracture, dislocation, or lytic or blastic lesion

## 2021-04-26 DIAGNOSIS — E55.9 VITAMIN D DEFICIENCY: ICD-10-CM

## 2021-04-27 ENCOUNTER — OFFICE VISIT (OUTPATIENT)
Dept: FAMILY MEDICINE CLINIC | Facility: CLINIC | Age: 61
End: 2021-04-27

## 2021-04-27 VITALS
SYSTOLIC BLOOD PRESSURE: 130 MMHG | WEIGHT: 178 LBS | OXYGEN SATURATION: 98 % | HEIGHT: 63 IN | TEMPERATURE: 98.2 F | DIASTOLIC BLOOD PRESSURE: 70 MMHG | HEART RATE: 66 BPM | RESPIRATION RATE: 18 BRPM | BODY MASS INDEX: 31.54 KG/M2

## 2021-04-27 DIAGNOSIS — E78.1 HYPERTRIGLYCERIDEMIA: ICD-10-CM

## 2021-04-27 DIAGNOSIS — E55.9 VITAMIN D DEFICIENCY: ICD-10-CM

## 2021-04-27 DIAGNOSIS — L10.0 PEMPHIGUS VULGARIS: ICD-10-CM

## 2021-04-27 DIAGNOSIS — D56.9 MEDITERRANEAN ANEMIA: ICD-10-CM

## 2021-04-27 DIAGNOSIS — J45.20 MILD INTERMITTENT ASTHMA WITHOUT COMPLICATION: ICD-10-CM

## 2021-04-27 DIAGNOSIS — J30.2 SEASONAL ALLERGIC RHINITIS, UNSPECIFIED TRIGGER: ICD-10-CM

## 2021-04-27 DIAGNOSIS — E78.2 MIXED HYPERLIPIDEMIA: ICD-10-CM

## 2021-04-27 DIAGNOSIS — F41.9 ANXIETY: ICD-10-CM

## 2021-04-27 DIAGNOSIS — E03.9 ACQUIRED HYPOTHYROIDISM: Primary | ICD-10-CM

## 2021-04-27 PROCEDURE — 99214 OFFICE O/P EST MOD 30 MIN: CPT | Performed by: NURSE PRACTITIONER

## 2021-04-27 PROCEDURE — 1036F TOBACCO NON-USER: CPT | Performed by: NURSE PRACTITIONER

## 2021-04-27 PROCEDURE — 3725F SCREEN DEPRESSION PERFORMED: CPT | Performed by: NURSE PRACTITIONER

## 2021-04-27 PROCEDURE — 3008F BODY MASS INDEX DOCD: CPT | Performed by: NURSE PRACTITIONER

## 2021-04-27 RX ORDER — ERGOCALCIFEROL 1.25 MG/1
50000 CAPSULE ORAL WEEKLY
Qty: 12 CAPSULE | Refills: 1 | Status: SHIPPED | OUTPATIENT
Start: 2021-04-27 | End: 2021-10-11

## 2021-04-27 RX ORDER — CITALOPRAM 10 MG/1
10 TABLET ORAL DAILY
Qty: 90 TABLET | Refills: 1 | Status: SHIPPED | OUTPATIENT
Start: 2021-04-27 | End: 2021-08-23 | Stop reason: SDUPTHER

## 2021-04-27 RX ORDER — LEVOTHYROXINE SODIUM 0.03 MG/1
25 TABLET ORAL DAILY
Qty: 90 TABLET | Refills: 1 | Status: SHIPPED | OUTPATIENT
Start: 2021-04-27 | End: 2022-03-09 | Stop reason: SDUPTHER

## 2021-04-27 RX ORDER — FLUTICASONE PROPIONATE 50 MCG
1 SPRAY, SUSPENSION (ML) NASAL DAILY
Qty: 16 G | Refills: 0 | Status: SHIPPED | OUTPATIENT
Start: 2021-04-27 | End: 2021-07-12

## 2021-04-27 NOTE — PATIENT INSTRUCTIONS
Heart healthy, carbohydrate controlled diet- limit red meat, limit saturated fat, moderate salt intake, limit junk food, etc    Regular exercise  Stress management  Routine labwork and screenings as ordered     Continue all current medications

## 2021-04-27 NOTE — PROGRESS NOTES
Assessment/Plan:  1  Acquired hypothyroidism  Stable  Continue same dose of meds  - levothyroxine 25 mcg tablet; Take 1 tablet (25 mcg total) by mouth daily  Dispense: 90 tablet; Refill: 1  2  Mild intermittent asthma without complication  Stable  Rescue inhaler 2 puffs every 4-6 hours as needed for shortness breath, chest tightness, bronchospasm, coughing fits  3  Anxiety  Stress management  Activities to divert attention when possible  Conscious breathing techniques as discussed  Coping mechanisms and strategies vary from person to person so try to utilize strategies that you think may work for you (such as meditation, music, etc  )  Consider or continue counseling as discussed  Anti anxiety/depression medications as prescribed  - citalopram (CeleXA) 10 mg tablet; Take 1 tablet (10 mg total) by mouth daily  Dispense: 90 tablet; Refill: 1  4  Mediterranean anemia  Stable  Managed by hematology  5  Mixed hyperlipidemia  Heart healthy diet  Will repeat labs in 6 months  6  Hypertriglyceridemia  Heart healthy diet  Will repeat labs in 6 months  7  Pemphigus vulgaris  Managed by hematology  Stable  8  Vitamin D deficiency  - ergocalciferol (VITAMIN D2) 50,000 units; Take 1 capsule (50,000 Units total) by mouth once a week  Dispense: 12 capsule; Refill: 1  9  Seasonal allergic rhinitis, unspecified trigger  - fluticasone (FLONASE) 50 mcg/act nasal spray; 1 spray into each nostril daily  Dispense: 16 g; Refill: 0    BMI Counseling: Body mass index is 32 04 kg/m²  The BMI is above normal  Nutrition recommendations include reducing portion sizes, decreasing overall calorie intake, moderation in carbohydrate intake, reducing intake of saturated fat and trans fat and reducing intake of cholesterol  Exercise recommendations include exercising 3-5 times per week  Depression Screening Follow-up Plan: Patient's depression screening was negative with a PHQ-2 score of 0    Clinically patient does not have depression  No treatment is required  Patient was counseled regarding instructions for management which included: impression/diagnosis, risk/benefits of treatment options, importance of compliance with treatment, risk factor reduction, and prognosis  I have reviewed the instructions with the patient answering all questions and patient verbalized understanding  Subjective:      Patient ID: Alana Lovelace is a 62 y o  female who presents for follow up    Here for follow up  Sees hematologist for pemphigus and mediterrean anemia  Has been following  Having issues with knees, arthritis in both  Seeing ortho, Dr Sylvie Mccann  On nsaid currently  May need knee replacement  Is taking ibuprofen 800mg for pain  Needs med refills  Is taking celexa for anxiety  Has prn xanax but rarely uses  Feels like anxiety well controlled on current dose of celexa  The following portions of the patient's history were reviewed and updated as appropriate: allergies, current medications, past family history, past medical history, past social history, past surgical history and problem list     Review of Systems   Constitutional: Negative for fatigue  HENT: Negative for congestion  Respiratory: Negative for chest tightness and shortness of breath  Cardiovascular: Negative for chest pain  Endocrine: Negative for cold intolerance, heat intolerance, polydipsia, polyphagia and polyuria  Musculoskeletal: Positive for arthralgias (bilateral knees)  Skin: Negative for color change and pallor  Allergic/Immunologic: Positive for environmental allergies  Neurological: Negative for dizziness and headaches  Psychiatric/Behavioral: Negative for dysphoric mood, self-injury and suicidal ideas  The patient is nervous/anxious  Objective:    Labs done 3/10/2021:  WBC 6 1  hgb 13 4, hct 42 7  3/18/2021:  Chol 188  tg 104  hdl 59  ldl 110    Reviewed lab/diagnostic results with pt including both normal and abnormal findings  In depth counseling and instructions given  All questions answered during visit  /70   Pulse 66   Temp 98 2 °F (36 8 °C) (Temporal)   Resp 18   Ht 5' 2 5" (1 588 m)   Wt 80 7 kg (178 lb)   SpO2 98%   BMI 32 04 kg/m²          Physical Exam  Vitals signs reviewed  Constitutional:       General: She is not in acute distress  Appearance: Normal appearance  She is not ill-appearing  Neck:      Musculoskeletal: Normal range of motion and neck supple  Cardiovascular:      Rate and Rhythm: Normal rate and regular rhythm  Pulmonary:      Effort: Pulmonary effort is normal  No respiratory distress  Breath sounds: Normal breath sounds  No wheezing  Musculoskeletal:      Right lower leg: No edema  Left lower leg: No edema  Skin:     General: Skin is warm and dry  Coloration: Skin is not jaundiced or pale  Neurological:      General: No focal deficit present  Mental Status: She is alert and oriented to person, place, and time  Cranial Nerves: No cranial nerve deficit  Sensory: No sensory deficit  Psychiatric:         Mood and Affect: Mood normal          Behavior: Behavior normal          Thought Content:  Thought content normal          Judgment: Judgment normal

## 2021-04-28 RX ORDER — ERGOCALCIFEROL 1.25 MG/1
CAPSULE ORAL
Qty: 3 CAPSULE | OUTPATIENT
Start: 2021-04-28

## 2021-05-03 ENCOUNTER — TELEPHONE (OUTPATIENT)
Dept: OBGYN CLINIC | Facility: HOSPITAL | Age: 61
End: 2021-05-03

## 2021-05-03 NOTE — TELEPHONE ENCOUNTER
Patient sees Dr Armas    Patient is still in pain & she thinks she has more then arthritis in her L knee  She is requesting a MRI if possible      cb - [phone]

## 2021-05-04 DIAGNOSIS — M17.0 BILATERAL PRIMARY OSTEOARTHRITIS OF KNEE: ICD-10-CM

## 2021-05-04 DIAGNOSIS — M25.562 CHRONIC PAIN OF LEFT KNEE: Primary | ICD-10-CM

## 2021-05-04 DIAGNOSIS — M23.92 INTERNAL DERANGEMENT OF LEFT KNEE: ICD-10-CM

## 2021-05-04 DIAGNOSIS — G89.29 CHRONIC PAIN OF LEFT KNEE: Primary | ICD-10-CM

## 2021-05-04 DIAGNOSIS — M25.462 EFFUSION OF LEFT KNEE: ICD-10-CM

## 2021-05-04 NOTE — TELEPHONE ENCOUNTER
I ordered an MRI without contrast of her left knee  Please have her call Central Scheduling to set up an appointment   Thanks

## 2021-06-02 ENCOUNTER — TELEPHONE (OUTPATIENT)
Dept: OBGYN CLINIC | Facility: HOSPITAL | Age: 61
End: 2021-06-02

## 2021-06-02 NOTE — TELEPHONE ENCOUNTER
Patient sees Dr Remington Montaño  Patient called because she didn't show up for her MRI of Left knee that was scheduled on 5/28 since she is doing better  She asked, if Dr Remington Montaño will be able to order an MRI for her Right leg since that is the one bothering her  Please call back to # [phone]  Thank you

## 2021-06-09 ENCOUNTER — OFFICE VISIT (OUTPATIENT)
Dept: OBGYN CLINIC | Facility: CLINIC | Age: 61
End: 2021-06-09
Payer: COMMERCIAL

## 2021-06-09 VITALS
HEIGHT: 63 IN | BODY MASS INDEX: 31.36 KG/M2 | SYSTOLIC BLOOD PRESSURE: 164 MMHG | DIASTOLIC BLOOD PRESSURE: 88 MMHG | HEART RATE: 60 BPM | WEIGHT: 177 LBS

## 2021-06-09 DIAGNOSIS — M17.0 BILATERAL PRIMARY OSTEOARTHRITIS OF KNEE: Primary | ICD-10-CM

## 2021-06-09 PROCEDURE — 99213 OFFICE O/P EST LOW 20 MIN: CPT | Performed by: ORTHOPAEDIC SURGERY

## 2021-06-09 PROCEDURE — 1036F TOBACCO NON-USER: CPT | Performed by: ORTHOPAEDIC SURGERY

## 2021-06-09 PROCEDURE — 3008F BODY MASS INDEX DOCD: CPT | Performed by: ORTHOPAEDIC SURGERY

## 2021-06-09 NOTE — PROGRESS NOTES
Assessment/Plan:  1  Bilateral primary osteoarthritis of knee        Parker Ortega is a very pleasant 55-year-old female very well known to our practice for her bilateral knee pain, primarily due to her underlying patellofemoral osteoarthritis  We discussed that all of her symptoms are consistent with her findings on x-ray  We do not have any concern for any other internal derangement at this time, therefore there is no indication for further imaging  We are pleased that she seems to be doing well with her new orthotics and encouraged her to continue with those as well as over-the-counter NSAIDs as needed for discomfort  We will plan to see her back in August, when she is eligible for repeat viscosupplementation in both knees  We can submit for authorization with needed  She expressed understanding all of her questions were addressed today    Subjective: Bilateral knee follow up    Patient ID: Jaz Bales is a 62 y o  female  Parker Ortega is a pleasant 62year old female well known to our practice for her bilateral knee pain  After her last appointment, her left knee pain resolved with NSAIDs, so she cancelled her MRI  Since then, her right knee has been causing symptoms in the front of the knee, especially with stairs  It occasionally gives way from her when she standing  She denies new injury  She reports that she [place] mall, which seem to be significantly helping with her knee discomfort    Review of Systems   Constitutional: Negative  HENT: Negative  Eyes: Negative  Respiratory: Negative  Cardiovascular: Negative  Gastrointestinal: Negative  Endocrine: Negative  Genitourinary: Negative  Musculoskeletal: Positive for arthralgias, joint swelling and myalgias  Skin: Negative  Allergic/Immunologic: Negative  Neurological: Negative  Hematological: Negative  Psychiatric/Behavioral: Negative            Past Medical History:   Diagnosis Date    Arthritis     Asthma     Hypothyroidism     hypo    Pemphigus vulgaris     Thalassemia        Past Surgical History:   Procedure Laterality Date    BREAST BIOPSY Right     benign    COLONOSCOPY      MT KNEE SCOPE,MED/LAT MENISECTOMY Right 2020    Procedure: MENISCECTOMY MEDIAL;  Surgeon: Wilma Reyes DO;  Location: [facility];  Service: Orthopedics       Family History   Problem Relation Age of Onset    Asthma Father     Hypertension Maternal Grandmother     Liver cancer Maternal Grandmother     Diabetes Maternal Grandmother     Cancer Family         multiple relatives    Seizures Sister     Asthma Brother     Hypertension Brother     Leukemia Maternal Uncle     Heart attack Cousin     Bone cancer Maternal Uncle     Substance Abuse Neg Hx     Mental illness Neg Hx        Social History     Occupational History    Not on file   Tobacco Use    Smoking status: Former Smoker     Years: 10 00     Quit date:      Years since quittin 4    Smokeless tobacco: Never Used   Substance and Sexual Activity    Alcohol use:  Yes     Alcohol/week: 1 0 standard drinks     Types: 1 Glasses of wine per week     Frequency: 2-4 times a month     Drinks per session: 1 or 2     Binge frequency: Less than monthly     Comment: socially     Drug use: Never    Sexual activity: Yes     Partners: Male         Current Outpatient Medications:     albuterol (PROVENTIL HFA,VENTOLIN HFA) 90 mcg/act inhaler, inhale 2 puffs by mouth and INTO THE LUNGS every 6 hours if needed for wheezing, Disp: 8 5 g, Rfl: 3    ALPRAZolam (XANAX) 0 25 mg tablet, Take 1 tablet (0 25 mg total) by mouth daily at bedtime as needed for anxiety, Disp: 30 tablet, Rfl: 1    aspirin (ECOTRIN LOW STRENGTH) 81 mg EC tablet, Take 1 tablet (81 mg total) by mouth daily, Disp: 90 tablet, Rfl: 3    citalopram (CeleXA) 10 mg tablet, Take 1 tablet (10 mg total) by mouth daily, Disp: 90 tablet, Rfl: 1    Diclofenac Sodium (VOLTAREN) 1 %, Apply 2 g topically 4 (four) times a day, Disp: 1 Tube, Rfl: 1    ergocalciferol (VITAMIN D2) 50,000 units, Take 1 capsule (50,000 Units total) by mouth once a week, Disp: 12 capsule, Rfl: 1    fluticasone (FLONASE) 50 mcg/act nasal spray, 1 spray into each nostril daily, Disp: 16 g, Rfl: 0    levothyroxine 25 mcg tablet, Take 1 tablet (25 mcg total) by mouth daily, Disp: 90 tablet, Rfl: 1    Omega-3 Fatty Acids (fish oil) 1,000 mg, Take two tablet twice a day (Patient taking differently: 4,000 mg daily Take two tablet twice a day), Disp: 360 capsule, Rfl: 3    Allergies   Allergen Reactions    Iodine - Food Allergy Itching and Vomiting    Shellfish-Derived Products - Food Allergy Itching and Vomiting    Levaquin [Levofloxacin] Rash       Objective:  Vitals:    06/09/21 1741   BP: 164/88   Pulse: 60       Body mass index is 31 86 kg/m²  Right Knee Exam     Muscle Strength   The patient has normal right knee strength  Tenderness   The patient is experiencing tenderness in the patella and medial joint line (minimal)  Range of Motion   Extension:  0 normal   Flexion:  130 normal     Tests   Varus: negative Valgus: negative  Drawer:  Anterior - negative    Posterior - negative  Patellar apprehension: negative    Other   Erythema: absent  Scars: present (healed arthroscopic incisions)  Sensation: normal  Pulse: present  Swelling: none  Effusion: no effusion present    Comments:  Negative patellofemoral grind  Knee is stable ligamentous exam  No erythema, effusion, or warmth        Left Knee Exam     Muscle Strength   The patient has normal left knee strength  Tenderness   The patient is experiencing tenderness in the patella and medial joint line      Range of Motion   Extension:  0 normal   Flexion:  130 normal     Tests   Varus: negative Valgus: negative  Drawer:  Anterior - negative     Posterior - negative  Patellar apprehension: negative    Other   Erythema: absent  Scars: absent  Sensation: normal  Pulse: present  Swelling: none  Effusion: effusion (scant) present    Comments:  Negative patellofemoral grind  Knee is stable ligamentous exam  No erythema effusion or warmth            Observations   Left Knee   Positive for effusion (scant)  Right Knee   Negative for effusion  Physical Exam  Vitals signs and nursing note reviewed  Constitutional:       Appearance: She is well-developed  Comments: Body mass index is 31 86 kg/m²  HENT:      Head: Normocephalic and atraumatic  Right Ear: External ear normal       Left Ear: External ear normal    Eyes:      Extraocular Movements: Extraocular movements intact  Neck:      Musculoskeletal: Normal range of motion  Cardiovascular:      Rate and Rhythm: Normal rate  Pulmonary:      Effort: Pulmonary effort is normal    Abdominal:      Palpations: Abdomen is soft  Musculoskeletal:      Right knee: She exhibits no effusion  Left knee: She exhibits effusion (scant)  Comments: See ortho exam   Skin:     General: Skin is warm and dry  Neurological:      Mental Status: She is alert and oriented to person, place, and time  Mental status is at baseline  Psychiatric:         Mood and Affect: Mood normal          Behavior: Behavior normal          Thought Content:  Thought content normal          Judgment: Judgment normal

## 2021-07-06 ENCOUNTER — HOSPITAL ENCOUNTER (OUTPATIENT)
Dept: RADIOLOGY | Facility: HOSPITAL | Age: 61
Discharge: HOME | End: 2021-07-06
Payer: COMMERCIAL

## 2021-07-06 VITALS — BODY MASS INDEX: 31.36 KG/M2 | WEIGHT: 177 LBS | HEIGHT: 63 IN

## 2021-07-06 DIAGNOSIS — Z12.39 BREAST CANCER SCREENING: ICD-10-CM

## 2021-07-06 PROCEDURE — 77067 SCR MAMMO BI INCL CAD: CPT

## 2021-07-06 PROCEDURE — 77063 BREAST TOMOSYNTHESIS BI: CPT

## 2021-07-11 DIAGNOSIS — J30.2 SEASONAL ALLERGIC RHINITIS, UNSPECIFIED TRIGGER: ICD-10-CM

## 2021-07-12 RX ORDER — FLUTICASONE PROPIONATE 50 MCG
SPRAY, SUSPENSION (ML) NASAL
Qty: 16 G | Refills: 0 | Status: SHIPPED | OUTPATIENT
Start: 2021-07-12 | End: 2021-09-20 | Stop reason: SDUPTHER

## 2021-08-02 ENCOUNTER — TELEPHONE (OUTPATIENT)
Dept: OBGYN CLINIC | Facility: HOSPITAL | Age: 61
End: 2021-08-02

## 2021-08-02 NOTE — TELEPHONE ENCOUNTER
Yes she may start bilateral knee viscosupplementation on that date if her insurance permits the authorization  Please open this authorization process for her

## 2021-08-02 NOTE — TELEPHONE ENCOUNTER
DR Jodie Batres  RE: 3 series visco    Patient asked to start auth/process for 3 series bilateral knee VISCO   Patient has f/u appt  08 11, and would like to start visco on this day    Current referral is "closed"

## 2021-08-02 NOTE — TELEPHONE ENCOUNTER
Hi Dr Tato Shaffer,    I called and spoke to the patient and advised that there is a possibility of the patient not being able to get the injection  She expressed understanding and I advised to give me until Friday and I would let her know if we had the injections on time!     Thanks

## 2021-08-11 ENCOUNTER — PROCEDURE VISIT (OUTPATIENT)
Dept: OBGYN CLINIC | Facility: CLINIC | Age: 61
End: 2021-08-11
Payer: COMMERCIAL

## 2021-08-11 VITALS — WEIGHT: 173 LBS | BODY MASS INDEX: 31.14 KG/M2

## 2021-08-11 DIAGNOSIS — G89.29 CHRONIC PAIN OF BOTH KNEES: ICD-10-CM

## 2021-08-11 DIAGNOSIS — M25.562 CHRONIC PAIN OF BOTH KNEES: ICD-10-CM

## 2021-08-11 DIAGNOSIS — M17.0 BILATERAL PRIMARY OSTEOARTHRITIS OF KNEE: Primary | ICD-10-CM

## 2021-08-11 DIAGNOSIS — M25.561 CHRONIC PAIN OF BOTH KNEES: ICD-10-CM

## 2021-08-11 PROCEDURE — 20610 DRAIN/INJ JOINT/BURSA W/O US: CPT | Performed by: ORTHOPAEDIC SURGERY

## 2021-08-11 NOTE — PROGRESS NOTES
Assessment/Plan:  1  Bilateral primary osteoarthritis of knee  Large joint arthrocentesis   2  Chronic pain of both knees  Large joint arthrocentesis       Rosario Aj is a pleasant 44-year-old female presenting today for the 1st of 3 Euflexxa injections for her bilateral knee pain  Due to her underlying osteoarthritis  She consented to and underwent the injections as detailed below, which she tolerated well without difficulty or complication  Post injection instructions were provided  We will plan to see her back next week and the week after to complete the series  All questions addressed    Large joint arthrocentesis: bilateral knee  Universal Protocol:  Consent: Verbal consent obtained  Risks and benefits: risks, benefits and alternatives were discussed  Consent given by: patient  Time out: Immediately prior to procedure a "time out" was called to verify the correct patient, procedure, equipment, support staff and site/side marked as required  Timeout called at: 8/11/2021 5:20 PM   Site marked: the operative site was marked  Patient identity confirmed: verbally with patient    Supporting Documentation  Indications: pain   Procedure Details  Location: knee - bilateral knee  Preparation: Patient was prepped and draped in the usual sterile fashion  Needle size: 20 G  Ultrasound guidance: no  Approach: anterolateral    Medications (Right): 20 mg Sodium Hyaluronate 20 MG/2MLSpecialty Pharmacy Supplied (Right): for right side  Medications (Left): 20 mg Sodium Hyaluronate 20 MG/2ML   Specialty Pharmacy Supplied (Left): for left side  Patient tolerance: patient tolerated the procedure well with no immediate complications  Dressing:  Sterile dressing applied        Subjective: Bilateral knee Euflexxa #1    Patient ID: Fely Crooks is a 62 y o  female  Rosario Aj is a pleasant 44-year-old female presenting today for the 1st of 3 Euflexxa injections for her bilateral knees    She denies new injuries from her previous visit   She has continued wearing the orthotics, which she feels has helped reduce the frequency and severity of her knee pain    Review of Systems   Constitutional: Positive for activity change  HENT: Negative  Eyes: Negative  Respiratory: Negative  Cardiovascular: Negative  Gastrointestinal: Negative  Endocrine: Negative  Genitourinary: Negative  Musculoskeletal: Positive for arthralgias, gait problem, joint swelling and myalgias  Skin: Negative  Allergic/Immunologic: Negative  Hematological: Negative  Psychiatric/Behavioral: Negative  Past Medical History:   Diagnosis Date    Arthritis     Asthma     Hypothyroidism     hypo    Pemphigus vulgaris     Thalassemia        Past Surgical History:   Procedure Laterality Date    BREAST BIOPSY Right     benign-not sure of exact year    COLONOSCOPY      [place] KNEE SCOPE,MED/LAT MENISECTOMY Right 2020    Procedure: MENISCECTOMY MEDIAL;  Surgeon: Antonella Ortega DO;  Location: [address] [place] [address];  Service: Orthopedics       Family History   Problem Relation Age of Onset    Asthma Father     Hypertension Maternal Grandmother     Liver cancer Maternal Grandmother     Diabetes Maternal Grandmother     Cancer Family         multiple relatives    Seizures Sister     Asthma Brother     Hypertension Brother     Leukemia Maternal Uncle     Heart attack Cousin     Bone cancer Maternal Uncle     No Known Problems Paternal Aunt     Substance Abuse Neg Hx     Mental illness Neg Hx        Social History     Occupational History    Not on file   Tobacco Use    Smoking status: Former Smoker     Years: 10 00     Quit date:      Years since quittin 6    Smokeless tobacco: Never Used   Vaping Use    Vaping Use: Never used   Substance and Sexual Activity    Alcohol use:  Yes     Alcohol/week: 1 0 standard drinks     Types: 1 Glasses of wine per week     Comment: socially     Drug use: Never    Sexual activity: Yes Partners: Male         Current Outpatient Medications:     albuterol (PROVENTIL HFA,VENTOLIN HFA) 90 mcg/act inhaler, inhale 2 puffs by mouth and INTO THE LUNGS every 6 hours if needed for wheezing, Disp: 8 5 g, Rfl: 3    ALPRAZolam (XANAX) 0 25 mg tablet, Take 1 tablet (0 25 mg total) by mouth daily at bedtime as needed for anxiety, Disp: 30 tablet, Rfl: 1    aspirin (ECOTRIN LOW STRENGTH) 81 mg EC tablet, Take 1 tablet (81 mg total) by mouth daily, Disp: 90 tablet, Rfl: 3    citalopram (CeleXA) 10 mg tablet, Take 1 tablet (10 mg total) by mouth daily, Disp: 90 tablet, Rfl: 1    Diclofenac Sodium (VOLTAREN) 1 %, Apply 2 g topically 4 (four) times a day, Disp: 1 Tube, Rfl: 1    ergocalciferol (VITAMIN D2) 50,000 units, Take 1 capsule (50,000 Units total) by mouth once a week, Disp: 12 capsule, Rfl: 1    fluticasone (FLONASE) 50 mcg/act nasal spray, instill 1 spray into each nostril once daily, Disp: 16 g, Rfl: 0    levothyroxine 25 mcg tablet, Take 1 tablet (25 mcg total) by mouth daily, Disp: 90 tablet, Rfl: 1    Omega-3 Fatty Acids (fish oil) 1,000 mg, Take two tablet twice a day (Patient taking differently: 4,000 mg daily Take two tablet twice a day), Disp: 360 capsule, Rfl: 3    Allergies   Allergen Reactions    Iodine - Food Allergy Itching and Vomiting    Shellfish-Derived Products - Food Allergy Itching and Vomiting    Levaquin [Levofloxacin] Rash       Objective: There were no vitals filed for this visit  Body mass index is 31 14 kg/m²  Ortho Exam    Physical Exam  Vitals and nursing note reviewed  Constitutional:       Appearance: She is well-developed  Comments: Body mass index is 31 14 kg/m²  HENT:      Head: Normocephalic and atraumatic  Right Ear: External ear normal       Left Ear: External ear normal    Cardiovascular:      Rate and Rhythm: Normal rate  Pulses: Normal pulses     Pulmonary:      Effort: Pulmonary effort is normal    Abdominal:      Palpations: Abdomen is soft  Musculoskeletal:      Cervical back: Normal range of motion  Comments: See ortho exam   Skin:     General: Skin is warm and dry  Neurological:      General: No focal deficit present  Mental Status: She is alert and oriented to person, place, and time  Mental status is at baseline  Psychiatric:         Mood and Affect: Mood normal          Behavior: Behavior normal          Thought Content:  Thought content normal          Judgment: Judgment normal

## 2021-08-19 ENCOUNTER — PROCEDURE VISIT (OUTPATIENT)
Dept: OBGYN CLINIC | Facility: CLINIC | Age: 61
End: 2021-08-19
Payer: COMMERCIAL

## 2021-08-19 DIAGNOSIS — M17.0 BILATERAL PRIMARY OSTEOARTHRITIS OF KNEE: Primary | ICD-10-CM

## 2021-08-19 DIAGNOSIS — M25.562 CHRONIC PAIN OF BOTH KNEES: ICD-10-CM

## 2021-08-19 DIAGNOSIS — M25.561 CHRONIC PAIN OF BOTH KNEES: ICD-10-CM

## 2021-08-19 DIAGNOSIS — G89.29 CHRONIC PAIN OF BOTH KNEES: ICD-10-CM

## 2021-08-19 PROCEDURE — 20610 DRAIN/INJ JOINT/BURSA W/O US: CPT | Performed by: ORTHOPAEDIC SURGERY

## 2021-08-19 RX ORDER — HYALURONATE SODIUM 10 MG/ML
20 SYRINGE (ML) INTRAARTICULAR
Status: COMPLETED | OUTPATIENT
Start: 2021-08-19 | End: 2021-08-19

## 2021-08-19 RX ADMIN — Medication 20 MG: at 08:40

## 2021-08-19 NOTE — PROGRESS NOTES
Assessment/Plan:  1  Bilateral primary osteoarthritis of knee  Large joint arthrocentesis: bilateral knee   2  Chronic pain of both knees  Large joint arthrocentesis: bilateral knee     Arabella Jara is a pleasant 62-year-old female presenting today for the 2ndof 3 Euflexxa injections for her bilateral knee pain due to her underlying osteoarthritis  She consented to and underwent the injections as detailed below, which she tolerated well without difficulty or complication  Post injection instructions were provided  We will plan to see her back next week to complete the series  All questions addressed    Large joint arthrocentesis: bilateral knee  Universal Protocol:  Consent: Verbal consent obtained  Risks and benefits: risks, benefits and alternatives were discussed  Consent given by: patient  Time out: Immediately prior to procedure a "time out" was called to verify the correct patient, procedure, equipment, support staff and site/side marked as required  Timeout called at: 8/19/2021 8:39 AM   Site marked: the operative site was marked  Patient identity confirmed: verbally with patient    Supporting Documentation  Indications: pain   Procedure Details  Location: knee - bilateral knee  Preparation: Patient was prepped and draped in the usual sterile fashion  Needle size: 20 G  Ultrasound guidance: no  Approach: anterolateral    Medications (Right): 20 mg Sodium Hyaluronate 20 MG/2MLSpecialty Pharmacy Supplied (Right): for right side  Medications (Left): 20 mg Sodium Hyaluronate 20 MG/2ML   Specialty Pharmacy Supplied (Left): for left side  Patient tolerance: patient tolerated the procedure well with no immediate complications  Dressing:  Sterile dressing applied        Subjective: Bilateral knee Euflexxa #2    Patient ID: Taiwo Park is a 62 y o  female  Arabella Jara is a pleasant 62-year-old female presenting today for the 2nd of 3 Euflexxa injections for her bilateral knees    She denies new injuries from her previous visit  Review of Systems   Constitutional: Positive for activity change  HENT: Negative  Eyes: Negative  Respiratory: Negative  Cardiovascular: Negative  Gastrointestinal: Negative  Endocrine: Negative  Genitourinary: Negative  Musculoskeletal: Positive for arthralgias, gait problem, joint swelling and myalgias  Skin: Negative  Allergic/Immunologic: Negative  Hematological: Negative  Psychiatric/Behavioral: Negative  Past Medical History:   Diagnosis Date    Arthritis     Asthma     Hypothyroidism     hypo    Pemphigus vulgaris     Thalassemia        Past Surgical History:   Procedure Laterality Date    BREAST BIOPSY Right     benign-not sure of exact year    COLONOSCOPY      [place] KNEE SCOPE,MED/LAT MENISECTOMY Right 2020    Procedure: MENISCECTOMY MEDIAL;  Surgeon: Adri Escobar DO;  Location: [address] [place] [address];  Service: Orthopedics       Family History   Problem Relation Age of Onset    Asthma Father     Hypertension Maternal Grandmother     Liver cancer Maternal Grandmother     Diabetes Maternal Grandmother     Cancer Family         multiple relatives    Seizures Sister     Asthma Brother     Hypertension Brother     Leukemia Maternal Uncle     Heart attack Cousin     Bone cancer Maternal Uncle     No Known Problems Paternal Aunt     Substance Abuse Neg Hx     Mental illness Neg Hx        Social History     Occupational History    Not on file   Tobacco Use    Smoking status: Former Smoker     Years: 10 00     Quit date:      Years since quittin 6    Smokeless tobacco: Never Used   Vaping Use    Vaping Use: Never used   Substance and Sexual Activity    Alcohol use:  Yes     Alcohol/week: 1 0 standard drinks     Types: 1 Glasses of wine per week     Comment: socially     Drug use: Never    Sexual activity: Yes     Partners: Male         Current Outpatient Medications:     albuterol (PROVENTIL HFA,VENTOLIN HFA) 90 mcg/act inhaler, inhale 2 puffs by mouth and INTO THE LUNGS every 6 hours if needed for wheezing, Disp: 8 5 g, Rfl: 3    ALPRAZolam (XANAX) 0 25 mg tablet, Take 1 tablet (0 25 mg total) by mouth daily at bedtime as needed for anxiety, Disp: 30 tablet, Rfl: 1    aspirin (ECOTRIN LOW STRENGTH) 81 mg EC tablet, Take 1 tablet (81 mg total) by mouth daily, Disp: 90 tablet, Rfl: 3    citalopram (CeleXA) 10 mg tablet, Take 1 tablet (10 mg total) by mouth daily, Disp: 90 tablet, Rfl: 1    Diclofenac Sodium (VOLTAREN) 1 %, Apply 2 g topically 4 (four) times a day, Disp: 1 Tube, Rfl: 1    ergocalciferol (VITAMIN D2) 50,000 units, Take 1 capsule (50,000 Units total) by mouth once a week, Disp: 12 capsule, Rfl: 1    fluticasone (FLONASE) 50 mcg/act nasal spray, instill 1 spray into each nostril once daily, Disp: 16 g, Rfl: 0    levothyroxine 25 mcg tablet, Take 1 tablet (25 mcg total) by mouth daily, Disp: 90 tablet, Rfl: 1    Omega-3 Fatty Acids (fish oil) 1,000 mg, Take two tablet twice a day (Patient taking differently: 4,000 mg daily Take two tablet twice a day), Disp: 360 capsule, Rfl: 3    Allergies   Allergen Reactions    Iodine - Food Allergy Itching and Vomiting    Shellfish-Derived Products - Food Allergy Itching and Vomiting    Levaquin [Levofloxacin] Rash       Objective: There were no vitals filed for this visit  There is no height or weight on file to calculate BMI  Ortho Exam    Physical Exam  Vitals and nursing note reviewed  Constitutional:       Appearance: She is well-developed  Comments: Body mass index is 31 14 kg/m²  HENT:      Head: Normocephalic and atraumatic  Right Ear: External ear normal       Left Ear: External ear normal    Cardiovascular:      Rate and Rhythm: Normal rate  Pulses: Normal pulses  Pulmonary:      Effort: Pulmonary effort is normal    Abdominal:      Palpations: Abdomen is soft  Musculoskeletal:      Cervical back: Normal range of motion  Comments: See ortho exam   Skin:     General: Skin is warm and dry  Neurological:      General: No focal deficit present  Mental Status: She is alert and oriented to person, place, and time  Mental status is at baseline  Psychiatric:         Mood and Affect: Mood normal          Behavior: Behavior normal          Thought Content:  Thought content normal          Judgment: Judgment normal

## 2021-08-23 ENCOUNTER — OFFICE VISIT (OUTPATIENT)
Dept: FAMILY MEDICINE CLINIC | Facility: CLINIC | Age: 61
End: 2021-08-23
Payer: COMMERCIAL

## 2021-08-23 VITALS
HEART RATE: 65 BPM | BODY MASS INDEX: 31.54 KG/M2 | WEIGHT: 178 LBS | DIASTOLIC BLOOD PRESSURE: 78 MMHG | SYSTOLIC BLOOD PRESSURE: 138 MMHG | HEIGHT: 63 IN | OXYGEN SATURATION: 98 % | TEMPERATURE: 98.4 F

## 2021-08-23 DIAGNOSIS — F41.9 ANXIETY: Primary | ICD-10-CM

## 2021-08-23 PROCEDURE — 3008F BODY MASS INDEX DOCD: CPT | Performed by: NURSE PRACTITIONER

## 2021-08-23 PROCEDURE — 1036F TOBACCO NON-USER: CPT | Performed by: NURSE PRACTITIONER

## 2021-08-23 PROCEDURE — 99213 OFFICE O/P EST LOW 20 MIN: CPT | Performed by: NURSE PRACTITIONER

## 2021-08-23 RX ORDER — CITALOPRAM 20 MG/1
20 TABLET ORAL DAILY
Qty: 30 TABLET | Refills: 1 | Status: SHIPPED | OUTPATIENT
Start: 2021-08-23 | End: 2021-09-20 | Stop reason: SDUPTHER

## 2021-08-23 NOTE — PROGRESS NOTES
Assessment/Plan:    1  Anxiety  Schedule appt for counseling, [facility]   If not able to get appt with counselor in [place], can contact [facility]  [facility] ([place] [phone]) to schedule appointment  Increase Citalopram to 20mg daily  Stress management  Activities to divert attention when possible  Conscious breathing techniques as discussed  Coping mechanisms and strategies vary from person to person so try to utilize strategies that you think may work for you (such as meditation, music, etc  )  - Ambulatory referral to [facility]; Future  - citalopram (CeleXA) 20 mg tablet; Take 1 tablet (20 mg total) by mouth daily  Dispense: 30 tablet; Refill: 1        Subjective:      Patient ID: John Lira is a 62 y o  female who presents to discuss therapy    Here to discuss counseling/therapy  Ongoing issues with anxiety   Seems to be getting worse  On citalapram  Pt does not always take medication  Has been advised several times in past to take med daily but takes "when needed" but has been taking daily most recently  Had issues with daughter this weekend and has been stressed and anxious since  Lives with boyfriend  Moved to the area a few years ago to be with boyfriend, but thinks needs to be closer to family  Does not feel like she is getting what she needs from the relationship        The following portions of the patient's history were reviewed and updated as appropriate: allergies, current medications, past family history, past medical history, past social history, past surgical history and problem list     Review of Systems   Constitutional: Negative for unexpected weight change  Respiratory: Negative for chest tightness  Cardiovascular: Negative for palpitations  Psychiatric/Behavioral: Positive for dysphoric mood  Negative for self-injury and suicidal ideas  The patient is nervous/anxious  Objective:      /78   Pulse 65   Temp 98 4 °F (36 9 °C) (Temporal)   Ht 5' 3" (1 6 m)   Wt 80 7 kg (178 lb)   SpO2 98%   BMI 31 53 kg/m²          Physical Exam  Constitutional:       General: She is in acute distress (emotionally distraught)  Cardiovascular:      Rate and Rhythm: Normal rate  Pulmonary:      Effort: Pulmonary effort is normal  No respiratory distress  Skin:     General: Skin is warm and dry  Neurological:      General: No focal deficit present  Mental Status: She is alert and oriented to person, place, and time  Psychiatric:         Thought Content: Thought content normal       Comments: Tearful, upset  Well groomed  Dressed appropriately for the weather  Cooperative  Good eye contact  Converses freely and appropriately

## 2021-08-23 NOTE — PATIENT INSTRUCTIONS
Schedule appt for counseling, [facility]   If not able to get appt with counselor in [place], can contact [facility]  [facility] ([facility], [address] [phone]) to schedule appointment  Increase Citalopram to 20mg daily  Stress management  Activities to divert attention when possible  Conscious breathing techniques as discussed  Coping mechanisms and strategies vary from person to person so try to utilize strategies that you think may work for you (such as meditation, music, etc  )  Anti anxiety/depression medications as prescribed

## 2021-08-26 ENCOUNTER — PROCEDURE VISIT (OUTPATIENT)
Dept: OBGYN CLINIC | Facility: CLINIC | Age: 61
End: 2021-08-26
Payer: COMMERCIAL

## 2021-08-26 DIAGNOSIS — M17.0 BILATERAL PRIMARY OSTEOARTHRITIS OF KNEE: Primary | ICD-10-CM

## 2021-08-26 DIAGNOSIS — M25.561 CHRONIC PAIN OF BOTH KNEES: ICD-10-CM

## 2021-08-26 DIAGNOSIS — G89.29 CHRONIC PAIN OF BOTH KNEES: ICD-10-CM

## 2021-08-26 DIAGNOSIS — M25.562 CHRONIC PAIN OF BOTH KNEES: ICD-10-CM

## 2021-08-26 PROCEDURE — 20610 DRAIN/INJ JOINT/BURSA W/O US: CPT | Performed by: ORTHOPAEDIC SURGERY

## 2021-08-26 RX ADMIN — Medication 20 MG: at 15:36

## 2021-08-26 NOTE — PROGRESS NOTES
Assessment/Plan:  1  Bilateral primary osteoarthritis of knee  Large joint arthrocentesis: bilateral knee   2  Chronic pain of both knees  Large joint arthrocentesis: bilateral knee     Mae Funk is a pleasant 60-year-old female presenting today for the 3rd of 3 Euflexxa injections for her bilateral knee pain due to her underlying osteoarthritis  She consented to and underwent the injections as detailed below, which she tolerated well without difficulty or complication  Post injection instructions were provided  We will plan to see her back in 6 months  All questions addressed    Large joint arthrocentesis: bilateral knee  Universal Protocol:  Consent: Verbal consent obtained  Risks and benefits: risks, benefits and alternatives were discussed  Consent given by: patient  Time out: Immediately prior to procedure a "time out" was called to verify the correct patient, procedure, equipment, support staff and site/side marked as required  Timeout called at: 8/26/2021 3:36 PM   Site marked: the operative site was marked  Patient identity confirmed: verbally with patient    Supporting Documentation  Indications: pain   Procedure Details  Location: knee - bilateral knee  Preparation: Patient was prepped and draped in the usual sterile fashion  Needle size: 20 G  Ultrasound guidance: no  Approach: anterolateral    Medications (Right): 20 mg Sodium Hyaluronate 20 MG/2MLSpecialty Pharmacy Supplied (Right): for right side  Medications (Left): 20 mg Sodium Hyaluronate 20 MG/2ML   Specialty Pharmacy Supplied (Left): for left side  Patient tolerance: patient tolerated the procedure well with no immediate complications  Dressing:  Sterile dressing applied        Subjective: Bilateral knee Euflexxa #3    Patient ID: Mariel Josiane is a 62 y o  female  Mae Funk is a pleasant 60-year-old female presenting today for the 3rd of 3 Euflexxa injections for her bilateral knees  She denies new injuries from her previous visit   She has seen some benefit    Review of Systems   Constitutional: Positive for activity change  HENT: Negative  Eyes: Negative  Respiratory: Negative  Cardiovascular: Negative  Gastrointestinal: Negative  Endocrine: Negative  Genitourinary: Negative  Musculoskeletal: Positive for arthralgias, gait problem, joint swelling and myalgias  Skin: Negative  Allergic/Immunologic: Negative  Hematological: Negative  Psychiatric/Behavioral: Negative  Past Medical History:   Diagnosis Date    Arthritis     Asthma     Hypothyroidism     hypo    Pemphigus vulgaris     Thalassemia        Past Surgical History:   Procedure Laterality Date    BREAST BIOPSY Right     benign-not sure of exact year    COLONOSCOPY      [place] KNEE SCOPE,MED/LAT MENISECTOMY Right 2020    Procedure: MENISCECTOMY MEDIAL;  Surgeon: Brandon Nix DO;  Location: [address] [place] [address];  Service: Orthopedics       Family History   Problem Relation Age of Onset    Asthma Father     Hypertension Maternal Grandmother     Liver cancer Maternal Grandmother     Diabetes Maternal Grandmother     Cancer Family         multiple relatives    Seizures Sister     Asthma Brother     Hypertension Brother     Leukemia Maternal Uncle     Heart attack Cousin     Bone cancer Maternal Uncle     No Known Problems Paternal Aunt     Substance Abuse Neg Hx     Mental illness Neg Hx        Social History     Occupational History    Not on file   Tobacco Use    Smoking status: Former Smoker     Years: 10 00     Quit date:      Years since quittin 6    Smokeless tobacco: Never Used   Vaping Use    Vaping Use: Never used   Substance and Sexual Activity    Alcohol use:  Yes     Alcohol/week: 1 0 standard drinks     Types: 1 Glasses of wine per week     Comment: socially     Drug use: Never    Sexual activity: Yes     Partners: Male         Current Outpatient Medications:     albuterol (PROVENTIL HFA,VENTOLIN HFA) 90 mcg/act inhaler, inhale 2 puffs by mouth and INTO THE LUNGS every 6 hours if needed for wheezing, Disp: 8 5 g, Rfl: 3    ALPRAZolam (XANAX) 0 25 mg tablet, Take 1 tablet (0 25 mg total) by mouth daily at bedtime as needed for anxiety, Disp: 30 tablet, Rfl: 1    aspirin (ECOTRIN LOW STRENGTH) 81 mg EC tablet, Take 1 tablet (81 mg total) by mouth daily, Disp: 90 tablet, Rfl: 3    citalopram (CeleXA) 20 mg tablet, Take 1 tablet (20 mg total) by mouth daily, Disp: 30 tablet, Rfl: 1    Diclofenac Sodium (VOLTAREN) 1 %, Apply 2 g topically 4 (four) times a day, Disp: 1 Tube, Rfl: 1    ergocalciferol (VITAMIN D2) 50,000 units, Take 1 capsule (50,000 Units total) by mouth once a week, Disp: 12 capsule, Rfl: 1    fluticasone (FLONASE) 50 mcg/act nasal spray, instill 1 spray into each nostril once daily, Disp: 16 g, Rfl: 0    levothyroxine 25 mcg tablet, Take 1 tablet (25 mcg total) by mouth daily, Disp: 90 tablet, Rfl: 1    Omega-3 Fatty Acids (fish oil) 1,000 mg, Take two tablet twice a day, Disp: 360 capsule, Rfl: 3    Allergies   Allergen Reactions    Iodine - Food Allergy Itching and Vomiting    Shellfish-Derived Products - Food Allergy Itching and Vomiting    Levaquin [Levofloxacin] Rash       Objective: There were no vitals filed for this visit  There is no height or weight on file to calculate BMI  Ortho Exam    Physical Exam  Vitals and nursing note reviewed  Constitutional:       Appearance: She is well-developed  Comments: Body mass index is 31 14 kg/m²  HENT:      Head: Normocephalic and atraumatic  Right Ear: External ear normal       Left Ear: External ear normal    Cardiovascular:      Rate and Rhythm: Normal rate  Pulses: Normal pulses  Pulmonary:      Effort: Pulmonary effort is normal    Abdominal:      Palpations: Abdomen is soft  Musculoskeletal:      Cervical back: Normal range of motion  Comments: See ortho exam   Skin:     General: Skin is warm and dry  Neurological:      General: No focal deficit present  Mental Status: She is alert and oriented to person, place, and time  Mental status is at baseline  Psychiatric:         Mood and Affect: Mood normal          Behavior: Behavior normal          Thought Content:  Thought content normal          Judgment: Judgment normal

## 2021-08-27 RX ORDER — HYALURONATE SODIUM 10 MG/ML
20 SYRINGE (ML) INTRAARTICULAR
Status: COMPLETED | OUTPATIENT
Start: 2021-08-26 | End: 2021-08-26

## 2021-09-10 ENCOUNTER — RA CDI HCC (OUTPATIENT)
Dept: OTHER | Facility: HOSPITAL | Age: 61
End: 2021-09-10

## 2021-09-10 NOTE — PROGRESS NOTES
[facility]  coding opportunities       Chart reviewed, no opportunity found: CHART REVIEWED, NO OPPORTUNITY FOUND                        Patients insurance company: [employer] (Medicare and Commercial for [employer] and [employer])

## 2021-09-20 ENCOUNTER — OFFICE VISIT (OUTPATIENT)
Dept: FAMILY MEDICINE CLINIC | Facility: CLINIC | Age: 61
End: 2021-09-20
Payer: COMMERCIAL

## 2021-09-20 VITALS
HEIGHT: 63 IN | HEART RATE: 60 BPM | DIASTOLIC BLOOD PRESSURE: 76 MMHG | RESPIRATION RATE: 16 BRPM | WEIGHT: 178 LBS | OXYGEN SATURATION: 98 % | BODY MASS INDEX: 31.54 KG/M2 | SYSTOLIC BLOOD PRESSURE: 128 MMHG | TEMPERATURE: 97.3 F

## 2021-09-20 DIAGNOSIS — F41.9 ANXIETY: Primary | ICD-10-CM

## 2021-09-20 DIAGNOSIS — J30.2 SEASONAL ALLERGIC RHINITIS, UNSPECIFIED TRIGGER: ICD-10-CM

## 2021-09-20 DIAGNOSIS — E03.9 ACQUIRED HYPOTHYROIDISM: ICD-10-CM

## 2021-09-20 DIAGNOSIS — Z12.11 SCREENING FOR MALIGNANT NEOPLASM OF COLON: ICD-10-CM

## 2021-09-20 PROCEDURE — 3725F SCREEN DEPRESSION PERFORMED: CPT | Performed by: NURSE PRACTITIONER

## 2021-09-20 PROCEDURE — 1036F TOBACCO NON-USER: CPT | Performed by: NURSE PRACTITIONER

## 2021-09-20 PROCEDURE — 3008F BODY MASS INDEX DOCD: CPT | Performed by: NURSE PRACTITIONER

## 2021-09-20 PROCEDURE — 99214 OFFICE O/P EST MOD 30 MIN: CPT | Performed by: NURSE PRACTITIONER

## 2021-09-20 RX ORDER — ALPRAZOLAM 0.25 MG/1
0.25 TABLET ORAL
Qty: 30 TABLET | Refills: 1 | Status: SHIPPED | OUTPATIENT
Start: 2021-09-20 | End: 2022-07-21 | Stop reason: SDUPTHER

## 2021-09-20 RX ORDER — CITALOPRAM 20 MG/1
20 TABLET ORAL DAILY
Qty: 90 TABLET | Refills: 1 | Status: SHIPPED | OUTPATIENT
Start: 2021-09-20 | End: 2022-03-15 | Stop reason: SDUPTHER

## 2021-09-20 RX ORDER — FLUTICASONE PROPIONATE 50 MCG
1 SPRAY, SUSPENSION (ML) NASAL DAILY
Qty: 16 G | Refills: 0 | Status: SHIPPED | OUTPATIENT
Start: 2021-09-20 | End: 2022-03-15 | Stop reason: SDUPTHER

## 2021-09-20 NOTE — PATIENT INSTRUCTIONS
Stress management  Activities to divert attention when possible  Conscious breathing techniques as discussed  Coping mechanisms and strategies vary from person to person so try to utilize strategies that you think may work for you (such as meditation, music, etc  )  Consider counseling as discussed  Anti anxiety/depression medications as prescribed  Continue Citalopram 20mg daily  Xanax as needed  Flonase as needed for allergies

## 2021-09-20 NOTE — PROGRESS NOTES
Assessment/Plan:  1  Anxiety  Stress management  Activities to divert attention when possible  Conscious breathing techniques as discussed  Coping mechanisms and strategies vary from person to person so try to utilize strategies that you think may work for you (such as meditation, music, etc  )  Consider counseling as discussed  Anti anxiety/depression medications as prescribed  Continue Citalopram 20mg daily  Xanax as needed  - ALPRAZolam (XANAX) 0 25 mg tablet; Take 1 tablet (0 25 mg total) by mouth daily at bedtime as needed for anxiety  Dispense: 30 tablet; Refill: 1  - citalopram (CeleXA) 20 mg tablet; Take 1 tablet (20 mg total) by mouth daily  Dispense: 90 tablet; Refill: 1  2  Seasonal allergic rhinitis, unspecified trigger  - fluticasone (FLONASE) 50 mcg/act nasal spray; 1 spray into each nostril daily  Dispense: 16 g; Refill: 0  3  Screening for malignant neoplasm of colon  - Ambulatory referral to Gastroenterology; Future  4  Acquired hypothyroidism  - Ambulatory referral to Endocrinology; Future  (ultrasound 7/2019 normal)    Patient was counseled regarding instructions for management which included: impression/diagnosis, risk/benefits of treatment options, importance of compliance with treatment, risk factor reduction, and prognosis  I have reviewed the instructions with the patient answering all questions and patient verbalized understanding  Depression Screening Follow-up Plan: Patient's depression screening was negative with a PHQ-2 score of 0  Patient assessed for underlying major depression  They have no active suicidal ideations  Brief counseling provided and recommend additional follow-up/re-evaluation next office visit  Subjective:      Patient ID: Perla Bowles is a 62 y o  female who presents for follow up    Here for follow up on anxiety  Trying to get in to counseling  Doing better on Citalopram 20mg daily  Xanax as needed  Denies any depression/suicidal ideation     Pt would like to see endocrine  Is on meds for thyroid  Has had thyroid ultrasound in past and it was normal  No other issues or concerns  The following portions of the patient's history were reviewed and updated as appropriate: allergies, current medications, past family history, past medical history, past social history, past surgical history and problem list     Review of Systems   Constitutional: Negative for fatigue and unexpected weight change  HENT: Positive for congestion  Respiratory: Negative for shortness of breath  Cardiovascular: Negative for palpitations  Gastrointestinal: Negative for abdominal pain, diarrhea, nausea and vomiting  Endocrine: Negative for cold intolerance, heat intolerance, polydipsia, polyphagia and polyuria  Allergic/Immunologic: Positive for environmental allergies  Psychiatric/Behavioral: Negative for dysphoric mood, self-injury and suicidal ideas  The patient is nervous/anxious  Objective:      /76 (BP Location: Right arm, Patient Position: Sitting, Cuff Size: Adult)   Pulse 60   Temp (!) 97 3 °F (36 3 °C) (Temporal)   Resp 16   Ht 5' 3" (1 6 m)   Wt 80 7 kg (178 lb)   SpO2 98%   BMI 31 53 kg/m²          Physical Exam  Vitals reviewed  Constitutional:       General: She is not in acute distress  Appearance: She is not ill-appearing  HENT:      Nose: Nose normal  No congestion  Cardiovascular:      Rate and Rhythm: Normal rate and regular rhythm  Pulmonary:      Effort: Pulmonary effort is normal  No respiratory distress  Breath sounds: Normal breath sounds  No wheezing or rales  Abdominal:      General: Bowel sounds are normal  There is no distension  Palpations: Abdomen is soft  Tenderness: There is no abdominal tenderness  Skin:     General: Skin is warm and dry  Coloration: Skin is not jaundiced or pale  Neurological:      General: No focal deficit present        Mental Status: She is alert and oriented to person, place, and time  Cranial Nerves: No cranial nerve deficit  Sensory: No sensory deficit  Psychiatric:         Mood and Affect: Mood normal          Behavior: Behavior normal          Thought Content: Thought content normal          Judgment: Judgment normal       Comments: Well groomed  Dressed appropriately for the weather  Calm  Pleasant   Cooperative  Good eye contact  Converses freely and appropriately

## 2021-10-09 DIAGNOSIS — E55.9 VITAMIN D DEFICIENCY: ICD-10-CM

## 2021-10-11 RX ORDER — ERGOCALCIFEROL 1.25 MG/1
CAPSULE ORAL
Qty: 12 CAPSULE | Refills: 1 | Status: SHIPPED | OUTPATIENT
Start: 2021-10-11 | End: 2022-03-15 | Stop reason: SDUPTHER

## 2021-11-18 ENCOUNTER — TELEPHONE (OUTPATIENT)
Dept: CARDIOLOGY CLINIC | Facility: CLINIC | Age: 61
End: 2021-11-18

## 2021-12-01 ENCOUNTER — OFFICE VISIT (OUTPATIENT)
Dept: GASTROENTEROLOGY | Facility: CLINIC | Age: 61
End: 2021-12-01
Payer: COMMERCIAL

## 2021-12-01 ENCOUNTER — APPOINTMENT (OUTPATIENT)
Dept: LAB | Facility: HOSPITAL | Age: 61
End: 2021-12-01
Attending: INTERNAL MEDICINE
Payer: COMMERCIAL

## 2021-12-01 ENCOUNTER — CONSULT (OUTPATIENT)
Dept: ENDOCRINOLOGY | Facility: CLINIC | Age: 61
End: 2021-12-01
Payer: COMMERCIAL

## 2021-12-01 VITALS
DIASTOLIC BLOOD PRESSURE: 80 MMHG | HEIGHT: 63 IN | SYSTOLIC BLOOD PRESSURE: 120 MMHG | WEIGHT: 177 LBS | HEART RATE: 63 BPM | BODY MASS INDEX: 31.36 KG/M2 | TEMPERATURE: 97 F

## 2021-12-01 VITALS
WEIGHT: 177.6 LBS | HEART RATE: 63 BPM | HEIGHT: 63 IN | SYSTOLIC BLOOD PRESSURE: 120 MMHG | BODY MASS INDEX: 31.47 KG/M2 | DIASTOLIC BLOOD PRESSURE: 80 MMHG | TEMPERATURE: 96.7 F

## 2021-12-01 DIAGNOSIS — E66.9 OBESITY, UNSPECIFIED CLASSIFICATION, UNSPECIFIED OBESITY TYPE, UNSPECIFIED WHETHER SERIOUS COMORBIDITY PRESENT: ICD-10-CM

## 2021-12-01 DIAGNOSIS — Z12.11 COLON CANCER SCREENING: ICD-10-CM

## 2021-12-01 DIAGNOSIS — E03.9 ACQUIRED HYPOTHYROIDISM: Primary | ICD-10-CM

## 2021-12-01 DIAGNOSIS — R63.5 ABNORMAL WEIGHT GAIN: ICD-10-CM

## 2021-12-01 DIAGNOSIS — R63.5 WEIGHT GAIN: ICD-10-CM

## 2021-12-01 DIAGNOSIS — R19.8 ALTERNATING CONSTIPATION AND DIARRHEA: ICD-10-CM

## 2021-12-01 DIAGNOSIS — I51.9 MYXEDEMA HEART DISEASE: ICD-10-CM

## 2021-12-01 DIAGNOSIS — R14.0 BLOATING: Primary | ICD-10-CM

## 2021-12-01 DIAGNOSIS — E66.9 CLASS 1 OBESITY WITHOUT SERIOUS COMORBIDITY WITH BODY MASS INDEX (BMI) OF 31.0 TO 31.9 IN ADULT, UNSPECIFIED OBESITY TYPE: ICD-10-CM

## 2021-12-01 DIAGNOSIS — E03.9 MYXEDEMA HEART DISEASE: ICD-10-CM

## 2021-12-01 PROBLEM — E66.811 CLASS 1 OBESITY WITHOUT SERIOUS COMORBIDITY WITH BODY MASS INDEX (BMI) OF 31.0 TO 31.9 IN ADULT: Status: ACTIVE | Noted: 2021-12-01

## 2021-12-01 LAB — T4 FREE SERPL-MCNC: 0.85 NG/DL (ref 0.76–1.46)

## 2021-12-01 PROCEDURE — 36415 COLL VENOUS BLD VENIPUNCTURE: CPT

## 2021-12-01 PROCEDURE — 99205 OFFICE O/P NEW HI 60 MIN: CPT | Performed by: INTERNAL MEDICINE

## 2021-12-01 PROCEDURE — 99214 OFFICE O/P EST MOD 30 MIN: CPT | Performed by: PHYSICIAN ASSISTANT

## 2021-12-01 PROCEDURE — 84305 ASSAY OF SOMATOMEDIN: CPT

## 2021-12-01 PROCEDURE — 84439 ASSAY OF FREE THYROXINE: CPT

## 2021-12-01 RX ORDER — NAPROXEN 500 MG/1
500 TABLET ORAL EVERY 12 HOURS PRN
COMMUNITY
Start: 2021-10-18 | End: 2022-03-04

## 2021-12-01 RX ORDER — PREDNISONE 50 MG/1
50 TABLET ORAL DAILY
COMMUNITY
Start: 2021-10-18 | End: 2022-03-04

## 2021-12-03 ENCOUNTER — OFFICE VISIT (OUTPATIENT)
Dept: CARDIOLOGY CLINIC | Facility: CLINIC | Age: 61
End: 2021-12-03
Payer: COMMERCIAL

## 2021-12-03 VITALS
TEMPERATURE: 98.1 F | HEART RATE: 59 BPM | OXYGEN SATURATION: 98 % | HEIGHT: 63 IN | SYSTOLIC BLOOD PRESSURE: 132 MMHG | WEIGHT: 176 LBS | DIASTOLIC BLOOD PRESSURE: 70 MMHG | BODY MASS INDEX: 31.18 KG/M2

## 2021-12-03 DIAGNOSIS — E78.1 HYPERTRIGLYCERIDEMIA: Primary | ICD-10-CM

## 2021-12-03 DIAGNOSIS — I77.9 BILATERAL CAROTID ARTERY DISEASE, UNSPECIFIED TYPE (HCC): ICD-10-CM

## 2021-12-03 DIAGNOSIS — E78.2 MIXED HYPERLIPIDEMIA: ICD-10-CM

## 2021-12-03 LAB — IGF-I SERPL-MCNC: 81 NG/ML (ref 60–207)

## 2021-12-03 PROCEDURE — 3008F BODY MASS INDEX DOCD: CPT | Performed by: INTERNAL MEDICINE

## 2021-12-03 PROCEDURE — 1036F TOBACCO NON-USER: CPT | Performed by: INTERNAL MEDICINE

## 2021-12-03 PROCEDURE — 99214 OFFICE O/P EST MOD 30 MIN: CPT | Performed by: INTERNAL MEDICINE

## 2021-12-03 PROCEDURE — 93000 ELECTROCARDIOGRAM COMPLETE: CPT | Performed by: INTERNAL MEDICINE

## 2021-12-03 RX ORDER — ROSUVASTATIN CALCIUM 20 MG/1
20 TABLET, COATED ORAL
Qty: 90 TABLET | Refills: 3 | Status: SHIPPED | OUTPATIENT
Start: 2021-12-03 | End: 2021-12-03

## 2021-12-03 RX ORDER — ROSUVASTATIN CALCIUM 10 MG/1
10 TABLET, COATED ORAL DAILY
Qty: 90 TABLET | Refills: 3 | Status: SHIPPED | OUTPATIENT
Start: 2021-12-03 | End: 2022-03-15 | Stop reason: SDUPTHER

## 2021-12-14 ENCOUNTER — RA CDI HCC (OUTPATIENT)
Dept: OTHER | Facility: HOSPITAL | Age: 61
End: 2021-12-14

## 2021-12-15 ENCOUNTER — APPOINTMENT (OUTPATIENT)
Dept: LAB | Facility: HOSPITAL | Age: 61
End: 2021-12-15
Attending: INTERNAL MEDICINE
Payer: COMMERCIAL

## 2021-12-15 DIAGNOSIS — I51.9 MYXEDEMA HEART DISEASE: ICD-10-CM

## 2021-12-15 DIAGNOSIS — R63.5 ABNORMAL WEIGHT GAIN: ICD-10-CM

## 2021-12-15 DIAGNOSIS — E03.9 MYXEDEMA HEART DISEASE: ICD-10-CM

## 2021-12-15 DIAGNOSIS — E66.9 OBESITY, UNSPECIFIED CLASSIFICATION, UNSPECIFIED OBESITY TYPE, UNSPECIFIED WHETHER SERIOUS COMORBIDITY PRESENT: ICD-10-CM

## 2021-12-15 PROCEDURE — 82533 TOTAL CORTISOL: CPT

## 2021-12-30 LAB
CORTIS BS SAL-MCNC: 0.01 UG/DL
CORTIS SP1 P CHAL SAL-MCNC: 0.04 UG/DL
CORTIS SP2 P CHAL SAL-MCNC: 0.13 UG/DL

## 2022-01-02 ENCOUNTER — TREATMENT (OUTPATIENT)
Dept: URGENT CARE | Facility: CLINIC | Age: 62
End: 2022-01-02
Payer: COMMERCIAL

## 2022-01-02 VITALS — RESPIRATION RATE: 16 BRPM | OXYGEN SATURATION: 100 % | HEART RATE: 59 BPM | TEMPERATURE: 97.2 F

## 2022-01-02 DIAGNOSIS — R05.9 COUGH: Primary | ICD-10-CM

## 2022-01-02 PROCEDURE — 87636 SARSCOV2 & INF A&B AMP PRB: CPT | Performed by: PHYSICIAN ASSISTANT

## 2022-01-02 PROCEDURE — 99203 OFFICE O/P NEW LOW 30 MIN: CPT

## 2022-01-02 NOTE — PATIENT INSTRUCTIONS
Patient Instructions   COVID testing initiated  Results may take up to 5-10 days to return, but often come back sooner (2-4 days)     If the patient has a [facility] My Chart account, results may be accessed on line  If the patient does not have the [facility] account, please establish one so results can be accessed  This will be the easiest and quickest way to get a copy of your test results if you require printed documentation  If patient is symptomatic and until results are obtained, home quarantine / self isolation strongly encouraged  If testing is done for screening purposes and patient is not symptomatic, we still recommend masking, social distancing, good hygiene practices be followed  If COVID test is positive, patient / care giver will be contacted by ordering provider or designated staff  If COVID test is positive, please call the primary care provider office to inform of positive test and request follow up evaluation appointment  (Generally, primary care providers are doing telemedicine visits with their positive COVID patients )  If COVID test is positive, please again review all information below  Further questions may be addressed by the primary care provider or the [address] [place] [address] Kash at [phone]  If the patient / caregiver has not heard about test results or has been unable to access results on the patient My Chart account in a timely fashion, please call the provider's office where test was ordered (or Hot Line if applicable)  to inquire about results  If results are negative and patient / care giver has been found to have already accessed results through the [facility]  [facility] Chart taylor, no call will be made  Until results are obtained, home quarantine / self isolation strongly encouraged       If the patient would develop profound weakness, chest pain, shortness of breath please proceed to an emergency room for further evaluation otherwise we do recommend that patient follow-up with their primary care provider in the next 5-7 days if not improving  Symptomatic treatment as needed for symptoms relief based on age / medical status of patient  Things like warm salt water gargles, Tylenol or Ibuprofen (if not contraindicated), drinking plenty of fluids, nasal saline rinses / spray, warm tea with honey (not for patients less than 1 year of age),  etc may provide symptoms relief  [address]     Your healthcare provider and/or public health staff have evaluated you and have determined that you do not need to remain in the hospital at this time  At this time you can be isolated at home where you will be monitored by staff from your local or state health department  You should carefully follow the prevention and isolation steps below until a healthcare provider or local or state health department says that you can return to your normal activities  Stay home except to get medical care     People who are mildly ill with COVID-19 are able to isolate at home during their illness  You should restrict activities outside your home, except for getting medical care  Do not go to work, school, or public areas  Avoid using public transportation, ride-sharing, or taxis  Separate yourself from other people and animals in your home     People: As much as possible, you should stay in a specific room and away from other people in your home  Also, you should use a separate bathroom, if available  Animals: You should restrict contact with pets and other animals while you are sick with COVID-19, just like you would around other people  Although there have not been reports of pets or other animals becoming sick with COVID-19, it is still recommended that people sick with COVID-19 limit contact with animals until more information is known about the virus   When possible, have another member of your household care for your animals while you are sick  If you are sick with COVID-19, avoid contact with your pet, including petting, snuggling, being kissed or licked, and sharing food  If you must care for your pet or be around animals while you are sick, wash your hands before and after you interact with pets and wear a facemask  See COVID-19 and Animals for more information  Call ahead before visiting your doctor     If you have a medical appointment, call the healthcare provider and tell them that you have or may have COVID-19  This will help the healthcare providers office take steps to keep other people from getting infected or exposed  Wear a facemask     You should wear a facemask when you are around other people (e g , sharing a room or vehicle) or pets and before you enter a healthcare providers office  If you are not able to wear a facemask (for example, because it causes trouble breathing), then people who live with you should not stay in the same room with you, or they should wear a facemask if they enter your room  Cover your coughs and sneezes     Cover your mouth and nose with a tissue when you cough or sneeze  Throw used tissues in a lined trash can  Immediately wash your hands with soap and water for at least 20 seconds or, if soap and water are not available, clean your hands with an alcohol-based hand  that contains at least 60% alcohol  Clean your hands often     Wash your hands often with soap and water for at least 20 seconds, especially after blowing your nose, coughing, or sneezing; going to the bathroom; and before eating or preparing food  If soap and water are not readily available, use an alcohol-based hand  with at least 60% alcohol, covering all surfaces of your hands and rubbing them together until they feel dry  Soap and water are the best option if hands are visibly dirty  Avoid touching your eyes, nose, and mouth with unwashed hands       Avoid sharing personal household items     You should not share dishes, drinking glasses, cups, eating utensils, towels, or bedding with other people or pets in your home  After using these items, they should be washed thoroughly with soap and water  Clean all high-touch surfaces everyday     High touch surfaces include counters, tabletops, doorknobs, bathroom fixtures, toilets, phones, keyboards, tablets, and bedside tables  Also, clean any surfaces that may have blood, stool, or body fluids on them  Use a household cleaning spray or wipe, according to the label instructions  Labels contain instructions for safe and effective use of the cleaning product including precautions you should take when applying the product, such as wearing gloves and making sure you have good ventilation during use of the product  Monitor your symptoms     Seek prompt medical attention if your illness is worsening (e g , difficulty breathing)  Before seeking care, call your healthcare provider and tell them that you have, or are being evaluated for, COVID-19  Put on a facemask before you enter the facility  These steps will help the healthcare providers office to keep other people in the office or waiting room from getting infected or exposed  Ask your healthcare provider to call the local or [facility] health department  Persons who are placed under active monitoring or facilitated self-monitoring should follow instructions provided by their local health department or occupational health professionals, as appropriate  If you have a medical emergency and need to call 911, notify the dispatch personnel that you have, or are being evaluated for COVID-19  If possible, put on a facemask before emergency medical services arrive       Discontinuing home isolation     Patients with confirmed COVID-19 should remain under home isolation precautions until the following conditions are met:   § They have had no fever for at least 24 hours (that is one full day of no fever without the use medicine that reduces fevers)  AND  § other symptoms have improved (for example, when their cough or shortness of breath have improved)  AND  § at least 10 days have passed since their symptoms first appeared     Patients with confirmed COVID-19 should also notify close contacts (including their workplace) and ask that they self-quarantine  Currently, close contact is defined as being within 6 feet for for a cumulative total of 15 minutes or more over a 24 hour period starting from 2 days before illness onset  (or, for asymptomatic patients, 2 days prior to test specimen collection)  Close contacts of patients diagnosed with COVID-19 should be instructed by the patient to self-quarantine for 14 days from the last time of their last contact with the patient        Source: [employer]

## 2022-01-02 NOTE — PROGRESS NOTES
[address] [facility]        NAME: Airam Hickman is a 62 y o  female  : 1960    MRN: [id number]  DATE: 2022  TIME: 12:36 PM    Assessment and Plan   Cough [R05 9]  1  Cough  COVID/FLU- Office Collect         Patient Instructions     Continue to monitor symptoms  If new or worsening symptoms develop, go immediately to Er  Drink plenty of fluids  Follow up with Family Doctor this week  Chief Complaint     Chief Complaint   Patient presents with    Cold Like Symptoms     pt presents with cough, chest congestion, bodyaches, sweat, dizzy, diarrhea, decreased appetite, started 2 days ago         History of Present Illness       Cough  This is a new problem  Episode onset: 3 days ago  The problem has been gradually worsening  The problem occurs every few minutes  The cough is non-productive  Associated symptoms include chills, a fever, myalgias, nasal congestion, postnasal drip, a sore throat and wheezing  Pertinent negatives include no chest pain, ear pain, headaches, rash, rhinorrhea, shortness of breath or sweats  Treatments tried: Tylenol, motrin  The treatment provided no relief  Her past medical history is significant for asthma  Pt has numerous significant contacts at work who currently have [employer]  Review of Systems   Review of Systems   Constitutional: Positive for chills, fatigue and fever  Negative for diaphoresis  HENT: Positive for postnasal drip, sinus pressure, sinus pain, sneezing and sore throat  Negative for congestion, ear pain, rhinorrhea and voice change  Eyes: Negative  Respiratory: Positive for cough and wheezing  Negative for chest tightness and shortness of breath  Cardiovascular: Negative for chest pain and palpitations  Gastrointestinal: Negative for abdominal pain, constipation, diarrhea, nausea and vomiting  Endocrine: Negative  Genitourinary: Negative for dysuria  Musculoskeletal: Positive for myalgias  Negative for back pain and neck pain  Skin: Negative for pallor and rash  Allergic/Immunologic: Negative  Neurological: Negative for dizziness, syncope and headaches  Hematological: Negative  Psychiatric/Behavioral: Negative            Current Medications       Current Outpatient Medications:     albuterol (PROVENTIL HFA,VENTOLIN HFA) 90 mcg/act inhaler, inhale 2 puffs by mouth and INTO THE LUNGS every 6 hours if needed for wheezing, Disp: 8 5 g, Rfl: 3    ALPRAZolam (XANAX) 0 25 mg tablet, Take 1 tablet (0 25 mg total) by mouth daily at bedtime as needed for anxiety, Disp: 30 tablet, Rfl: 1    aspirin (ECOTRIN LOW STRENGTH) 81 mg EC tablet, Take 1 tablet (81 mg total) by mouth daily, Disp: 90 tablet, Rfl: 3    citalopram (CeleXA) 20 mg tablet, Take 1 tablet (20 mg total) by mouth daily, Disp: 90 tablet, Rfl: 1    Diclofenac Sodium (VOLTAREN) 1 %, Apply 2 g topically 4 (four) times a day, Disp: 1 Tube, Rfl: 1    ergocalciferol (VITAMIN D2) 50,000 units, take 1 capsule by mouth every week, Disp: 12 capsule, Rfl: 1    fluticasone (FLONASE) 50 mcg/act nasal spray, 1 spray into each nostril daily, Disp: 16 g, Rfl: 0    levothyroxine 25 mcg tablet, Take 1 tablet (25 mcg total) by mouth daily, Disp: 90 tablet, Rfl: 1    Omega-3 Fatty Acids (fish oil) 1,000 mg, Take two tablet twice a day, Disp: 360 capsule, Rfl: 3    rosuvastatin (CRESTOR) 10 MG tablet, Take 1 tablet (10 mg total) by mouth daily, Disp: 90 tablet, Rfl: 3    citalopram (CeleXA) 10 mg tablet, Take 10 mg by mouth daily (Patient not taking: Reported on 12/1/2021 ), Disp: , Rfl:     Multiple Vitamins-Minerals (ZINC PO), Take by mouth daily (Patient not taking: Reported on 1/2/2022 ), Disp: , Rfl:     naproxen (EC NAPROSYN) 500 MG EC tablet, Take 500 mg by mouth every 12 (twelve) hours as needed (Patient not taking: Reported on 12/1/2021 ), Disp: , Rfl:     predniSONE 50 mg tablet, Take 50 mg by mouth daily (Patient not taking: Reported on 12/1/2021 ), Disp: , Rfl: Current Allergies     Allergies as of 01/02/2022 - Reviewed 01/02/2022   Allergen Reaction Noted    Iodine - food allergy Itching and Vomiting 01/07/2019    Shellfish-derived products - food allergy Itching and Vomiting 01/07/2019    Levaquin [levofloxacin] Rash 04/10/2019            The following portions of the patient's history were reviewed and updated as appropriate: allergies, current medications, past family history, past medical history, past social history, past surgical history and problem list      Past Medical History:   Diagnosis Date    Arthritis     Asthma     Hypothyroidism     hypo    Pemphigus vulgaris     Thalassemia        Past Surgical History:   Procedure Laterality Date    BREAST BIOPSY Right 2000    benign-not sure of exact year    COLONOSCOPY      IN KNEE SCOPE,MED/LAT MENISECTOMY Right 6/2/2020    Procedure: MENISCECTOMY MEDIAL;  Surgeon: Corinne Henry DO;  Location: [facility];  Service: Orthopedics       Family History   Problem Relation Age of Onset    Asthma Father     Hypertension Maternal Grandmother     Liver cancer Maternal Grandmother     Diabetes Maternal Grandmother     Cancer Family         multiple relatives    Seizures Sister     Asthma Brother     Hypertension Brother     Leukemia Maternal Uncle     Heart attack Cousin     Bone cancer Maternal Uncle     No Known Problems Paternal Aunt     Substance Abuse Neg Hx     Mental illness Neg Hx          Medications have been verified  Objective   Pulse 59   Temp (!) 97 2 °F (36 2 °C)   Resp 16   SpO2 100%        Physical Exam     Physical Exam  Vitals and nursing note reviewed  Constitutional:       General: She is not in acute distress  Appearance: Normal appearance  She is well-developed  She is not ill-appearing or diaphoretic  HENT:      Head: Normocephalic and atraumatic  Right Ear: External ear normal       Left Ear: External ear normal       Nose: Congestion present   No rhinorrhea  Mouth/Throat:      Pharynx: Posterior oropharyngeal erythema present  No oropharyngeal exudate  Eyes:      General:         Right eye: No discharge  Left eye: No discharge  Conjunctiva/sclera: Conjunctivae normal    Cardiovascular:      Rate and Rhythm: Normal rate and regular rhythm  Heart sounds: Normal heart sounds  Pulmonary:      Effort: Pulmonary effort is normal  No respiratory distress  Breath sounds: Normal breath sounds  No wheezing, rhonchi or rales  Musculoskeletal:      Cervical back: Normal range of motion and neck supple  Lymphadenopathy:      Cervical: No cervical adenopathy  Skin:     General: Skin is warm  Findings: No rash  Neurological:      Mental Status: She is alert

## 2022-01-02 NOTE — LETTER
January 2, 2022       Patient: Airam Hickman   YOB: 1960   Date of Visit: 1/2/2022      If Covid and flu tests are negative, patient may return to work/school when fever free for 24 hours without the use of a fever reducing agent  If covid or flu test is positive, patient may return to work five days after the onset of symptoms as long as they are fever free for 24 hours without the use of a fever reducing agent  Upon return, the patient must then adhere to strict masking for an additional 5 days  If you have any questions or concerns, please don't hesitate to call             Sincerely,        Regine Gomez RN      CC: [unfilled]    Enclosure

## 2022-01-03 ENCOUNTER — TELEPHONE (OUTPATIENT)
Dept: GASTROENTEROLOGY | Facility: CLINIC | Age: 62
End: 2022-01-03

## 2022-01-03 NOTE — TELEPHONE ENCOUNTER
Attempted to call patient regarding dulcolax/miralax prep but got patient's VM  Left message with my direct line to call back and discuss

## 2022-01-03 NOTE — TELEPHONE ENCOUNTER
Patients GI provider:  THOMAS Monte     Number to return call: [phone]     Reason for call: Pt calling stating she is not sure which prep she is supposed to be taking for her procedure  Pt can be reached at above number      Scheduled procedure/appointment date if applicable: 5/01/0268

## 2022-01-06 ENCOUNTER — TELEPHONE (OUTPATIENT)
Dept: ENDOCRINOLOGY | Facility: CLINIC | Age: 62
End: 2022-01-06

## 2022-01-06 ENCOUNTER — TELEMEDICINE (OUTPATIENT)
Dept: FAMILY MEDICINE CLINIC | Facility: CLINIC | Age: 62
End: 2022-01-06
Payer: COMMERCIAL

## 2022-01-06 VITALS — WEIGHT: 176 LBS | HEIGHT: 63 IN | BODY MASS INDEX: 31.18 KG/M2

## 2022-01-06 DIAGNOSIS — U07.1 COVID-19: Primary | ICD-10-CM

## 2022-01-06 LAB
FLUAV RNA RESP QL NAA+PROBE: NEGATIVE
FLUBV RNA RESP QL NAA+PROBE: NEGATIVE
SARS-COV-2 RNA RESP QL NAA+PROBE: POSITIVE

## 2022-01-06 PROCEDURE — 3008F BODY MASS INDEX DOCD: CPT | Performed by: NURSE PRACTITIONER

## 2022-01-06 PROCEDURE — 1036F TOBACCO NON-USER: CPT | Performed by: NURSE PRACTITIONER

## 2022-01-06 PROCEDURE — 99213 OFFICE O/P EST LOW 20 MIN: CPT | Performed by: NURSE PRACTITIONER

## 2022-01-06 NOTE — PROGRESS NOTES
COVID-19 Outpatient Progress Note    Assessment/Plan:  1  COVID-19  Refer to CDC  gov for specific information, guidelines,  and answers to many frequently asked questions  As you are aware Covid-19 test is positive, self isolation for 5 days from onset of symptoms and 24 hours with no symptoms and no fever without use of anti-pyrectic medications (ie: Tylenol, Ibuprofen, Advil, etc ) before discontinuing self isolation and/or return to work or normal activities for fully vaccinated individuals  Unvaccinated individuals should isolate for 10 days  Masking is recommended for an additional 5 days after isolation period ends  While in self isolation you should be using a private bathroom, sleeping area, mask in the home if any other people are present, no shared eating utensils, etc    Deep breathing exercises to expand lung fields  Monitor temperature daily or if you feel you have a fever  Tylenol or Advil/Ibuprofen is appropriate for fever control  Check pulse oximetry,  if able,  daily and if you are having any increased shortness of breath or chest tightness  Call the office if you are having any increased respiratory symptoms or if pulse oximetry is persistently less than 92%  Current recommendations for treatment include daily multivitamin, vitamin C, zinc, and vitamin D supplementation    CDC emergency warning signs for when to seek medical attention immediately:  - difficulty breathing or shortness of breath  - persistent pain or pressure in the chest   - new confusion or inability to arouse  - bluish lips or face   If you should need to seek medical care, you should notify the ED or EMS that you are positive for COVID-19 prior and wear a facemask if possible, scarf or bandana       Problem List Items Addressed This Visit        Other    COVID-19 - Primary         Disposition:     Patient has COVID-19 infection   Based off CDC guidelines, they were recommended to isolate for 5 days from the date of the positive test  If they remain asymptomatic, isolation may be ended followed by 5 days of wearing a mask when around othes to minimize risk of infecting others  If they have a fever, continue to stay home until fever resolves for at least 24 hours  I recommended continued isolation until at least 24 hours have passed since recovery defined as resolution of fever without the use of fever-reducing medications AND improvement in COVID symptoms AND 10 days have passed since onset of symptoms (or 10 days have passed since date of first positive viral diagnostic test for asymptomatic patients)  I have spent 10 minutes directly with the patient  Greater than 50% of this time was spent in counseling/coordination of care regarding: diagnostic results, prognosis, risks and benefits of treatment options, instructions for management, patient and family education, importance of treatment compliance, risk factor reductions and impressions  Encounter provider HAYDEE Choi    Provider located at [place] [id number]  [address] [address]    Recent Visits  No visits were found meeting these conditions  Showing recent visits within past 7 days and meeting all other requirements  Today's Visits  Date Type Provider Dept   01/06/22 Telemedicine HAYDEE Choi Pg   Showing today's visits and meeting all other requirements  Future Appointments  No visits were found meeting these conditions  Showing future appointments within next 150 days and meeting all other requirements     This virtual check-in was done via [employer] and patient was informed that this is a secure, HIPAA-compliant platform  She agrees to proceed  Patient agrees to participate in a virtual check in via telephone or video visit instead of presenting to the office to address urgent/immediate medical needs   Patient is aware this is a billable service  After connecting through [facility], the patient was identified by name and date of birth  Deonna Lin was informed that this was a telemedicine visit and that the exam was being conducted confidentially over secure lines  My office door was closed  No one else was in the room  Deonna Lin acknowledged consent and understanding of privacy and security of the telemedicine visit  I informed the patient that I have reviewed her record in Epic and presented the opportunity for her to ask any questions regarding the visit today  The patient agreed to participate  Verification of patient location:  Patient is located in the following state in which I hold an active license: [place]    Subjective:   Deonna Lin is a 62 y o  female who has been screened for COVID-19  Symptom change since last report: resolving  Patient's symptoms include nasal congestion (mild, improving) and cough (mild)  Patient denies fever (had fever initially but none last 2 days), chills, fatigue, malaise, rhinorrhea, sore throat, anosmia, loss of taste, shortness of breath, chest tightness, abdominal pain, nausea, vomiting, diarrhea (had for 2 days but resolved), myalgias and headaches  Date of symptom onset: 12/30/2021  Date of positive COVID-19 PCR: 1/2/2022  COVID-19 vaccination status: Fully vaccinated (primary series)    César Dimas has been staying home and has isolated themselves in her home  She is taking care to not share personal items and is cleaning all surfaces that are touched often, like counters, tabletops, and doorknobs using household cleaning sprays or wipes  She is wearing a mask when she leaves her room  Symptoms started on 12/30  Got worse and went to urgent care on 1/2     Was tested and did not receive results until today   Feels much better  Still with mild congestion and cough      Lab Results   Component Value Date    SARSCOV2 Positive (A) 01/02/2022    SARSCOV2 Not Detected 03/29/2021     Past Medical History:   Diagnosis Date    Arthritis     Asthma     Hypothyroidism     hypo    Pemphigus vulgaris     Thalassemia      Past Surgical History:   Procedure Laterality Date    BREAST BIOPSY Right 2000    benign-not sure of exact year    COLONOSCOPY      MN KNEE SCOPE,MED/LAT MENISECTOMY Right 6/2/2020    Procedure: MENISCECTOMY MEDIAL;  Surgeon: Usman Welsh DO;  Location: [facility];  Service: Orthopedics     Current Outpatient Medications   Medication Sig Dispense Refill    albuterol (PROVENTIL HFA,VENTOLIN HFA) 90 mcg/act inhaler inhale 2 puffs by mouth and INTO THE LUNGS every 6 hours if needed for wheezing 8 5 g 3    ALPRAZolam (XANAX) 0 25 mg tablet Take 1 tablet (0 25 mg total) by mouth daily at bedtime as needed for anxiety 30 tablet 1    aspirin (ECOTRIN LOW STRENGTH) 81 mg EC tablet Take 1 tablet (81 mg total) by mouth daily 90 tablet 3    citalopram (CeleXA) 20 mg tablet Take 1 tablet (20 mg total) by mouth daily 90 tablet 1    Diclofenac Sodium (VOLTAREN) 1 % Apply 2 g topically 4 (four) times a day 1 Tube 1    ergocalciferol (VITAMIN D2) 50,000 units take 1 capsule by mouth every week 12 capsule 1    fluticasone (FLONASE) 50 mcg/act nasal spray 1 spray into each nostril daily 16 g 0    levothyroxine 25 mcg tablet Take 1 tablet (25 mcg total) by mouth daily 90 tablet 1    Multiple Vitamins-Minerals (ZINC PO) Take by mouth daily        rosuvastatin (CRESTOR) 10 MG tablet Take 1 tablet (10 mg total) by mouth daily 90 tablet 3    citalopram (CeleXA) 10 mg tablet Take 10 mg by mouth daily (Patient not taking: Reported on 12/1/2021 )      naproxen (EC NAPROSYN) 500 MG EC tablet Take 500 mg by mouth every 12 (twelve) hours as needed (Patient not taking: Reported on 12/1/2021 )      Omega-3 Fatty Acids (fish oil) 1,000 mg Take two tablet twice a day (Patient not taking: Reported on 1/6/2022 ) 360 capsule 3    predniSONE 50 mg tablet Take 50 mg by mouth daily (Patient not taking: Reported on 12/1/2021 )       No current facility-administered medications for this visit  Allergies   Allergen Reactions    Iodine - Food Allergy Itching and Vomiting    Shellfish-Derived Products - Food Allergy Itching and Vomiting    Levaquin [Levofloxacin] Rash       Review of Systems   Constitutional: Negative for chills, fatigue and fever (had fever initially but none last 2 days)  HENT: Positive for congestion (mild, improving)  Negative for rhinorrhea and sore throat  Respiratory: Positive for cough (mild)  Negative for chest tightness and shortness of breath  Gastrointestinal: Negative for abdominal pain, diarrhea (had for 2 days but resolved), nausea and vomiting  Musculoskeletal: Negative for myalgias  Neurological: Negative for headaches  Objective:    Vitals:    01/06/22 1248   Weight: 79 8 kg (176 lb)   Height: 5' 3" (1 6 m)       Physical Exam  Constitutional:       General: She is not in acute distress  Appearance: She is not ill-appearing  Eyes:      General: No scleral icterus  Pulmonary:      Effort: Pulmonary effort is normal  No respiratory distress  Comments: No conversational dyspnea  Skin:     Coloration: Skin is not jaundiced or pale  Neurological:      Mental Status: She is alert  Psychiatric:         Mood and Affect: Mood normal          Thought Content: Thought content normal          Judgment: Judgment normal          VIRTUAL VISIT DISCLAIMER    Juan Brought verbally agrees to participate in [employer]  Pt is aware that [employer] could be limited without vital signs or the ability to perform a full hands-on physical Maryln Boots understands she or the provider may request at any time to terminate the video visit and request the patient to seek care or treatment in person

## 2022-01-06 NOTE — PATIENT INSTRUCTIONS
Refer to [facility]  [place] [place]  gov for specific information, guidelines,  and answers to many frequently asked questions  As you are aware Covid-19 test is positive, self isolation for 5 days from onset of symptoms and 24 hours with no symptoms and no fever without use of anti-pyrectic medications (ie: Tylenol, Ibuprofen, Advil, etc ) before discontinuing self isolation and/or return to work or normal activities for fully vaccinated individuals  Unvaccinated individuals should isolate for 10 days  Masking is recommended for an additional 5 days after isolation period ends  While in self isolation you should be using a private bathroom, sleeping area, mask in the home if any other people are present, no shared eating utensils, etc    Deep breathing exercises to expand lung fields  Monitor temperature daily or if you feel you have a fever  Tylenol or Advil/Ibuprofen is appropriate for fever control  Check pulse oximetry,  if able,  daily and if you are having any increased shortness of breath or chest tightness  Call the office if you are having any increased respiratory symptoms or if pulse oximetry is persistently less than 92%    Current recommendations for treatment include daily multivitamin, vitamin C, zinc, and vitamin D supplementation    CDC emergency warning signs for when to seek medical attention immediately:  - difficulty breathing or shortness of breath  - persistent pain or pressure in the chest   - new confusion or inability to arouse  - bluish lips or face   If you should need to seek medical care, you should notify the ED or EMS that you are positive for COVID-19 prior and wear a facemask if possible, scarf or bandana

## 2022-01-06 NOTE — TELEPHONE ENCOUNTER
Regarding: FW: Question regarding SALIVARY CORTISOL, THREE SPECIMENS   Please call inform patient of results  Midnight salivary cortisol x3 within normal limits, not suggestive of excessive production of steroid  No Hormonal causes of weight gain based on results  IGF-1 and free T4 also within normal limits     ----- Message -----  From: Marlena Anderson LPN  Sent: 7/0/8320   7:24 AM EST  To: Deisy Goldstein MD  Subject: Question regarding SALIVARY CORTISOL, THREE #    ----- Message from Marlena Anderson LPN sent at 9/8/6768  7:24 AM EST -----       ----- Message from Jean Marcano to Deisy Goldstein MD sent at 1/2/2022  2:22 PM -----   Could i please get a call regarding the saliva test i took   I really dont understand it  Thank you  Adeel Kurtz

## 2022-01-12 ENCOUNTER — HOSPITAL ENCOUNTER (OUTPATIENT)
Dept: RADIOLOGY | Facility: HOSPITAL | Age: 62
Discharge: HOME | End: 2022-01-12
Attending: INTERNAL MEDICINE
Payer: COMMERCIAL

## 2022-01-12 DIAGNOSIS — E03.9 ACQUIRED HYPOTHYROIDISM: ICD-10-CM

## 2022-01-12 PROCEDURE — 76536 US EXAM OF HEAD AND NECK: CPT

## 2022-01-18 ENCOUNTER — TELEPHONE (OUTPATIENT)
Dept: PREADMISSION TESTING | Facility: HOSPITAL | Age: 62
End: 2022-01-18

## 2022-01-19 ENCOUNTER — TELEPHONE (OUTPATIENT)
Dept: GASTROENTEROLOGY | Facility: CLINIC | Age: 62
End: 2022-01-19

## 2022-01-31 ENCOUNTER — TELEPHONE (OUTPATIENT)
Dept: OBGYN CLINIC | Facility: CLINIC | Age: 62
End: 2022-01-31

## 2022-01-31 NOTE — TELEPHONE ENCOUNTER
Order placed for B/L Euflexxa once approval received from [address] [place] [address] Dr team will reach out to schedule

## 2022-01-31 NOTE — TELEPHONE ENCOUNTER
Pt sees Dr Remington Montaño    Pt is calling in wanting to schedule her next set of Euflexxa shots for both knees       Last injection was on 8/26/2021  Please advise    Pt can be reached at [phone]

## 2022-03-02 ENCOUNTER — NURSE TRIAGE (OUTPATIENT)
Dept: OTHER | Facility: OTHER | Age: 62
End: 2022-03-02

## 2022-03-02 NOTE — TELEPHONE ENCOUNTER
Patient needing prep  Triage nurse gave prep instructions for dulcolax/miralax as listed in chart  Patient is requesting if [facility] can [facility] emailed or [facility]       Regarding: GI COLONOSCOPY & EGD - Prep  ----- Message from Steven Crews sent at 3/2/2022  1:09 PM EST -----  " I have a GI COLONOSCOPY & EGD scheduled on  3/8/2022, I was wondering when I need to start the prep "

## 2022-03-08 ENCOUNTER — ANESTHESIA EVENT (OUTPATIENT)
Dept: GASTROENTEROLOGY | Facility: AMBULARY SURGERY CENTER | Age: 62
End: 2022-03-08

## 2022-03-08 ENCOUNTER — HOSPITAL ENCOUNTER (OUTPATIENT)
Dept: GASTROENTEROLOGY | Facility: AMBULARY SURGERY CENTER | Age: 62
Setting detail: OUTPATIENT SURGERY
Discharge: HOME | End: 2022-03-08
Attending: INTERNAL MEDICINE
Payer: COMMERCIAL

## 2022-03-08 ENCOUNTER — ANESTHESIA (OUTPATIENT)
Dept: GASTROENTEROLOGY | Facility: AMBULARY SURGERY CENTER | Age: 62
End: 2022-03-08

## 2022-03-08 VITALS
HEART RATE: 77 BPM | OXYGEN SATURATION: 99 % | RESPIRATION RATE: 18 BRPM | DIASTOLIC BLOOD PRESSURE: 77 MMHG | SYSTOLIC BLOOD PRESSURE: 146 MMHG | TEMPERATURE: 97.9 F

## 2022-03-08 DIAGNOSIS — R14.0 ABDOMINAL BLOATING: ICD-10-CM

## 2022-03-08 DIAGNOSIS — R19.8 ALTERNATING CONSTIPATION AND DIARRHEA: ICD-10-CM

## 2022-03-08 DIAGNOSIS — K29.70 GASTRITIS WITHOUT BLEEDING, UNSPECIFIED CHRONICITY, UNSPECIFIED GASTRITIS TYPE: Primary | ICD-10-CM

## 2022-03-08 DIAGNOSIS — R10.11 RIGHT UPPER QUADRANT ABDOMINAL PAIN: ICD-10-CM

## 2022-03-08 DIAGNOSIS — Z12.11 COLON CANCER SCREENING: ICD-10-CM

## 2022-03-08 PROCEDURE — 88305 TISSUE EXAM BY PATHOLOGIST: CPT | Performed by: PATHOLOGY

## 2022-03-08 PROCEDURE — 45380 COLONOSCOPY AND BIOPSY: CPT | Performed by: INTERNAL MEDICINE

## 2022-03-08 PROCEDURE — 43239 EGD BIOPSY SINGLE/MULTIPLE: CPT | Performed by: INTERNAL MEDICINE

## 2022-03-08 RX ORDER — PROPOFOL 10 MG/ML
INJECTION, EMULSION INTRAVENOUS CONTINUOUS PRN
Status: DISCONTINUED | OUTPATIENT
Start: 2022-03-08 | End: 2022-03-08

## 2022-03-08 RX ORDER — LIDOCAINE HYDROCHLORIDE 10 MG/ML
INJECTION, SOLUTION EPIDURAL; INFILTRATION; INTRACAUDAL; PERINEURAL AS NEEDED
Status: DISCONTINUED | OUTPATIENT
Start: 2022-03-08 | End: 2022-03-08

## 2022-03-08 RX ORDER — PANTOPRAZOLE SODIUM 40 MG/1
40 TABLET, DELAYED RELEASE ORAL DAILY
Qty: 90 TABLET | Refills: 0 | Status: SHIPPED | OUTPATIENT
Start: 2022-03-08 | End: 2022-05-29

## 2022-03-08 RX ORDER — SODIUM CHLORIDE, SODIUM LACTATE, POTASSIUM CHLORIDE, CALCIUM CHLORIDE 600; 310; 30; 20 MG/100ML; MG/100ML; MG/100ML; MG/100ML
INJECTION, SOLUTION INTRAVENOUS CONTINUOUS PRN
Status: DISCONTINUED | OUTPATIENT
Start: 2022-03-08 | End: 2022-03-08

## 2022-03-08 RX ORDER — SODIUM CHLORIDE, SODIUM LACTATE, POTASSIUM CHLORIDE, CALCIUM CHLORIDE 600; 310; 30; 20 MG/100ML; MG/100ML; MG/100ML; MG/100ML
125 INJECTION, SOLUTION INTRAVENOUS CONTINUOUS
Status: CANCELLED | OUTPATIENT
Start: 2022-03-08

## 2022-03-08 RX ORDER — PROPOFOL 10 MG/ML
INJECTION, EMULSION INTRAVENOUS AS NEEDED
Status: DISCONTINUED | OUTPATIENT
Start: 2022-03-08 | End: 2022-03-08

## 2022-03-08 RX ADMIN — SODIUM CHLORIDE, SODIUM LACTATE, POTASSIUM CHLORIDE, AND CALCIUM CHLORIDE: .6; .31; .03; .02 INJECTION, SOLUTION INTRAVENOUS at 10:14

## 2022-03-08 RX ADMIN — PROPOFOL 100 MCG/KG/MIN: 10 INJECTION, EMULSION INTRAVENOUS at 10:16

## 2022-03-08 RX ADMIN — PROPOFOL 100 MG: 10 INJECTION, EMULSION INTRAVENOUS at 10:16

## 2022-03-08 RX ADMIN — LIDOCAINE HYDROCHLORIDE 50 MG: 10 INJECTION, SOLUTION EPIDURAL; INFILTRATION; INTRACAUDAL; PERINEURAL at 10:16

## 2022-03-08 NOTE — ANESTHESIA POSTPROCEDURE EVALUATION
Post-Op Assessment Note    CV Status:  Stable  Pain Score: 0    Pain management: adequate     Mental Status:  Awake   Hydration Status:  Stable   PONV Controlled:  None   Airway Patency:  Patent      Post Op Vitals Reviewed: Yes      Staff: CRNA         No complications documented      BP      Temp      Pulse     Resp      SpO2   99

## 2022-03-08 NOTE — H&P
History and Physical -  [facility]  Flory Medrano 62 y o  female MRN: [id number]    HPI: Flory Medrano is a 62y o  year old female who presents evaluation of dyspepsia symptoms and for evaluation of change in bowel habits with predominant diarrhea        Review of Systems    Historical Information   Past Medical History:   Diagnosis Date    Arthritis     Asthma     COVID-19 2022    Disease of thyroid gland     hypo    Dizziness     s/p covid 19    Mediterranean anemia     Pemphigus vulgaris     Thalassemia     Vitamin D deficiency     Wears partial dentures     upper     Past Surgical History:   Procedure Laterality Date    BLEPHAROPLASTY      bilateral    BREAST BIOPSY Right     benign-not sure of exact year    COLONOSCOPY      CT KNEE SCOPE,MED/LAT MENISECTOMY Right 2020    Procedure: MENISCECTOMY MEDIAL;  Surgeon: Adri Koo DO;  [facility];  Service: Orthopedics     Social History   Social History     Substance and Sexual Activity   Alcohol Use Yes    Alcohol/week: 1 0 standard drink    Types: 1 Glasses of wine per week    Comment: socially      Social History     Substance and Sexual Activity   Drug Use Never     Social History     Tobacco Use   Smoking Status Former Smoker    Years: 10 00    Quit date:     Years since quittin 1   Smokeless Tobacco Never Used     Family History   Problem Relation Age of Onset    No Known Problems Mother    Ramirez Machuca Asthma Father     Hypertension Father     Hypertension Maternal Grandmother     Liver cancer Maternal Grandmother     Diabetes Maternal Grandmother     Cancer Maternal Grandmother         liver    Cancer Family         multiple relatives    Seizures Sister     Asthma Brother     Hypertension Brother     Leukemia Maternal Uncle     Cancer Maternal Uncle         leukemia    Heart attack Cousin     Bone cancer Maternal Uncle     No Known Problems Paternal Aunt     Substance Abuse Neg Hx     Mental illness Neg Hx        Meds/Allergies     (Not in a hospital admission)      Allergies   Allergen Reactions    Iodine - Food Allergy Itching and Vomiting    Shellfish-Derived Products - Food Allergy Itching and Vomiting    Levaquin [Levofloxacin] Rash       Objective     /74   Pulse 63   Temp 97 9 °F (36 6 °C) (Oral)   Resp 18   SpO2 98%       PHYSICAL EXAM    Gen: NAD  CV: RRR  CHEST: Clear  ABD: soft, NT/ND  EXT: no edema  Neuro: AAO      ASSESSMENT/PLAN:  This is a 62y o  year old female here for evaluation of change in bowel habits with diarrhea and poor abdominal bloating and dyspepsia like symptoms  PLAN:   Procedure:  EGD and colonoscopy

## 2022-03-08 NOTE — ANESTHESIA PREPROCEDURE EVALUATION
Procedure:  COLONOSCOPY  EGD    Relevant Problems   CARDIO   (+) Hypertriglyceridemia   (+) Mixed hyperlipidemia      ENDO   (+) Acquired hypothyroidism      HEMATOLOGY   (+) Mediterranean anemia      MUSCULOSKELETAL   (+) DDD (degenerative disc disease), lumbar      NEURO/PSYCH   (+) Anxiety      PULMONARY   (+) Mild intermittent asthma without complication        Physical Exam    Airway    Mallampati score: II  TM Distance: >3 FB  Neck ROM: full     Dental   Comment: Upper partial denture, No notable dental hx     Cardiovascular  Cardiovascular exam normal    Pulmonary  Pulmonary exam normal     Other Findings        Anesthesia Plan  ASA Score- 2     Anesthesia Type- IV sedation with anesthesia with ASA Monitors  Additional Monitors:   Airway Plan:           Plan Factors-Exercise tolerance (METS): >4 METS  Chart reviewed  Imaging results reviewed  Existing labs reviewed  Patient summary reviewed  Patient is not a current smoker  Induction-     Postoperative Plan-     Informed Consent- Anesthetic plan and risks discussed with patient  I personally reviewed this patient with the CRNA  Discussed and agreed on the Anesthesia Plan with the CRNA  Lizett Malik

## 2022-03-09 ENCOUNTER — NURSE TRIAGE (OUTPATIENT)
Dept: OTHER | Facility: OTHER | Age: 62
End: 2022-03-09

## 2022-03-09 DIAGNOSIS — E03.9 ACQUIRED HYPOTHYROIDISM: ICD-10-CM

## 2022-03-09 NOTE — TELEPHONE ENCOUNTER
Reason for Disposition   Caller has NON-URGENT medicine question about med that PCP or specialist prescribed and triager unable to answer question    Answer Assessment - Initial Assessment Questions  1  NAME of MEDICATION: "What medicine are you calling about?"      Pantoprazole     2  QUESTION: "What is your question?" (e g , medication refill, side effect)      "While reading the side effects I saw that I'm not suppose to take vitamin D or zinc, and I'm also concerned about hair loss  I also take a probiotic and don't know if I'm allowed to take that " Patient states that if she changes her diet and doesn't need to take this medication then she doesn't want to be on it  3  PRESCRIBING HCP: "Who prescribed it?" Reason: if prescribed by specialist, call should be referred to that group  Gastroenterologist     4  SYMPTOMS: "Do you have any symptoms?"      She has been taking this medications and denies having any symptoms      Protocols used: MEDICATION QUESTION CALL-ADULT-[place]

## 2022-03-09 NOTE — TELEPHONE ENCOUNTER
Regarding: pantoprazole (PROTONIX) 40 mg tablet-Question  ----- Message from Vicenta Kelly sent at 3/9/2022  1:26 PM EST -----  " I am on pantoprazole (PROTONIX) 40 mg tablet and I am also taking Vitamin D and other Vitamins and I am concerned  "

## 2022-03-10 RX ORDER — LEVOTHYROXINE SODIUM 0.03 MG/1
TABLET ORAL
Qty: 90 TABLET | Refills: 3 | Status: SHIPPED | OUTPATIENT
Start: 2022-03-10

## 2022-03-10 NOTE — TELEPHONE ENCOUNTER
Please inform the patient that she had gastric erosions which is the reason that I prescribed a PPI ( pantoprazole 40 mg) on daily basis for the next 3 months, after 3 months, she can switch to Pepcid or make dietary modifications  I would recommend to avoid NSAIDs  You may take probiotics as you are doing  These medications do have side effects, however they are rare-likelihood of hair loss is very low  She may continue vitamin-D and zinc supplement as she is doing if she needs those      Once I have the results of the biopsies from the endoscopy, we can make any further recommendations    Thank you

## 2022-03-11 NOTE — TELEPHONE ENCOUNTER
Spoke with pt and relayed advice and recommendations  Pt agreeable and will await call with biopsy results

## 2022-03-15 ENCOUNTER — OFFICE VISIT (OUTPATIENT)
Dept: FAMILY MEDICINE CLINIC | Facility: CLINIC | Age: 62
End: 2022-03-15
Payer: COMMERCIAL

## 2022-03-15 VITALS
WEIGHT: 180 LBS | SYSTOLIC BLOOD PRESSURE: 138 MMHG | BODY MASS INDEX: 31.89 KG/M2 | HEART RATE: 64 BPM | DIASTOLIC BLOOD PRESSURE: 70 MMHG | OXYGEN SATURATION: 100 % | HEIGHT: 63 IN | TEMPERATURE: 97.4 F

## 2022-03-15 DIAGNOSIS — M51.36 DDD (DEGENERATIVE DISC DISEASE), LUMBAR: ICD-10-CM

## 2022-03-15 DIAGNOSIS — J45.20 MILD INTERMITTENT ASTHMA WITHOUT COMPLICATION: ICD-10-CM

## 2022-03-15 DIAGNOSIS — E78.2 MIXED HYPERLIPIDEMIA: ICD-10-CM

## 2022-03-15 DIAGNOSIS — E03.9 ACQUIRED HYPOTHYROIDISM: Primary | ICD-10-CM

## 2022-03-15 DIAGNOSIS — Z12.31 ENCOUNTER FOR SCREENING MAMMOGRAM FOR MALIGNANT NEOPLASM OF BREAST: ICD-10-CM

## 2022-03-15 DIAGNOSIS — I77.9 BILATERAL CAROTID ARTERY DISEASE, UNSPECIFIED TYPE (HCC): ICD-10-CM

## 2022-03-15 DIAGNOSIS — D56.9 MEDITERRANEAN ANEMIA: ICD-10-CM

## 2022-03-15 DIAGNOSIS — E78.1 HYPERTRIGLYCERIDEMIA: ICD-10-CM

## 2022-03-15 DIAGNOSIS — Z78.0 POST-MENOPAUSAL: ICD-10-CM

## 2022-03-15 DIAGNOSIS — E53.8 VITAMIN B12 DEFICIENCY: ICD-10-CM

## 2022-03-15 DIAGNOSIS — E55.9 VITAMIN D DEFICIENCY: ICD-10-CM

## 2022-03-15 DIAGNOSIS — F41.9 ANXIETY: ICD-10-CM

## 2022-03-15 DIAGNOSIS — J30.2 SEASONAL ALLERGIC RHINITIS, UNSPECIFIED TRIGGER: ICD-10-CM

## 2022-03-15 PROCEDURE — 99214 OFFICE O/P EST MOD 30 MIN: CPT | Performed by: NURSE PRACTITIONER

## 2022-03-15 PROCEDURE — 1036F TOBACCO NON-USER: CPT | Performed by: NURSE PRACTITIONER

## 2022-03-15 RX ORDER — ERGOCALCIFEROL 1.25 MG/1
50000 CAPSULE ORAL WEEKLY
Qty: 12 CAPSULE | Refills: 1 | Status: SHIPPED | OUTPATIENT
Start: 2022-03-15 | End: 2022-07-21 | Stop reason: SDUPTHER

## 2022-03-15 RX ORDER — MULTIVIT WITH MINERALS/LUTEIN
1000 TABLET ORAL DAILY
COMMUNITY
End: 2022-07-21 | Stop reason: ALTCHOICE

## 2022-03-15 RX ORDER — FLUTICASONE PROPIONATE 50 MCG
1 SPRAY, SUSPENSION (ML) NASAL DAILY
Qty: 16 G | Refills: 3 | Status: SHIPPED | OUTPATIENT
Start: 2022-03-15 | End: 2022-07-07

## 2022-03-15 RX ORDER — ROSUVASTATIN CALCIUM 10 MG/1
10 TABLET, COATED ORAL DAILY
Qty: 90 TABLET | Refills: 3 | Status: SHIPPED | OUTPATIENT
Start: 2022-03-15 | End: 2022-07-21 | Stop reason: SDUPTHER

## 2022-03-15 RX ORDER — CITALOPRAM 20 MG/1
20 TABLET ORAL DAILY
Qty: 90 TABLET | Refills: 1 | Status: SHIPPED | OUTPATIENT
Start: 2022-03-15 | End: 2022-05-24 | Stop reason: ALTCHOICE

## 2022-03-15 RX ORDER — ALBUTEROL SULFATE 90 UG/1
2 AEROSOL, METERED RESPIRATORY (INHALATION) EVERY 6 HOURS PRN
Qty: 18 G | Refills: 1 | Status: SHIPPED | OUTPATIENT
Start: 2022-03-15 | End: 2022-07-21 | Stop reason: SDUPTHER

## 2022-03-15 RX ORDER — SACCHAROMYCES BOULARDII 250 MG
250 CAPSULE ORAL DAILY
COMMUNITY
End: 2022-07-21 | Stop reason: ALTCHOICE

## 2022-03-15 NOTE — PATIENT INSTRUCTIONS
Heart healthy, carbohydrate controlled diet- limit red meat, limit saturated fat, moderate salt intake, limit junk food, etc    Regular exercise  Stress management  Routine labwork and screenings as ordered, to be done prior to annual physical   Continue all current medications

## 2022-03-15 NOTE — PROGRESS NOTES
Assessment/Plan:  1  Acquired hypothyroidism  Stable on levothyroxine  Monitor    - TSH, 3rd generation; Future  2  Mild intermittent asthma without complication  Stable  Monitor  Rescue inhaler 2 puffs every 4-6 hours as needed for shortness breath, chest tightness, bronchospasm, coughing fits  - albuterol (PROVENTIL HFA,VENTOLIN HFA) 90 mcg/act inhaler; Inhale 2 puffs every 6 (six) hours as needed for wheezing  Dispense: 18 g; Refill: 1  3  DDD (degenerative disc disease), lumbar  Stable  Monitor  4  Vitamin D deficiency  - CBC and differential; Future  - Comprehensive metabolic panel; Future  - Vitamin D 25 hydroxy; Future  - ergocalciferol (VITAMIN D2) 50,000 units; Take 1 capsule (50,000 Units total) by mouth once a week  Dispense: 12 capsule; Refill: 1  -5  Anxiety  Stress management  Activities to divert attention when possible  Conscious breathing techniques as discussed  Coping mechanisms and strategies vary from person to person so try to utilize strategies that you think may work for you (such as meditation, music, etc  )  Anti anxiety/depression medications as prescribed  - citalopram (CeleXA) 20 mg tablet; Take 1 tablet (20 mg total) by mouth daily  Dispense: 90 tablet; Refill: 1  6  Vitamin B12 deficiency  - CBC and differential; Future  - Vitamin B12; Future  - 7  Mediterranean anemia  - CBC and differential; Future  - Comprehensive metabolic panel; Future  8  Mixed hyperlipidemia  Heart healthy diet  Statin  Monitor  Regular exercise  - CBC and differential; Future  - Comprehensive metabolic panel; Future  - Lipid panel; Future  - rosuvastatin (CRESTOR) 10 MG tablet; Take 1 tablet (10 mg total) by mouth daily  Dispense: 90 tablet; Refill: 3  -9  Hypertriglyceridemia  Heart healthy diet  Statin  Monitor  Regular exercise  - CBC and differential; Future  - Comprehensive metabolic panel; Future  - Lipid panel; Future  - rosuvastatin (CRESTOR) 10 MG tablet;  Take 1 tablet (10 mg total) by mouth daily  Dispense: 90 tablet; Refill: 3  10  Encounter for screening mammogram for malignant neoplasm of breast  - Mammo screening bilateral w 3d & cad; Future  11  Post-menopausal  - DXA bone density spine hip and pelvis; Future  12  Seasonal allergic rhinitis, unspecified trigger  - fluticasone (FLONASE) 50 mcg/act nasal spray; 1 spray into each nostril daily  Dispense: 16 g; Refill: 3  13  Bilateral carotid artery disease, unspecified type (HCC)  Statin  Managed by cardio  Monitor    - rosuvastatin (CRESTOR) 10 MG tablet; Take 1 tablet (10 mg total) by mouth daily  Dispense: 90 tablet; Refill: 3    Patient was counseled regarding instructions for management which included: impression/diagnosis, risk/benefits of treatment options, importance of compliance with treatment, risk factor reduction, and prognosis  I have reviewed the instructions with the patient answering all questions and patient verbalized understanding  BMI Counseling: Body mass index is 31 89 kg/m²  The BMI is above normal  Nutrition recommendations include reducing portion sizes, decreasing overall calorie intake, reducing intake of saturated fat and trans fat and reducing intake of cholesterol  Exercise recommendations include exercising 3-5 times per week  Subjective:      Patient ID: Jaz Bales is a 65 y o  female who presents for med check    Here for follow up on multiple chronic conditions  Sees endocrine for management of thyroid and vitamin D  Recently had EGD and colon  Started on protonix  No recent exac of asthma  Seasonal allergies acting up  Needs refills on meds  Chronic back pain but manageable  No recent illness  Feels well  Anxiety well controlled on celexa               The following portions of the patient's history were reviewed and updated as appropriate: allergies, current medications, past family history, past medical history, past social history, past surgical history and problem list     Review of Systems   Constitutional: Negative for fatigue and unexpected weight change  HENT: Negative for congestion  Eyes: Negative for visual disturbance  Respiratory: Negative for shortness of breath  Cardiovascular: Negative for chest pain and palpitations  Gastrointestinal: Negative for abdominal pain, diarrhea, nausea and vomiting  Endocrine: Negative for cold intolerance, heat intolerance, polydipsia, polyphagia and polyuria  Genitourinary: Negative for frequency and urgency  Musculoskeletal: Positive for back pain  Skin: Negative for color change and pallor  Allergic/Immunologic: Positive for environmental allergies  Neurological: Negative for dizziness and headaches  Hematological: Negative for adenopathy  Does not bruise/bleed easily  Psychiatric/Behavioral: The patient is nervous/anxious (well controlled)  Objective:      /70   Pulse 64   Temp (!) 97 4 °F (36 3 °C) (Temporal)   Ht 5' 3" (1 6 m)   Wt 81 6 kg (180 lb)   SpO2 100%   BMI 31 89 kg/m²          Physical Exam  Vitals reviewed  Constitutional:       General: She is not in acute distress  Appearance: She is not ill-appearing  Eyes:      General: No scleral icterus  Neck:      Vascular: No carotid bruit  Cardiovascular:      Rate and Rhythm: Normal rate and regular rhythm  Pulmonary:      Effort: Pulmonary effort is normal  No respiratory distress  Breath sounds: Normal breath sounds  No wheezing or rales  Abdominal:      General: There is no distension  Palpations: Abdomen is soft  Musculoskeletal:      Cervical back: Normal range of motion and neck supple  Skin:     General: Skin is warm and dry  Coloration: Skin is not jaundiced or pale  Neurological:      General: No focal deficit present  Mental Status: She is alert and oriented to person, place, and time  Cranial Nerves: No cranial nerve deficit  Sensory: No sensory deficit     Psychiatric:         Mood and Affect: Mood normal          Behavior: Behavior normal          Thought Content:  Thought content normal          Judgment: Judgment normal

## 2022-03-16 ENCOUNTER — PROCEDURE VISIT (OUTPATIENT)
Dept: OBGYN CLINIC | Facility: CLINIC | Age: 62
End: 2022-03-16
Payer: COMMERCIAL

## 2022-03-16 VITALS — HEIGHT: 63 IN | WEIGHT: 182 LBS | BODY MASS INDEX: 32.25 KG/M2

## 2022-03-16 DIAGNOSIS — M25.561 CHRONIC PAIN OF BOTH KNEES: ICD-10-CM

## 2022-03-16 DIAGNOSIS — G89.29 CHRONIC PAIN OF BOTH KNEES: ICD-10-CM

## 2022-03-16 DIAGNOSIS — M25.562 CHRONIC PAIN OF BOTH KNEES: ICD-10-CM

## 2022-03-16 DIAGNOSIS — M17.0 BILATERAL PRIMARY OSTEOARTHRITIS OF KNEE: Primary | ICD-10-CM

## 2022-03-16 PROCEDURE — 20610 DRAIN/INJ JOINT/BURSA W/O US: CPT | Performed by: ORTHOPAEDIC SURGERY

## 2022-03-16 PROCEDURE — 3008F BODY MASS INDEX DOCD: CPT | Performed by: NURSE PRACTITIONER

## 2022-03-16 RX ORDER — HYALURONATE SODIUM 10 MG/ML
20 SYRINGE (ML) INTRAARTICULAR
Status: COMPLETED | OUTPATIENT
Start: 2022-03-16 | End: 2022-03-16

## 2022-03-16 RX ADMIN — Medication 20 MG: at 11:46

## 2022-03-16 NOTE — PROGRESS NOTES
Assessment/Plan:  1  Bilateral primary osteoarthritis of knee  Large joint arthrocentesis   2  Chronic pain of both knees  Large joint arthrocentesis     Arsalan Miller is a pleasant 58-year-old female presenting today follow-up of her activity related bilateral knee pain due to her underlying osteoarthritis  She has done well with viscosupplementation is interested in repeating the series  She consented to and underwent bilateral knee Euflexxa injections as detailed below, which she tolerated well without difficulty or complication  Post injection instructions were provided  We will see her next week and a week after to complete the series  All questions addressed  Large joint arthrocentesis: bilateral knee  Universal Protocol:  Consent: Verbal consent obtained  Risks and benefits: risks, benefits and alternatives were discussed  Consent given by: patient  Time out: Immediately prior to procedure a "time out" was called to verify the correct patient, procedure, equipment, support staff and site/side marked as required  Timeout called at: 3/16/2022 11:45 AM   Site marked: the operative site was marked  Patient identity confirmed: verbally with patient    Supporting Documentation  Indications: pain   Procedure Details  Location: knee - bilateral knee  Preparation: Patient was prepped and draped in the usual sterile fashion  Needle size: 20 G  Ultrasound guidance: no  Approach: anterolateral    Medications (Right): 20 mg Sodium Hyaluronate 20 MG/2MLSpecialty Pharmacy Supplied (Right): for right side  Medications (Left): 20 mg Sodium Hyaluronate 20 MG/2ML   Specialty Pharmacy Supplied (Left): for left side  Patient tolerance: patient tolerated the procedure well with no immediate complications  Dressing:  Sterile dressing applied        Subjective: Bilateral knee Euflexxa #1    Patient ID: Jose Mendieta is a 65 y o  female      Arsalan Miller is a pleasant 58-year-old very well known to our practice for her activity related bilateral knee pain due to her underlying osteoarthritis  She did do well with Euflexxa completed last August   She is interested in repeating the series here today  She denies any new injuries, but states that she has right greater than left pain especially with activity and at night  She denies new specific injuries    Review of Systems   Constitutional: Negative  HENT: Negative  Eyes: Negative  Respiratory: Negative  Cardiovascular: Negative  Gastrointestinal: Negative  Endocrine: Negative  Genitourinary: Negative  Musculoskeletal: Positive for arthralgias, joint swelling and myalgias  Skin: Negative  Allergic/Immunologic: Negative  Neurological: Negative  Hematological: Negative  Psychiatric/Behavioral: Negative            Past Medical History:   Diagnosis Date    Arthritis     Asthma     COVID-19 01/02/2022    Disease of thyroid gland     hypo    Dizziness     s/p covid 19    Mediterranean anemia     Pemphigus vulgaris     Thalassemia     Vitamin D deficiency     Wears partial dentures     upper       Past Surgical History:   Procedure Laterality Date    BLEPHAROPLASTY      bilateral    BREAST BIOPSY Right 2000    benign-not sure of exact year    COLONOSCOPY      TX KNEE SCOPE,MED/LAT MENISECTOMY Right 6/2/2020    Procedure: MENISCECTOMY MEDIAL;  Surgeon: Amaury Case DO;  Location: [facility];  Service: Orthopedics       Family History   Problem Relation Age of Onset    No Known Problems Mother     Asthma Father     Hypertension Father     Hypertension Maternal Grandmother     Liver cancer Maternal Grandmother     Diabetes Maternal Grandmother     Cancer Maternal Grandmother         liver    Cancer Family         multiple relatives    Seizures Sister     Asthma Brother     Hypertension Brother     Leukemia Maternal Uncle     Cancer Maternal Uncle         leukemia    Heart attack Cousin     Bone cancer Maternal Uncle     No Known Problems Paternal Aunt     Substance Abuse Neg Hx     Mental illness Neg Hx        Social History     Occupational History    Not on file   Tobacco Use    Smoking status: Former Smoker     Years: 10 00     Quit date:      Years since quittin 2    Smokeless tobacco: Never Used   Vaping Use    Vaping Use: Never used   Substance and Sexual Activity    Alcohol use:  Yes     Alcohol/week: 1 0 standard drink     Types: 1 Glasses of wine per week     Comment: socially     Drug use: Never    Sexual activity: Yes     Partners: Male     Birth control/protection: Post-menopausal         Current Outpatient Medications:     albuterol (PROVENTIL HFA,VENTOLIN HFA) 90 mcg/act inhaler, Inhale 2 puffs every 6 (six) hours as needed for wheezing, Disp: 18 g, Rfl: 1    ALPRAZolam (XANAX) 0 25 mg tablet, Take 1 tablet (0 25 mg total) by mouth daily at bedtime as needed for anxiety, Disp: 30 tablet, Rfl: 1    Ascorbic Acid (vitamin C) 1000 MG tablet, Take 1,000 mg by mouth daily, Disp: , Rfl:     citalopram (CeleXA) 20 mg tablet, Take 1 tablet (20 mg total) by mouth daily, Disp: 90 tablet, Rfl: 1    ergocalciferol (VITAMIN D2) 50,000 units, Take 1 capsule (50,000 Units total) by mouth once a week, Disp: 12 capsule, Rfl: 1    fluticasone (FLONASE) 50 mcg/act nasal spray, 1 spray into each nostril daily, Disp: 16 g, Rfl: 3    levothyroxine 25 mcg tablet, Take one tablet on an empty stomach every night time, Disp: 90 tablet, Rfl: 3    Multiple Vitamins-Minerals (ZINC PO), Take by mouth daily  , Disp: , Rfl:     pantoprazole (PROTONIX) 40 mg tablet, Take 1 tablet (40 mg total) by mouth daily, Disp: 90 tablet, Rfl: 0    rosuvastatin (CRESTOR) 10 MG tablet, Take 1 tablet (10 mg total) by mouth daily, Disp: 90 tablet, Rfl: 3    saccharomyces boulardii (FLORASTOR) 250 mg capsule, Take 250 mg by mouth in the morning, Disp: , Rfl:     Allergies   Allergen Reactions    Iodine - Food Allergy Itching and Vomiting    Shellfish-Derived Products - Food Allergy Itching and Vomiting    Levaquin [Levofloxacin] Rash       Objective: There were no vitals filed for this visit  Body mass index is 32 24 kg/m²      Ortho Exam    Physical Exam

## 2022-03-17 ENCOUNTER — TELEPHONE (OUTPATIENT)
Dept: GASTROENTEROLOGY | Facility: AMBULARY SURGERY CENTER | Age: 62
End: 2022-03-17

## 2022-03-17 NOTE — TELEPHONE ENCOUNTER
Pt aware of results and verbalized understanding  Pt is experiencing lots of bloating and gas  States it is very uncomfortable and abdomin gets hard  Pt wants to know what could be causing this  Pt is trying to stay away from red sauce and cheese  Please advise  Thank you!

## 2022-03-17 NOTE — TELEPHONE ENCOUNTER
Spoke to patient on phone concerning patient's reports of bloating and gas  Patient denies any abdominal pain but reports her abdomen will get hard  Patient states she is having bowel movements daily  No reports of diarrhea or constipation  Patient advised to avoid gassy food and  may take extra strength gas -X up to 4 times daily for gas  Patient also instructed to take pantoprazole 40 mg daily as ordered for the next 3 months then 20 mg daily  Patient instructed to avoid NSAIDs  Patient is going out for her birthday tonight and wanted to know if it was a ride to have a small glass a wine  Instructed patient does was okay but she should limit alcohol intake  Told patient if symptoms do not improve she should call our office back she may need SIBO testing   Thank you

## 2022-03-17 NOTE — TELEPHONE ENCOUNTER
----- Message from Sekou Meyer MD sent at 3/15/2022  8:08 AM EDT -----  Please inform the patient that the small-bowel biopsies are negative for celiac disease, no evidence of H pylori  Polyp in the stomach is benign  Would recommend starting on pantoprazole 40 mg on the daily basis for the next 3 months followed by 20 mg  Avoid the use of NSAIDs  No need for repeat EGD  No evidence of microscopic colitis, polyps in the colon were all benign, repeat colonoscopy in 5 years  Follow-up in office in the next 2-3 months

## 2022-03-23 ENCOUNTER — PROCEDURE VISIT (OUTPATIENT)
Dept: OBGYN CLINIC | Facility: CLINIC | Age: 62
End: 2022-03-23
Payer: COMMERCIAL

## 2022-03-23 VITALS — WEIGHT: 182 LBS | BODY MASS INDEX: 32.24 KG/M2

## 2022-03-23 DIAGNOSIS — M17.0 BILATERAL PRIMARY OSTEOARTHRITIS OF KNEE: Primary | ICD-10-CM

## 2022-03-23 DIAGNOSIS — G89.29 CHRONIC PAIN OF BOTH KNEES: ICD-10-CM

## 2022-03-23 DIAGNOSIS — M25.562 CHRONIC PAIN OF BOTH KNEES: ICD-10-CM

## 2022-03-23 DIAGNOSIS — M25.561 CHRONIC PAIN OF BOTH KNEES: ICD-10-CM

## 2022-03-23 PROCEDURE — 20610 DRAIN/INJ JOINT/BURSA W/O US: CPT | Performed by: PHYSICIAN ASSISTANT

## 2022-03-23 RX ORDER — HYALURONATE SODIUM 10 MG/ML
20 SYRINGE (ML) INTRAARTICULAR
Status: COMPLETED | OUTPATIENT
Start: 2022-03-23 | End: 2022-03-23

## 2022-03-23 RX ADMIN — Medication 20 MG: at 09:14

## 2022-03-23 NOTE — PROGRESS NOTES
Assessment/Plan:  1  Bilateral primary osteoarthritis of knee  Large joint arthrocentesis   2  Chronic pain of both knees  Large joint arthrocentesis     Danny Gamez is a pleasant 70-year-old female presenting today follow-up of her activity related bilateral knee pain due to her underlying osteoarthritis  She consented to and underwent the 2nd of 3 Euflexxa injections for her bilateral knees as detailed below, which she tolerated well without difficulty or complication  Post injection instructions were provided  We will see her next week to complete the series  Large joint arthrocentesis: bilateral knee  Universal Protocol:  Consent: Verbal consent obtained  Risks and benefits: risks, benefits and alternatives were discussed  Consent given by: patient  Time out: Immediately prior to procedure a "time out" was called to verify the correct patient, procedure, equipment, support staff and site/side marked as required  Timeout called at: 3/23/2022 9:00 AM   Site marked: the operative site was marked  Patient identity confirmed: verbally with patient    Supporting Documentation  Indications: pain   Procedure Details  Location: knee - bilateral knee  Preparation: Patient was prepped and draped in the usual sterile fashion  Needle size: 20 G  Ultrasound guidance: no  Approach: anterolateral    Medications (Right): 20 mg Sodium Hyaluronate 20 MG/2MLSpecialty Pharmacy Supplied (Right): for right side  Medications (Left): 20 mg Sodium Hyaluronate 20 MG/2ML   Specialty Pharmacy Supplied (Left): for left side  Patient tolerance: patient tolerated the procedure well with no immediate complications  Dressing:  Sterile dressing applied        Subjective: Bilateral knee Euflexxa #2    Patient ID: Keenan De Santiago is a 65 y o  female  Danny Gamez is a pleasant 70-year-old very well known to our practice for her activity related bilateral knee pain due to her underlying osteoarthritis    She is here for the 2nd of 3 Euflexxa injections  She initially did see benefit from 1st injection but it is since wear off  She denies adverse effects    Review of Systems      Past Medical History:   Diagnosis Date    Arthritis     Asthma     COVID-19 2022    Disease of thyroid gland     hypo    Dizziness     s/p covid 19    Mediterranean anemia     Pemphigus vulgaris     Thalassemia     Vitamin D deficiency     Wears partial dentures     upper       Past Surgical History:   Procedure Laterality Date    BLEPHAROPLASTY      bilateral    BREAST BIOPSY Right     benign-not sure of exact year    COLONOSCOPY      CT KNEE SCOPE,MED/LAT MENISECTOMY Right 2020    Procedure: MENISCECTOMY MEDIAL;  Surgeon: Radha Santacruz DO;  Location: [facility];  Service: Orthopedics       Family History   Problem Relation Age of Onset    No Known Problems Mother     Asthma Father     Hypertension Father     Hypertension Maternal Grandmother     Liver cancer Maternal Grandmother     Diabetes Maternal Grandmother     Cancer Maternal Grandmother         liver    Cancer Family         multiple relatives    Seizures Sister     Asthma Brother     Hypertension Brother     Leukemia Maternal Uncle     Cancer Maternal Uncle         leukemia    Heart attack Cousin     Bone cancer Maternal Uncle     No Known Problems Paternal Aunt     Substance Abuse Neg Hx     Mental illness Neg Hx        Social History     Occupational History    Not on file   Tobacco Use    Smoking status: Former Smoker     Years: 10 00     Quit date:      Years since quittin 2    Smokeless tobacco: Never Used   Vaping Use    Vaping Use: Never used   Substance and Sexual Activity    Alcohol use:  Yes     Alcohol/week: 1 0 standard drink     Types: 1 Glasses of wine per week     Comment: socially     Drug use: Never    Sexual activity: Yes     Partners: Male     Birth control/protection: Post-menopausal         Current Outpatient Medications:    albuterol (PROVENTIL HFA,VENTOLIN HFA) 90 mcg/act inhaler, Inhale 2 puffs every 6 (six) hours as needed for wheezing, Disp: 18 g, Rfl: 1    ALPRAZolam (XANAX) 0 25 mg tablet, Take 1 tablet (0 25 mg total) by mouth daily at bedtime as needed for anxiety, Disp: 30 tablet, Rfl: 1    Ascorbic Acid (vitamin C) 1000 MG tablet, Take 1,000 mg by mouth daily, Disp: , Rfl:     citalopram (CeleXA) 20 mg tablet, Take 1 tablet (20 mg total) by mouth daily, Disp: 90 tablet, Rfl: 1    ergocalciferol (VITAMIN D2) 50,000 units, Take 1 capsule (50,000 Units total) by mouth once a week, Disp: 12 capsule, Rfl: 1    fluticasone (FLONASE) 50 mcg/act nasal spray, 1 spray into each nostril daily, Disp: 16 g, Rfl: 3    levothyroxine 25 mcg tablet, Take one tablet on an empty stomach every night time, Disp: 90 tablet, Rfl: 3    Multiple Vitamins-Minerals (ZINC PO), Take by mouth daily  , Disp: , Rfl:     pantoprazole (PROTONIX) 40 mg tablet, Take 1 tablet (40 mg total) by mouth daily, Disp: 90 tablet, Rfl: 0    rosuvastatin (CRESTOR) 10 MG tablet, Take 1 tablet (10 mg total) by mouth daily, Disp: 90 tablet, Rfl: 3    saccharomyces boulardii (FLORASTOR) 250 mg capsule, Take 250 mg by mouth in the morning, Disp: , Rfl:     Allergies   Allergen Reactions    Iodine - Food Allergy Itching and Vomiting    Shellfish-Derived Products - Food Allergy Itching and Vomiting    Levaquin [Levofloxacin] Rash       Objective: There were no vitals filed for this visit  Body mass index is 32 24 kg/m²      Ortho Exam    Physical Exam

## 2022-03-23 NOTE — TELEPHONE ENCOUNTER
Thank you gilberto,   Agree with recommendations  Please schedule follow-up in office with PA in the next 2-3 months        Thank you

## 2022-03-24 ENCOUNTER — TELEPHONE (OUTPATIENT)
Dept: GASTROENTEROLOGY | Facility: AMBULARY SURGERY CENTER | Age: 62
End: 2022-03-24

## 2022-03-30 ENCOUNTER — PROCEDURE VISIT (OUTPATIENT)
Dept: OBGYN CLINIC | Facility: CLINIC | Age: 62
End: 2022-03-30
Payer: COMMERCIAL

## 2022-03-30 DIAGNOSIS — M17.0 BILATERAL PRIMARY OSTEOARTHRITIS OF KNEE: Primary | ICD-10-CM

## 2022-03-30 DIAGNOSIS — G89.29 CHRONIC PAIN OF BOTH KNEES: ICD-10-CM

## 2022-03-30 DIAGNOSIS — M25.562 CHRONIC PAIN OF BOTH KNEES: ICD-10-CM

## 2022-03-30 DIAGNOSIS — M25.561 CHRONIC PAIN OF BOTH KNEES: ICD-10-CM

## 2022-03-30 PROCEDURE — 20610 DRAIN/INJ JOINT/BURSA W/O US: CPT | Performed by: ORTHOPAEDIC SURGERY

## 2022-03-30 RX ORDER — HYALURONATE SODIUM 10 MG/ML
20 SYRINGE (ML) INTRAARTICULAR
Status: COMPLETED | OUTPATIENT
Start: 2022-03-30 | End: 2022-03-30

## 2022-03-30 RX ADMIN — Medication 20 MG: at 09:35

## 2022-03-30 NOTE — PROGRESS NOTES
Assessment/Plan:  1  Bilateral primary osteoarthritis of knee  Large joint arthrocentesis   2  Chronic pain of both knees  Large joint arthrocentesis     61-year-old female who presents for her 3rd of 3 Euflexxa injections to bilateral knees  Bilateral Euflexxa injections to both knees were offered, excepted and performed as described below  Patient tolerated well  Post-injection instructions were provided to the patient  She will follow-up in in 6 months for recheck  Large joint arthrocentesis: bilateral knee  Universal Protocol:  Consent: Verbal consent obtained  Risks and benefits: risks, benefits and alternatives were discussed  Consent given by: patient  Patient identity confirmed: verbally with patient    Supporting Documentation  Indications: pain   Procedure Details  Location: knee - bilateral knee  Preparation: Patient was prepped and draped in the usual sterile fashion  Needle size: 22 G  Ultrasound guidance: no  Approach: anterolateral    Medications (Right): 20 mg Sodium Hyaluronate 20 MG/2MLMedications (Left): 20 mg Sodium Hyaluronate 20 MG/2ML   Patient tolerance: patient tolerated the procedure well with no immediate complications  Dressing:  Sterile dressing applied        Subjective: bilateral knee pain     Patient ID: Sekou Hodge is a 65 y o  female  61-year-old female who presents for chronic bilateral knee pain secondary to underlying osteoarthritis  She is here to receive her 3rd of 3 Euflexxa injections to bilateral knees  She has no complaints today  Review of Systems   Constitutional: Negative for fever  HENT: Negative for congestion  Eyes: Negative for photophobia  Respiratory: Negative for shortness of breath  Cardiovascular: Negative for chest pain  Gastrointestinal: Negative for nausea and vomiting  Musculoskeletal: Positive for arthralgias  Skin: Negative for rash  Neurological: Negative for headaches     Psychiatric/Behavioral: Negative for behavioral problems  Past Medical History:   Diagnosis Date    Arthritis     Asthma     COVID-19 2022    Disease of thyroid gland     hypo    Dizziness     s/p covid 19    Mediterranean anemia     Pemphigus vulgaris     Thalassemia     Vitamin D deficiency     Wears partial dentures     upper       Past Surgical History:   Procedure Laterality Date    BLEPHAROPLASTY      bilateral    BREAST BIOPSY Right     benign-not sure of exact year    COLONOSCOPY      DE KNEE SCOPE,MED/LAT MENISECTOMY Right 2020    Procedure: MENISCECTOMY MEDIAL;  Surgeon: Evelin Pearce DO;  Location: [facility];  Service: Orthopedics       Family History   Problem Relation Age of Onset    No Known Problems Mother     Asthma Father     Hypertension Father     Hypertension Maternal Grandmother     Liver cancer Maternal Grandmother     Diabetes Maternal Grandmother     Cancer Maternal Grandmother         liver    Cancer Family         multiple relatives    Seizures Sister     Asthma Brother     Hypertension Brother     Leukemia Maternal Uncle     Cancer Maternal Uncle         leukemia    Heart attack Cousin     Bone cancer Maternal Uncle     No Known Problems Paternal Aunt     Substance Abuse Neg Hx     Mental illness Neg Hx        Social History     Occupational History    Not on file   Tobacco Use    Smoking status: Former Smoker     Years: 10 00     Quit date:      Years since quittin 2    Smokeless tobacco: Never Used   Vaping Use    Vaping Use: Never used   Substance and Sexual Activity    Alcohol use:  Yes     Alcohol/week: 1 0 standard drink     Types: 1 Glasses of wine per week     Comment: socially     Drug use: Never    Sexual activity: Yes     Partners: Male     Birth control/protection: Post-menopausal         Current Outpatient Medications:     albuterol (PROVENTIL HFA,VENTOLIN HFA) 90 mcg/act inhaler, Inhale 2 puffs every 6 (six) hours as needed for wheezing, Disp: 18 g, Rfl: 1    ALPRAZolam (XANAX) 0 25 mg tablet, Take 1 tablet (0 25 mg total) by mouth daily at bedtime as needed for anxiety, Disp: 30 tablet, Rfl: 1    Ascorbic Acid (vitamin C) 1000 MG tablet, Take 1,000 mg by mouth daily, Disp: , Rfl:     citalopram (CeleXA) 20 mg tablet, Take 1 tablet (20 mg total) by mouth daily, Disp: 90 tablet, Rfl: 1    ergocalciferol (VITAMIN D2) 50,000 units, Take 1 capsule (50,000 Units total) by mouth once a week, Disp: 12 capsule, Rfl: 1    fluticasone (FLONASE) 50 mcg/act nasal spray, 1 spray into each nostril daily, Disp: 16 g, Rfl: 3    levothyroxine 25 mcg tablet, Take one tablet on an empty stomach every night time, Disp: 90 tablet, Rfl: 3    Multiple Vitamins-Minerals (ZINC PO), Take by mouth daily  , Disp: , Rfl:     pantoprazole (PROTONIX) 40 mg tablet, Take 1 tablet (40 mg total) by mouth daily, Disp: 90 tablet, Rfl: 0    rosuvastatin (CRESTOR) 10 MG tablet, Take 1 tablet (10 mg total) by mouth daily, Disp: 90 tablet, Rfl: 3    saccharomyces boulardii (FLORASTOR) 250 mg capsule, Take 250 mg by mouth in the morning, Disp: , Rfl:     Allergies   Allergen Reactions    Iodine - Food Allergy Itching and Vomiting    Shellfish-Derived Products - Food Allergy Itching and Vomiting    Levaquin [Levofloxacin] Rash       Objective: There were no vitals filed for this visit  There is no height or weight on file to calculate BMI  Right Knee Exam   Right knee exam is normal       Left Knee Exam   Left knee exam is normal               Physical Exam  Vitals reviewed  HENT:      Head: Normocephalic  Right Ear: External ear normal       Left Ear: External ear normal       Nose: Nose normal       Mouth/Throat:      Pharynx: Oropharynx is clear  Eyes:      Pupils: Pupils are equal, round, and reactive to light  Cardiovascular:      Rate and Rhythm: Normal rate  Pulses: Normal pulses     Pulmonary:      Effort: Pulmonary effort is normal    Abdominal: Palpations: Abdomen is soft  Musculoskeletal:      Cervical back: Normal range of motion  Comments: Please see ortho exam    Skin:     Capillary Refill: Capillary refill takes less than 2 seconds  Neurological:      Mental Status: She is alert  Mental status is at baseline     Psychiatric:         Mood and Affect: Mood normal

## 2022-04-06 ENCOUNTER — NURSE TRIAGE (OUTPATIENT)
Dept: OTHER | Facility: OTHER | Age: 62
End: 2022-04-06

## 2022-04-06 ENCOUNTER — TELEPHONE (OUTPATIENT)
Dept: FAMILY MEDICINE CLINIC | Facility: CLINIC | Age: 62
End: 2022-04-06

## 2022-04-06 NOTE — TELEPHONE ENCOUNTER
Agreed! Only other recommendation would be to increase dietary fiber to 25-30g daily or start Metamucil daily  Can consider colorectal eval if hemorrhoids remain symptomatic despite increased fiber  Would recommend OV for follow up

## 2022-04-06 NOTE — TELEPHONE ENCOUNTER
Please Advise   Napoleon Peres PA-C   Last seen: 12/1/21   Hx: Abdominal bloating with alternating constipation/ diarrhea and gas    Spoke with patient, had colonoscopy 2 5 weeks ago and was started on Protonix  Patient states after she went out to eat she had 1 loose stool with a streak of red blood on the stool and some on the toilet paper  Patient does have small internal hemorrhoids  Patient went to the bathroom a couple hours ago and noticed she was bleeding from her vagina  When she wiped was bright red blood and "the toilet water was pink" no clots  Patient currently has tissue in her underwear but no more bleeding  Patient is postmenopausal   Patient was wondering if that was a side effect of protonix  Denies: pain, fever, n/v, lightheadedness, dizziness     Recs:   - I advised patient that bleeding was not a side effect of protonix and she should see her PCP or Ob/gyn  Patient states she is waiting for a call back from PCP  - Advised per colonoscopy small internal hemorrhoids were noted and could be the cause for the rectal bleeding after her loose stool    -Advised if she feels dizzy, lightheaded, fever, chills, or severe N/V to go to the ED   Patient verbalized understanding  Any other recs?

## 2022-04-06 NOTE — TELEPHONE ENCOUNTER
Reason for Disposition   Caller has URGENT medicine question about med that PCP or specialist prescribed and triager unable to answer question    Answer Assessment - Initial Assessment Questions  1  NAME of MEDICATION: "What medicine are you calling about?"      Protonix     2  QUESTION: "What is your question?" (e g , medication refill, side effect)    "Had blood in stool and now bleeding from vagina, is this normal side affect from Protonix"    3  PRESCRIBING HCP: "Who prescribed it?" Reason: if prescribed by specialist, call should be referred to that group  [facility]     4  SYMPTOMS: "Do you have any symptoms?"      Blood in stool times once and now bleeding from vagina when wiping    5  SEVERITY: If symptoms are present, ask "Are they mild, moderate or severe?"      Mild to moderate    6   PREGNANCY:  "Is there any chance that you are pregnant?" "When was your last menstrual period?"      Post menopausal    Protocols used: MEDICATION QUESTION CALL-ADULT-[place]

## 2022-04-06 NOTE — TELEPHONE ENCOUNTER
Just wanted let you know    Is out of town in [place]  Had a colonoscopy a few weeks ago  She stated that she was having some diarrhea and it looked like there was blood in it  She is also experience bleeding from her vagina  She is very nervous  She was not sure if her taking Protonix would cause any bleeding  I could not answer that for her  I recommended for her to go to an urgent care or hospital er, but [facility] has [facility], she is not sure if they will take that    I then recommended for her to call her dr that did the colonoscopy  Gave her the number  Pre-Visit Chart Review  For Appointment Scheduled on 04/06/18    Health Maintenance Due   Topic Date Due    TETANUS VACCINE  11/18/1982

## 2022-04-07 NOTE — TELEPHONE ENCOUNTER
Please follow up with pt regarding above  Agree that she should follow up with GI doctor but it is doubtful that she would be having bleeding due to colonoscopy weeks later  Protonix should not be causing bleeding  It could be irritation, could be hemorrhoids, could be viral gastritis   cannot answer without exam but if continues should will need to seek evaluation/medical care  Vaginal bleeding is another issue and would require gyn eval

## 2022-04-07 NOTE — TELEPHONE ENCOUNTER
Spoke with patient, reviewed provider recommendations  Patient has not had any bleeding since yesterday she is going to make an appointment with ob/gyn  Follow up OV made for GI  Patient verbalized understanding, no further questions

## 2022-04-07 NOTE — TELEPHONE ENCOUNTER
Spoke with patient, she is still out of town, [place]  She stated she does have a appointment with Ob-gyn and GI on 04/29/2022  She stated she did have bright red blood on her toilet tissue after wiping yesterday  She has not noticed any today    DAIJA Zepeda/LILY

## 2022-04-23 LAB
25(OH)D3+25(OH)D2 SERPL-MCNC: 50.2 NG/ML (ref 30–100)
ALBUMIN SERPL-MCNC: 4.6 G/DL (ref 3.8–4.8)
ALBUMIN/GLOB SERPL: 1.8 {RATIO} (ref 1.2–2.2)
ALP SERPL-CCNC: 70 IU/L (ref 44–121)
ALT SERPL-CCNC: 32 IU/L (ref 0–32)
AST SERPL-CCNC: 24 IU/L (ref 0–40)
BASOPHILS # BLD AUTO: 0.1 X10E3/UL (ref 0–0.2)
BASOPHILS NFR BLD AUTO: 1 %
BILIRUB SERPL-MCNC: 0.4 MG/DL (ref 0–1.2)
BUN SERPL-MCNC: 19 MG/DL (ref 8–27)
BUN/CREAT SERPL: 23 (ref 12–28)
CALCIUM SERPL-MCNC: 9.7 MG/DL (ref 8.7–10.3)
CHLORIDE SERPL-SCNC: 98 MMOL/L (ref 96–106)
CHOLEST SERPL-MCNC: 173 MG/DL (ref 100–199)
CHOLEST/HDLC SERPL: 2.8 RATIO (ref 0–4.4)
CO2 SERPL-SCNC: 25 MMOL/L (ref 20–29)
CREAT SERPL-MCNC: 0.82 MG/DL (ref 0.57–1)
EGFR: 81 ML/MIN/1.73
EOSINOPHIL # BLD AUTO: 0.4 X10E3/UL (ref 0–0.4)
EOSINOPHIL NFR BLD AUTO: 6 %
ERYTHROCYTE [DISTWIDTH] IN BLOOD BY AUTOMATED COUNT: 14.3 % (ref 11.7–15.4)
GLOBULIN SER-MCNC: 2.6 G/DL (ref 1.5–4.5)
GLUCOSE SERPL-MCNC: 95 MG/DL (ref 65–99)
HCT VFR BLD AUTO: 41.4 % (ref 34–46.6)
HDLC SERPL-MCNC: 61 MG/DL
HGB BLD-MCNC: 13.3 G/DL (ref 11.1–15.9)
IMM GRANULOCYTES # BLD: 0 X10E3/UL (ref 0–0.1)
IMM GRANULOCYTES NFR BLD: 0 %
LDLC SERPL CALC-MCNC: 93 MG/DL (ref 0–99)
LYMPHOCYTES # BLD AUTO: 2.2 X10E3/UL (ref 0.7–3.1)
LYMPHOCYTES NFR BLD AUTO: 36 %
MCH RBC QN AUTO: 24.8 PG (ref 26.6–33)
MCHC RBC AUTO-ENTMCNC: 32.1 G/DL (ref 31.5–35.7)
MCV RBC AUTO: 77 FL (ref 79–97)
MONOCYTES # BLD AUTO: 0.4 X10E3/UL (ref 0.1–0.9)
MONOCYTES NFR BLD AUTO: 6 %
NEUTROPHILS # BLD AUTO: 3.2 X10E3/UL (ref 1.4–7)
NEUTROPHILS NFR BLD AUTO: 51 %
PLATELET # BLD AUTO: 223 X10E3/UL (ref 150–450)
POTASSIUM SERPL-SCNC: 4.3 MMOL/L (ref 3.5–5.2)
PROT SERPL-MCNC: 7.2 G/DL (ref 6–8.5)
RBC # BLD AUTO: 5.36 X10E6/UL (ref 3.77–5.28)
SL AMB VLDL CHOLESTEROL CALC: 19 MG/DL (ref 5–40)
SODIUM SERPL-SCNC: 141 MMOL/L (ref 134–144)
TRIGL SERPL-MCNC: 103 MG/DL (ref 0–149)
TSH SERPL DL<=0.005 MIU/L-ACNC: 3.55 UIU/ML (ref 0.45–4.5)
VIT B12 SERPL-MCNC: 814 PG/ML (ref 232–1245)
WBC # BLD AUTO: 6.3 X10E3/UL (ref 3.4–10.8)

## 2022-04-29 ENCOUNTER — OFFICE VISIT (OUTPATIENT)
Dept: OBGYN CLINIC | Facility: CLINIC | Age: 62
End: 2022-04-29
Payer: COMMERCIAL

## 2022-04-29 ENCOUNTER — OFFICE VISIT (OUTPATIENT)
Dept: GASTROENTEROLOGY | Facility: CLINIC | Age: 62
End: 2022-04-29
Payer: COMMERCIAL

## 2022-04-29 VITALS
OXYGEN SATURATION: 98 % | SYSTOLIC BLOOD PRESSURE: 134 MMHG | WEIGHT: 182 LBS | TEMPERATURE: 98.2 F | BODY MASS INDEX: 32.25 KG/M2 | HEART RATE: 69 BPM | HEIGHT: 63 IN | DIASTOLIC BLOOD PRESSURE: 87 MMHG

## 2022-04-29 VITALS — WEIGHT: 183 LBS | DIASTOLIC BLOOD PRESSURE: 72 MMHG | BODY MASS INDEX: 32.42 KG/M2 | SYSTOLIC BLOOD PRESSURE: 140 MMHG

## 2022-04-29 DIAGNOSIS — N95.0 POST-MENOPAUSAL BLEEDING: ICD-10-CM

## 2022-04-29 DIAGNOSIS — R14.3 FLATULENCE: ICD-10-CM

## 2022-04-29 DIAGNOSIS — R19.5 LOOSE STOOLS: ICD-10-CM

## 2022-04-29 DIAGNOSIS — R14.0 BLOATING: Primary | ICD-10-CM

## 2022-04-29 DIAGNOSIS — R10.2 PELVIC PAIN: ICD-10-CM

## 2022-04-29 DIAGNOSIS — K25.7 CHRONIC GASTRIC EROSION: Primary | ICD-10-CM

## 2022-04-29 DIAGNOSIS — R14.0 BLOATING: ICD-10-CM

## 2022-04-29 DIAGNOSIS — Z86.010 HISTORY OF COLON POLYPS: ICD-10-CM

## 2022-04-29 PROCEDURE — 99214 OFFICE O/P EST MOD 30 MIN: CPT | Performed by: PHYSICIAN ASSISTANT

## 2022-04-29 PROCEDURE — 99213 OFFICE O/P EST LOW 20 MIN: CPT | Performed by: NURSE PRACTITIONER

## 2022-04-29 PROCEDURE — 3008F BODY MASS INDEX DOCD: CPT | Performed by: NURSE PRACTITIONER

## 2022-04-29 RX ORDER — PANTOPRAZOLE SODIUM 20 MG/1
20 TABLET, DELAYED RELEASE ORAL DAILY
Qty: 90 TABLET | Refills: 1 | Status: SHIPPED | OUTPATIENT
Start: 2022-04-29 | End: 2022-05-10 | Stop reason: DRUGHIGH

## 2022-04-29 NOTE — PROGRESS NOTES
Assessment/Plan:    Post-menopausal bleeding  After discussing with patient I recommend an endometrial biopsy due to reoccuring episode of postmenopausal bleeding  Procedure reviewed with patient  Can complete today or return for biopsy  Pt would like biopsy today  Endometrial biopsy performed without incident  Specimen sent to pathology  Will call with results and follow up accordingly  RTO annual exam or sooner as needed    Bloating  Due to vague symptoms of back pain with bleeding and ongoing abdominal bloating will send for pelvic ultrasound  Reviewed how to call and schedule  Will call with results and follow up accordingly       Diagnoses and all orders for this visit:    Bloating  -     US pelvis complete w transvaginal; Future    Pelvic pain  -     US pelvis complete w transvaginal; Future    Post-menopausal bleeding  -     Pathology Report    Other orders  -     Endometrial biopsy          Subjective:      Patient ID: Franny Benedict is a 65 y o  female  Pt presents for postmenopausal bleeding  Went to bathroom two weeks ago  Was having a bowel movement and noted a little bit of red in the toilet tissue  She then later went to wipe herself after urinating  When she wiped she saw bright red blood on toilet tissue  She then used toilet tissue to see where it was coming from and it was vaginal bleeding  She was only noticing bright red bleeding when wiping and then resolved by the next day  Then a week and a half later happened again  Same thing occurred vaginal bleeding when wiping but this time was lighter  That occurred for one or two wipes and then resolved on its own  Lower left back pain when the bleeding occurred  She could not sleep when the pain was occurring  Pain resolved within a day  Postmenopausal since age 49  Denies any postmenopausal bleeding before current episode  Denies any hx of HRT       Denies any family hx of breast, ovarian, or endometrial cancer that she is aware of  Denies any pelvic pain  Just that back pain when the bleeding occurred  Does complain of abdominal bloating  Denies any urinary complaints  She had soft stool and follows with GI, was diagnosed with an ulcer and started on Protonix  She is concerned for cancer  Sister-in-law just had a hysterectomy due to cancer  The following portions of the patient's history were reviewed and updated as appropriate: allergies, current medications, past family history, past medical history, past social history, past surgical history and problem list     Review of Systems   Genitourinary: Positive for vaginal bleeding  Objective:      /72 (BP Location: Left arm, Patient Position: Sitting, Cuff Size: Large)   Wt 83 kg (183 lb)   BMI 32 42 kg/m²          Physical Exam  Vitals and nursing note reviewed  Constitutional:       Appearance: She is well-developed  HENT:      Head: Normocephalic and atraumatic  Neck:      Thyroid: No thyromegaly  Cardiovascular:      Rate and Rhythm: Normal rate and regular rhythm  Heart sounds: Normal heart sounds  Pulmonary:      Effort: Pulmonary effort is normal       Breath sounds: Normal breath sounds  Abdominal:      General: Bowel sounds are normal  There is no distension  Palpations: Abdomen is soft  There is no mass  Tenderness: There is no abdominal tenderness  There is no guarding or rebound  Genitourinary:     Labia:         Right: No rash, tenderness, lesion or injury  Left: No rash, tenderness, lesion or injury  Vagina: Normal       Cervix: Normal       Uterus: Normal        Adnexa: Right adnexa normal and left adnexa normal         Right: No mass, tenderness or fullness  Left: No mass, tenderness or fullness  Musculoskeletal:         General: Normal range of motion  Cervical back: Normal range of motion and neck supple  Skin:     General: Skin is warm and dry     Neurological: Mental Status: She is alert and oriented to person, place, and time  Psychiatric:         Behavior: Behavior normal          Thought Content: Thought content normal          Judgment: Judgment normal            Endometrial biopsy    Date/Time: 4/29/2022 2:26 PM  Performed by: HAYDEE Harry  Authorized by: HAYDEE Harry   Universal Protocol:  Consent: Verbal consent obtained  Risks and benefits: risks, benefits and alternatives were discussed  Consent given by: patient  Time out: Immediately prior to procedure a "time out" was called to verify the correct patient, procedure, equipment, support staff and site/side marked as required  Timeout called at: 4/29/2022 2:10 PM   Patient understanding: patient states understanding of the procedure being performed  Patient consent: the patient's understanding of the procedure matches consent given  Procedure consent: procedure consent matches procedure scheduled  Relevant documents: relevant documents present and verified  Test results: test results available and properly labeled  Site marked: the operative site was not marked  Radiology Images displayed and confirmed  If images not available, report reviewed: imaging studies not available  Required items: required blood products, implants, devices, and special equipment available  Patient identity confirmed: verbally with patient      Indication:     Indications: Post-menopausal bleeding      Chronicity of post-menopausal bleeding:  Recurrent    Progression of post-menopausal bleeding:  Resolved  Procedure:     Procedure: endometrial biopsy with Pipelle      A bivalve speculum was placed in the vagina: yes      Cervix cleaned and prepped: yes      A paracervical block was performed: no      An intracervical block was performed: no      The cervix was dilated: yes      Uterus sounded: yes      Uterus sound depth (cm):  8    Curettes used: 3 passes      Specimen collected: specimen collected and sent to pathology      Patient tolerated procedure well with no complications: yes    Findings:     Uterus size:  6-8 weeks    Cervix: normal      Adnexa: normal    Comments:     Procedure comments:  No intercourse, tampon use, soaking in bath tub, or swimming for the next 3 days to avoid risk of infection  Call office with excessive bleeding, cramping, fever, or chills  Office will call with results and appropriate follow up

## 2022-04-29 NOTE — ASSESSMENT & PLAN NOTE
After discussing with patient I recommend an endometrial biopsy due to reoccuring episode of postmenopausal bleeding  Procedure reviewed with patient  Can complete today or return for biopsy  Pt would like biopsy today  Endometrial biopsy performed without incident    Specimen sent to pathology  Will call with results and follow up accordingly  RTO annual exam or sooner as needed

## 2022-04-29 NOTE — ASSESSMENT & PLAN NOTE
Due to vague symptoms of back pain with bleeding and ongoing abdominal bloating will send for pelvic ultrasound  Reviewed how to call and schedule  Will call with results and follow up accordingly

## 2022-04-29 NOTE — PROGRESS NOTES
Assessment and Plan    #1  Abdominal bloating with soft stools and gas: suspect functional/IBS or SIBO, celiac panel was normal, h pylori negative, on PPI now for gastric erosions but still with a lot of gas, also on probiotic  -continue probiotic  -continue PPI for 3 months, then decrease to 20 mg daily  -SIBO testing kit given, reviewed instructions to hold PPI and probiotic for 7 days prior     #2  History of colon polyps  -repeat in 5 years    #3  Gastric erosions  -nonulcerogenic diet  -complete PPI 40 mg daily for 3 months, then decrease to 20 mg     Follow up in 3 months   --------------------------------------------------------------------------------------------------------------------    Chief Complaint: f/u bloating, gas    HPI: Krystal Cole is a 65 y o  female with a history of hypothyroidism, asthma, HLD who presents today for follow up for bloating and gas  She had recent EGD and colonoscopy with gastric erosions, bx negative for H pylori and colon polyps removed  She continues to have a lot of gas and some bloating  Her stools are soft, but denies diarrhea  She denies nausea, vomiting, GERD  Appetite is good, no weight loss  She is on PPI 40 mg once daily for last 2 months  She denies NSAID use  She does occasionally drink wine and does drink coffee in the morning  She has not had SIBO testing       Review of Systems:   General: negative for fatigue, fever, night sweats or unexpected weight loss  Psychological: negative for anxiety or depression  Ophthalmic: negative for blurry vision or scleral icterus  ENT: negative for headaches, oral lesions, sore throat, vocal changes or dysphagia  Hematological and Lymphatic: negative for pallor or swollen lymph nodes  Respiratory: negative for cough, shortness of breath or wheezing  Cardiovascular: negative for chest pain, edema or murmur  Gastrointestinal: as mentioned in HPI  Genito-Urinary: negative for dysuria or incontinence  Musculoskeletal: negative for joint pain, joint stiffness or joint swelling  Dermatological: negative for pruritus, rash, or jaundice    Current Medications  Current Outpatient Medications   Medication Sig Dispense Refill    albuterol (PROVENTIL HFA,VENTOLIN HFA) 90 mcg/act inhaler Inhale 2 puffs every 6 (six) hours as needed for wheezing 18 g 1    ALPRAZolam (XANAX) 0 25 mg tablet Take 1 tablet (0 25 mg total) by mouth daily at bedtime as needed for anxiety 30 tablet 1    Ascorbic Acid (vitamin C) 1000 MG tablet Take 1,000 mg by mouth daily      citalopram (CeleXA) 20 mg tablet Take 1 tablet (20 mg total) by mouth daily 90 tablet 1    ergocalciferol (VITAMIN D2) 50,000 units Take 1 capsule (50,000 Units total) by mouth once a week 12 capsule 1    fluticasone (FLONASE) 50 mcg/act nasal spray 1 spray into each nostril daily 16 g 3    levothyroxine 25 mcg tablet Take one tablet on an empty stomach every night time 90 tablet 3    Multiple Vitamins-Minerals (ZINC PO) Take by mouth daily        pantoprazole (PROTONIX) 40 mg tablet Take 1 tablet (40 mg total) by mouth daily 90 tablet 0    rosuvastatin (CRESTOR) 10 MG tablet Take 1 tablet (10 mg total) by mouth daily 90 tablet 3    saccharomyces boulardii (FLORASTOR) 250 mg capsule Take 250 mg by mouth in the morning      pantoprazole (PROTONIX) 20 mg tablet Take 1 tablet (20 mg total) by mouth daily 90 tablet 1     No current facility-administered medications for this visit         Past Medical History  Past Medical History:   Diagnosis Date    Arthritis     Asthma     COVID-19 01/02/2022    Disease of thyroid gland     hypo    Dizziness     s/p covid 19    Mediterranean anemia     Pemphigus vulgaris     Thalassemia     Vitamin D deficiency     Wears partial dentures     upper       Past Surgical History  Past Surgical History:   Procedure Laterality Date    BLEPHAROPLASTY      bilateral    BREAST BIOPSY Right 2000    benign-not sure of exact year    COLONOSCOPY      ME KNEE SCOPE,MED/LAT MENISECTOMY Right 2020    Procedure: MENISCECTOMY MEDIAL;  Surgeon: Delmi Soares DO;  Location: [facility];  Service: Orthopedics       Past Social History   Social History     Socioeconomic History    Marital status: /Civil Union     Spouse name: None    Number of children: None    Years of education: None    Highest education level: None   Occupational History    None   Tobacco Use    Smoking status: Former Smoker     Years: 10 00     Quit date:      Years since quittin 3    Smokeless tobacco: Never Used   Vaping Use    Vaping Use: Never used   Substance and Sexual Activity    Alcohol use:  Yes     Alcohol/week: 1 0 standard drink     Types: 1 Glasses of wine per week     Comment: socially     Drug use: Never    Sexual activity: Yes     Partners: Male     Birth control/protection: Post-menopausal   Other Topics Concern    None   Social History Narrative    None     Social Determinants of Health     Financial Resource Strain: Not on file   Food Insecurity: Not on file   Transportation Needs: Not on file   Physical Activity: Not on file   Stress: Not on file   Social Connections: Not on file   Intimate Partner Violence: Not on file   Housing Stability: Not on file       The following portions of the patient's history were reviewed and updated as appropriate: allergies, current medications, past family history, past medical history, past social history, past surgical history and problem list     Vital Signs  Vitals:    22 1159   BP: 134/87   BP Location: Right arm   Patient Position: Sitting   Cuff Size: Standard   Pulse: 69   Temp: 98 2 °F (36 8 °C)   SpO2: 98%   Weight: 82 6 kg (182 lb)   Height: 5' 3" (1 6 m)       Physical Exam:  General appearance: alert, cooperative, no distress  HEENT: normocephalic, anicteric, no eye erythema or discharge, no oropharyngeal thrush  Neck: supple, trachea midline, no adenopathy  Lungs: CTA b/l, no rales, rhonchi, or wheezing, unlabored respirations  Heart: RRR, no murmur, rubs, or gallops  Abdomen: soft, non-tender, non-distended, normal bowel sounds, no masses or organomegaly  Rectal: deferred  Extremities: no cyanosis, clubbing, or edema  Musculoskeletal: normal gait  Skin: color and texture normal, no jaundice, no rashes or lesions  Psychiatric: alert and oriented, normal affect and behavior

## 2022-05-05 ENCOUNTER — TELEPHONE (OUTPATIENT)
Dept: OBGYN CLINIC | Facility: CLINIC | Age: 62
End: 2022-05-05

## 2022-05-05 NOTE — TELEPHONE ENCOUNTER
Pt called stating she saw you 4/29 for endo biopsy for post shabnam bleeding - she stated she started to bleed again yesterday - bled twice  Wiped and was bloody and noticed the blood on her toilet paper  It was getting lighter  As of today, just mild spotting  No cramping  Has US's schedule for 5/13 as well  She would like to speak to you in regards to the bleeding   Pt is aware Labs are running a little behind (got an email to inform us)

## 2022-05-06 ENCOUNTER — TELEPHONE (OUTPATIENT)
Dept: OBGYN CLINIC | Facility: CLINIC | Age: 62
End: 2022-05-06

## 2022-05-06 NOTE — TELEPHONE ENCOUNTER
LMOM  If she calls you can let her know to try coconut oil to see if bleeding caused by dryness otherwise we have to wait for biopsy results and ultrasound   Thank you

## 2022-05-06 NOTE — TELEPHONE ENCOUNTER
Spoke with pt  Informed to try coconut oil  She states she has and it does not help  She states she will just wait for the biopsy results and will call back then  No further questions or concerns

## 2022-05-07 LAB
PATH REPORT.SITE OF ORIGIN SPEC: NORMAL
PAYMENT PROCEDURE: NORMAL
SL AMB .: NORMAL

## 2022-05-10 ENCOUNTER — OFFICE VISIT (OUTPATIENT)
Dept: FAMILY MEDICINE CLINIC | Facility: CLINIC | Age: 62
End: 2022-05-10
Payer: COMMERCIAL

## 2022-05-10 VITALS
HEIGHT: 62 IN | BODY MASS INDEX: 33.31 KG/M2 | DIASTOLIC BLOOD PRESSURE: 84 MMHG | SYSTOLIC BLOOD PRESSURE: 140 MMHG | HEART RATE: 64 BPM | WEIGHT: 181 LBS | TEMPERATURE: 97.2 F | OXYGEN SATURATION: 99 %

## 2022-05-10 DIAGNOSIS — F41.9 ANXIETY: Primary | ICD-10-CM

## 2022-05-10 DIAGNOSIS — E78.2 MIXED HYPERLIPIDEMIA: ICD-10-CM

## 2022-05-10 DIAGNOSIS — N95.0 POST-MENOPAUSAL BLEEDING: ICD-10-CM

## 2022-05-10 DIAGNOSIS — F32.A DEPRESSION, UNSPECIFIED DEPRESSION TYPE: ICD-10-CM

## 2022-05-10 PROCEDURE — 99214 OFFICE O/P EST MOD 30 MIN: CPT | Performed by: NURSE PRACTITIONER

## 2022-05-10 PROCEDURE — 3725F SCREEN DEPRESSION PERFORMED: CPT | Performed by: NURSE PRACTITIONER

## 2022-05-10 RX ORDER — BUPROPION HYDROCHLORIDE 75 MG/1
75 TABLET ORAL 2 TIMES DAILY
Qty: 60 TABLET | Refills: 1 | Status: SHIPPED | OUTPATIENT
Start: 2022-05-10 | End: 2022-06-30

## 2022-05-10 NOTE — PROGRESS NOTES
Assessment/Plan:  1  Anxiety  Wean Citalopram- decrease to 10mg daily for one week and then every other day for one week and then stop  Start Wellbutrin 75mg daily for 2 weeks   Will follow up in the office in 2 weeks  Counseling is highly recommended  - buPROPion (WELLBUTRIN) 75 mg tablet; Take 1 tablet (75 mg total) by mouth 2 (two) times a day  Dispense: 60 tablet; Refill: 1  2  Depression, unspecified depression type  Wean Citalopram- decrease to 10mg daily for one week and then every other day for one week and then stop  Start Wellbutrin 75mg daily for 2 weeks   Will follow up in the office in 2 weeks  Counseling is highly recommended  - buPROPion (WELLBUTRIN) 75 mg tablet; Take 1 tablet (75 mg total) by mouth 2 (two) times a day  Dispense: 60 tablet; Refill: 1  3  Mixed hyperlipidemia  Stable  Heart healthy diet  4  Post-menopausal bleeding  Follow up with gyn as scheduled  Pelvic ultrasound scheduled      Patient was counseled regarding instructions for management which included: impression/diagnosis, risk/benefits of treatment options, importance of compliance with treatment, risk factor reduction, and prognosis  I have reviewed the instructions with the patient answering all questions and patient verbalized understanding  Depression Screening Follow-up Plan: Patient's depression screening was positive with a PHQ-2 score of 1    Patient assessed for underlying major depression  They have no active suicidal ideations  Brief counseling provided and recommend additional follow-up/re-evaluation next office visit  Subjective:      Patient ID: Ekta Owen is a 65 y o  female who presents for follow up/lab review    Here for follow up  Had labs done, needs to review  Struggling with depression  Is on citalopram 20mg daily  Intermittent issues with relationship  All of her family in in [place]  Has been on "celexa for years" and wants to try something different     Denies any suicidal ideation  Has been trying to find counselor  Issues recently with vaginal bleeding so will be having pelvic ultrasound at the end of the week  Taking all meds as prescribed  The following portions of the patient's history were reviewed and updated as appropriate: allergies, current medications, past family history, past medical history, past social history, past surgical history and problem list     Review of Systems   Constitutional: Negative for unexpected weight change  Eyes: Negative for visual disturbance  Respiratory: Negative for chest tightness and shortness of breath  Cardiovascular: Negative for chest pain and palpitations  Gastrointestinal: Positive for abdominal distention (seeing GI and gyn for issues with bloating)  Endocrine: Negative for cold intolerance, heat intolerance, polydipsia, polyphagia and polyuria  Genitourinary: Positive for vaginal bleeding  Musculoskeletal: Negative for arthralgias and myalgias  Skin: Negative for color change and pallor  Neurological: Negative for dizziness and headaches  Psychiatric/Behavioral: Positive for dysphoric mood  Negative for self-injury and suicidal ideas  The patient is nervous/anxious            Objective:  Labs 4/29/2022  Vitamin B-12 232 - 1,245 pg/mL 814      TSH 0 450 - 4 500 uIU/mL 3 550      25-HYDROXY VIT D 30 0 - 100 0 ng/mL 50 2     Cholesterol, Total 100 - 199 mg/dL 173  188  228 High   199  207 High     Triglycerides 0 - 149 mg/dL 103  104  294 High   125  170 High     HDL >39 mg/dL 61  59  47  56  53    VLDL Cholesterol Calculated 5 - 40 mg/dL 19  19  52 High   25  34    LDL Calculated 0 - 99 mg/dL 93          Glucose, Random 65 - 99 mg/dL 95  87  91 R, CM  94 R, CM  100 High   83  82    BUN 8 - 27 mg/dL 19  15 R  15 R  20 R  17 R  17 R  16 R    Creatinine 0 57 - 1 00 mg/dL 0 82  0 85  1 06 R, CM  0 84 R, CM  0 94  0 92  0 81    eGFR >59 mL/min/1 73 81   58 R  76 R       SL AMB BUN/CREATININE RATIO 12 - 28 23  18 R    18 R 18 R  20 R    Sodium 134 - 144 mmol/L 141  140  137 R  137 R  144  142  141    Potassium 3 5 - 5 2 mmol/L 4 3  3 9  3 7 R  3 8 R  4 2  4 1  4 0    Chloride 96 - 106 mmol/L 98  101  100 R  101 R  101  100  101    CO2 20 - 29 mmol/L 25  23  31 R  28 R  26  26  25    CALCIUM 8 7 - 10 3 mg/dL 9 7  9 6 R    10 0 R  9 9 R  9 8 R    Protein, Total 6 0 - 8 5 g/dL 7 2  7 2   7 6 R  7 4  7 4  7 4    Albumin 3 8 - 4 8 g/dL 4 6  4 4 R   3 8 R  4 6 R, CM  4 6 R  4 6 R    Globulin, Total 1 5 - 4 5 g/dL 2 6  2 8    2 8  2 8  2 8    Albumin/Globulin Ratio 1 2 - 2 2 1 8  1 6    1 6  1 6  1 6    TOTAL BILIRUBIN 0 0 - 1 2 mg/dL 0 4  <0 2   0 50 R, CM  0 3  0 3  0 3    Alk Phos Isoenzymes 44 - 121 IU/L 70  61 R    62 R  57 R  56 R    AST 0 - 40 IU/L 24 24   22 R, CM  16  18  17    ALT 0 - 32 IU/L 32  72 High    62 R, CM  20  20  17         4/22/22 12:09 PM 9/10/20  2:06 PM 2/24/20  9:18 AM 10/3/19 12:22 PM 6/4/19  5:22 PM     White Blood Cell Count 3 4 - 10 8 x10E3/uL 6 3  7 0  5 4  6 1  6 5    Red Blood Cell Count 3 77 - 5 28 x10E6/uL 5 36 High   5 27  5 49 High   5 17  5 27    Hemoglobin 11 1 - 15 9 g/dL 13 3  13 2  13 5  12 8  13 0    HCT 34 0 - 46 6 % 41 4  40 2  42 7  39 6  40 4    MCV 79 - 97 fL 77 Low   76 Low   78 Low   77 Low   77 Low     MCH 26 6 - 33 0 pg 24 8 Low   25 0 Low   24 6 Low   24 8 Low   24 7 Low     MCHC 31 5 - 35 7 g/dL 32 1  32 8  31 6  32 3  32 2    RDW 11 7 - 15 4 % 14 3  15 0  14 2  15 4 R  15 3 R    Platelet Count 786 - 450 x10E3/uL 223  229                 GYN notes reviewed 4/29/2022  Post-menopausal bleeding  After discussing with patient I recommend an endometrial biopsy due to reoccuring episode of postmenopausal bleeding  Procedure reviewed with patient  Can complete today or return for biopsy  Pt would like biopsy today       Endometrial biopsy performed without incident    Specimen sent to pathology  Will call with results and follow up accordingly  RTO annual exam or sooner as needed     Bloating  Due to vague symptoms of back pain with bleeding and ongoing abdominal bloating will send for pelvic ultrasound          /84   Pulse 64   Temp (!) 97 2 °F (36 2 °C) (Temporal)   Ht 5' 2" (1 575 m)   Wt 82 1 kg (181 lb)   SpO2 99%   BMI 33 11 kg/m²          Physical Exam  Vitals reviewed  Constitutional:       General: She is not in acute distress  Appearance: She is not ill-appearing  Neck:      Vascular: No carotid bruit  Cardiovascular:      Rate and Rhythm: Normal rate and regular rhythm  Pulmonary:      Effort: Pulmonary effort is normal  No respiratory distress  Breath sounds: Normal breath sounds  No wheezing or rales  Abdominal:      General: There is no distension  Palpations: Abdomen is soft  Musculoskeletal:      Cervical back: Normal range of motion  Right lower leg: No edema  Left lower leg: No edema  Skin:     General: Skin is warm and dry  Coloration: Skin is not jaundiced or pale  Neurological:      General: No focal deficit present  Mental Status: She is alert and oriented to person, place, and time  Cranial Nerves: No cranial nerve deficit  Sensory: No sensory deficit  Psychiatric:         Mood and Affect: Mood normal          Behavior: Behavior normal          Thought Content: Thought content normal          Judgment: Judgment normal       Comments: Well groomed  Dressed appropriately for the weather  Calm  Pleasant   Cooperative  Good eye contact  Converses freely and appropriately

## 2022-05-10 NOTE — PATIENT INSTRUCTIONS
Wean Citalopram- decrease to 10mg daily for one week and then every other day for one week and then stop  Start Wellbutrin 75mg daily for 2 weeks   Will follow up in the office in 2 weeks  Counseling is highly recommended  Heart healthy, carbohydrate controlled diet- limit red meat, limit saturated fat, moderate salt intake, limit junk food, etc    Regular exercise  Stress management  Routine labwork and screenings as ordered

## 2022-05-13 ENCOUNTER — OFFICE VISIT (OUTPATIENT)
Dept: ENDOCRINOLOGY | Facility: CLINIC | Age: 62
End: 2022-05-13
Payer: COMMERCIAL

## 2022-05-13 ENCOUNTER — HOSPITAL ENCOUNTER (OUTPATIENT)
Dept: RADIOLOGY | Facility: HOSPITAL | Age: 62
Discharge: HOME | End: 2022-05-13
Payer: COMMERCIAL

## 2022-05-13 VITALS
HEIGHT: 62 IN | SYSTOLIC BLOOD PRESSURE: 130 MMHG | HEART RATE: 56 BPM | DIASTOLIC BLOOD PRESSURE: 80 MMHG | BODY MASS INDEX: 33.31 KG/M2 | WEIGHT: 181 LBS

## 2022-05-13 DIAGNOSIS — E66.9 CLASS 1 OBESITY WITHOUT SERIOUS COMORBIDITY WITH BODY MASS INDEX (BMI) OF 31.0 TO 31.9 IN ADULT, UNSPECIFIED OBESITY TYPE: ICD-10-CM

## 2022-05-13 DIAGNOSIS — F32.A DEPRESSION, UNSPECIFIED DEPRESSION TYPE: ICD-10-CM

## 2022-05-13 DIAGNOSIS — R14.0 BLOATING: ICD-10-CM

## 2022-05-13 DIAGNOSIS — E03.9 ACQUIRED HYPOTHYROIDISM: Primary | ICD-10-CM

## 2022-05-13 DIAGNOSIS — R10.2 PELVIC PAIN: ICD-10-CM

## 2022-05-13 PROCEDURE — 76856 US EXAM PELVIC COMPLETE: CPT

## 2022-05-13 PROCEDURE — 76830 TRANSVAGINAL US NON-OB: CPT

## 2022-05-13 PROCEDURE — 99214 OFFICE O/P EST MOD 30 MIN: CPT | Performed by: INTERNAL MEDICINE

## 2022-05-13 NOTE — PROGRESS NOTES
Tasha Muñoz 65 y o  female MRN: [id number]    Encounter: [id number]      Assessment/Plan     1  Hypothyroidism   Recent TSH within normal limits, free T4 not checked   Continue current dose of levothyroxine   -check TSH, free T4     2  Obesity, weight gain   Continue lifestyle modifications as discussed  Wellbutrin which patient has recently started for depression may help with weight loss  Patient asked about the weight management center, offered a referral, she would like to hold off for now  3  Depression     CC:  Hypothyroidism     History of Present Illness     HPI:  Tasha Muñoz is a 65 y o  female who is here for a follow-up of hypothyroidism    Last seen in 12/2021  Diagnosed with hypothyroidism approximately 3 years ago  Currently on levothyroxine 25 mcg orally daily   Compliant, takes on an empty stomach     Ultrasound thyroid 01/2022-normal, no nodules    Gained a few last visit, would like to loose   Eating well, try to incorporate more exercise into her daily routine  Has knee pain which limits exercise/dancing which she previous enjoyed   H/o depression, recently started Wellbutrin, feeling better  Feels more hot than cold - not new  Gets both diarrhea/ constipation    No other symptoms of hypo or hyperthyroidism     Following up with OBGYN for postmenopausal bleeding     All other systems were reviewed and are negative         Review of Systems    Historical Information   Past Medical History:   Diagnosis Date    Arthritis     Asthma     COVID-19 01/02/2022    Disease of thyroid gland     hypo    Dizziness     s/p covid 19    Mediterranean anemia     Pemphigus vulgaris     Thalassemia     Vitamin D deficiency     Wears partial dentures     upper     Past Surgical History:   Procedure Laterality Date    BLEPHAROPLASTY      bilateral    BREAST BIOPSY Right 2000    benign-not sure of exact year    COLONOSCOPY      TN KNEE SCOPE,MED/LAT MENISECTOMY Right 6/2/2020    Procedure: MENISCECTOMY MEDIAL;  Surgeon: Johana Diamond DO;  Location: [facility];  Service: Orthopedics     Social History   Social History     Substance and Sexual Activity   Alcohol Use Yes    Alcohol/week: 1 0 standard drink    Types: 1 Glasses of wine per week    Comment: socially      Social History     Substance and Sexual Activity   Drug Use Never     Social History     Tobacco Use   Smoking Status Former Smoker    Years: 10 00    Quit date:     Years since quittin 3   Smokeless Tobacco Never Used     Family History:   Family History   Problem Relation Age of Onset    No Known Problems Mother     Asthma Father     Hypertension Father     Hypertension Maternal Grandmother     Liver cancer Maternal Grandmother     Diabetes Maternal Grandmother     Cancer Maternal Grandmother         liver    Cancer Family         multiple relatives    Stomach cancer Family     Liver cancer Family     Bone cancer Family     Leukemia Family     Seizures Sister     Asthma Brother     Hypertension Brother     Leukemia Maternal Uncle     Cancer Maternal Uncle         leukemia    Heart attack Cousin     Bone cancer Maternal Uncle     No Known Problems Paternal Aunt     Substance Abuse Neg Hx     Mental illness Neg Hx        Meds/Allergies   Current Outpatient Medications   Medication Sig Dispense Refill    albuterol (PROVENTIL HFA,VENTOLIN HFA) 90 mcg/act inhaler Inhale 2 puffs every 6 (six) hours as needed for wheezing 18 g 1    ALPRAZolam (XANAX) 0 25 mg tablet Take 1 tablet (0 25 mg total) by mouth daily at bedtime as needed for anxiety 30 tablet 1    Ascorbic Acid (vitamin C) 1000 MG tablet Take 1,000 mg by mouth daily      buPROPion (WELLBUTRIN) 75 mg tablet Take 1 tablet (75 mg total) by mouth 2 (two) times a day 60 tablet 1    citalopram (CeleXA) 20 mg tablet Take 1 tablet (20 mg total) by mouth daily 90 tablet 1    ergocalciferol (VITAMIN D2) 50,000 units Take 1 capsule (50,000 Units total) by mouth once a week 12 capsule 1    fluticasone (FLONASE) 50 mcg/act nasal spray 1 spray into each nostril daily 16 g 3    levothyroxine 25 mcg tablet Take one tablet on an empty stomach every night time 90 tablet 3    Multiple Vitamins-Minerals (ZINC PO) Take by mouth daily        pantoprazole (PROTONIX) 40 mg tablet Take 1 tablet (40 mg total) by mouth daily 90 tablet 0    rosuvastatin (CRESTOR) 10 MG tablet Take 1 tablet (10 mg total) by mouth daily 90 tablet 3    saccharomyces boulardii (FLORASTOR) 250 mg capsule Take 250 mg by mouth in the morning (Patient not taking: Reported on 5/13/2022)       No current facility-administered medications for this visit  Allergies   Allergen Reactions    Iodine - Food Allergy Itching and Vomiting    Shellfish-Derived Products - Food Allergy Itching and Vomiting    Levaquin [Levofloxacin] Rash       Objective   Vitals: Blood pressure 130/80, pulse 56, height 5' 2" (1 575 m), weight 82 1 kg (181 lb), not currently breastfeeding  Physical Exam  Constitutional:       Appearance: She is well-developed  HENT:      Head: Normocephalic and atraumatic  Eyes:      Conjunctiva/sclera: Conjunctivae normal       Pupils: Pupils are equal, round, and reactive to light  Neck:      Thyroid: No thyromegaly  Comments: No thyromegaly   Nontender, no nodules appreciated on palpation    Cardiovascular:      Rate and Rhythm: Normal rate and regular rhythm  Heart sounds: Normal heart sounds  No murmur heard  Pulmonary:      Effort: Pulmonary effort is normal       Breath sounds: Normal breath sounds  No wheezing  Abdominal:      General: There is no distension  Palpations: Abdomen is soft  Tenderness: There is no abdominal tenderness  Musculoskeletal:         General: Normal range of motion  Cervical back: Normal range of motion and neck supple  Skin:     General: Skin is warm and dry     Neurological:      Mental Status: She is alert and oriented to person, place, and time  Deep Tendon Reflexes: Reflexes normal       Comments: No tremors on the outstretched arms     Psychiatric:         Behavior: Behavior normal          The history was obtained from the review of the chart, patient  Lab Results:   Lab Results   Component Value Date/Time    Free T4 0 85 12/01/2021 12:18 PM     Lab Results   Component Value Date    WBC 6 3 04/22/2022    HGB 13 3 04/22/2022    HCT 41 4 04/22/2022    MCV 77 (L) 04/22/2022     04/22/2022     Lab Results   Component Value Date    CREATININE 0 82 04/22/2022    BUN 19 04/22/2022    K 4 3 04/22/2022    CL 98 04/22/2022    CO2 25 04/22/2022     No results found for: HGBA1C      Imaging Studies:   Results for orders placed during the hospital encounter of 01/12/22    US thyroid    Impression  Normal examination  Reference: ACR Thyroid Imaging, Reporting and Data System (TI-RADS): White Paper of the [employer]  J AM Sarah Radiol 3400;[id number]  (additional recommendations based on American Thyroid Association 2015 guidelines )      Workstation performed: [id number]      I have personally reviewed pertinent reports  Portions of the record may have been created with voice recognition software  Occasional wrong word or "sound a like" substitutions may have occurred due to the inherent limitations of voice recognition software  Read the chart carefully and recognize, using context, where substitutions have occurred

## 2022-05-17 ENCOUNTER — TELEPHONE (OUTPATIENT)
Dept: OBGYN CLINIC | Facility: CLINIC | Age: 62
End: 2022-05-17

## 2022-05-24 ENCOUNTER — OFFICE VISIT (OUTPATIENT)
Dept: FAMILY MEDICINE CLINIC | Facility: CLINIC | Age: 62
End: 2022-05-24
Payer: COMMERCIAL

## 2022-05-24 VITALS
SYSTOLIC BLOOD PRESSURE: 130 MMHG | TEMPERATURE: 96.7 F | WEIGHT: 181 LBS | HEART RATE: 60 BPM | HEIGHT: 63 IN | BODY MASS INDEX: 32.07 KG/M2 | OXYGEN SATURATION: 99 % | DIASTOLIC BLOOD PRESSURE: 80 MMHG

## 2022-05-24 DIAGNOSIS — J06.9 VIRAL URI: ICD-10-CM

## 2022-05-24 DIAGNOSIS — J02.9 SORE THROAT: ICD-10-CM

## 2022-05-24 DIAGNOSIS — F32.A DEPRESSION, UNSPECIFIED DEPRESSION TYPE: Primary | ICD-10-CM

## 2022-05-24 LAB — S PYO AG THROAT QL: NEGATIVE

## 2022-05-24 PROCEDURE — 3008F BODY MASS INDEX DOCD: CPT | Performed by: NURSE PRACTITIONER

## 2022-05-24 PROCEDURE — 1036F TOBACCO NON-USER: CPT | Performed by: NURSE PRACTITIONER

## 2022-05-24 PROCEDURE — 99214 OFFICE O/P EST MOD 30 MIN: CPT | Performed by: NURSE PRACTITIONER

## 2022-05-24 PROCEDURE — 87880 STREP A ASSAY W/OPTIC: CPT | Performed by: NURSE PRACTITIONER

## 2022-05-24 NOTE — PATIENT INSTRUCTIONS
Continue Wellbutrin 75mg daily for 2 weeks  Then increase to twice day after that  Stress management  Activities to divert attention when possible  Conscious breathing techniques as discussed  Coping mechanisms and strategies vary from person to person so try to utilize strategies that you think may work for you (such as meditation, music, etc  )  Rapid strep test in the office was negative  Will check throat culture  Fluids  Rest  Tylenol as needed  Salt water gargles  Viral symptoms typically last 7-14 days

## 2022-05-24 NOTE — PROGRESS NOTES
Assessment/Plan:  1  Depression, unspecified depression type  Continue Wellbutrin 75mg daily for 2 weeks  Then increase to twice day after that  Stress management  Activities to divert attention when possible  Conscious breathing techniques as discussed  Coping mechanisms and strategies vary from person to person so try to utilize strategies that you think may work for you (such as meditation, music, etc  )  2  Sore throat  Tylenol  Fluids  Salt water gargles  - POCT rapid strepA- negative  - Throat culture    3  Viral URI  Fluids  Rest  Nasal saline  Supportive care for symptom management  Tylenol or ibuprofen as needed for fever or discomfort  Use a cool mist humidifier at bedtime  Antibiotics are not indicated at this time  Symptoms may persist for 7-14 days  Patient was counseled regarding instructions for management which included: impression/diagnosis, risk/benefits of treatment options, importance of compliance with treatment, risk factor reduction, and prognosis  I have reviewed the instructions with the patient answering all questions and patient verbalized understanding  Subjective:      Patient ID: Freddie Rick is a 65 y o  female who presents for follow up    Here for med check/follow up  Had been on Citalopram for many years for depression  Seen in office 2 weeks ago at which time requesting med be changed to something else  Wants to try different med  Wean of citalopram started and Wellbutrin 75mg daily started  Just finished citalapram wean today  No side effect from wellbutrin   Few days ago, felt edgy but that resolved  C/o sore throat and headache for 3 days  Did home covid test and negative  Nasal congestion  PND  No fever  Taking otc decongestants         The following portions of the patient's history were reviewed and updated as appropriate: allergies, current medications, past family history, past medical history, past social history, past surgical history and problem list     Review of Systems   Constitutional: Negative for fever  HENT: Positive for congestion, postnasal drip and sore throat  Negative for sinus pressure and sinus pain  Eyes: Negative for discharge and itching  Respiratory: Negative for shortness of breath  Cardiovascular: Negative for palpitations  Neurological: Positive for headaches  Negative for dizziness  Psychiatric/Behavioral: Positive for dysphoric mood  The patient is nervous/anxious  Objective:      /80   Pulse 60   Temp (!) 96 7 °F (35 9 °C) (Temporal)   Ht 5' 2 5" (1 588 m)   Wt 82 1 kg (181 lb)   SpO2 99%   BMI 32 58 kg/m²          Physical Exam  Vitals reviewed  Constitutional:       General: She is not in acute distress  Appearance: She is ill-appearing  HENT:      Right Ear: Tympanic membrane normal       Left Ear: Tympanic membrane normal       Nose: Congestion present  Mouth/Throat:      Mouth: Mucous membranes are moist       Pharynx: Oropharynx is clear  Posterior oropharyngeal erythema (mild posteriorly) present  No oropharyngeal exudate or uvula swelling  Cardiovascular:      Rate and Rhythm: Normal rate and regular rhythm  Pulmonary:      Effort: Pulmonary effort is normal  No respiratory distress  Breath sounds: Normal breath sounds  No wheezing or rales  Musculoskeletal:      Cervical back: Normal range of motion  Lymphadenopathy:      Cervical: No cervical adenopathy  Skin:     General: Skin is warm and dry  Neurological:      General: No focal deficit present  Mental Status: She is alert and oriented to person, place, and time  Psychiatric:         Mood and Affect: Mood normal          Behavior: Behavior normal       Comments: Well groomed  Dressed appropriately for the weather  Calm  Pleasant   Cooperative  Good eye contact  Converses freely and appropriately

## 2022-05-27 LAB — B-HEM STREP SPEC QL CULT: NEGATIVE

## 2022-05-29 ENCOUNTER — NURSE TRIAGE (OUTPATIENT)
Dept: OTHER | Facility: OTHER | Age: 62
End: 2022-05-29

## 2022-05-29 DIAGNOSIS — K29.70 GASTRITIS WITHOUT BLEEDING, UNSPECIFIED CHRONICITY, UNSPECIFIED GASTRITIS TYPE: ICD-10-CM

## 2022-05-29 RX ORDER — PANTOPRAZOLE SODIUM 40 MG/1
TABLET, DELAYED RELEASE ORAL
Qty: 90 TABLET | Refills: 0 | Status: SHIPPED | OUTPATIENT
Start: 2022-05-29 | End: 2022-07-21 | Stop reason: ALTCHOICE

## 2022-05-29 NOTE — TELEPHONE ENCOUNTER
Reason for Disposition   Pharmacy calling with prescription question and triager answers question    Answer Assessment - Initial Assessment Questions  1  NAME of MEDICATION: "What medicine are you calling about?"      wellbutrin   2  QUESTION: "What is your question?" (e g , medication refill, side effect)      " I wanted to let Dr Cavazos know I am going to take the wellbutrin 75mg tablets BID instead of once a day  3  PRESCRIBING HCP: "Who prescribed it?" Reason: if prescribed by specialist, call should be referred to that group  PCP   4  SYMPTOMS: "Do you have any symptoms?"     Some withdrawal like sx's such as Nausea and flashes in my head from not having my celexa  Sx's Mild      Protocols used: MEDICATION QUESTION CALL-ADULT-

## 2022-05-29 NOTE — TELEPHONE ENCOUNTER
Regarding: Flashes and Nauseated   ----- Message from Fe Lancaster sent at 5/29/2022 11:35 AM EDT -----  "I weaned off of Celexa and now I am on buPROPion (WELLBUTRIN) 75 mg tablet  I wanted Hal Marsh to know  I am having flashes and I"m nauseated  I am starting the wellburtrin 75 mil   2times a day "

## 2022-06-23 ENCOUNTER — OFFICE VISIT (OUTPATIENT)
Dept: FAMILY MEDICINE CLINIC | Facility: CLINIC | Age: 62
End: 2022-06-23
Payer: COMMERCIAL

## 2022-06-23 VITALS
BODY MASS INDEX: 31.01 KG/M2 | OXYGEN SATURATION: 99 % | WEIGHT: 175 LBS | TEMPERATURE: 97.8 F | DIASTOLIC BLOOD PRESSURE: 72 MMHG | SYSTOLIC BLOOD PRESSURE: 128 MMHG | HEART RATE: 59 BPM | HEIGHT: 63 IN

## 2022-06-23 DIAGNOSIS — H81.12 BENIGN PAROXYSMAL POSITIONAL VERTIGO OF LEFT EAR: ICD-10-CM

## 2022-06-23 DIAGNOSIS — F41.9 ANXIETY: Primary | ICD-10-CM

## 2022-06-23 PROCEDURE — 1036F TOBACCO NON-USER: CPT | Performed by: NURSE PRACTITIONER

## 2022-06-23 PROCEDURE — 3725F SCREEN DEPRESSION PERFORMED: CPT | Performed by: NURSE PRACTITIONER

## 2022-06-23 PROCEDURE — 99214 OFFICE O/P EST MOD 30 MIN: CPT | Performed by: NURSE PRACTITIONER

## 2022-06-23 PROCEDURE — 3008F BODY MASS INDEX DOCD: CPT | Performed by: NURSE PRACTITIONER

## 2022-06-23 NOTE — PROGRESS NOTES
Assessment/Plan:  1  Anxiety  Continue Wellbutrin 75mg twice daily   Stress management  Activities to divert attention when possible  Conscious breathing techniques as discussed  Coping mechanisms and strategies vary from person to person so try to utilize strategies that you think may work for you (such as meditation, music, etc  )  Consider counseling   Anti anxiety/depression medications as prescribed  Will re-evaluate in one month  2  Benign paroxysmal positional vertigo of left ear  Will refer to PT for eval/tx for vestibular therapy  - Ambulatory Referral to Physical Therapy; Future      Depression Screening Follow-up Plan: Patient's depression screening was negativew with a PHQ-9 score  0  Patient assessed for underlying major depression  They have no active suicidal ideations  Brief counseling provided and recommend additional follow-up/re-evaluation next office visit  Subjective:      Patient ID: Ekta Owen is a 65 y o  female who presents for med check    Follow up on anxiety  Mid May, weaned off Citalopram and started on Wellbutrin  Initially wellbutrin 75mg daily and then increased to twice daily about 2 weeks ago  Tolerating with no side effects  No panic attacks but yesterday felt anxious  Only takes xanax prn  Denies any depressive feelings, no suicidal ideation, thoughts  Has been having intermittent vertigo  Was seen in ED in [place] a few weeks ago  CT scan done and was normal  bp was okay  Labs okay  Still having intermittent , "room spinning" at times  Tried to google exercises but does not think is doing them right  Receptive to going to physical therapy for eval/tx  The following portions of the patient's history were reviewed and updated as appropriate: allergies, current medications, past family history, past medical history, past social history, past surgical history and problem list     Review of Systems   Constitutional: Negative for fatigue     Eyes: Negative for visual disturbance  Respiratory: Negative for chest tightness and shortness of breath  Cardiovascular: Negative for chest pain and palpitations  Gastrointestinal: Negative for abdominal pain  Skin: Negative for color change and pallor  Neurological: Positive for dizziness and light-headedness  Psychiatric/Behavioral: Negative for dysphoric mood, self-injury and suicidal ideas  The patient is nervous/anxious  Objective:      /72   Pulse 59   Temp 97 8 °F (36 6 °C) (Temporal)   Ht 5' 2 5" (1 588 m)   Wt 79 4 kg (175 lb)   SpO2 99%   BMI 31 50 kg/m²          Physical Exam  Vitals reviewed  Constitutional:       General: She is not in acute distress  Appearance: She is not ill-appearing  Eyes:      Extraocular Movements: Extraocular movements intact  Pupils: Pupils are equal, round, and reactive to light  Neck:      Vascular: No carotid bruit  Cardiovascular:      Rate and Rhythm: Normal rate and regular rhythm  Pulmonary:      Effort: Pulmonary effort is normal  No respiratory distress  Breath sounds: Normal breath sounds  No wheezing or rales  Abdominal:      General: There is no distension  Palpations: Abdomen is soft  Musculoskeletal:      Cervical back: Normal range of motion  Skin:     General: Skin is warm and dry  Coloration: Skin is not jaundiced or pale  Neurological:      General: No focal deficit present  Mental Status: She is alert and oriented to person, place, and time  Cranial Nerves: No cranial nerve deficit  Sensory: No sensory deficit  Psychiatric:         Mood and Affect: Mood normal          Thought Content:  Thought content normal          Judgment: Judgment normal

## 2022-06-23 NOTE — PATIENT INSTRUCTIONS
Continue Wellbutrin 75mg twice daily   Stress management  Activities to divert attention when possible  Conscious breathing techniques as discussed  Coping mechanisms and strategies vary from person to person so try to utilize strategies that you think may work for you (such as meditation, music, etc  )  Consider counseling   Anti anxiety/depression medications as prescribed     Physical therapy evaluation/treatment for treatment of vertigo

## 2022-06-27 ENCOUNTER — EVALUATION (OUTPATIENT)
Dept: PHYSICAL THERAPY | Facility: CLINIC | Age: 62
End: 2022-06-27
Payer: COMMERCIAL

## 2022-06-27 DIAGNOSIS — H81.12 BENIGN PAROXYSMAL POSITIONAL VERTIGO OF LEFT EAR: ICD-10-CM

## 2022-06-27 PROCEDURE — 97161 PT EVAL LOW COMPLEX 20 MIN: CPT | Performed by: PHYSICAL THERAPIST

## 2022-06-27 PROCEDURE — 97112 NEUROMUSCULAR REEDUCATION: CPT | Performed by: PHYSICAL THERAPIST

## 2022-06-27 NOTE — PROGRESS NOTES
PT Evaluation     Today's date: 2022  Patient name: Tasha Muñoz  : 1960  MRN: [id number]  Referring provider: HAYDEE Hopkins  Dx:   Encounter Diagnosis     ICD-10-CM    1  Benign paroxysmal positional vertigo of left ear  H81 12 Ambulatory Referral to Physical Therapy                  Assessment  Assessment details: Tasha Muñoz is a 65 y o  female who presents with dizziness w/ quick head movements and positional changes  At time of onset, symptoms were so severe, she experienced uncontrollable vomiting leading her to go to the ED  She presents with positive [place] Halpike L ear  She responds well to Eply maneuver which was symptomatic and positive for nystagmus first trial, but negative the 2nd trial  Due to these impairments, patient has difficulty performing ADL's, recreational activities, lifting/carrying, transfers  Patient's clinical presentation is consistent with their referring diagnosis of Benign paroxysmal positional vertigo of left ear  Plan: Ambulatory Referral to Physical Therapy  Patient has been educated in plan of care  Patient would benefit from skilled physical therapy services to address their aforementioned functional limitations and progress towards prior level of function and independence with home exercise program      Impairments: activity intolerance, impaired balance and lacks appropriate home exercise program  Other impairment: dizziness w/ positional changes; (+) nystagmus    Goals  Short Term Goals: Target Date 4 weeks (2022)  1  Initiate and advance HEP  2 Pt to report no dizziness w/ xfers OOB or bending forward  3  Pt to present w/ negative Kaushal Halpike test w/o symptoms  Long Term Goals: Target Date 12 weeks (2022)  1  Indep with HEP  2  Pt to score 0 w/ DHI  3  Pt to score on FOTO to 62 or better    4  Improve balance such that pt can maintain balance w/ FT and EC and have negative Fakuda step test w/ EC   5  Pt to report no symptoms w/ oculomotor testing  Plan  Plan details:     Patient would benefit from: PT eval and skilled physical therapy  Planned modality interventions: cryotherapy, thermotherapy: hydrocollator packs and unattended electrical stimulation  Planned therapy interventions: manual therapy, neuromuscular re-education, therapeutic activities, therapeutic exercise, home exercise program, patient education, stretching, functional ROM exercises and balance/weight bearing training  Frequency: 1x week (1-2x/week)  Plan of Care beginning date: 6/27/2022  Plan of Care expiration date: 9/19/2022  Treatment plan discussed with: patient        Subjective Evaluation    History of Present Illness  Mechanism of injury: 6/27/2022: Pt woke with severe dizziness, nausea and vomiting 6/1/2022  She reports a recent upper respiratory infection just prior to the onset of symptoms  She went to the ED due to uncontrolled vomiting  She was discharged w/ medication (Meclazine), but still has lingering dizziness, mostly associated w/ lying on L side and/or getting OOB  Dizziness lasts for a minute, then goes away  Pt denies tinnitus or HA  Pain  Progression: no change    Treatments  Previous treatment: medication  Current treatment: physical therapy  Patient Goals  Patient goal: abolish dizziness        Objective      Cervical/Thoracic:  MVBI - negative Bilaterally 30 sec  Sharp Cody - negative  Alar Ligament - negative  spurlings - negative    Cervical AROM limitation:  Flexion - nil cannon  Extension - min cannon  R rotation - min cannon  L rotation - min cannon  R Lat flexion - min cannon  L lat flexion - min cannon w/ dizziness  Retraction - min cannon    Palpation:   Right - hypertonic in cervical paraspinals, levator scapulae, SCM, suboccipitals and upper trapezius  Left - hypertonic in cervical paraspinals, levator scapulae, SCM, suboccipitals and upper trapezius      Oculomotor Screen: baseline 0/10  Smooth Pursuits (vert/horiz/diag/J) - 3/10 symptoms, normal eye movements  Near Point Convergence - NT (4 cm= normal)  Saccades 10x each - Horizontal - 3/10 symptoms   Saccades 10x each- Vertical - 0/10  VOR screen - 0/10 dizziness/nausea/fogginess/HA  VOR cancel /VMS- 0/10 dizziness/nausea/fogginess/HA  Head Thrust (ipsilateral)- 0/10 dizziness/nausea/fogginess/HA - mild corrective saccade L side      DHI - 32 (0-30 =mild; 31-60 = mod;  = severe; >60= fall risk)    Fukuda step test w/ EC:  6/27/2022 veers to L    Balance:  FT w/ EO firm: 60 sec +  FT w/ EC form: LOB to L within 5 seconds  SLS EO R: 60 sec +  SLS EO L: 60 sec +     BPPV screen/Mechanical assessment:  6/27/2022 - Stefany Persaud (-) R ear; Kaushal Halpike (+) L ear upbeat toward L side       Precautions:   Past Medical History:   Diagnosis Date    Arthritis     Asthma     COVID-19 01/02/2022    Disease of thyroid gland     hypo    Dizziness     s/p covid 19    Mediterranean anemia     Pemphigus vulgaris     Thalassemia     Vitamin D deficiency     Wears partial dentures     upper     SOC: 6/27/2022  FOTO: 6/27/2022  POC Expiration: 9/19/2022  Daily Treatment Log:  Date 6/27/202       Visit # 1       Manual                        There Exer                                                                 HEP        There Activ                                        NMReed 15'       CRT Eply treat L side 2x: (+) 1st trial; (-) 2nd trial                                       Modalities

## 2022-06-30 ENCOUNTER — OFFICE VISIT (OUTPATIENT)
Dept: PHYSICAL THERAPY | Facility: CLINIC | Age: 62
End: 2022-06-30
Payer: COMMERCIAL

## 2022-06-30 DIAGNOSIS — F41.9 ANXIETY: ICD-10-CM

## 2022-06-30 DIAGNOSIS — H81.12 BENIGN PAROXYSMAL POSITIONAL VERTIGO OF LEFT EAR: Primary | ICD-10-CM

## 2022-06-30 DIAGNOSIS — F32.A DEPRESSION, UNSPECIFIED DEPRESSION TYPE: ICD-10-CM

## 2022-06-30 PROCEDURE — 97112 NEUROMUSCULAR REEDUCATION: CPT | Performed by: PHYSICAL THERAPIST

## 2022-06-30 RX ORDER — BUPROPION HYDROCHLORIDE 75 MG/1
TABLET ORAL
Qty: 60 TABLET | Refills: 1 | Status: SHIPPED | OUTPATIENT
Start: 2022-06-30 | End: 2022-07-21 | Stop reason: SDUPTHER

## 2022-06-30 NOTE — PROGRESS NOTES
Daily Note     Today's date: 2022  Patient name: Jean Pierre Allred  : 1960  MRN: [id number]  Referring provider: HAYDEE Serrano  Dx:   Encounter Diagnosis     ICD-10-CM    1  Benign paroxysmal positional vertigo of left ear  H81 12                   Subjective: Pt reports feeling asymptomatic 2022  She reports dizziness and nausea for most of the day yesterday w/o vomiting  Symptoms lasted most of the day until she took Meclizine  She did not have symptoms upon getting OOB today and has not taken Meclizine  She has not taken Flonase the past 2 days  Pt also c/o L glut pain w/ ambulation (unrelated and ongoing for some time)  Objective: See treatment diary below  [facility] negative today  Horizontal roll test negative L/R  Completed full Eply treating L ear today due to symptoms yesterday and prior positive test  Advised pt long duration symptoms is not consistent w/ BPPV, and may benefit from VRT  Issued HEP per activities done in clinic today  Advised her to resume Flonase due to recent sinus/upper resp illness  Advised pt we can assess and potentially treat L glut symptoms  Advised she wait until we are finished w/ vestibular issue, and that we would need to evaluate it first (which she would need to schedule an appt w/ us to do)  Explained direct access allows us to treat her, if appropriate for PT, for up to a month  Anything beyond that, she would need to see her physician  Assessment: Tolerated treatment well  Patient is asymptomatic throughout today's session  She expresses understanding of HEP and instructions regarding Flonase and direct access  Plan: Continue per plan of care        Precautions:   Past Medical History:   Diagnosis Date    Arthritis     Asthma     COVID-19 2022    Disease of thyroid gland     hypo    Dizziness     s/p covid 19    Mediterranean anemia     Pemphigus vulgaris     Thalassemia     Vitamin D deficiency     Wears partial dentures     upper     SOC: 6/27/2022  FOTO: 6/27/2022  POC Expiration: 9/19/2022  Daily Treatment Log:  Date 6/27/202 6/30/2022      Visit # 1 2      Manual                        There Exer                                                                 HEP        There Activ                                        NMReed 15' 40'      CRT Eply treat L side 2x: (+) 1st trial; (-) 2nd trial TT 1x negative; 1' per position      hoirz roll test  negative      Smooth pursuits w/ ambulation  50' ea horiz, vert, cw, ccw      Saccades w/ word cards w/ ambulation  50' ea horiz & vert      VOR x1 w/ FT  30x ea horiz/vert 130 BPM      HEP VRT  Issued/reviewed                                Access Code: [email or web address]  URL: [email or web address]  Date: 06/30/2022  Prepared by:  Xiomy Thao    Exercises  · Seated Horizontal Smooth Pursuit - 1 x daily - 7 x weekly  · Seated Horizontal Saccades - 1 x daily - 7 x weekly  · Standing Gaze Stabilization with Head Rotation - 1 x daily - 7 x weekly - 1 sets - 30 reps

## 2022-07-05 ENCOUNTER — OFFICE VISIT (OUTPATIENT)
Dept: PHYSICAL THERAPY | Facility: CLINIC | Age: 62
End: 2022-07-05
Payer: COMMERCIAL

## 2022-07-05 DIAGNOSIS — M54.16 LUMBAR RADICULOPATHY: Primary | ICD-10-CM

## 2022-07-05 PROCEDURE — 97164 PT RE-EVAL EST PLAN CARE: CPT | Performed by: PHYSICAL THERAPIST

## 2022-07-05 PROCEDURE — 97110 THERAPEUTIC EXERCISES: CPT | Performed by: PHYSICAL THERAPIST

## 2022-07-05 NOTE — PROGRESS NOTES
PT Evaluation     Today's date: 2022  Patient name: Giorgio Ralph  : 1960  MRN: [id number]  Referring provider: HAYDEE Joshi  Dx:   Encounter Diagnosis     ICD-10-CM    1  Lumbar radiculopathy  M54 16                   Assessment  Assessment details: 2022: Giorgio Ralph is a 65 y o  female who presents with lumbar derangement with pain, decreased strength, decreased ROM, decreased joint mobility and postural dysfunction  Due to these impairments, patient has difficulty performing ADL's, recreational activities, engaging in social activities, driving, ambulation, stair negotiation, lifting/carrying  Patient's clinical presentation is consistent with their referring diagnosis of Lumbar radiculopathy  (primary encounter diagnosis)  Patient has been educated in home exercise program, disc mechanics, periperalization vs centralization of symptoms, importance of frequency of HEP, use of lumbar roll, avoidance of flexed postures and plan of care  Patient would benefit from skilled physical therapy services to address their aforementioned functional limitations and progress towards prior level of function and independence with home exercise program and self symptom management/resolution  Impairments: abnormal or restricted ROM, activity intolerance, impaired physical strength, lacks appropriate home exercise program, pain with function, weight-bearing intolerance, poor posture  and poor body mechanics  Other impairment: poor tolerance to prolonged static positions  Functional limitations: limited tolerance to sitting/driving/bendingUnderstanding of Dx/Px/POC: good   Prognosis: good    Goals  Short Term Goals: Target Date 2022  1  Initiate and advance HEP toward self symptom reduction/resolution  2  Improve postural awareness and demonstrate self postural correction    3  Improve AROM lumbar spine to min cannon or better t/o   4  Improve LE flexibility to WNL w/o discomfort to improve functional mobility  5  Pt to demonstrate B/L hip strength of 5/5 to improve squatting/lifting/stair negotiation tolerance to WNL  6  Pt to report at least 50% reduction in pain  Long Term Goals: Target Date 2022  1  Indep with HEP and self symptom prevention, with pt noting at least 75% reduction in LBP  2  Achieve lumbar AROM to WNL w/o c/o   3  Improve Lumbar FOTO score to 54 or better such that pt can climb stairs, lift groceries from floor w/o difficulty  4  Improve sitting/driving tolerance to 1 hour or more w/ pain 0-2/10  Plan  Patient would benefit from: skilled PT and PT eval  Planned modality interventions: thermotherapy: hydrocollator packs and unattended electrical stimulation  Planned therapy interventions: joint mobilization, patient education, postural training, abdominal trunk stabilization, functional ROM exercises, home exercise program, neuromuscular re-education, strengthening, stretching, therapeutic activities and therapeutic exercise  Other planned therapy interventions: mechanical assessment  Frequency: 2x week  Duration in weeks: 8  Treatment plan discussed with: patient        Subjective Evaluation    History of Present Illness  Mechanism of injury: 2022: Pt reports back pain beginning approximately 3 months ago which is progressively worsening  She reports first noticing w/ 3 hour drives to [place]  She now notices it w/ sitting 10-20 minutes and has pain w/ lumbar AROM in standing and w/ stairs  Pain is across her low back most of the time, and intermittently into her left buttock/posterior thigh  She has not had any treatment as of yet for her low back  Pt is currently being seen for Vertigo/BPPV referred by her PCP, and has c/o LBP at each visit thus far    Pain  At best pain ratin  At worst pain ratin  Quality: dull ache, sharp and cramping  Relieving factors: change in position  Aggravating factors: sitting and stair climbing (driving)  Progression: worsening    Social Support  Lives with: significant other      Diagnostic Tests  No diagnostic tests performed  Treatments  Current treatment: physical therapy  Patient Goals  Patient goals for therapy: decreased pain, increased motion and return to sport/leisure activities          Objective  SPECIAL TESTS 7/5/2022:  SLR supine: (negative R/negative L)  Crossed SLR: (negative R/negative L)  Prone instability test: (negative)  Prone hip IR ROM: (painful/min cannon L>R)  Aberrant motion/[place] sign: (+)  Supine to sit test: negatrive  [place] test prone: negative  Slump test: (+)  Femoral NTT: not tested    DERMATOMAL TESTING:  L1-L5 Myotomal testing WNL B/L LE's to pinprick L2-S1    MYOTOMAL TESTING:  L2-3 Hip flexion: WNL B/L  L3-4 Knee extension: WNL B/L (cannon by knee pain/OA)  L5 Great toe exten: WNL B/L  L4 Heel walk/DF: WNL B/L  S1 toe walk/PF/ever: WNL B/L  S2 knee flex: WNL B/L  HIP abd B/L: 4/5     REFLEXES: 0= none; 1+ = slight; 2+ = brisk/normal; 3+= very brisk; 4+= clonus  L3-4 Quadriceps: 2+ B/L  L5-S1 Achilles: 2+ B/L    SPINAL/PIAVM ASSESSMENTPALPATION:   7/5/2022 - maggie cannon P to A mobility lumbar spine in prone  No TTP B/L quadratus lumborum/lumbar paraspinals  Mild (+) TTP B/L glut medius  LUMBAR AROM: 7/5/2022  Flexion   mod cannon "tight"  Extension  mod cannon "tight"  R sideglide  min cannon  L sideglide  min cannon    MECHANICAL ASSESSMENT: 7/5/2022  PRISCILA against plinth 1x10 =   NE  Repeated extension in lying (REIL) 2x10 = dec/B      FUNCTION:  7/5/2022 - Pt is limited with driving/sitting, stairs         Precautions:   Past Medical History:   Diagnosis Date    Arthritis     Asthma     COVID-19 01/02/2022    Disease of thyroid gland     hypo    Dizziness     s/p covid 19    Mediterranean anemia     Pemphigus vulgaris     Thalassemia     Vitamin D deficiency     Wears partial dentures     upper   SOC BPPV: 6/27/2022  FOTO BPPV: 6/27/2022  SOC LUMBAR: 7/5/202  FOTO LUMBAR: 7/5/2022  POC Expiration (both): 9/19/2022  Daily Treatment Log:  Date 6/27/202 6/30/2022 7/5/2022     Visit # 1 2 3     Manual                        There Exer         DIRECT ACCESS EVAL LUMBAR   TT 30'     bridges        pirif stretch        Quad stretch                        Pt educ   10' disc mechanics; periph vs centraliz; importance of HEP freq; lumbar roll     HEP   LUMBAR issued/reviewed     There Activ                                        NMReed 15' 40'      CRT Eply treat L side 2x: (+) 1st trial; (-) 2nd trial TT 1x negative; 1' per position      hoirz roll test  negative      Smooth pursuits w/ ambulation  50' ea horiz, vert, cw, ccw      Saccades w/ word cards w/ ambulation  50' ea horiz & vert      VOR x1 w/ FT  30x ea horiz/vert 130 BPM      HEP VRT  Issued/reviewed      Sciatic NG        REIL   1X10; 2X               Access Code: [email or web address]  URL: [email or web address]  Date: 06/30/2022  Prepared by: Nuzhat Capes    Exercises  · Seated Horizontal Smooth Pursuit - 1 x daily - 7 x weekly  · Seated Horizontal Saccades - 1 x daily - 7 x weekly  · Standing Gaze Stabilization with Head Rotation - 1 x daily - 7 x weekly - 1 sets - 30 reps    Access Code: [email or web address]  URL: [email or web address]  Date: 07/05/2022  Prepared by:  Nuzhat Capes    Exercises  · Prone Press Up - 5 x daily - 7 x weekly - 2 sets - 5 reps  · Standing Lumbar Extension with Counter - 1 x daily - 7 x weekly - 2 sets - 10 reps

## 2022-07-06 DIAGNOSIS — J30.2 SEASONAL ALLERGIC RHINITIS, UNSPECIFIED TRIGGER: ICD-10-CM

## 2022-07-07 ENCOUNTER — OFFICE VISIT (OUTPATIENT)
Dept: PHYSICAL THERAPY | Facility: CLINIC | Age: 62
End: 2022-07-07
Payer: COMMERCIAL

## 2022-07-07 DIAGNOSIS — M54.16 LUMBAR RADICULOPATHY: Primary | ICD-10-CM

## 2022-07-07 DIAGNOSIS — H81.12 BENIGN PAROXYSMAL POSITIONAL VERTIGO OF LEFT EAR: ICD-10-CM

## 2022-07-07 PROCEDURE — 97112 NEUROMUSCULAR REEDUCATION: CPT | Performed by: PHYSICAL THERAPIST

## 2022-07-07 PROCEDURE — 97110 THERAPEUTIC EXERCISES: CPT | Performed by: PHYSICAL THERAPIST

## 2022-07-07 RX ORDER — FLUTICASONE PROPIONATE 50 MCG
SPRAY, SUSPENSION (ML) NASAL
Qty: 16 G | Refills: 3 | Status: SHIPPED | OUTPATIENT
Start: 2022-07-07

## 2022-07-07 NOTE — PROGRESS NOTES
Daily Note lumbar/Re-eval & D/C vertigo     Today's date: 2022  Patient name: Teri Reddy  : 1960  MRN: [id number]  Referring provider: HAYDEE Garcia  Dx:   Encounter Diagnosis     ICD-10-CM    1  Lumbar radiculopathy  M54 16    2  Benign paroxysmal positional vertigo of left ear  H81 12                   Subjective: Pt states she no longer has any vertigo symptoms and feels it is resolved  Her back is a bit better w/ new exercises        Objective: See treatment diary below  trialed EIL from qped - pt prefers standard REIL    Cervical/Thoracic:  MVBI - negative Bilaterally 30 sec  Sharp Cody - negative  Alar Ligament - negative  spurlings - negative    Cervical AROM limitation:    2022  Flexion -  nil   nil cannon  Extension -  Nil   min cannon  R rotation -  Nil   min cannon  L rotation -  Nil   min cannon  R Lat flexion -  Nil   min cannon  L lat flexion - Nil   min cannon w/ dizziness  Retraction -  Nil   min cannon      Oculomotor Screen: baseline 0/10       2022  Smooth Pursuits (vert/horiz/diag/J) -  0/10    3/10 symptoms, normal eye movements  Near Point Convergence -   NT   NT (4 cm= normal)  Saccades 10x each - Horizontal -  0/10   3/10 symptoms   Saccades 10x each- Vertical -  0/10   0/10  VOR screen -     0/10   0/10 dizziness/nausea/fogginess/HA  VOR cancel /VMS-    0/10   0/10 dizziness/nausea/fogginess/HA  Head Thrust (ipsilateral)-   0/10   0/10 dizziness/nausea/fogginess/HA - mild corrective saccade L side      DHI -   2022: 0/100  2022:32/100 (0-30 =mild; 31-60 = mod;  = severe; >60= fall risk)    Fukuda step test w/ EC:  2022- WNL no veering  2022 veers to L    Balance: 2022  FT w/ EO firm: 60 sec +  60 sec +  FT w/ EC firm: 60 sec +  LOB to L within 5 seconds  SLS EO R:  NT   60 sec +  SLS EO L:  NT   60 sec +  FT W/ EC foam:  60 sec +  NT  FT w/ EC foam: 15 sec  Unable    BPPV screen/Mechanical assessment:  2022 - DixHalpike (-) B/L; no issues w/ bed mobility in clinic or home  6/27/2022 - Kaushal Halpike (-) R ear; Narendra Boris (+) L ear upbeat toward L side        Assessment: Tolerated treatment well  Patient would benefit from continued PT and focus on her low back  She is responding well to extension biased program thus far  Goals  Short Term Goals: Target Date 4 weeks (7/25/2022)  1  Initiate and advance HEP  2 Pt to report no dizziness w/ xfers OOB or bending forward  3  Pt to present w/ negative [place] Halpike test w/o symptoms  Long Term Goals: Target Date 12 weeks (9/19/2022)  1  Indep with HEP  2  Pt to score 0 w/ DHI  3  Pt to score on FOTO to 62 or better  4  Improve balance such that pt can maintain balance w/ FT and EC and have negative Fakuda step test w/ EC   5  Pt to report no symptoms w/ oculomotor testing  Plan: Continue per plan of care  D/C for Vertigo       Precautions:   Past Medical History:   Diagnosis Date    Arthritis     Asthma     COVID-19 01/02/2022    Disease of thyroid gland     hypo    Dizziness     s/p covid 19    Mediterranean anemia     Pemphigus vulgaris     Thalassemia     Vitamin D deficiency     Wears partial dentures     upper   SOC BPPV: 6/27/2022  FOTO BPPV: 6/27/2022  SOC LUMBAR: 7/5/202  FOTO LUMBAR: 7/5/2022  POC Expiration (both): 9/19/2022  Daily Treatment Log:  Date 6/27/202 6/30/2022 7/5/2022 7/7/2022  FOTO vertigo    Visit # 1 2 3 4    Manual                        There Exer    10'     DIRECT ACCESS EVAL LUMBAR   TT 30'             pirif stretch fig 4 w/ SOS    15"x3    Quad stretch                        Pt educ   10' disc mechanics; periph vs centraliz; importance of HEP freq; lumbar roll     HEP   LUMBAR issued/reviewed updated    There Activ                                        NMReed 15' 40'  35'    CRT Eply treat L side 2x: (+) 1st trial; (-) 2nd trial TT 1x negative; 1' per position      hoirz roll test  negative      Smooth pursuits w/ ambulation 48' ea horiz, vert, cw, ccw      Saccades w/ word cards w/ ambulation  50' ea horiz & vert      VOR x1 w/ FT  30x ea horiz/vert 130 BPM      HEP VRT  Issued/reviewed      Sciatic NG    2x10 R/L    bridges    2x10    REIL   1X10; 2X 1x10; 2x    vestib tests    15'    Access Code: [email or web address]  URL: [email or web address]  Date: 07/07/2022  Prepared by: Scar Liv    Exercises  · Static Prone on Elbows - 1 x daily - 7 x weekly - 2 sets - 10 reps  · Prone Press Up - 5 x daily - 7 x weekly - 2 sets - 5 reps  · Standing Lumbar Extension with Counter - 1 x daily - 7 x weekly - 2 sets - 10 reps  · Supine Sciatic Nerve Glide - 1 x daily - 7 x weekly - 2 sets - 10 reps  · Supine Piriformis Stretch - 1 x daily - 7 x weekly - 3 reps - 15 hold  · Supine Bridge - 1 x daily - 7 x weekly - 3 sets - 5 reps        Access Code: [email or web address]  URL: [email or web address]  Date: 06/30/2022  Prepared by: Scar Liv    Exercises  · Seated Horizontal Smooth Pursuit - 1 x daily - 7 x weekly  · Seated Horizontal Saccades - 1 x daily - 7 x weekly  · Standing Gaze Stabilization with Head Rotation - 1 x daily - 7 x weekly - 1 sets - 30 reps    Access Code: [email or web address]  URL: [email or web address]  Date: 07/05/2022  Prepared by:  Scar Liv    Exercises  · Prone Press Up - 5 x daily - 7 x weekly - 2 sets - 5 reps  · Standing Lumbar Extension with Counter - 1 x daily - 7 x weekly - 2 sets - 10 reps

## 2022-07-13 ENCOUNTER — OFFICE VISIT (OUTPATIENT)
Dept: PHYSICAL THERAPY | Facility: CLINIC | Age: 62
End: 2022-07-13
Payer: COMMERCIAL

## 2022-07-13 DIAGNOSIS — M54.16 LUMBAR RADICULOPATHY: Primary | ICD-10-CM

## 2022-07-13 PROCEDURE — 97110 THERAPEUTIC EXERCISES: CPT | Performed by: PHYSICAL THERAPIST

## 2022-07-13 PROCEDURE — 97112 NEUROMUSCULAR REEDUCATION: CPT | Performed by: PHYSICAL THERAPIST

## 2022-07-13 NOTE — PROGRESS NOTES
Daily Note     Today's date: 2022  Patient name: Dexter Morrison  : 1960  MRN: [id number]  Referring provider: HAYDEE Isaacs  Dx:   Encounter Diagnosis     ICD-10-CM    1  Lumbar radiculopathy  M54 16                   Subjective: Pt states no return of dizziness symptoms  She states her back was doing her HEP for her back initially in the bed, but felt it was ineffective due to bed being soft  She then tried doing AARON and REIL on the floor 2022, and hurt her back and her R knee when getting up from the floor  She reports her L LBP is 4/10 upon arrival, and her R knee 8/10  Back pain is worse w/ raising from sitting or lying on her L side  Objective: See treatment diary below      Assessment: Tolerated treatment fair and is most comfortable in prone, 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 extension responder    Patient does present w/ diffuse swelling R knee  She was advised to refrain from trying to do HEP on the floor, and to resume in the bed AARON and AARON w/ B/L knee flexion as nikki  She was advised to apply MH at home (if it is helpful in clinic) but to do it in prone, not sitting  She was advised she may resume PRISCILA as tolerated  Plan: Continue per plan of care        Precautions:   Past Medical History:   Diagnosis Date    Arthritis     Asthma     COVID-19 2022    Disease of thyroid gland     hypo    Dizziness     s/p covid 19    Mediterranean anemia     Pemphigus vulgaris     Thalassemia     Vitamin D deficiency     Wears partial dentures     upper   SOC BPPV: 2022  FOTO BPPV: 2022  SOC LUMBAR:   FOTO LUMBAR: 2022  POC Expiration (both): 2022  Daily Treatment Log:  Date 2022  FOTO vertigo; resolve vertigo 2022  Proceed lumbar only   Visit #/auth 1 2 3 4    auth expir     2022   Manual     10'   TP R gluts in prone w/ hip ER/IR     TT   [place] test for SI     (+) pre TP rel; (-) post TP rel Prone lumb ext mobs L3/4 and L4/5     Grade 3 to 4 slow progression w/ pt exhale   There Exer    10' 10'    DIRECT ACCESS EVAL LUMBAR   TT 30'             pirif stretch fig 4 w/ SOS    15"x3    shruthi add hooklying w/ ball     3"x10   abd hooklying w/ TB     YTB 3"x10           Pt educ   10' disc mechanics; periph vs centraliz; importance of HEP freq; lumbar roll     HEP   LUMBAR issued/reviewed updated    There Activ                                        NMReed 15' 40'  35' 15'   CRT Eply treat L side 2x: (+) 1st trial; (-) 2nd trial TT 1x negative; 1' per position      hoirz roll test  negative      Smooth pursuits w/ ambulation  50' ea horiz, vert, cw, ccw      Saccades w/ word cards w/ ambulation  50' ea horiz & vert      VOR x1 w/ FT  30x ea horiz/vert 130 BPM      HEP VRT  Issued/reviewed      Sciatic NG    2x10 R/L 2x10 R/L   bridges    2x10    AARON     30"x3   AARON w/ B/L knee flexion     2x10   REIL   1X10; 2X 1x10; 2x    vestib tests    15'    Modalities        MH/estim L SI/glut     Prone 10' skin intact pre/post   Access Code: [email or web address]  URL: [email or web address]  Date: 07/07/2022  Prepared by: Gypsy Rodrigues    Exercises  · Static Prone on Elbows - 1 x daily - 7 x weekly - 2 sets - 10 reps  · Prone Press Up - 5 x daily - 7 x weekly - 2 sets - 5 reps  · Standing Lumbar Extension with Counter - 1 x daily - 7 x weekly - 2 sets - 10 reps  · Supine Sciatic Nerve Glide - 1 x daily - 7 x weekly - 2 sets - 10 reps  · Supine Piriformis Stretch - 1 x daily - 7 x weekly - 3 reps - 15 hold  · Supine Bridge - 1 x daily - 7 x weekly - 3 sets - 5 reps        Access Code: [email or web address]  URL: [email or web address]  Date: 06/30/2022  Prepared by:  Gypsy Rodrigues    Exercises  · Seated Horizontal Smooth Pursuit - 1 x daily - 7 x weekly  · Seated Horizontal Saccades - 1 x daily - 7 x weekly  · Standing Gaze Stabilization with Head Rotation - 1 x daily - 7 x weekly - 1 sets - 30 reps    Access Code: [email or web address]  URL: [email or web address]  Date: 07/05/2022  Prepared by:  Mercy Singh    Exercises  · Prone Press Up - 5 x daily - 7 x weekly - 2 sets - 5 reps  · Standing Lumbar Extension with Counter - 1 x daily - 7 x weekly - 2 sets - 10 reps

## 2022-07-14 ENCOUNTER — HOSPITAL ENCOUNTER (OUTPATIENT)
Dept: RADIOLOGY | Facility: HOSPITAL | Age: 62
Discharge: HOME | End: 2022-07-14
Payer: COMMERCIAL

## 2022-07-14 ENCOUNTER — OFFICE VISIT (OUTPATIENT)
Dept: GASTROENTEROLOGY | Facility: CLINIC | Age: 62
End: 2022-07-14
Payer: COMMERCIAL

## 2022-07-14 VITALS
DIASTOLIC BLOOD PRESSURE: 84 MMHG | BODY MASS INDEX: 30.65 KG/M2 | HEIGHT: 63 IN | HEART RATE: 62 BPM | WEIGHT: 173 LBS | TEMPERATURE: 98.1 F | SYSTOLIC BLOOD PRESSURE: 131 MMHG

## 2022-07-14 VITALS — BODY MASS INDEX: 31.01 KG/M2 | WEIGHT: 175 LBS | HEIGHT: 63 IN

## 2022-07-14 DIAGNOSIS — K25.9 GASTRIC EROSION, UNSPECIFIED CHRONICITY: Primary | ICD-10-CM

## 2022-07-14 DIAGNOSIS — Z12.31 ENCOUNTER FOR SCREENING MAMMOGRAM FOR MALIGNANT NEOPLASM OF BREAST: ICD-10-CM

## 2022-07-14 DIAGNOSIS — K63.5 HYPERPLASTIC COLONIC POLYP, UNSPECIFIED PART OF COLON: ICD-10-CM

## 2022-07-14 DIAGNOSIS — Z78.0 POST-MENOPAUSAL: ICD-10-CM

## 2022-07-14 DIAGNOSIS — R14.0 ABDOMINAL BLOATING: ICD-10-CM

## 2022-07-14 PROCEDURE — 77080 DXA BONE DENSITY AXIAL: CPT

## 2022-07-14 PROCEDURE — 77063 BREAST TOMOSYNTHESIS BI: CPT

## 2022-07-14 PROCEDURE — 77067 SCR MAMMO BI INCL CAD: CPT

## 2022-07-14 PROCEDURE — 99214 OFFICE O/P EST MOD 30 MIN: CPT | Performed by: INTERNAL MEDICINE

## 2022-07-14 NOTE — PROGRESS NOTES
[facility]  Progress Note - Juan Najera 65 y o  female MRN: [id number]    Unit/Bed#:  Encounter: [id number]    Assessment/Plan:    1  Gastric erosions-negative for H pylori bacteria  Completed the course of pantoprazole 40 mg on daily basis for 3 months, asymptomatic after stopping it  Currently not on any PPI or H2 blocker  Went over dietary modifications again including following non ulcerogenic diet  Also recommended to avoid NSAIDs, use acetaminophen when possible  2  History of colon polyps-repeat colonoscopy at 5 year interval     3  Abdominal bloating-resolved  Patient lost her prior SIBO kit however symptoms have resolved as well therefore will hold off on testing at this time  Can continue with the probiotic   -continue to avoid gas producing foods  4  Follow-up on as-needed basis  Subjective:   36-year-old female with history of hypothyroidism, asthma, hyperlipidemia, presents for follow-up  She underwent EGD and colonoscopy by me earlier this year  EGD was notable for gastric erosions, biopsy negative for H pylori  Colon polyp was normal mucosa, no evidence of microscopic colitis  Patient reports that she is feeling well  She reports that abdominal bloating, loose stools have resolved  Patient reports that she no longer has symptoms of dyspepsia or GERD  She has completed the pantoprazole 40 mg for 3 months and has not had any recurrent symptoms  She is no longer taking a PPI or an H2 blocker  She does not report any significant use of NSAIDs  No other symptoms  Objective:     Vitals: Blood pressure 131/84, pulse 62, temperature 98 1 °F (36 7 °C), temperature source Temporal, height 5' 2 5" (1 588 m), weight 78 5 kg (173 lb), not currently breastfeeding  ,Body mass index is 31 14 kg/m²      [unfilled]    Physical Exam:    GEN: wn/wd, NAD  HEENT: MMM, no cervical or supraclavicular LAD, anciteric  CV: RRR, no m/r/g  CHEST: CTA b/l, no w/r/r  ABD: +BS, soft, NT/ND, no hepatosplenomegaly  EXT: no c/c/e  SKIN: no rashes  NEURO: aaox3      Invasive Devices  Report    None                         Lab, Imaging and other studies:     No visits with results within 1 Day(s) from this visit  Latest known visit with results is:   Office Visit on 05/24/2022   Component Date Value     RAPID STREP A 05/24/2022 Negative     Beta Strep Grp A Culture 05/24/2022 Negative          I have personally reviewed pertinent films in PACS    No current facility-administered medications for this visit

## 2022-07-14 NOTE — LETTER
July 14, 2022     Yazmin Stone [address] [address]    Patient: Mercy Beth   YOB: 1960   Date of Visit: 7/14/2022       Dear Dr Zheng Jaime: Thank you for referring Mercy Beth to me for evaluation  Below are my notes for this consultation  If you have questions, please do not hesitate to call me  I look forward to following your patient along with you  Sincerely,        Dante Ramos MD        CC: No Recipients  Dante Ramos MD  7/14/2022 12:10 PM  Sign when Signing Visit  [address] [facility]  Progress Note - Mercy Beth 65 y o  female MRN: [id number]    Unit/Bed#:  Encounter: [id number]    Assessment/Plan:    1  Gastric erosions-negative for H pylori bacteria  Completed the course of pantoprazole 40 mg on daily basis for 3 months, asymptomatic after stopping it  Currently not on any PPI or H2 blocker  Went over dietary modifications again including following non ulcerogenic diet  Also recommended to avoid NSAIDs, use acetaminophen when possible  2  History of colon polyps-repeat colonoscopy at 5 year interval     3  Abdominal bloating-resolved  Patient lost her prior SIBO kit however symptoms have resolved as well therefore will hold off on testing at this time  Can continue with the probiotic   -continue to avoid gas producing foods  4  Follow-up on as-needed basis  Subjective:   60-year-old female with history of hypothyroidism, asthma, hyperlipidemia, presents for follow-up  She underwent EGD and colonoscopy by me earlier this year  EGD was notable for gastric erosions, biopsy negative for H pylori  Colon polyp was normal mucosa, no evidence of microscopic colitis  Patient reports that she is feeling well  She reports that abdominal bloating, loose stools have resolved  Patient reports that she no longer has symptoms of dyspepsia or GERD    She has completed the pantoprazole 40 mg for 3 months and has not had any recurrent symptoms  She is no longer taking a PPI or an H2 blocker  She does not report any significant use of NSAIDs  No other symptoms  Objective:     Vitals: Blood pressure 131/84, pulse 62, temperature 98 1 °F (36 7 °C), temperature source Temporal, height 5' 2 5" (1 588 m), weight 78 5 kg (173 lb), not currently breastfeeding  ,Body mass index is 31 14 kg/m²  [unfilled]    Physical Exam:    GEN: wn/wd, NAD  HEENT: MMM, no cervical or supraclavicular LAD, anciteric  CV: RRR, no m/r/g  CHEST: CTA b/l, no w/r/r  ABD: +BS, soft, NT/ND, no hepatosplenomegaly  EXT: no c/c/e  SKIN: no rashes  NEURO: aaox3      Invasive Devices  Report    None                         Lab, Imaging and other studies:     No visits with results within 1 Day(s) from this visit  Latest known visit with results is:   Office Visit on 05/24/2022   Component Date Value     RAPID STREP A 05/24/2022 Negative     Beta Strep Grp A Culture 05/24/2022 Negative          I have personally reviewed pertinent films in PACS    No current facility-administered medications for this visit

## 2022-07-15 ENCOUNTER — TELEPHONE (OUTPATIENT)
Dept: OBGYN CLINIC | Facility: HOSPITAL | Age: 62
End: 2022-07-15

## 2022-07-15 NOTE — TELEPHONE ENCOUNTER
Patient calling in asking for bilateral Euflexxa injections again  Her last injection was 3/30  Also, her right knee is really swollen  She has been icing it  She states it feels really tight  She states that she can barely bend it  She is asking for Dr Kisha Hart to give her a call back        # [phone]

## 2022-07-15 NOTE — TELEPHONE ENCOUNTER
Called the patient and her questions and concerns were addressed  She will initiate over-the-counter anti-inflammatory medication and Tylenol onto her appointment on Wednesday

## 2022-07-18 ENCOUNTER — OFFICE VISIT (OUTPATIENT)
Dept: PHYSICAL THERAPY | Facility: CLINIC | Age: 62
End: 2022-07-18
Payer: COMMERCIAL

## 2022-07-18 DIAGNOSIS — M54.16 LUMBAR RADICULOPATHY: Primary | ICD-10-CM

## 2022-07-18 PROCEDURE — 97112 NEUROMUSCULAR REEDUCATION: CPT | Performed by: PHYSICAL THERAPIST

## 2022-07-18 PROCEDURE — 97140 MANUAL THERAPY 1/> REGIONS: CPT | Performed by: PHYSICAL THERAPIST

## 2022-07-18 PROCEDURE — 97110 THERAPEUTIC EXERCISES: CPT | Performed by: PHYSICAL THERAPIST

## 2022-07-18 NOTE — PROGRESS NOTES
Daily Note     Today's date: 2022  Patient name: Alana Lovelace  : 1960  MRN: [id number]  Referring provider: HAYDEE Ball  Dx:   Encounter Diagnosis     ICD-10-CM    1  Lumbar radiculopathy  M54 16                   Subjective: Has been unable to perform HEP  Feels most pain with prolonged sitting (watching TV)   Has been sleeping on heating pad      Objective: See treatment diary below  Discussed options for HEP- emphasized EIS as well as frequent interruption of sitting posture and reviewed use of lumbar roll as well as strategies for long commutes to [address] [place] [address]      Assessment: Tolerated treatment fair  Patient appears to have an extension directional preference with a relevant L lateral compartment      Plan: Continue per plan of care        Precautions:   Past Medical History:   Diagnosis Date    Arthritis     Asthma     COVID-19 2022    Disease of thyroid gland     hypo    Dizziness     s/p covid 19    Mediterranean anemia     Pemphigus vulgaris     Thalassemia     Vitamin D deficiency     Wears partial dentures     upper   SOC BPPV: 2022  FOTO BPPV: 2022  SOC LUMBAR:   FOTO LUMBAR: 2022  POC Expiration (both): 2022  Daily Treatment Log:  Date 2022  FOTO vertigo; resolve vertigo 2022  Proceed lumbar only   Visit #/auth  2 3 4    auth expir     2022   Manual     10'   TP R gluts in prone w/ hip ER/IR C    TT   [place] test for SI     (+) pre TP rel; (-) post TP rel   Prone lumb ext mobs L3/4 and L4/5 Grade 3-4, extension and L UPAs    Grade 3 to 4 slow progression w/ pt exhale   There Exer 15   10' 10'    DIRECT ACCESS EVAL LUMBAR   TT 30'             pirif stretch fig 4 w/ SOS    15"x3    shruthi add hooklying w/ ball 3x10    3"x10   abd hooklying w/ TB YTB :03 20    YTB 3"x10           Pt educ 10' posture, HEP  10' disc mechanics; periph vs centraliz; importance of HEP freq; lumbar roll     HEP EIS LUMBAR issued/reviewed updated    There Activ                                        NMReed 15' 39'  35' 15'   CRT Eply treat L side  TT 1x negative; 1' per position      hoirz roll test  negative      Smooth pursuits w/ ambulation  50' ea horiz, vert, cw, ccw      Saccades w/ word cards w/ ambulation  50' ea horiz & vert      VOR x1 w/ FT  30x ea horiz/vert 130 BPM      HEP VRT  Issued/reviewed      Sciatic NG 20x B   2x10 R/L 2x10 R/L   bridges    2x10    AARON     30"x3   AARON w/ B/L knee flexion     2x10   REIL with L SG 10x       REIL 20x  1X10; 2X 1x10; 2x    vestib tests    15'    Modalities        MH/estim L SI/glut     Prone 10' skin intact pre/post   Access Code: [email or web address]  URL: [email or web address]  Date: 07/07/2022  Prepared by: Brayden Distad    Exercises  · Static Prone on Elbows - 1 x daily - 7 x weekly - 2 sets - 10 reps  · Prone Press Up - 5 x daily - 7 x weekly - 2 sets - 5 reps  · Standing Lumbar Extension with Counter - 1 x daily - 7 x weekly - 2 sets - 10 reps  · Supine Sciatic Nerve Glide - 1 x daily - 7 x weekly - 2 sets - 10 reps  · Supine Piriformis Stretch - 1 x daily - 7 x weekly - 3 reps - 15 hold  · Supine Bridge - 1 x daily - 7 x weekly - 3 sets - 5 reps        Access Code: [email or web address]  URL: [email or web address]  Date: 06/30/2022  Prepared by: Brayden Distad    Exercises  · Seated Horizontal Smooth Pursuit - 1 x daily - 7 x weekly  · Seated Horizontal Saccades - 1 x daily - 7 x weekly  · Standing Gaze Stabilization with Head Rotation - 1 x daily - 7 x weekly - 1 sets - 30 reps    Access Code: [email or web address]  URL: [email or web address]  Date: 07/05/2022  Prepared by:  Brayden Distad    Exercises  · Prone Press Up - 5 x daily - 7 x weekly - 2 sets - 5 reps  · Standing Lumbar Extension with Counter - 1 x daily - 7 x weekly - 2 sets - 10 reps

## 2022-07-20 ENCOUNTER — OFFICE VISIT (OUTPATIENT)
Dept: OBGYN CLINIC | Facility: CLINIC | Age: 62
End: 2022-07-20
Payer: COMMERCIAL

## 2022-07-20 ENCOUNTER — APPOINTMENT (OUTPATIENT)
Dept: RADIOLOGY | Facility: CLINIC | Age: 62
End: 2022-07-20
Payer: COMMERCIAL

## 2022-07-20 VITALS
SYSTOLIC BLOOD PRESSURE: 150 MMHG | BODY MASS INDEX: 31.83 KG/M2 | TEMPERATURE: 98.2 F | HEIGHT: 62 IN | RESPIRATION RATE: 18 BRPM | HEART RATE: 88 BPM | DIASTOLIC BLOOD PRESSURE: 80 MMHG | WEIGHT: 173 LBS

## 2022-07-20 DIAGNOSIS — M25.562 CHRONIC PAIN OF BOTH KNEES: ICD-10-CM

## 2022-07-20 DIAGNOSIS — M25.561 CHRONIC PAIN OF BOTH KNEES: ICD-10-CM

## 2022-07-20 DIAGNOSIS — M17.0 BILATERAL PRIMARY OSTEOARTHRITIS OF KNEE: ICD-10-CM

## 2022-07-20 DIAGNOSIS — G89.29 CHRONIC PAIN OF BOTH KNEES: ICD-10-CM

## 2022-07-20 DIAGNOSIS — M17.0 BILATERAL PRIMARY OSTEOARTHRITIS OF KNEE: Primary | ICD-10-CM

## 2022-07-20 PROCEDURE — 73562 X-RAY EXAM OF KNEE 3: CPT

## 2022-07-20 PROCEDURE — 99214 OFFICE O/P EST MOD 30 MIN: CPT | Performed by: ORTHOPAEDIC SURGERY

## 2022-07-20 NOTE — PROGRESS NOTES
Assessment/Plan:  1  Bilateral primary osteoarthritis of knee  XR knee 3 vw right non injury    XR knee 3 vw left non injury    Injection Procedure Prior Authorization   2  Chronic pain of both knees  XR knee 3 vw left non injury    Injection Procedure Prior Authorization     Scribe Attestation    I,:  Louis Lee am acting as a scribe while in the presence of the attending physician :       I,:  Amaury Case, DO personally performed the services described in this documentation    as scribed in my presence :         Nikki Goodwin is a 58-year-old female well known to the practice who returns today for follow-up evaluation of her bilateral knee pain  She did respond to the viscosupplementation injection series concluded at her last visit until a recent exacerbation of her underlying right knee osteoarthritis  I explained that her disease has progressed and is now end-stage on the right side  This acute exacerbation does appear to have subsided  I explained that it would be reasonable to again pursue viscosupplementation injection therapy for her bilateral knees when she is eligible after 09/30/2022  I did place an order for prior authorization today  We will reach out to her after this is approved to schedule the appropriate visits  All of her questions and concerns were addressed today  Subjective: Follow-up evaluation for bilateral knee pain    Patient ID: Moe Mukherjee is a 65 y o  female who returns today for follow-up evaluation of her bilateral knee pain  She concluded a viscosupplementation injection series for her bilateral knees on 03/30/2022  This again provided good relief of her knee pain  She does report a recurrence of her right knee pain which was accompanied by swelling recently  She reports the swelling has subsequently resolved and her pain has improved again  She would like to discuss repeat viscosupplementation injection therapy    She does report that she may be [place] in the next few weeks and anticipates this to be a permanent move  She denies any new injury or trauma  Review of Systems   Constitutional: Positive for activity change  Negative for chills, fever and unexpected weight change  HENT: Negative for hearing loss, nosebleeds and sore throat  Eyes: Negative for pain, redness and visual disturbance  Respiratory: Negative for cough, shortness of breath and wheezing  Cardiovascular: Negative for chest pain, palpitations and leg swelling  Gastrointestinal: Negative for abdominal pain, nausea and vomiting  Endocrine: Negative for polydipsia and polyuria  Genitourinary: Negative for dysuria and hematuria  Musculoskeletal: Positive for arthralgias, joint swelling and myalgias  See HPI   Skin: Negative for rash and wound  Neurological: Negative for dizziness, numbness and headaches  Psychiatric/Behavioral: Negative for decreased concentration and suicidal ideas  The patient is not nervous/anxious            Past Medical History:   Diagnosis Date    Arthritis     Asthma     COVID-19 01/02/2022    Disease of thyroid gland     hypo    Dizziness     s/p covid 19    Mediterranean anemia     Pemphigus vulgaris     Thalassemia     Vitamin D deficiency     Wears partial dentures     upper       Past Surgical History:   Procedure Laterality Date    BLEPHAROPLASTY      bilateral    BREAST BIOPSY Right 2000    benign-not sure of exact year    COLONOSCOPY      MS KNEE SCOPE,MED/LAT MENISECTOMY Right 6/2/2020    Procedure: MENISCECTOMY MEDIAL;  Surgeon: Brittney Connelly DO;  Location: [facility];  Service: Orthopedics       Family History   Problem Relation Age of Onset    No Known Problems Mother     Asthma Father     Hypertension Father     Hypertension Maternal Grandmother     Liver cancer Maternal Grandmother     Diabetes Maternal Grandmother     Cancer Maternal Grandmother         liver    Cancer Family         multiple relatives    Stomach cancer Family     Liver cancer Family     Bone cancer Family     Leukemia Family     Seizures Sister     Asthma Brother     Hypertension Brother     Leukemia Maternal Uncle     Cancer Maternal Uncle         leukemia    Heart attack Cousin     Bone cancer Maternal Uncle     No Known Problems Paternal Aunt     Substance Abuse Neg Hx     Mental illness Neg Hx        Social History     Occupational History    Not on file   Tobacco Use    Smoking status: Former Smoker     Years: 10 00     Quit date:      Years since quittin 5    Smokeless tobacco: Never Used   Vaping Use    Vaping Use: Never used   Substance and Sexual Activity    Alcohol use:  Yes     Alcohol/week: 1 0 standard drink     Types: 1 Glasses of wine per week     Comment: socially     Drug use: Never    Sexual activity: Yes     Partners: Male     Birth control/protection: Post-menopausal         Current Outpatient Medications:     albuterol (PROVENTIL HFA,VENTOLIN HFA) 90 mcg/act inhaler, Inhale 2 puffs every 6 (six) hours as needed for wheezing, Disp: 18 g, Rfl: 1    ALPRAZolam (XANAX) 0 25 mg tablet, Take 1 tablet (0 25 mg total) by mouth daily at bedtime as needed for anxiety, Disp: 30 tablet, Rfl: 1    Ascorbic Acid (vitamin C) 1000 MG tablet, Take 1,000 mg by mouth daily, Disp: , Rfl:     buPROPion (WELLBUTRIN) 75 mg tablet, take 1 tablet by mouth twice a day, Disp: 60 tablet, Rfl: 1    ergocalciferol (VITAMIN D2) 50,000 units, Take 1 capsule (50,000 Units total) by mouth once a week, Disp: 12 capsule, Rfl: 1    fluticasone (FLONASE) 50 mcg/act nasal spray, instill 1 spray into each nostril once daily, Disp: 16 g, Rfl: 3    levothyroxine 25 mcg tablet, Take one tablet on an empty stomach every night time, Disp: 90 tablet, Rfl: 3    Multiple Vitamins-Minerals (ZINC PO), Take by mouth daily  , Disp: , Rfl:     pantoprazole (PROTONIX) 40 mg tablet, take 1 tablet by mouth once daily, Disp: 90 tablet, Rfl: 0   rosuvastatin (CRESTOR) 10 MG tablet, Take 1 tablet (10 mg total) by mouth daily, Disp: 90 tablet, Rfl: 3    saccharomyces boulardii (FLORASTOR) 250 mg capsule, Take 250 mg by mouth in the morning, Disp: , Rfl:     Allergies   Allergen Reactions    Iodine - Food Allergy Itching and Vomiting    Shellfish-Derived Products - Food Allergy Itching and Vomiting    Levaquin [Levofloxacin] Rash       Objective:  Vitals:    07/20/22 1131   BP: 150/80   Pulse: 88   Resp: 18   Temp: 98 2 °F (36 8 °C)       Body mass index is 31 64 kg/m²  Right Knee Exam     Muscle Strength   The patient has normal right knee strength  Tenderness   The patient is experiencing tenderness in the patella and medial joint line  Range of Motion   Extension:  0 normal   Flexion:  130 normal     Tests   Varus: negative Valgus: negative  Drawer:  Anterior - negative    Posterior - negative  Patellar apprehension: negative    Other   Erythema: absent  Scars: present (healed arthroscopic incisions)  Sensation: normal  Pulse: present  Swelling: none  Effusion: effusion (scant) present    Comments:  Negative patellofemoral grind  Knee is stable ligamentous exam  No erythema, effusion, or warmth        Left Knee Exam     Muscle Strength   The patient has normal left knee strength  Tenderness   The patient is experiencing tenderness in the patella and medial joint line  Range of Motion   Extension:  0 normal   Flexion:  130 normal     Tests   Varus: negative Valgus: negative  Drawer:  Anterior - negative     Posterior - negative  Patellar apprehension: negative    Other   Erythema: absent  Scars: absent  Sensation: normal  Pulse: present  Swelling: none  Effusion: no effusion present    Comments:  Negative patellofemoral grind  Knee is stable ligamentous exam  No erythema, effusion, or warmth            Observations   Left Knee   Negative for effusion  Right Knee   Positive for effusion (scant)         Physical Exam  Vitals and nursing note reviewed  Constitutional:       Appearance: She is well-developed  HENT:      Head: Normocephalic and atraumatic  Eyes:      General: No scleral icterus  Extraocular Movements: Extraocular movements intact  Conjunctiva/sclera: Conjunctivae normal    Cardiovascular:      Rate and Rhythm: Normal rate  Pulmonary:      Effort: Pulmonary effort is normal  No respiratory distress  Musculoskeletal:      Cervical back: Normal range of motion and neck supple  Right knee: Effusion (scant) present  Left knee: No effusion  Comments: As noted in HPI   Skin:     General: Skin is warm and dry  Neurological:      Mental Status: She is alert and oriented to person, place, and time  Psychiatric:         Behavior: Behavior normal          I have personally reviewed pertinent films in PACS  Bilateral knee x-rays obtained on 07/20/2022 reviewed demonstrating severe degenerative change in the right knee and moderate degenerative change in the left knee  There is medial narrowing in both knees  There is sclerosis and osteophytosis  There is no acute fracture, dislocation, lytic or blastic lesion

## 2022-07-21 ENCOUNTER — OFFICE VISIT (OUTPATIENT)
Dept: FAMILY MEDICINE CLINIC | Facility: CLINIC | Age: 62
End: 2022-07-21
Payer: COMMERCIAL

## 2022-07-21 VITALS
SYSTOLIC BLOOD PRESSURE: 138 MMHG | TEMPERATURE: 98 F | WEIGHT: 172 LBS | HEART RATE: 67 BPM | DIASTOLIC BLOOD PRESSURE: 74 MMHG | HEIGHT: 63 IN | BODY MASS INDEX: 30.48 KG/M2 | OXYGEN SATURATION: 99 %

## 2022-07-21 DIAGNOSIS — E78.1 HYPERTRIGLYCERIDEMIA: ICD-10-CM

## 2022-07-21 DIAGNOSIS — F32.A DEPRESSION, UNSPECIFIED DEPRESSION TYPE: ICD-10-CM

## 2022-07-21 DIAGNOSIS — E55.9 VITAMIN D DEFICIENCY: ICD-10-CM

## 2022-07-21 DIAGNOSIS — E78.2 MIXED HYPERLIPIDEMIA: ICD-10-CM

## 2022-07-21 DIAGNOSIS — J45.20 MILD INTERMITTENT ASTHMA WITHOUT COMPLICATION: ICD-10-CM

## 2022-07-21 DIAGNOSIS — F41.9 ANXIETY: Primary | ICD-10-CM

## 2022-07-21 PROCEDURE — 3725F SCREEN DEPRESSION PERFORMED: CPT | Performed by: NURSE PRACTITIONER

## 2022-07-21 PROCEDURE — 99214 OFFICE O/P EST MOD 30 MIN: CPT | Performed by: NURSE PRACTITIONER

## 2022-07-21 RX ORDER — ERGOCALCIFEROL 1.25 MG/1
50000 CAPSULE ORAL WEEKLY
Qty: 12 CAPSULE | Refills: 1 | Status: SHIPPED | OUTPATIENT
Start: 2022-07-21

## 2022-07-21 RX ORDER — ROSUVASTATIN CALCIUM 10 MG/1
10 TABLET, COATED ORAL DAILY
Qty: 90 TABLET | Refills: 1 | Status: SHIPPED | OUTPATIENT
Start: 2022-07-21

## 2022-07-21 RX ORDER — BUPROPION HYDROCHLORIDE 75 MG/1
75 TABLET ORAL 2 TIMES DAILY
Qty: 180 TABLET | Refills: 1 | Status: SHIPPED | OUTPATIENT
Start: 2022-07-21 | End: 2022-08-26 | Stop reason: DRUGHIGH

## 2022-07-21 RX ORDER — ALBUTEROL SULFATE 90 UG/1
2 AEROSOL, METERED RESPIRATORY (INHALATION) EVERY 6 HOURS PRN
Qty: 18 G | Refills: 1 | Status: SHIPPED | OUTPATIENT
Start: 2022-07-21 | End: 2022-09-02

## 2022-07-21 RX ORDER — ALPRAZOLAM 0.25 MG/1
0.25 TABLET ORAL
Qty: 30 TABLET | Refills: 1 | Status: SHIPPED | OUTPATIENT
Start: 2022-07-21 | End: 2022-10-27 | Stop reason: SDUPTHER

## 2022-07-21 NOTE — PATIENT INSTRUCTIONS
Stress management  Activities to divert attention when possible  Conscious breathing techniques as discussed  Coping mechanisms and strategies vary from person to person so try to utilize strategies that you think may work for you (such as meditation, music, etc  )  Anti anxiety/depression medications as prescribed

## 2022-07-21 NOTE — PROGRESS NOTES
Assessment/Plan:  1  Anxiety  Stress management  Activities to divert attention when possible  Conscious breathing techniques as discussed  Coping mechanisms and strategies vary from person to person so try to utilize strategies that you think may work for you (such as meditation, music, etc  )  Anti anxiety/depression medications as prescribed  - ALPRAZolam (XANAX) 0 25 mg tablet; Take 1 tablet (0 25 mg total) by mouth daily at bedtime as needed for anxiety  Dispense: 30 tablet; Refill: 1  - buPROPion (WELLBUTRIN) 75 mg tablet; Take 1 tablet (75 mg total) by mouth 2 (two) times a day  Dispense: 180 tablet; Refill: 1  2  Depression, unspecified depression type  Stable  No suicidal ideation  Monitor  Anticipatory guidance  Continue wellbutrin  - buPROPion (WELLBUTRIN) 75 mg tablet; Take 1 tablet (75 mg total) by mouth 2 (two) times a day  Dispense: 180 tablet; Refill: 1  3  Mixed hyperlipidemia  Heart healthy diet  Statin  Regular exercise  - rosuvastatin (CRESTOR) 10 MG tablet; Take 1 tablet (10 mg total) by mouth daily  Dispense: 90 tablet; Refill: 1  4  Hypertriglyceridemia  Heart healthy diet  Statin  Regular exercise  - rosuvastatin (CRESTOR) 10 MG tablet; Take 1 tablet (10 mg total) by mouth daily  Dispense: 90 tablet; Refill: 1  5  Mild intermittent asthma without complication  Stable  monitor  - albuterol (PROVENTIL HFA,VENTOLIN HFA) 90 mcg/act inhaler; Inhale 2 puffs every 6 (six) hours as needed for wheezing  Dispense: 18 g; Refill: 1  6  Vitamin D deficiency  - ergocalciferol (VITAMIN D2) 50,000 units; Take 1 capsule (50,000 Units total) by mouth once a week  Dispense: 12 capsule; Refill: 1    Patient was counseled regarding instructions for management which included: impression/diagnosis, risk/benefits of treatment options, importance of compliance with treatment, risk factor reduction, and prognosis     I have reviewed the instructions with the patient answering all questions and patient verbalized understanding  Subjective:      Patient ID: Cosmo Regan is a 65 y o  female who presents for med check    Here for follow up on anxiety and depression and chronic issues  Currently on wellubtrin 75mg bid  Tolerating with no side effects  No recent panic attacks  No suicidal thoughts  Has only used xanax once in past month  No recent illness  Generally feels well except has been noticing some wheezing in the mornings  Will use rescue inhaler once and usually clears  No other specific concerns or issues  The following portions of the patient's history were reviewed and updated as appropriate: allergies, current medications, past family history, past medical history, past social history, past surgical history and problem list     Review of Systems   Constitutional: Negative for fatigue  HENT: Negative for congestion  Eyes: Negative for visual disturbance  Respiratory: Positive for wheezing (intermittently)  Negative for chest tightness and shortness of breath  Gastrointestinal: Negative for abdominal pain, diarrhea, nausea and vomiting  Endocrine: Negative for cold intolerance, heat intolerance, polydipsia, polyphagia and polyuria  Genitourinary: Negative for frequency and urgency  Musculoskeletal: Negative for arthralgias and myalgias  Skin: Negative for color change and pallor  Allergic/Immunologic: Negative for immunocompromised state  Neurological: Negative for dizziness and headaches  Hematological: Does not bruise/bleed easily  Psychiatric/Behavioral: Negative for dysphoric mood, self-injury and suicidal ideas  The patient is nervous/anxious (at times, but well controlled currently)  Objective:      /74   Pulse 67   Temp 98 °F (36 7 °C) (Temporal)   Ht 5' 3" (1 6 m)   Wt 78 kg (172 lb)   SpO2 99%   BMI 30 47 kg/m²          Physical Exam  Vitals reviewed  Constitutional:       General: She is not in acute distress       Appearance: She is not ill-appearing  HENT:      Nose: No congestion  Mouth/Throat:      Mouth: Mucous membranes are moist       Pharynx: Oropharynx is clear  Eyes:      General: No scleral icterus  Neck:      Vascular: No carotid bruit  Cardiovascular:      Rate and Rhythm: Normal rate and regular rhythm  Pulmonary:      Effort: Pulmonary effort is normal  No respiratory distress  Breath sounds: Normal breath sounds  No wheezing or rales  Abdominal:      General: There is no distension  Palpations: Abdomen is soft  Musculoskeletal:      Cervical back: Normal range of motion and neck supple  Right lower leg: No edema  Left lower leg: No edema  Skin:     General: Skin is warm and dry  Coloration: Skin is not jaundiced or pale  Neurological:      General: No focal deficit present  Mental Status: She is alert and oriented to person, place, and time  Cranial Nerves: No cranial nerve deficit  Sensory: No sensory deficit  Psychiatric:         Mood and Affect: Mood normal          Behavior: Behavior normal          Thought Content:  Thought content normal          Judgment: Judgment normal

## 2022-07-27 ENCOUNTER — APPOINTMENT (OUTPATIENT)
Dept: PHYSICAL THERAPY | Facility: CLINIC | Age: 62
End: 2022-07-27
Payer: COMMERCIAL

## 2022-07-27 ENCOUNTER — TELEPHONE (OUTPATIENT)
Dept: PHYSICAL THERAPY | Facility: CLINIC | Age: 62
End: 2022-07-27

## 2022-08-03 ENCOUNTER — TELEPHONE (OUTPATIENT)
Dept: PHYSICAL THERAPY | Facility: CLINIC | Age: 62
End: 2022-08-03

## 2022-08-03 NOTE — TELEPHONE ENCOUNTER
Called pt to check on status  She has been ill with an upper respiratory infection  She's been sick for 10-14 days  She just started antibiotics after testing negative for covid  She will call to schedule when she is feeling better

## 2022-08-16 ENCOUNTER — EMERGENCY (EMERGENCY)
Facility: HOSPITAL | Age: 62
LOS: 1 days | Discharge: HOME | End: 2022-08-16
Admitting: EMERGENCY MEDICINE

## 2022-08-16 DIAGNOSIS — J45.901 UNSPECIFIED ASTHMA WITH (ACUTE) EXACERBATION: ICD-10-CM

## 2022-08-16 DIAGNOSIS — I10 ESSENTIAL (PRIMARY) HYPERTENSION: ICD-10-CM

## 2022-08-16 DIAGNOSIS — R05.9 COUGH, UNSPECIFIED: ICD-10-CM

## 2022-08-16 DIAGNOSIS — J20.9 ACUTE BRONCHITIS, UNSPECIFIED: ICD-10-CM

## 2022-08-16 PROCEDURE — 71045 X-RAY EXAM CHEST 1 VIEW: CPT | Mod: 26

## 2022-08-16 PROCEDURE — 99285 EMERGENCY DEPT VISIT HI MDM: CPT

## 2022-08-22 NOTE — PROGRESS NOTES
8/22/2022: Will resolve episode of care at this time  It has been more than one month since pt last treatment session

## 2022-08-23 DIAGNOSIS — E78.2 MIXED HYPERLIPIDEMIA: ICD-10-CM

## 2022-08-23 DIAGNOSIS — E78.1 HYPERTRIGLYCERIDEMIA: ICD-10-CM

## 2022-08-23 RX ORDER — ROSUVASTATIN CALCIUM 10 MG/1
10 TABLET, COATED ORAL DAILY
Qty: 90 TABLET | Refills: 1 | OUTPATIENT
Start: 2022-08-23

## 2022-08-26 ENCOUNTER — TELEPHONE (OUTPATIENT)
Dept: FAMILY MEDICINE CLINIC | Facility: CLINIC | Age: 62
End: 2022-08-26

## 2022-08-26 DIAGNOSIS — F41.9 ANXIETY: Primary | ICD-10-CM

## 2022-08-26 DIAGNOSIS — F32.A DEPRESSION, UNSPECIFIED DEPRESSION TYPE: ICD-10-CM

## 2022-08-26 RX ORDER — BUPROPION HYDROCHLORIDE 150 MG/1
TABLET ORAL
Qty: 90 TABLET | Refills: 3 | Status: SHIPPED | OUTPATIENT
Start: 2022-08-26 | End: 2023-01-16 | Stop reason: SDUPTHER

## 2022-08-26 NOTE — TELEPHONE ENCOUNTER
Question regarding Wellbutrin    She is on 150 mg (75 in morning, 75 at night)    She wanted to know if she can go to the extended time release pill  So she can just take the one pill in the morning    She stated that you spoke about this at her appt on 7/21/22    Uses [facility] in [place]

## 2022-09-01 DIAGNOSIS — J45.20 MILD INTERMITTENT ASTHMA WITHOUT COMPLICATION: ICD-10-CM

## 2022-09-02 RX ORDER — ALBUTEROL SULFATE 90 UG/1
AEROSOL, METERED RESPIRATORY (INHALATION)
Qty: 8.5 G | Refills: 1 | Status: ON HOLD | OUTPATIENT
Start: 2022-09-02 | End: 2022-10-14 | Stop reason: SDUPTHER

## 2022-09-15 ENCOUNTER — OFFICE VISIT (OUTPATIENT)
Dept: FAMILY MEDICINE CLINIC | Facility: CLINIC | Age: 62
End: 2022-09-15
Payer: COMMERCIAL

## 2022-09-15 VITALS
BODY MASS INDEX: 29.77 KG/M2 | OXYGEN SATURATION: 100 % | DIASTOLIC BLOOD PRESSURE: 70 MMHG | TEMPERATURE: 97.6 F | HEIGHT: 63 IN | RESPIRATION RATE: 18 BRPM | WEIGHT: 168 LBS | HEART RATE: 90 BPM | SYSTOLIC BLOOD PRESSURE: 104 MMHG

## 2022-09-15 DIAGNOSIS — J45.21 MILD INTERMITTENT ASTHMA WITH ACUTE EXACERBATION: ICD-10-CM

## 2022-09-15 DIAGNOSIS — J45.20 MILD INTERMITTENT ASTHMA WITHOUT COMPLICATION: Primary | ICD-10-CM

## 2022-09-15 PROCEDURE — 99214 OFFICE O/P EST MOD 30 MIN: CPT | Performed by: NURSE PRACTITIONER

## 2022-09-15 RX ORDER — ALBUTEROL SULFATE 2.5 MG/3ML
2.5 SOLUTION RESPIRATORY (INHALATION) EVERY 6 HOURS PRN
Qty: 75 ML | Refills: 0 | Status: ON HOLD | OUTPATIENT
Start: 2022-09-15 | End: 2022-10-14 | Stop reason: SDUPTHER

## 2022-09-15 RX ORDER — IPRATROPIUM BROMIDE AND ALBUTEROL SULFATE 2.5; .5 MG/3ML; MG/3ML
SOLUTION RESPIRATORY (INHALATION)
Status: ON HOLD | COMMUNITY
Start: 2022-09-06 | End: 2022-10-14 | Stop reason: SDUPTHER

## 2022-09-15 RX ORDER — PREDNISONE 20 MG/1
40 TABLET ORAL DAILY
COMMUNITY
Start: 2022-08-16 | End: 2022-09-15 | Stop reason: ALTCHOICE

## 2022-09-15 RX ORDER — PREDNISONE 20 MG/1
20 TABLET ORAL DAILY
Qty: 7 TABLET | Refills: 0 | Status: SHIPPED | OUTPATIENT
Start: 2022-09-15 | End: 2022-09-22

## 2022-09-15 RX ORDER — AZITHROMYCIN 250 MG/1
TABLET, FILM COATED ORAL
COMMUNITY
Start: 2022-08-16 | End: 2022-09-15 | Stop reason: ALTCHOICE

## 2022-09-15 RX ORDER — SULFAMETHOXAZOLE AND TRIMETHOPRIM 800; 160 MG/1; MG/1
1 TABLET ORAL EVERY 12 HOURS
COMMUNITY
Start: 2022-08-03 | End: 2022-09-15 | Stop reason: ALTCHOICE

## 2022-09-15 RX ORDER — MONTELUKAST SODIUM 10 MG/1
10 TABLET ORAL
Qty: 30 TABLET | Refills: 1 | Status: SHIPPED | OUTPATIENT
Start: 2022-09-15 | End: 2022-10-27 | Stop reason: SDUPTHER

## 2022-09-15 NOTE — PATIENT INSTRUCTIONS
Rescue inhaler 2 puffs or nebulizer treatments every 4-6 hours as needed for shortness breath, chest tightness, bronchospasm, coughing fits  Prednisone 20mg daily with food for 7 days  Start Singulair 10mg daily  Call or return to office if symptoms worsen or if new symptoms develop

## 2022-09-15 NOTE — PROGRESS NOTES
Name: Oscar Shaw      : 1960      MRN: [id number]  Encounter Provider: HAYDEE Perez  Encounter Date: 9/15/2022   Encounter department: [address] [place] [address]  Mild intermittent asthma without complication  Rescue inhaler 2 puffs or nebulizer treatments every 4-6 hours as needed for shortness breath, chest tightness, bronchospasm, coughing fits  Start daily singulair  - montelukast (SINGULAIR) 10 mg tablet; Take 1 tablet (10 mg total) by mouth daily at bedtime  Dispense: 30 tablet; Refill: 1    2  Mild intermittent asthma with acute exacerbation  Rescue inhaler 2 puffs or nebulizer treatments every 4-6 hours as needed for shortness breath, chest tightness, bronchospasm, coughing fits  Prednisone 20mg daily with food for 7 days  Start Singulair 10mg daily  Call or return to office if symptoms worsen or if new symptoms develop  - predniSONE 20 mg tablet; Take 1 tablet (20 mg total) by mouth daily for 7 days  Dispense: 7 tablet; Refill: 0  - albuterol (2 5 mg/3 mL) 0 083 % nebulizer solution; Take 3 mL (2 5 mg total) by nebulization every 6 (six) hours as needed for wheezing or shortness of breath  Dispense: 75 mL; Refill: 0           Subjective      Here for issues with asthma  History of mild asthma and has not had flare for last several years, until recently  Has been to ED in [facility] twice in past month  Would like rx for nebulizer machine  Persistent cough for a few months  Initially cough was dry and now productive  Martha Grace to ED in [place]  Difficulty breathing  Treated with nebulizer tx, steroids, and antibiotics  About 10 days ago  Finished course of antibiotics and steroids  zpack , finished  Finished course of steroids about one week ago  Still feels sob, still with a lot of mucous, still with cough  (+) wheeze  Does feel better but not resolved  No fevers  Needs neb solution rx  Review of Systems   Constitutional: Negative for fever  HENT: Negative for congestion, sinus pressure and sinus pain  Eyes: Negative for discharge  Respiratory: Positive for cough, chest tightness, shortness of breath and wheezing  Allergic/Immunologic: Positive for environmental allergies  Neurological: Negative for dizziness and headaches  Hematological: Negative for adenopathy  Current Outpatient Medications on File Prior to Visit   Medication Sig    albuterol (PROVENTIL HFA,VENTOLIN HFA) 90 mcg/act inhaler inhale 2 puffs by mouth and INTO THE LUNGS every 6 hours if needed for wheezing    ALPRAZolam (XANAX) 0 25 mg tablet Take 1 tablet (0 25 mg total) by mouth daily at bedtime as needed for anxiety    buPROPion (Wellbutrin XL) 150 mg 24 hr tablet 1 tablet po daily    ergocalciferol (VITAMIN D2) 50,000 units Take 1 capsule (50,000 Units total) by mouth once a week    fluticasone (FLONASE) 50 mcg/act nasal spray instill 1 spray into each nostril once daily    ipratropium-albuterol (DUO-NEB) 0 5-2 5 mg/3 mL nebulizer solution INHALE 1 VIAL VIA NEBULIZER 4 TIMES A DAY AS NEEDED    levothyroxine 25 mcg tablet Take one tablet on an empty stomach every night time    Multiple Vitamins-Minerals (ZINC PO) Take by mouth daily      rosuvastatin (CRESTOR) 10 MG tablet Take 1 tablet (10 mg total) by mouth daily       Objective     /70   Pulse 90   Temp 97 6 °F (36 4 °C) (Temporal)   Resp 18   Ht 5' 3" (1 6 m)   Wt 76 2 kg (168 lb)   SpO2 100%   BMI 29 76 kg/m²     Physical Exam  Vitals reviewed  Constitutional:       General: She is not in acute distress  Appearance: She is not ill-appearing  HENT:      Nose: No congestion  Neck:      Vascular: No carotid bruit  Cardiovascular:      Rate and Rhythm: Normal rate and regular rhythm  Pulmonary:      Effort: Pulmonary effort is normal       Breath sounds: Wheezing (upper lobes b/l) present  No rhonchi or rales  Abdominal:      General: There is no distension        Palpations: Abdomen is soft  Musculoskeletal:      Cervical back: Normal range of motion  Right lower leg: No edema  Left lower leg: No edema  Lymphadenopathy:      Cervical: No cervical adenopathy  Skin:     General: Skin is warm and dry  Coloration: Skin is not jaundiced or pale  Neurological:      General: No focal deficit present  Mental Status: She is alert and oriented to person, place, and time  Cranial Nerves: No cranial nerve deficit  Sensory: No sensory deficit  Psychiatric:         Mood and Affect: Mood normal          Behavior: Behavior normal          Thought Content:  Thought content normal          Judgment: Judgment normal        Gil Lee

## 2022-09-21 NOTE — TELEPHONE ENCOUNTER
Pt left message yesterday at 2 pm canceling her appt for 8 am this morning, no reason left on message  General Sunscreen Counseling: I recommended a broad spectrum sunscreen with a SPF of 30 or higher.  I explained that SPF 30 sunscreens block approximately 97 percent of the sun's harmful rays.  Sunscreens should be applied at least 15 minutes prior to expected sun exposure and then every 2 hours after that as long as sun exposure continues. If swimming or exercising sunscreen should be reapplied every 45 minutes to an hour after getting wet or sweating.  One ounce, or the equivalent of a shot glass full of sunscreen, is adequate to protect the skin not covered by a bathing suit. I also recommended a lip balm with a sunscreen as well. Sun protective clothing can be used in lieu of sunscreen but must be worn the entire time you are exposed to the sun's rays. Detail Level: Generalized Products Recommended: Zinc based spf daily, instructions given, samples given when available

## 2022-10-07 ENCOUNTER — TELEPHONE (OUTPATIENT)
Dept: FAMILY MEDICINE CLINIC | Facility: CLINIC | Age: 62
End: 2022-10-07

## 2022-10-07 NOTE — TELEPHONE ENCOUNTER
Patient called asking for a recommendation for a pulmonologist she states she is still not feeling well and thinks she needs to see a pulmonologist  Patient stating has a lot of mucus and coughing?

## 2022-10-09 ENCOUNTER — HOSPITAL ENCOUNTER (INPATIENT)
Facility: HOSPITAL | Age: 62
LOS: 5 days | Discharge: HOME | DRG: 203 | End: 2022-10-14
Attending: EMERGENCY MEDICINE | Admitting: FAMILY MEDICINE
Payer: COMMERCIAL

## 2022-10-09 ENCOUNTER — APPOINTMENT (EMERGENCY)
Dept: RADIOLOGY | Facility: HOSPITAL | Age: 62
DRG: 203 | End: 2022-10-09
Payer: COMMERCIAL

## 2022-10-09 DIAGNOSIS — J45.51 CHRONIC ASTHMA, SEVERE PERSISTENT, WITH ACUTE EXACERBATION: ICD-10-CM

## 2022-10-09 DIAGNOSIS — J45.20 MILD INTERMITTENT ASTHMA WITHOUT COMPLICATION: ICD-10-CM

## 2022-10-09 DIAGNOSIS — J45.21 MILD INTERMITTENT ASTHMA WITH ACUTE EXACERBATION: ICD-10-CM

## 2022-10-09 DIAGNOSIS — J45.901 ASTHMA EXACERBATION: Primary | ICD-10-CM

## 2022-10-09 LAB
ANION GAP SERPL CALCULATED.3IONS-SCNC: 9 MMOL/L (ref 4–13)
ARTERIAL PATENCY WRIST A: YES
BASE EXCESS BLDA CALC-SCNC: -0.4 MMOL/L
BASOPHILS # BLD AUTO: 0.1 THOUSANDS/ΜL (ref 0–0.1)
BASOPHILS NFR BLD AUTO: 1 % (ref 0–1)
BODY TEMPERATURE: 97.4 DEGREES FEHRENHEIT
BUN SERPL-MCNC: 13 MG/DL (ref 5–25)
CALCIUM SERPL-MCNC: 9.4 MG/DL (ref 8.3–10.1)
CHLORIDE SERPL-SCNC: 104 MMOL/L (ref 96–108)
CO2 SERPL-SCNC: 28 MMOL/L (ref 21–32)
CREAT SERPL-MCNC: 0.99 MG/DL (ref 0.6–1.3)
EOSINOPHIL # BLD AUTO: 0.65 THOUSAND/ΜL (ref 0–0.61)
EOSINOPHIL NFR BLD AUTO: 8 % (ref 0–6)
ERYTHROCYTE [DISTWIDTH] IN BLOOD BY AUTOMATED COUNT: 15.7 % (ref 11.6–15.1)
FLUAV RNA RESP QL NAA+PROBE: NEGATIVE
FLUBV RNA RESP QL NAA+PROBE: NEGATIVE
GFR SERPL CREATININE-BSD FRML MDRD: 61 ML/MIN/1.73SQ M
GLUCOSE SERPL-MCNC: 94 MG/DL (ref 65–140)
HCO3 BLDA-SCNC: 23.1 MMOL/L (ref 22–28)
HCT VFR BLD AUTO: 42.2 % (ref 34.8–46.1)
HGB BLD-MCNC: 13 G/DL (ref 11.5–15.4)
IMM GRANULOCYTES # BLD AUTO: 0.03 THOUSAND/UL (ref 0–0.2)
IMM GRANULOCYTES NFR BLD AUTO: 0 % (ref 0–2)
LYMPHOCYTES # BLD AUTO: 3.31 THOUSANDS/ΜL (ref 0.6–4.47)
LYMPHOCYTES NFR BLD AUTO: 38 % (ref 14–44)
MCH RBC QN AUTO: 24.3 PG (ref 26.8–34.3)
MCHC RBC AUTO-ENTMCNC: 30.8 G/DL (ref 31.4–37.4)
MCV RBC AUTO: 79 FL (ref 82–98)
MONOCYTES # BLD AUTO: 0.61 THOUSAND/ΜL (ref 0.17–1.22)
MONOCYTES NFR BLD AUTO: 7 % (ref 4–12)
NASAL CANNULA: 2
NEUTROPHILS # BLD AUTO: 4.01 THOUSANDS/ΜL (ref 1.85–7.62)
NEUTS SEG NFR BLD AUTO: 46 % (ref 43–75)
NRBC BLD AUTO-RTO: 0 /100 WBCS
O2 CT BLDA-SCNC: 18.8 ML/DL (ref 16–23)
OXYHGB MFR BLDA: 97.6 % (ref 94–97)
PCO2 BLDA: 34.1 MM HG (ref 36–44)
PCO2 TEMP ADJ BLDA: 33.1 MM HG (ref 36–44)
PH BLD: 7.46 [PH] (ref 7.35–7.45)
PH BLDA: 7.45 [PH] (ref 7.35–7.45)
PLATELET # BLD AUTO: 248 THOUSANDS/UL (ref 149–390)
PMV BLD AUTO: 12.1 FL (ref 8.9–12.7)
PO2 BLD: 96 MM HG (ref 75–129)
PO2 BLDA: 100.1 MM HG (ref 75–129)
POTASSIUM SERPL-SCNC: 3.5 MMOL/L (ref 3.5–5.3)
PROCALCITONIN SERPL-MCNC: <0.05 NG/ML
RBC # BLD AUTO: 5.36 MILLION/UL (ref 3.81–5.12)
RSV RNA RESP QL NAA+PROBE: NEGATIVE
SARS-COV-2 RNA RESP QL NAA+PROBE: NEGATIVE
SODIUM SERPL-SCNC: 141 MMOL/L (ref 135–147)
SPECIMEN SOURCE: ABNORMAL
WBC # BLD AUTO: 8.71 THOUSAND/UL (ref 4.31–10.16)

## 2022-10-09 PROCEDURE — 94760 N-INVAS EAR/PLS OXIMETRY 1: CPT

## 2022-10-09 PROCEDURE — 82805 BLOOD GASES W/O2 SATURATION: CPT | Performed by: PHYSICIAN ASSISTANT

## 2022-10-09 PROCEDURE — 84145 PROCALCITONIN (PCT): CPT

## 2022-10-09 PROCEDURE — 99223 1ST HOSP IP/OBS HIGH 75: CPT

## 2022-10-09 PROCEDURE — 71250 CT THORAX DX C-: CPT

## 2022-10-09 PROCEDURE — 0241U HB NFCT DS VIR RESP RNA 4 TRGT: CPT | Performed by: PHYSICIAN ASSISTANT

## 2022-10-09 PROCEDURE — 96361 HYDRATE IV INFUSION ADD-ON: CPT

## 2022-10-09 PROCEDURE — 0202U NFCT DS 22 TRGT SARS-COV-2: CPT

## 2022-10-09 PROCEDURE — G1004 CDSM NDSC: HCPCS

## 2022-10-09 PROCEDURE — 80048 BASIC METABOLIC PNL TOTAL CA: CPT | Performed by: PHYSICIAN ASSISTANT

## 2022-10-09 PROCEDURE — 96365 THER/PROPH/DIAG IV INF INIT: CPT

## 2022-10-09 PROCEDURE — 94644 CONT INHLJ TX 1ST HOUR: CPT

## 2022-10-09 PROCEDURE — 99285 EMERGENCY DEPT VISIT HI MDM: CPT | Performed by: PHYSICIAN ASSISTANT

## 2022-10-09 PROCEDURE — 36600 WITHDRAWAL OF ARTERIAL BLOOD: CPT

## 2022-10-09 PROCEDURE — 94640 AIRWAY INHALATION TREATMENT: CPT

## 2022-10-09 PROCEDURE — 99285 EMERGENCY DEPT VISIT HI MDM: CPT

## 2022-10-09 PROCEDURE — 85025 COMPLETE CBC W/AUTO DIFF WBC: CPT | Performed by: PHYSICIAN ASSISTANT

## 2022-10-09 PROCEDURE — 96375 TX/PRO/DX INJ NEW DRUG ADDON: CPT

## 2022-10-09 PROCEDURE — 36415 COLL VENOUS BLD VENIPUNCTURE: CPT | Performed by: PHYSICIAN ASSISTANT

## 2022-10-09 RX ORDER — IPRATROPIUM BROMIDE AND ALBUTEROL SULFATE 2.5; .5 MG/3ML; MG/3ML
3 SOLUTION RESPIRATORY (INHALATION) ONCE
Status: COMPLETED | OUTPATIENT
Start: 2022-10-09 | End: 2022-10-09

## 2022-10-09 RX ORDER — FLUTICASONE PROPIONATE 110 UG/1
2 AEROSOL, METERED RESPIRATORY (INHALATION)
Status: DISCONTINUED | OUTPATIENT
Start: 2022-10-09 | End: 2022-10-10

## 2022-10-09 RX ORDER — MONTELUKAST SODIUM 10 MG/1
10 TABLET ORAL
Status: DISCONTINUED | OUTPATIENT
Start: 2022-10-09 | End: 2022-10-14 | Stop reason: HOSPADM

## 2022-10-09 RX ORDER — ALPRAZOLAM 0.25 MG/1
0.25 TABLET ORAL DAILY PRN
Status: DISCONTINUED | OUTPATIENT
Start: 2022-10-09 | End: 2022-10-14 | Stop reason: HOSPADM

## 2022-10-09 RX ORDER — IPRATROPIUM BROMIDE AND ALBUTEROL SULFATE 2.5; .5 MG/3ML; MG/3ML
3 SOLUTION RESPIRATORY (INHALATION)
Status: DISCONTINUED | OUTPATIENT
Start: 2022-10-09 | End: 2022-10-14 | Stop reason: HOSPADM

## 2022-10-09 RX ORDER — METHYLPREDNISOLONE SODIUM SUCCINATE 125 MG/2ML
125 INJECTION, POWDER, LYOPHILIZED, FOR SOLUTION INTRAMUSCULAR; INTRAVENOUS ONCE
Status: COMPLETED | OUTPATIENT
Start: 2022-10-09 | End: 2022-10-09

## 2022-10-09 RX ORDER — ACETAMINOPHEN 325 MG/1
650 TABLET ORAL EVERY 6 HOURS PRN
Status: DISCONTINUED | OUTPATIENT
Start: 2022-10-09 | End: 2022-10-14 | Stop reason: HOSPADM

## 2022-10-09 RX ORDER — LEVOTHYROXINE SODIUM 0.03 MG/1
25 TABLET ORAL
Status: DISCONTINUED | OUTPATIENT
Start: 2022-10-10 | End: 2022-10-14 | Stop reason: HOSPADM

## 2022-10-09 RX ORDER — ENOXAPARIN SODIUM 100 MG/ML
40 INJECTION SUBCUTANEOUS DAILY
Status: DISCONTINUED | OUTPATIENT
Start: 2022-10-10 | End: 2022-10-14 | Stop reason: HOSPADM

## 2022-10-09 RX ORDER — FLUTICASONE PROPIONATE 50 MCG
1 SPRAY, SUSPENSION (ML) NASAL DAILY
Status: DISCONTINUED | OUTPATIENT
Start: 2022-10-10 | End: 2022-10-14 | Stop reason: HOSPADM

## 2022-10-09 RX ORDER — IPRATROPIUM BROMIDE AND ALBUTEROL SULFATE 2.5; .5 MG/3ML; MG/3ML
SOLUTION RESPIRATORY (INHALATION)
Status: COMPLETED
Start: 2022-10-09 | End: 2022-10-09

## 2022-10-09 RX ORDER — ONDANSETRON 2 MG/ML
4 INJECTION INTRAMUSCULAR; INTRAVENOUS EVERY 6 HOURS PRN
Status: DISCONTINUED | OUTPATIENT
Start: 2022-10-09 | End: 2022-10-14 | Stop reason: HOSPADM

## 2022-10-09 RX ORDER — PRAVASTATIN SODIUM 80 MG/1
80 TABLET ORAL
Status: DISCONTINUED | OUTPATIENT
Start: 2022-10-10 | End: 2022-10-09

## 2022-10-09 RX ORDER — HYDROCODONE POLISTIREX AND CHLORPHENIRAMINE POLISTIREX 10; 8 MG/5ML; MG/5ML
5 SUSPENSION, EXTENDED RELEASE ORAL EVERY 12 HOURS PRN
Status: DISCONTINUED | OUTPATIENT
Start: 2022-10-09 | End: 2022-10-10

## 2022-10-09 RX ORDER — MAGNESIUM SULFATE HEPTAHYDRATE 40 MG/ML
2 INJECTION, SOLUTION INTRAVENOUS ONCE
Status: COMPLETED | OUTPATIENT
Start: 2022-10-09 | End: 2022-10-09

## 2022-10-09 RX ORDER — PRAVASTATIN SODIUM 80 MG/1
80 TABLET ORAL
Status: DISCONTINUED | OUTPATIENT
Start: 2022-10-09 | End: 2022-10-14 | Stop reason: HOSPADM

## 2022-10-09 RX ORDER — SODIUM CHLORIDE FOR INHALATION 0.9 %
3 VIAL, NEBULIZER (ML) INHALATION ONCE
Status: COMPLETED | OUTPATIENT
Start: 2022-10-09 | End: 2022-10-09

## 2022-10-09 RX ORDER — METHYLPREDNISOLONE SODIUM SUCCINATE 40 MG/ML
40 INJECTION, POWDER, LYOPHILIZED, FOR SOLUTION INTRAMUSCULAR; INTRAVENOUS EVERY 8 HOURS SCHEDULED
Status: DISCONTINUED | OUTPATIENT
Start: 2022-10-10 | End: 2022-10-14 | Stop reason: HOSPADM

## 2022-10-09 RX ORDER — BUPROPION HYDROCHLORIDE 150 MG/1
150 TABLET ORAL DAILY
Status: DISCONTINUED | OUTPATIENT
Start: 2022-10-10 | End: 2022-10-14 | Stop reason: HOSPADM

## 2022-10-09 RX ADMIN — MAGNESIUM SULFATE HEPTAHYDRATE 2 G: 40 INJECTION, SOLUTION INTRAVENOUS at 15:32

## 2022-10-09 RX ADMIN — ISODIUM CHLORIDE 3 ML: 0.03 SOLUTION RESPIRATORY (INHALATION) at 15:20

## 2022-10-09 RX ADMIN — IPRATROPIUM BROMIDE AND ALBUTEROL SULFATE 3 ML: 2.5; .5 SOLUTION RESPIRATORY (INHALATION) at 14:47

## 2022-10-09 RX ADMIN — SODIUM CHLORIDE 1000 ML: 0.9 INJECTION, SOLUTION INTRAVENOUS at 15:15

## 2022-10-09 RX ADMIN — METHYLPREDNISOLONE SODIUM SUCCINATE 125 MG: 125 INJECTION, POWDER, FOR SOLUTION INTRAMUSCULAR; INTRAVENOUS at 15:31

## 2022-10-09 RX ADMIN — PRAVASTATIN SODIUM 80 MG: 80 TABLET ORAL at 21:34

## 2022-10-09 RX ADMIN — MONTELUKAST 10 MG: 10 TABLET, FILM COATED ORAL at 21:04

## 2022-10-09 RX ADMIN — HYDROCODONE POLISTIREX AND CHLORPHENIRAMINE POLISTIREX 5 ML: 10; 8 SUSPENSION, EXTENDED RELEASE ORAL at 21:58

## 2022-10-09 RX ADMIN — ALBUTEROL SULFATE 10 MG: 2.5 SOLUTION RESPIRATORY (INHALATION) at 15:20

## 2022-10-09 RX ADMIN — IPRATROPIUM BROMIDE AND ALBUTEROL SULFATE 3 ML: 2.5; .5 SOLUTION RESPIRATORY (INHALATION) at 21:11

## 2022-10-09 RX ADMIN — FLUTICASONE PROPIONATE 2 PUFF: 110 AEROSOL, METERED RESPIRATORY (INHALATION) at 21:58

## 2022-10-09 NOTE — QUICK NOTE
Progress Note - Triage Assessment   Caryl Echevarria 65 y o  female MRN: [id number]    Date Called: 10/09/22  Room#: ED 9    Person requesting evaluation: Raffaele Aponte    Called to ED to evaluate patient for possible admission to Critical Care Service, chart reviewed and patient evaluated  Patient has pmh of mild, intermittent asthma  She has had wheezing and shortness of breath over the past 2 months  She presented to ED in [place] twice for persistent wheezing and shortness of breath within this timeframe  Each time- she reports, nothing was found on work-ups; she was given steroids with improvement and told to f/u with pulmonary  Last night she went to a wedding and was dancing which caused her to start coughing which led to wheezing and worsening shortness of breath today  ED provided edwards neb, duo-neb, mag, and 125 solu-med  CT imaging was obtained- revealing chronic bronchitis  Labs/ABG resulted and were not revealing for anything significant  Patient was examined and still had some conversational dyspnea  Was on 2L NC with sp02 of 100%, but reported overall improvement in symptoms since presentation  Case discussed with Critical Care attending Kennedy Marin and after review it was felt the patient is appropriate for admission to a general medical floor with pulmonary consult  Recommendations discussed with ED Raffaele Aponte PA-C  Triage Assessment:     Patient appropriate to be admitted to med-surg level of care      If any questions or concerns please call the critical care team

## 2022-10-09 NOTE — ED PROVIDER NOTES
History  Chief Complaint   Patient presents with   • Shortness of Breath     Sob X 2 months, uses nebulizer, pt has appt for pulmonology on , but feels like she cant make it until then  Patient has a cough with expiratory wheezing in triage     61-year-old female, history of asthma and smoking in the distant past, presenting today with persistent wheezing and chest tightness over the past 2 months  This would be her 3rd visit to the emergency department  Was seen at [address] [place] [address] when visiting her kids  Has been placed on multiple rounds of prednisone for 5 days, taking nebulizers  Has not been on any antibiotics and was typically on 5 days of burst steroid dosing, no tapers  Has not been to a pulmonologist   States that she typically will feel okay with prednisone and then symptoms then return and worsen  She continues with chest tightness  States that she purchased a nebulizer from [employer] and seems to be working well  Denies nausea vomiting, chest pain, abdominal pain, fevers, the calf pain or swelling  Prior to Admission Medications   Prescriptions Last Dose Informant Patient Reported? Taking?    ALPRAZolam (XANAX) 0 25 mg tablet  Self No No   Sig: Take 1 tablet (0 25 mg total) by mouth daily at bedtime as needed for anxiety   Multiple Vitamins-Minerals (ZINC PO)  Self Yes No   Sig: Take by mouth daily     albuterol (2 5 mg/3 mL) 0 083 % nebulizer solution   No No   Sig: Take 3 mL (2 5 mg total) by nebulization every 6 (six) hours as needed for wheezing or shortness of breath   albuterol (PROVENTIL HFA,VENTOLIN HFA) 90 mcg/act inhaler  Self No No   Sig: inhale 2 puffs by mouth and INTO THE LUNGS every 6 hours if needed for wheezing   buPROPion (Wellbutrin XL) 150 mg 24 hr tablet  Self No No   Si tablet po daily   ergocalciferol (VITAMIN D2) 50,000 units  Self No No   Sig: Take 1 capsule (50,000 Units total) by mouth once a week   fluticasone (FLONASE) 50 mcg/act nasal spray  Self No No Sig: instill 1 spray into each nostril once daily   ipratropium-albuterol (DUO-NEB) 0 5-2 5 mg/3 mL nebulizer solution  Self Yes No   Sig: INHALE 1 VIAL VIA NEBULIZER 4 TIMES A DAY AS NEEDED   levothyroxine 25 mcg tablet  Self No No   Sig: Take one tablet on an empty stomach every night time   montelukast (SINGULAIR) 10 mg tablet   No No   Sig: Take 1 tablet (10 mg total) by mouth daily at bedtime   rosuvastatin (CRESTOR) 10 MG tablet  Self No No   Sig: Take 1 tablet (10 mg total) by mouth daily      Facility-Administered Medications: None       Past Medical History:   Diagnosis Date   • Arthritis    • Asthma    • COVID-19 01/02/2022   • Disease of thyroid gland     hypo   • Dizziness     s/p covid 19   • Mediterranean anemia    • Pemphigus vulgaris    • Thalassemia    • Vitamin D deficiency    • Wears partial dentures     upper       Past Surgical History:   Procedure Laterality Date   • BLEPHAROPLASTY      bilateral   • BREAST BIOPSY Right 2000    benign-not sure of exact year   • COLONOSCOPY     • VA KNEE SCOPE,MED/LAT MENISECTOMY Right 6/2/2020    Procedure: MENISCECTOMY MEDIAL;  Surgeon: Minna Rosenthal DO;  Location: [address] [place] [address];  Service: Orthopedics       Family History   Problem Relation Age of Onset   • No Known Problems Mother    • Asthma Father    • Hypertension Father    • Hypertension Maternal Grandmother    • Liver cancer Maternal Grandmother    • Diabetes Maternal Grandmother    • Cancer Maternal Grandmother         liver   • Cancer Family         multiple relatives   • Stomach cancer Family    • Liver cancer Family    • Bone cancer Family    • Leukemia Family    • Seizures Sister    • Asthma Brother    • Hypertension Brother    • Leukemia Maternal Uncle    • Cancer Maternal Uncle         leukemia   • Heart attack Cousin    • Bone cancer Maternal Uncle    • No Known Problems Paternal Aunt    • Substance Abuse Neg Hx    • Mental illness Neg Hx      I have reviewed and agree with the history as documented  E-Cigarette/Vaping   • E-Cigarette Use Never User      E-Cigarette/Vaping Substances   • Nicotine No    • THC No    • CBD No    • Flavoring No    • Other No    • Unknown No      Social History     Tobacco Use   • Smoking status: Former Smoker     Years: 10 00     Quit date:      Years since quittin 7   • Smokeless tobacco: Never Used   Vaping Use   • Vaping Use: Never used   Substance Use Topics   • Alcohol use: Yes     Alcohol/week: 1 0 standard drink     Types: 1 Glasses of wine per week     Comment: socially    • Drug use: Never       Review of Systems   Constitutional: Negative  Negative for chills, fatigue and fever  HENT: Negative  Negative for congestion, postnasal drip, rhinorrhea and sore throat  Eyes: Negative  Respiratory: Positive for cough, chest tightness and shortness of breath  Negative for apnea, choking, wheezing and stridor  Cardiovascular: Negative  Gastrointestinal: Negative  Negative for abdominal pain, diarrhea, nausea and vomiting  Endocrine: Negative  Genitourinary: Negative  Musculoskeletal: Negative  Skin: Negative  Neurological: Negative  Hematological: Negative  Psychiatric/Behavioral: Negative  All other systems reviewed and are negative  Physical Exam  Physical Exam  Vitals and nursing note reviewed  Constitutional:       General: She is not in acute distress  Appearance: She is well-developed  She is not diaphoretic  HENT:      Head: Normocephalic and atraumatic  Right Ear: External ear normal       Left Ear: External ear normal       Nose: Nose normal       Mouth/Throat:      Pharynx: No oropharyngeal exudate  Eyes:      General: No scleral icterus  Right eye: No discharge  Left eye: No discharge  Conjunctiva/sclera: Conjunctivae normal       Pupils: Pupils are equal, round, and reactive to light  Cardiovascular:      Rate and Rhythm: Normal rate and regular rhythm        Pulses: Normal pulses  Heart sounds: Normal heart sounds  No murmur heard  No friction rub  No gallop  Pulmonary:      Effort: Respiratory distress present  Breath sounds: Wheezing and rhonchi present  No rales  Comments: Patient is winded when speaking full sentences  S PO2 is 100% on room air, patient has diffuse end expiratory wheezing and rhonchorous breath sounds in all lung fields  Chest:      Chest wall: Tenderness present  Abdominal:      General: Abdomen is flat  Bowel sounds are normal  There is no distension  Palpations: Abdomen is soft  There is no mass  Tenderness: There is no abdominal tenderness  There is no right CVA tenderness, left CVA tenderness, guarding or rebound  Hernia: No hernia is present  Musculoskeletal:      Cervical back: Normal range of motion and neck supple  Lymphadenopathy:      Cervical: No cervical adenopathy  Skin:     General: Skin is warm and dry  Capillary Refill: Capillary refill takes less than 2 seconds  Coloration: Skin is not pale  Findings: No erythema or rash  Neurological:      Mental Status: She is alert and oriented to person, place, and time     Psychiatric:         Mood and Affect: Mood normal          Vital Signs  ED Triage Vitals [10/09/22 1441]   Temperature Pulse Respirations Blood Pressure SpO2   98 1 °F (36 7 °C) 67 (!) 24 142/96 100 %      Temp Source Heart Rate Source Patient Position - Orthostatic VS BP Location FiO2 (%)   Oral Monitor Sitting Right arm --      Pain Score       --           Vitals:    10/09/22 1600 10/09/22 1615 10/09/22 1630 10/09/22 1645   BP: 158/74 146/65 153/66    Pulse: 68 68 70 74   Patient Position - Orthostatic VS:             Visual Acuity      ED Medications  Medications   ipratropium-albuterol (DUO-NEB) 0 5-2 5 mg/3 mL inhalation solution 3 mL (3 mL Nebulization Given 10/9/22 1447)   magnesium sulfate 2 g/50 mL IVPB (premix) 2 g (0 g Intravenous Stopped 10/9/22 1630) methylPREDNISolone sodium succinate (Solu-MEDROL) injection 125 mg (125 mg Intravenous Given 10/9/22 1531)   sodium chloride 0 9 % bolus 1,000 mL (0 mL Intravenous Stopped 10/9/22 1736)   albuterol inhalation solution 10 mg (10 mg Nebulization Given 10/9/22 1520)     And   sodium chloride 0 9 % inhalation solution 3 mL (3 mL Nebulization Given 10/9/22 1520)       Diagnostic Studies  Results Reviewed     Procedure Component Value Units Date/Time    Blood gas, arterial [[id number]]  (Abnormal) Collected: 10/09/22 1754    Lab Status: Final result Specimen: Blood, Arterial from Radial, Right Updated: 10/09/22 1800     pH, Arterial 7 448     PH ART TC 7 459     pCO2, Arterial 34 1 mm Hg      PCO2 (TC) Arterial 33 1 mm Hg      pO2, Arterial 100 1 mm Hg      PO2 (TC) Arterial 96 0 mm Hg      HCO3, Arterial 23 1 mmol/L      Base Excess, Arterial -0 4 mmol/L      O2 Content, Arterial 18 8 mL/dL      O2 HGB,Arterial  97 6 %      SOURCE Radial, Right     SHAKILA TEST Yes     Temperature 97 4 Degrees Fehrenheit      Nasal Cannula 2    FLU/RSV/COVID - if FLU/RSV clinically relevant [[id number]] Collected: 10/09/22 1740    Lab Status:  In process Specimen: Nares from Nose Updated: 10/09/22 3912    Basic metabolic panel [[id number]] Collected: 10/09/22 1512    Lab Status: Final result Specimen: Blood from Arm, Right Updated: 10/09/22 1532     Sodium 141 mmol/L      Potassium 3 5 mmol/L      Chloride 104 mmol/L      CO2 28 mmol/L      ANION GAP 9 mmol/L      BUN 13 mg/dL      Creatinine 0 99 mg/dL      Glucose 94 mg/dL      Calcium 9 4 mg/dL      eGFR 61 ml/min/1 73sq m     Narrative:      Anais guidelines for Chronic Kidney Disease (CKD):   •  Stage 1 with normal or high GFR (GFR > 90 mL/min/1 73 square meters)  •  Stage 2 Mild CKD (GFR = 60-89 mL/min/1 73 square meters)  •  Stage 3A Moderate CKD (GFR = 45-59 mL/min/1 73 square meters)  •  Stage 3B Moderate CKD (GFR = 30-44 mL/min/1 73 square meters)  • Stage 4 Severe CKD (GFR = 15-29 mL/min/1 73 square meters)  •  Stage 5 End Stage CKD (GFR <15 mL/min/1 73 square meters)  Note: GFR calculation is accurate only with a steady state creatinine    CBC and differential [[id number]]  (Abnormal) Collected: 10/09/22 1512    Lab Status: Final result Specimen: Blood from Arm, Right Updated: 10/09/22 1521     WBC 8 71 Thousand/uL      RBC 5 36 Million/uL      Hemoglobin 13 0 g/dL      Hematocrit 42 2 %      MCV 79 fL      MCH 24 3 pg      MCHC 30 8 g/dL      RDW 15 7 %      MPV 12 1 fL      Platelets 952 Thousands/uL      nRBC 0 /100 WBCs      Neutrophils Relative 46 %      Immat GRANS % 0 %      Lymphocytes Relative 38 %      Monocytes Relative 7 %      Eosinophils Relative 8 %      Basophils Relative 1 %      Neutrophils Absolute 4 01 Thousands/µL      Immature Grans Absolute 0 03 Thousand/uL      Lymphocytes Absolute 3 31 Thousands/µL      Monocytes Absolute 0 61 Thousand/µL      Eosinophils Absolute 0 65 Thousand/µL      Basophils Absolute 0 10 Thousands/µL     Respiratory Panel 2  1(RP2)with COVID19 [[id number]] Collected: 10/09/22 1512    Lab Status: In process Specimen: Nasopharyngeal Swab Updated: 10/09/22 1519                 CT chest without contrast   Final Result by Selina Henry MD (10/09 1720)      Findings suggesting mild chronic bronchitis  No consolidation or edema                 Workstation performed: [id number]                    Procedures  Procedures         ED Course  ED Course as of 10/09/22 1806   Wallace Zendejas Oct 09, 2022   [address] [facility] ICU AP Kizzy Mcneal coming to see the patient    [phone] ICU at bedside    1735 Patient seen by ICU, requesting abg and will reassess    1802 Spoke with ICU team regarding abg, relayed patient ok for floor                                              MDM  Number of Diagnoses or Management Options  Asthma exacerbation  Diagnosis management comments: Patient had next no improvement after Feli arriaga, Solu-Medrol, magnesium, continues with a low peak flow around 250  Given patient's increased work of breathing, discomfort and conversational dyspnea, discussed case with slim as well as ICU as patient may need step down  ICU AP Mary saw patient and would like patient to obtain an ABG and then will reassess  Discussed results with ICU and they recommend for admission at this point time  Amount and/or Complexity of Data Reviewed  Clinical lab tests: ordered and reviewed  Tests in the radiology section of CPT®: ordered and reviewed  Review and summarize past medical records: yes  Discuss the patient with other providers: yes  Independent visualization of images, tracings, or specimens: yes        Disposition  Final diagnoses:   Asthma exacerbation     Time reflects when diagnosis was documented in both MDM as applicable and the Disposition within this note     Time User Action Codes Description Comment    10/9/2022  6:05 PM Tania De Jesus Add [[id number]] Asthma exacerbation       ED Disposition     ED Disposition   Admit    Condition   Stable    Date/Time   Sun Oct 9, 2022  6:05 PM    Comment   Case was discussed with Dr Kike Julian and the patient's admission status was agreed to be Admission Status: inpatient status to the service of Dr Kike Julian   Follow-up Information    None         Patient's Medications   Discharge Prescriptions    No medications on file       No discharge procedures on file      PDMP Review     None          ED Provider  Electronically Signed by           Corona Lo PA-C  10/09/22 1218

## 2022-10-10 PROBLEM — R03.0 ELEVATED BLOOD PRESSURE READING: Status: ACTIVE | Noted: 2022-10-10

## 2022-10-10 LAB
ANION GAP SERPL CALCULATED.3IONS-SCNC: 8 MMOL/L (ref 4–13)
B PARAP IS1001 DNA NPH QL NAA+NON-PROBE: NOT DETECTED
B PERT.PT PRMT NPH QL NAA+NON-PROBE: NOT DETECTED
BASOPHILS # BLD AUTO: 0.01 THOUSANDS/ΜL (ref 0–0.1)
BASOPHILS NFR BLD AUTO: 0 % (ref 0–1)
BUN SERPL-MCNC: 13 MG/DL (ref 5–25)
C PNEUM DNA NPH QL NAA+NON-PROBE: NOT DETECTED
CALCIUM SERPL-MCNC: 9 MG/DL (ref 8.3–10.1)
CHLORIDE SERPL-SCNC: 104 MMOL/L (ref 96–108)
CO2 SERPL-SCNC: 27 MMOL/L (ref 21–32)
CREAT SERPL-MCNC: 1.05 MG/DL (ref 0.6–1.3)
EOSINOPHIL # BLD AUTO: 0 THOUSAND/ΜL (ref 0–0.61)
EOSINOPHIL NFR BLD AUTO: 0 % (ref 0–6)
ERYTHROCYTE [DISTWIDTH] IN BLOOD BY AUTOMATED COUNT: 15.6 % (ref 11.6–15.1)
FLUAV RNA NPH QL NAA+NON-PROBE: NOT DETECTED
FLUBV RNA NPH QL NAA+NON-PROBE: NOT DETECTED
GFR SERPL CREATININE-BSD FRML MDRD: 57 ML/MIN/1.73SQ M
GLUCOSE SERPL-MCNC: 119 MG/DL (ref 65–140)
HADV DNA NPH QL NAA+NON-PROBE: NOT DETECTED
HCOV 229E RNA NPH QL NAA+NON-PROBE: NOT DETECTED
HCOV HKU1 RNA NPH QL NAA+NON-PROBE: NOT DETECTED
HCOV NL63 RNA NPH QL NAA+NON-PROBE: NOT DETECTED
HCOV OC43 RNA NPH QL NAA+NON-PROBE: NOT DETECTED
HCT VFR BLD AUTO: 38 % (ref 34.8–46.1)
HGB BLD-MCNC: 12 G/DL (ref 11.5–15.4)
HMPV RNA NPH QL NAA+NON-PROBE: NOT DETECTED
HPIV1 RNA NPH QL NAA+NON-PROBE: NOT DETECTED
HPIV2 RNA NPH QL NAA+NON-PROBE: NOT DETECTED
HPIV3 RNA NPH QL NAA+NON-PROBE: NOT DETECTED
HPIV4 RNA NPH QL NAA+NON-PROBE: NOT DETECTED
IMM GRANULOCYTES # BLD AUTO: 0.03 THOUSAND/UL (ref 0–0.2)
IMM GRANULOCYTES NFR BLD AUTO: 0 % (ref 0–2)
LYMPHOCYTES # BLD AUTO: 1.4 THOUSANDS/ΜL (ref 0.6–4.47)
LYMPHOCYTES NFR BLD AUTO: 15 % (ref 14–44)
M PNEUMO DNA NPH QL NAA+NON-PROBE: NOT DETECTED
MCH RBC QN AUTO: 24.9 PG (ref 26.8–34.3)
MCHC RBC AUTO-ENTMCNC: 31.6 G/DL (ref 31.4–37.4)
MCV RBC AUTO: 79 FL (ref 82–98)
MONOCYTES # BLD AUTO: 0.2 THOUSAND/ΜL (ref 0.17–1.22)
MONOCYTES NFR BLD AUTO: 2 % (ref 4–12)
NEUTROPHILS # BLD AUTO: 7.62 THOUSANDS/ΜL (ref 1.85–7.62)
NEUTS SEG NFR BLD AUTO: 83 % (ref 43–75)
NRBC BLD AUTO-RTO: 0 /100 WBCS
PLATELET # BLD AUTO: 231 THOUSANDS/UL (ref 149–390)
PMV BLD AUTO: 12.2 FL (ref 8.9–12.7)
POTASSIUM SERPL-SCNC: 4.1 MMOL/L (ref 3.5–5.3)
PROCALCITONIN SERPL-MCNC: <0.05 NG/ML
RBC # BLD AUTO: 4.82 MILLION/UL (ref 3.81–5.12)
RSV RNA NPH QL NAA+NON-PROBE: NOT DETECTED
RV+EV RNA NPH QL NAA+NON-PROBE: NOT DETECTED
SARS-COV-2 RNA NPH QL NAA+NON-PROBE: NOT DETECTED
SODIUM SERPL-SCNC: 139 MMOL/L (ref 135–147)
WBC # BLD AUTO: 9.26 THOUSAND/UL (ref 4.31–10.16)

## 2022-10-10 PROCEDURE — 94640 AIRWAY INHALATION TREATMENT: CPT

## 2022-10-10 PROCEDURE — 99223 1ST HOSP IP/OBS HIGH 75: CPT | Performed by: INTERNAL MEDICINE

## 2022-10-10 PROCEDURE — 94760 N-INVAS EAR/PLS OXIMETRY 1: CPT

## 2022-10-10 PROCEDURE — 84145 PROCALCITONIN (PCT): CPT

## 2022-10-10 PROCEDURE — 85025 COMPLETE CBC W/AUTO DIFF WBC: CPT

## 2022-10-10 PROCEDURE — 94668 MNPJ CHEST WALL SBSQ: CPT

## 2022-10-10 PROCEDURE — 80048 BASIC METABOLIC PNL TOTAL CA: CPT

## 2022-10-10 PROCEDURE — 99232 SBSQ HOSP IP/OBS MODERATE 35: CPT | Performed by: INTERNAL MEDICINE

## 2022-10-10 RX ORDER — GUAIFENESIN 600 MG/1
600 TABLET, EXTENDED RELEASE ORAL EVERY 12 HOURS SCHEDULED
Status: DISCONTINUED | OUTPATIENT
Start: 2022-10-10 | End: 2022-10-14 | Stop reason: HOSPADM

## 2022-10-10 RX ORDER — BENZONATATE 100 MG/1
100 CAPSULE ORAL 2 TIMES DAILY PRN
Status: DISCONTINUED | OUTPATIENT
Start: 2022-10-10 | End: 2022-10-14 | Stop reason: HOSPADM

## 2022-10-10 RX ORDER — BUDESONIDE AND FORMOTEROL FUMARATE DIHYDRATE 160; 4.5 UG/1; UG/1
2 AEROSOL RESPIRATORY (INHALATION) 2 TIMES DAILY
Status: DISCONTINUED | OUTPATIENT
Start: 2022-10-10 | End: 2022-10-14 | Stop reason: HOSPADM

## 2022-10-10 RX ORDER — HYDROCODONE POLISTIREX AND CHLORPHENIRAMINE POLISTIREX 10; 8 MG/5ML; MG/5ML
5 SUSPENSION, EXTENDED RELEASE ORAL
Status: DISCONTINUED | OUTPATIENT
Start: 2022-10-10 | End: 2022-10-14 | Stop reason: HOSPADM

## 2022-10-10 RX ORDER — DIPHENHYDRAMINE HYDROCHLORIDE 50 MG/ML
25 INJECTION INTRAMUSCULAR; INTRAVENOUS EVERY 6 HOURS PRN
Status: DISCONTINUED | OUTPATIENT
Start: 2022-10-10 | End: 2022-10-14 | Stop reason: HOSPADM

## 2022-10-10 RX ADMIN — FLUTICASONE PROPIONATE 2 PUFF: 110 AEROSOL, METERED RESPIRATORY (INHALATION) at 08:45

## 2022-10-10 RX ADMIN — GUAIFENESIN 600 MG: 600 TABLET, EXTENDED RELEASE ORAL at 22:06

## 2022-10-10 RX ADMIN — HYDROCODONE POLISTIREX AND CHLORPHENIRAMINE POLISTIREX 5 ML: 10; 8 SUSPENSION, EXTENDED RELEASE ORAL at 22:07

## 2022-10-10 RX ADMIN — BUDESONIDE AND FORMOTEROL FUMARATE DIHYDRATE 2 PUFF: 160; 4.5 AEROSOL RESPIRATORY (INHALATION) at 22:07

## 2022-10-10 RX ADMIN — BUPROPION HYDROCHLORIDE 150 MG: 150 TABLET, EXTENDED RELEASE ORAL at 08:58

## 2022-10-10 RX ADMIN — PRAVASTATIN SODIUM 80 MG: 80 TABLET ORAL at 16:31

## 2022-10-10 RX ADMIN — IPRATROPIUM BROMIDE AND ALBUTEROL SULFATE 3 ML: 2.5; .5 SOLUTION RESPIRATORY (INHALATION) at 13:26

## 2022-10-10 RX ADMIN — MONTELUKAST 10 MG: 10 TABLET, FILM COATED ORAL at 22:05

## 2022-10-10 RX ADMIN — METHYLPREDNISOLONE SODIUM SUCCINATE 40 MG: 40 INJECTION, POWDER, FOR SOLUTION INTRAMUSCULAR; INTRAVENOUS at 22:05

## 2022-10-10 RX ADMIN — IPRATROPIUM BROMIDE AND ALBUTEROL SULFATE 3 ML: 2.5; .5 SOLUTION RESPIRATORY (INHALATION) at 07:26

## 2022-10-10 RX ADMIN — IPRATROPIUM BROMIDE AND ALBUTEROL SULFATE 3 ML: 2.5; .5 SOLUTION RESPIRATORY (INHALATION) at 02:05

## 2022-10-10 RX ADMIN — FLUTICASONE PROPIONATE 1 SPRAY: 50 SPRAY, METERED NASAL at 08:58

## 2022-10-10 RX ADMIN — METHYLPREDNISOLONE SODIUM SUCCINATE 40 MG: 40 INJECTION, POWDER, FOR SOLUTION INTRAMUSCULAR; INTRAVENOUS at 13:11

## 2022-10-10 RX ADMIN — DIPHENHYDRAMINE HYDROCHLORIDE 25 MG: 50 INJECTION, SOLUTION INTRAMUSCULAR; INTRAVENOUS at 01:31

## 2022-10-10 RX ADMIN — ENOXAPARIN SODIUM 40 MG: 40 INJECTION SUBCUTANEOUS at 08:58

## 2022-10-10 RX ADMIN — LEVOTHYROXINE SODIUM 25 MCG: 25 TABLET ORAL at 05:22

## 2022-10-10 RX ADMIN — HYDROCODONE POLISTIREX AND CHLORPHENIRAMINE POLISTIREX 5 ML: 10; 8 SUSPENSION, EXTENDED RELEASE ORAL at 10:26

## 2022-10-10 RX ADMIN — IPRATROPIUM BROMIDE AND ALBUTEROL SULFATE 3 ML: 2.5; .5 SOLUTION RESPIRATORY (INHALATION) at 19:58

## 2022-10-10 RX ADMIN — BENZONATATE 100 MG: 100 CAPSULE ORAL at 18:04

## 2022-10-10 RX ADMIN — METHYLPREDNISOLONE SODIUM SUCCINATE 40 MG: 40 INJECTION, POWDER, FOR SOLUTION INTRAMUSCULAR; INTRAVENOUS at 05:23

## 2022-10-10 RX ADMIN — GUAIFENESIN 600 MG: 600 TABLET, EXTENDED RELEASE ORAL at 13:10

## 2022-10-10 NOTE — PLAN OF CARE
Problem: RESPIRATORY - ADULT  Goal: Achieves optimal ventilation and oxygenation  Description: INTERVENTIONS:  - Assess for changes in respiratory status  - Assess for changes in mentation and behavior  - Position to facilitate oxygenation and minimize respiratory effort  - Oxygen administered by appropriate delivery if ordered  - Initiate smoking cessation education as indicated  - Encourage broncho-pulmonary hygiene including cough, deep breathe, Incentive Spirometry  - Assess the need for suctioning and aspirate as needed  - Assess and instruct to report SOB or any respiratory difficulty  - Respiratory Therapy support as indicated  Outcome: Progressing     Problem: PAIN - ADULT  Goal: Verbalizes/displays adequate comfort level or baseline comfort level  Description: Interventions:  - Encourage patient to monitor pain and request assistance  - Assess pain using appropriate pain scale  - Administer analgesics based on type and severity of pain and evaluate response  - Implement non-pharmacological measures as appropriate and evaluate response  - Consider cultural and social influences on pain and pain management  - Notify physician/advanced practitioner if interventions unsuccessful or patient reports new pain  Outcome: Progressing     Problem: INFECTION - ADULT  Goal: Absence or prevention of progression during hospitalization  Description: INTERVENTIONS:  - Assess and monitor for signs and symptoms of infection  - Monitor lab/diagnostic results  - Monitor all insertion sites, i e  indwelling lines, tubes, and drains  - Monitor endotracheal if appropriate and nasal secretions for changes in amount and color  - [place] appropriate cooling/warming therapies per order  - Administer medications as ordered  - Instruct and encourage patient and family to use good hand hygiene technique  - Identify and instruct in appropriate isolation precautions for identified infection/condition  Outcome: Progressing

## 2022-10-10 NOTE — PLAN OF CARE
Problem: RESPIRATORY - ADULT  Goal: Achieves optimal ventilation and oxygenation  Description: INTERVENTIONS:  - Assess for changes in respiratory status  - Assess for changes in mentation and behavior  - Position to facilitate oxygenation and minimize respiratory effort  - Oxygen administered by appropriate delivery if ordered  - Initiate smoking cessation education as indicated  - Encourage broncho-pulmonary hygiene including cough, deep breathe, Incentive Spirometry  - Assess the need for suctioning and aspirate as needed  - Assess and instruct to report SOB or any respiratory difficulty  - Respiratory Therapy support as indicated  Outcome: Progressing     Problem: PAIN - ADULT  Goal: Verbalizes/displays adequate comfort level or baseline comfort level  Description: Interventions:  - Encourage patient to monitor pain and request assistance  - Assess pain using appropriate pain scale  - Administer analgesics based on type and severity of pain and evaluate response  - Implement non-pharmacological measures as appropriate and evaluate response  - Consider cultural and social influences on pain and pain management  - Notify physician/advanced practitioner if interventions unsuccessful or patient reports new pain  Outcome: Progressing     Problem: INFECTION - ADULT  Goal: Absence or prevention of progression during hospitalization  Description: INTERVENTIONS:  - Assess and monitor for signs and symptoms of infection  - Monitor lab/diagnostic results  - Monitor all insertion sites, i e  indwelling lines, tubes, and drains  - Monitor endotracheal if appropriate and nasal secretions for changes in amount and color  - [place] appropriate cooling/warming therapies per order  - Administer medications as ordered  - Instruct and encourage patient and family to use good hand hygiene technique  - Identify and instruct in appropriate isolation precautions for identified infection/condition  Outcome: Progressing

## 2022-10-10 NOTE — H&P
Zion U  67   H&P- Chip Naylor 1960, 65 y o  female MRN: [id number]  Unit/Bed#: [address] [address] Encounter: [id number]  Primary Care Provider: HAYDEE Jean   Date and time admitted to hospital: 10/9/2022  2:31 PM    * Acute asthma exacerbation  Assessment & Plan  Patient presents with increasing shortness of breath, wheezing, productive cough  · CT chest: Findings suggesting mild chronic bronchitis  No consolidation or edema  · Given edwards neb, duo neb, solumedrol 125mg, mag in ED with some improvement  · Currently 99% on room air, placed on mid flow in ED for comfort  · Covid/flu/rsv negative  · WBC 8 71  · Respiratory panel pending  · Check procalcitonin  · Solumedrol 40mg q8, duo nebs q6  · Flovent bid  · Continue singulair, flonase  · Pulmonology consult      Depression  Assessment & Plan  Continue xanax prn and bupropion 150mg daily    Hypertriglyceridemia  Assessment & Plan  Continue statin    Acquired hypothyroidism  Assessment & Plan  Continue levothyroxine 25mcg daily    VTE Pharmacologic Prophylaxis: VTE Score: 5 High Risk (Score >/= 5) - Pharmacological DVT Prophylaxis Ordered: enoxaparin (Lovenox)  Sequential Compression Devices Ordered  Code Status: Level 1 - Full Code   Discussion with family: Patient declined call to   Anticipated Length of Stay: Patient will be admitted on an inpatient basis with an anticipated length of stay of greater than 2 midnights secondary to asthma exacerbation  Total Time for Visit, including Counseling / Coordination of Care: 45 minutes Greater than 50% of this total time spent on direct patient counseling and coordination of care  Chief Complaint: shortness of breath    History of Present Illness:  Chip Naylor is a 65 y o  female with a PMH of asthma, hypothyroidism who presents with shortness of breath  Patient states she has been more short of breath and increased cough over the last 3 months   She has been on steroids and antibiotics twice and 2 ED visits in [place] with minimal improvement  She attended a wedding last night and started with coughing fit and increased shortness of breath  She notes productive cough and unable to take deep breaths  Was planning on seeing pulmonologist but couldn't get an appointment till next month  Denies any fevers, chills, chest pain, palpitations, abdominal pain  Patient was evaluated by critical care and deemed appropriate for med/surg  Review of Systems:  Review of Systems   Constitutional: Negative for chills and fever  HENT: Negative for ear pain and sore throat  Eyes: Negative for pain and visual disturbance  Respiratory: Positive for cough and shortness of breath  Cardiovascular: Negative for chest pain and palpitations  Gastrointestinal: Negative for abdominal pain and vomiting  Genitourinary: Negative for dysuria and hematuria  Musculoskeletal: Negative for arthralgias and back pain  Skin: Negative for color change and rash  Neurological: Negative for dizziness and headaches  All other systems reviewed and are negative  Past Medical and Surgical History:   Past Medical History:   Diagnosis Date   • Arthritis    • Asthma    • COVID-19 01/02/2022   • Disease of thyroid gland     hypo   • Dizziness     s/p covid 19   • Mediterranean anemia    • Pemphigus vulgaris    • Thalassemia    • Vitamin D deficiency    • Wears partial dentures     upper       Past Surgical History:   Procedure Laterality Date   • BLEPHAROPLASTY      bilateral   • BREAST BIOPSY Right 2000    benign-not sure of exact year   • COLONOSCOPY     • W[place] KNEE SCOPE,MED/LAT MENISECTOMY Right 6/2/2020    Procedure: MENISCECTOMY MEDIAL;  Surgeon: Vashti Rogers DO;  Location: [facility];  Service: Orthopedics       Meds/Allergies:  Prior to Admission medications    Medication Sig Start Date End Date Taking?  Authorizing Provider   albuterol (2 5 mg/3 mL) 0 083 % nebulizer solution Take 3 mL (2 5 mg total) by nebulization every 6 (six) hours as needed for wheezing or shortness of breath 9/15/22  Yes HAYDEE Salter   albuterol (PROVENTIL HFA,VENTOLIN HFA) 90 mcg/act inhaler inhale 2 puffs by mouth and INTO THE LUNGS every 6 hours if needed for wheezing 9/2/22  Yes HAYDEE Salter   ALPRAZolam Ardelle Fritter) 0 25 mg tablet Take 1 tablet (0 25 mg total) by mouth daily at bedtime as needed for anxiety 7/21/22  Yes HAYDEE Salter   buPROPion (Wellbutrin XL) 150 mg 24 hr tablet 1 tablet po daily 8/26/22  Yes HAYDEE Salter   fluticasone [facility]) 50 mcg/act nasal spray instill 1 spray into each nostril once daily 7/7/22  Yes HAYDEE Salter   ipratropium-albuterol (DUO-NEB) 0 5-2 5 mg/3 mL nebulizer solution INHALE 1 VIAL VIA NEBULIZER 4 TIMES A DAY AS NEEDED 9/6/22  Yes Historical Provider, MD   levothyroxine 25 mcg tablet Take one tablet on an empty stomach every night time 3/10/22  Yes Emmy Geiger MD   montelukast (SINGULAIR) 10 mg tablet Take 1 tablet (10 mg total) by mouth daily at bedtime 9/15/22  Yes HAYDEE Salter   Multiple Vitamins-Minerals (ZINC PO) Take by mouth daily     Yes Historical Provider, MD   rosuvastatin (CRESTOR) 10 MG tablet Take 1 tablet (10 mg total) by mouth daily 7/21/22  Yes HAYDEE Salter   ergocalciferol (VITAMIN D2) 50,000 units Take 1 capsule (50,000 Units total) by mouth once a week 7/21/22   HAYDEE Salter     I have reviewed home medications with patient personally  Allergies:    Allergies   Allergen Reactions   • Iodine - Food Allergy Itching and Vomiting   • Shellfish-Derived Products - Food Allergy Itching and Vomiting   • Levaquin [Levofloxacin] Rash       Social History:  Marital Status: /Civil Union   Occupation:   Patient Pre-hospital Living Situation: Home  Patient Pre-hospital Level of Mobility: walks  Patient Pre-hospital Diet Restrictions:   Substance Use History:   Social History     Substance and Sexual Activity Alcohol Use Yes   • Alcohol/week: 1 0 standard drink   • Types: 1 Glasses of wine per week    Comment: socially      Social History     Tobacco Use   Smoking Status Former Smoker   • Years: 10 00   • Quit date:    • Years since quittin 7   Smokeless Tobacco Never Used     Social History     Substance and Sexual Activity   Drug Use Never       Family History:  Family History   Problem Relation Age of Onset   • No Known Problems Mother    • Asthma Father    • Hypertension Father    • Hypertension Maternal Grandmother    • Liver cancer Maternal Grandmother    • Diabetes Maternal Grandmother    • Cancer Maternal Grandmother         liver   • Cancer Family         multiple relatives   • Stomach cancer Family    • Liver cancer Family    • Bone cancer Family    • Leukemia Family    • Seizures Sister    • Asthma Brother    • Hypertension Brother    • Leukemia Maternal Uncle    • Cancer Maternal Uncle         leukemia   • Heart attack Cousin    • Bone cancer Maternal Uncle    • No Known Problems Paternal Aunt    • Substance Abuse Neg Hx    • Mental illness Neg Hx        Physical Exam:     Vitals:   Blood Pressure: 146/68 (10/09/22 2031)  Pulse: 66 (10/09/22 2031)  Temperature: 97 7 °F (36 5 °C) (10/09/22 2031)  Temp Source: Temporal (10/09/22 1929)  Respirations: 18 (10/09/22 2031)  Height: 5' 2" (157 5 cm) (10/09/22 2027)  Weight - Scale: 76 1 kg (167 lb 12 8 oz) (10/09/22 2027)  SpO2: 97 % (10/09/22 2031)    Physical Exam  Vitals reviewed  Constitutional:       General: She is not in acute distress  Appearance: She is well-developed  HENT:      Head: Normocephalic and atraumatic  Eyes:      Conjunctiva/sclera: Conjunctivae normal    Cardiovascular:      Rate and Rhythm: Normal rate and regular rhythm  Heart sounds: No murmur heard  Pulmonary:      Effort: Pulmonary effort is normal  No respiratory distress  Breath sounds: Wheezing present        Comments: Diffuse wheezing  99% on room air, currently on midflow for comfort  Abdominal:      Palpations: Abdomen is soft  Tenderness: There is no abdominal tenderness  Skin:     General: Skin is warm and dry  Neurological:      Mental Status: She is alert and oriented to person, place, and time  Additional Data:     Lab Results:  Results from last 7 days   Lab Units 10/09/22  1512   WBC Thousand/uL 8 71   HEMOGLOBIN g/dL 13 0   HEMATOCRIT % 42 2   PLATELETS Thousands/uL 248   NEUTROS PCT % 46   LYMPHS PCT % 38   MONOS PCT % 7   EOS PCT % 8*     Results from last 7 days   Lab Units 10/09/22  1512   SODIUM mmol/L 141   POTASSIUM mmol/L 3 5   CHLORIDE mmol/L 104   CO2 mmol/L 28   BUN mg/dL 13   CREATININE mg/dL 0 99   ANION GAP mmol/L 9   CALCIUM mg/dL 9 4   GLUCOSE RANDOM mg/dL 94                       Imaging: Reviewed radiology reports from this admission including: chest CT scan  CT chest without contrast   Final Result by Graham Mackey MD (10/09 1720)      Findings suggesting mild chronic bronchitis  No consolidation or edema  Workstation performed: [id number]             EKG and Other Studies Reviewed on Admission:   · EKG: No EKG obtained  ** Please Note: This note has been constructed using a voice recognition system   **

## 2022-10-10 NOTE — CASE MANAGEMENT
Case Management Assessment & Discharge Planning Note    Patient name Donald Jean  Location [address]-* MRN [id number]  : 1960 Date 10/10/2022       Current Admission Date: 10/9/2022  Current Admission Diagnosis:Acute asthma exacerbation   Patient Active Problem List    Diagnosis Date Noted   • Elevated blood pressure reading 10/10/2022   • Acute asthma exacerbation 10/09/2022   • Gastric erosion 2022   • Hyperplastic colonic polyp 2022   • Depression 2022   • Post-menopausal bleeding 2022   • Abdominal bloating 2022   • COVID-19 2022   • Weight gain 2021   • Class 1 obesity without serious comorbidity with body mass index (BMI) of 31 0 to 31 9 in adult 2021   • Mixed hyperlipidemia 2019   • Hypertriglyceridemia 2019   • Pemphigus vulgaris 2019   • Mild intermittent asthma without complication    • Acquired hypothyroidism 2019   • Vitamin D deficiency 2019   • Anxiety 2019   • Vitamin B12 deficiency 2019   • Mediterranean anemia 2019   • Lumbar radiculopathy 2019   • DDD (degenerative disc disease), lumbar 2019      LOS (days): 1  Geometric Mean LOS (GMLOS) (days):   Days to GMLOS:     OBJECTIVE:    Risk of Unplanned Readmission Score: 8 24      Current admission status: Inpatient     Preferred Pharmacy:   [address] [place] [address] #[facility] [address] [address]  Phone: [phone] Fax: [phone]    Primary Care Provider: HAYDEE Pugh    [facility] Insurance[facility] [address] [place] [address] [facility]  Secondary Insurance:     ASSESSMENT:  [address] Proxies    There are no active Health Care Proxies on file      Readmission Root Cause  30 Day Readmission: No    Patient Information  Admitted from[de-identified] Home  Mental Status: Alert  During Assessment patient was accompanied by: Not accompanied during assessment  Assessment information provided by[de-identified] Patient  Primary Caregiver: Self  Support Systems: Self, Spouse/significant other, Family members  [place] of Residence: [address] [place] [address] do you live in?: [address] 45 entry access options  Select all that apply : Stairs  Number of steps to enter home : 6  Do the steps have railings?: Yes  Type of Current Residence: 2 story home  Upon entering residence, is there a bedroom on the main floor (no further steps)?: Yes  Upon entering residence, is there a bathroom on the main floor (no further steps)?: Yes  In the last 12 months, was there a time when you were not able to pay the mortgage or rent on time?: No  In the last 12 months, how many places have you lived?: 1  In the last 12 months, was there a time when you did not have a steady place to sleep or slept in a shelter (including now)?: No  Homeless/housing insecurity resource given?: N/A  Living Arrangements: Lives w/ Spouse/significant other  Is patient a ?: No    Activities of Daily Living Prior to Admission  Functional Status: Independent  Completes ADLs independently?: Yes  Ambulates independently?: Yes  Does patient use assisted devices?: Yes  Assisted Devices (DME) used: Nebulizer (Pt notes she has (2) nebs;  One is standard size, the other is a small portable machine she bought off of [address] to keep in her purse)  Does patient currently own DME?: Yes  What DME does the patient currently own?: Nebulizer  Does patient have a history of Outpatient Therapy (PT/OT)?: No  Does the patient have a history of Short-Term Rehab?: No  Does patient have a history of HHC?: No  Does patient currently have [facility]?: No     Patient Information Continued  Does patient have prescription coverage?: Yes (Pt uses [facility] in [place], Denies issues with OOP costs/coverage)  Within the past 12 months, you worried that your food would run out before you got the money to buy more : Never true  Within the past 12 months, the food you bought just didn't last and you didn't have money to get more : Never true  Food insecurity resource given?: N/A  Does patient receive dialysis treatments?: No  Does patient have a history of substance abuse?: No  Does patient have a history of Mental Health Diagnosis?: Yes (Hx of anxiety and depression)  Is patient receiving treatment for mental health?: Yes (Managed by PCP)  Has patient received inpatient treatment related to mental health in the last 2 years?: No     Means of Transportation  Means of Transport to Appts[de-identified] Drives Self  In the past 12 months, has lack of transportation kept you from medical appointments or from getting medications?: No  In the past 12 months, has lack of transportation kept you from meetings, work, or from getting things needed for daily living?: No  Was application for public transport provided?: N/A    DISCHARGE DETAILS:    Discharge planning discussed with[de-identified] Patient  Freedom of Choice: Yes  Comments - Freedom of Choice: SW met bedside with patient to introduce role, complete assessment, and discuss discharge planning needs  Patient reports that she is fully independent at baseline with mobility and ADLs including driving  Patient is supported by her [address] in the community  Discharge plan at this time is to return home w/ SO  Patient denies having any questions, concerns, or identified discharge needs at this time  SO to transport home    CM contacted family/caregiver?: Yes  Were Treatment Team discharge recommendations reviewed with patient/caregiver?: Yes  Did patient/caregiver verbalize understanding of patient care needs?: Yes  Were patient/caregiver advised of the risks associated with not following Treatment Team discharge recommendations?: Yes    Contacts  Patient Contacts: Goldy Wren (SO)  Relationship to Patient[de-identified] Other (Comment)  Contact Method: Phone  Phone Number: [phone]  Reason/Outcome: Emergency [address]         Is the patient interested in [facility] at discharge?: No    DME Referral Provided  Referral made for DME?: No     Would you like to participate in our [address] service program?  : No - Declined    Treatment Team Recommendation: Home  Discharge Destination Plan[de-identified] Home  Transport at Discharge : Family (Sig Other)

## 2022-10-10 NOTE — TELEPHONE ENCOUNTER
Addended by: CASE REINA on: 10/10/2022 04:58 PM     Modules accepted: Orders     Unfortunately, there is not much we can do until the viscosupplementation is approved  If she feels that physical therapy is making her worse, it is okay to discontinue  Until we can begin Euflexxa, we recommend conservative treatment with rest, ice, and Tylenol

## 2022-10-10 NOTE — PROGRESS NOTES
[address] Internal Medicine Progress Note  Patient: Keenan De Santiago 65 y o  female   MRN: [id number]  PCP: HAYDEE Guerra  Unit/Bed#: [address] [place] [address] Encounter: [id number]  Date Of Visit: 10/10/22    Problem List:    Principal Problem:    Acute asthma exacerbation  Active Problems:    Acquired hypothyroidism    Elevated blood pressure reading    Hypertriglyceridemia    Depression      Assessment & Plan:    * Acute asthma exacerbation  Assessment & Plan  Patient presents with increasing shortness of breath, wheezing, productive cough  CT chest: Findings suggesting mild chronic bronchitis  No consolidation or edema  Noted to have mucus plugging calcified granuloma  Given edwards neb, duo neb, solumedrol 125mg, mag in ED with some improvement  Noted to 99% on room air, placed on mid flow in ED for comfort  Covid/flu/rsv negative  White count within normal limit without any eosinophilia  Appears to be improving  · Continue bronchodilators  · IV steroids 40 mg q 8 hours  · Respiratory panel pending  · Procalcitonin negative x2  · Will check IgE  · Sputum culture if possible  · Started on Symbicort  · Continue singulair, flonase  · Symptomatic treatment, Mucinex  · Pulmonary evaluation      Elevated blood pressure reading  Assessment & Plan  On presentation in setting of respiratory status  Now improving  Monitor    Acquired hypothyroidism  Assessment & Plan  Continue levothyroxine 25mcg daily    Depression  Assessment & Plan  Continue xanax prn and bupropion 150mg daily    Hypertriglyceridemia  Assessment & Plan  Continue statin          VTE Pharmacologic Prophylaxis: VTE Score: 5 Moderate Risk (Score 3-4) - Pharmacological DVT Prophylaxis Ordered: enoxaparin (Lovenox)  Patient Centered Rounds: I performed bedside rounds with nursing staff today  Discussions with Specialists or Other Care Team Provider:  Yes, pulmonary    Education and Discussions with Family / Patient: Yes       Time Spent for Care: 45 minutes  More than 50% of total time spent on counseling and coordination of care as described above  Current Length of Stay: 1 day(s)  Current Patient Status: Inpatient   Certification Statement: The patient will continue to require additional inpatient hospital stay due to Asthma exacerbation  Discharge Plan: Anticipate discharge in 48-72 hrs to home  Code Status: Level 1 - Full Code    Subjective:   Reports some improvement in breathing , still with episodes of shortness of breath, wheezing and coughing spells  HPI reviewed, ongoing symptoms over last 3 months intermittently of cough wheezing and shortness of breath requiring frequent use of nebulizer    Denies any fever, URI symptoms, chest pain       Objective:     Vitals:   Temp (24hrs), Av 8 °F (36 6 °C), Min:97 4 °F (36 3 °C), Max:98 2 °F (36 8 °C)    Temp:  [97 4 °F (36 3 °C)-98 2 °F (36 8 °C)] 98 2 °F (36 8 °C)  HR:  [60-76] 74  Resp:  [17-24] 18  BP: (114-195)/(59-96) 137/59  SpO2:  [82 %-100 %] 98 % on room air at present  Body mass index is 30 69 kg/m²  Input and Output Summary (last 24 hours): Intake/Output Summary (Last 24 hours) at 10/10/2022 0808  Last data filed at 10/9/2022 1736  Gross per 24 hour   Intake 1050 ml   Output --   Net 1050 ml       Physical Exam:   Physical Exam  Constitutional:       General: She is not in acute distress  Appearance: She is obese  HENT:      Head: Normocephalic and atraumatic  Cardiovascular:      Rate and Rhythm: Normal rate  Pulmonary:      Effort: Pulmonary effort is normal  No respiratory distress  Breath sounds: Wheezing present  No rales  Comments: Diminished bilaterally  Abdominal:      General: Bowel sounds are normal  There is no distension  Palpations: Abdomen is soft  Tenderness: There is no abdominal tenderness  There is no guarding or rebound  Musculoskeletal:      Right lower leg: No edema  Left lower leg: No edema     Skin:     General: Skin is warm and dry  Findings: No rash  Neurological:      General: No focal deficit present  Mental Status: She is alert and oriented to person, place, and time  Mental status is at baseline     Psychiatric:         Mood and Affect: Mood normal          Additional Data:     Labs:  Results from last 7 days   Lab Units 10/10/22  0535   WBC Thousand/uL 9 26   HEMOGLOBIN g/dL 12 0   HEMATOCRIT % 38 0   PLATELETS Thousands/uL 231   NEUTROS PCT % 83*   LYMPHS PCT % 15   MONOS PCT % 2*   EOS PCT % 0     Results from last 7 days   Lab Units 10/10/22  0535   SODIUM mmol/L 139   POTASSIUM mmol/L 4 1   CHLORIDE mmol/L 104   CO2 mmol/L 27   BUN mg/dL 13   CREATININE mg/dL 1 05   ANION GAP mmol/L 8   CALCIUM mg/dL 9 0   GLUCOSE RANDOM mg/dL 119                 Results from last 7 days   Lab Units 10/10/22  0535 10/09/22  2057   PROCALCITONIN ng/ml <0 05 <0 05       Lines/Drains:  Invasive Devices  Report    Peripheral Intravenous Line  Duration           Peripheral IV 10/09/22 Right Antecubital <1 day                      Imaging: Reviewed radiology reports from this admission including: chest CT scan    Recent Cultures (last 7 days):         Last 24 Hours Medication List:   Current Facility-Administered Medications   Medication Dose Route Frequency Provider Last Rate   • acetaminophen  650 mg Oral Q6H PRN Sudhakar Ospina PA-C     • ALPRAZolam  0 25 mg Oral Daily PRN Sudhakar Ospina PA-C     • buPROPion  150 mg Oral Daily Tosha Higginbotham PA-C     • diphenhydrAMINE  25 mg Intravenous Q6H PRN Tosha Higginbotham PA-C     • enoxaparin  40 mg Subcutaneous Daily Tosha Higginbotham PA-C     • fluticasone  1 spray Each Nare Daily Tosha Higginbotham PA-C     • fluticasone  2 puff Inhalation BID Tosha Higginbotham PA-C     • hydrocodone-chlorpheniramine polistirex  5 mL Oral Q12H PRN Tosha Higginbotham PA-C     • ipratropium-albuterol  3 mL Nebulization Q6H Tosha Higginbotham PA-C     • levothyroxine  25 mcg Oral Early Morning Byron Lynch PA-C     • methylPREDNISolone sodium succinate  40 mg Intravenous Q8H [address] Tosha Higginbotham PA-C     • montelukast  10 mg Oral HS Tosha Higginbotham PA-C     • ondansetron  4 mg Intravenous Q6H PRN Tosha Higginbotham PA-C     • pravastatin  80 mg Oral Daily With Dinner Byron Lynch PA-C          Today, Patient Was Seen By: Gonsalo Cheatham    ** Please Note: "This note has been constructed using a voice recognition system  Therefore there may be syntax, spelling, and/or grammatical errors   Please call if you have any questions  "**

## 2022-10-10 NOTE — CONSULTS
Consultation - Pulmonary Medicine   Tong Means 65 y o  female MRN: [id number]  Unit/Bed#: [address] [place] [address] Encounter: [id number]      Assessment:  Acute exacerbation of asthma  Patient may have mild persistent asthma  She is not on any maintenance inhaler at home  History of depression  Acquired hypothyroidism for which patient is only though thyroxine 25 mcg daily  Has some postnasal drainage and may have some seasonal allergic rhinitis  History of mild COVID infection in early January of this year    Plan:   Would continue methylprednisone 40 mg IV every 8 hours and neb treatments with DuoNeb every 6 hours  Monitor peak flow rates  Trial of Symbicort 160 mcg 2 puffs b i d     Will try to avoid powdered inhaler as this may make her cough worse  Agree with Tussionex and give dose at nighttime to help with cough  Can use benzonatate as needed during the day 100 mg b i d   I did provide patient with peak flow meter  Predicted peak flow rate is 350-440 liters/minute and measured peak flow rate today was 240 L  Agree with serum IgE level and also will check [facility] panel    History of Present Illness   Physician Requesting Consult: Richelle Martinez MD  Reason for Consult / Principal Problem: asthma exacerbation  Hx and PE limited by: none    HPI: Tong Means is a 65y o  year old female who presented to the ER yesterday after complaining difficulty breathing  She has had been having intermittent shortness of breath for past couple months  In June she was given a course of steroid therapy with some improvement and then again 2 weeks ago she had tapering dose of steroid with improvement  She has had frequent nonproductive cough and this will also make her short of breath  She does have nebulizer which he but at home but does not have any maintenance inhaler other than albuterol    Over course of last few months she has been to emergency room twice and prescribed antibiotic therapy and sometimes short does prednisone  She was at awaiting the night before admission start have a coughing fit after she started to try to dance and became short of breath  Her breathing got worse  Cough is mostly nonproductive  She has never been intubated for asthma  She denies any significant seasonal allergies  Does have some postnasal drainage and nasal congestion  Has occasional GERD  Does have history of asthma for several years  No history of diabetes or hypertension  She did have COVID infection in early January of this year but recovered  She was only sick for about 3 days  Has history of Mediterranean anemia  She was started on Singulair 10 mg daily this year but did not feel it helped her breathing or cough  She does take levothyroxine 25 mcg daily for hypothyroidism  She also uses Flonase nasal spray  She did have shortness of breath with activity  Also sometimes waking up short of breath at night  CT of the chest was done without contrast   This has small hiatal hernia, T9 vertebral body hemangioma, and mild diffuse bronchial wall thickening  There is some scattered bronchial mucosa plugging in left lower lobe as well and some scattered punctate calcified granulomas  She is not aware of any allergies to cats or dogs    White blood cell count was 8 7 with hemoglobin 13 0 platelet count of 971  There were 8% if in a pills and today 0% eosinophils  Prior CBC in June of 2020 showed white blood cell count of 7 3 with 5% eosinophils    Serum creatinine today 1 05 with sodium 139 glucose of 119    Nasal swab for COVID influenza and RSV was negative    Review of Systems   Constitutional: Negative for chills, fever and unexpected weight change  HENT: Positive for congestion and postnasal drip  Negative for rhinorrhea and sore throat  Eyes: Negative for discharge and redness  Respiratory: Positive for cough, shortness of breath and wheezing      Cardiovascular: Negative for chest pain, palpitations and leg swelling  Gastrointestinal: Negative for abdominal distention, abdominal pain and nausea  Endocrine: Negative for polydipsia and polyphagia  Genitourinary: Negative for dysuria  Musculoskeletal: Negative for joint swelling and myalgias  Skin: Negative for rash  Neurological: Negative for light-headedness  Psychiatric/Behavioral: Negative for confusion         Historical Information   Past Medical History:   Diagnosis Date   • Arthritis    • Asthma    • COVID-19 2022   • Disease of thyroid gland     hypo   • Dizziness     s/p covid 19   • Mediterranean anemia    • Pemphigus vulgaris    • Thalassemia    • Vitamin D deficiency    • Wears partial dentures     upper     Past Surgical History:   Procedure Laterality Date   • BLEPHAROPLASTY      bilateral   • BREAST BIOPSY Right     benign-not sure of exact year   • COLONOSCOPY     • SD KNEE SCOPE,MED/LAT MENISECTOMY Right 2020    Procedure: MENISCECTOMY MEDIAL;  Surgeon: Adri Koo DO;  Location: [facility];  Service: Orthopedics     Social History   Social History     Substance and Sexual Activity   Alcohol Use Yes   • Alcohol/week: 1 0 standard drink   • Types: 1 Glasses of wine per week    Comment: socially      Social History     Substance and Sexual Activity   Drug Use Never     Social History     Tobacco Use   Smoking Status Former Smoker   • Years: 10 00   • Quit date:    • Years since quittin 7   Smokeless Tobacco Never Used         Family History:   Family History   Problem Relation Age of Onset   • No Known Problems Mother    • Asthma Father    • Hypertension Father    • Hypertension Maternal Grandmother    • Liver cancer Maternal Grandmother    • Diabetes Maternal Grandmother    • Cancer Maternal Grandmother         liver   • Cancer Family         multiple relatives   • Stomach cancer Family    • Liver cancer Family    • Bone cancer Family    • Leukemia Family    • Seizures Sister    • Asthma Brother    • Hypertension Brother    • Leukemia Maternal Uncle    • Cancer Maternal Uncle         leukemia   • Heart attack Cousin    • Bone cancer Maternal Uncle    • No Known Problems Paternal Aunt    • Substance Abuse Neg Hx    • Mental illness Neg Hx        Meds/Allergies     Current Facility-Administered Medications:   •  acetaminophen (TYLENOL) tablet 650 mg, 650 mg, Oral, Q6H PRN, Tosha Vecbio, PA-C  •  ALPRAZolam (XANAX) tablet 0 25 mg, 0 25 mg, Oral, Daily PRN, Tosha Vecbio, PA-C  •  buPROPion (WELLBUTRIN XL) 24 hr tablet 150 mg, 150 mg, Oral, Daily, Tosha Vecellio, PA-C, 150 mg at 10/10/22 0858  •  diphenhydrAMINE (BENADRYL) injection 25 mg, 25 mg, Intravenous, Q6H PRN, Tosha Vecellio, PA-C, 25 mg at 10/10/22 0131  •  enoxaparin (LOVENOX) subcutaneous injection 40 mg, 40 mg, Subcutaneous, Daily, Tosha Vecbio, PA-C, 40 mg at 10/10/22 0858  •  fluticasone (FLONASE) 50 mcg/act nasal spray 1 spray, 1 spray, Each Nare, Daily, Tosha Vecbio, PA-C, 1 spray at 10/10/22 0858  •  fluticasone (FLOVENT HFA) 110 MCG/ACT inhaler 2 puff, 2 puff, Inhalation, BID, Tosha Vecbio, PA-C, 2 puff at 10/10/22 0845  •  hydrocodone-chlorpheniramine polistirex (TUSSIONEX) ER suspension 5 mL, 5 mL, Oral, Q12H PRN, Tosha Vecbio, PA-C, 5 mL at 10/10/22 1026  •  ipratropium-albuterol (DUO-NEB) 0 5-2 5 mg/3 mL inhalation solution 3 mL, 3 mL, Nebulization, Q6H, Tosha Vecbio, PA-C, 3 mL at 10/10/22 0350  •  levothyroxine tablet 25 mcg, 25 mcg, Oral, Early Morning, Tosha Vecbio, PA-C, 25 mcg at 10/10/22 0522  •  methylPREDNISolone sodium succinate (Solu-MEDROL) injection 40 mg, 40 mg, Intravenous, Q8H [facility], Tosha Higginbotham PA-C, 40 mg at 10/10/22 0523  •  montelukast (SINGULAIR) tablet 10 mg, 10 mg, Oral, HS, Tosha Higginbotham PA-C, 10 mg at 10/09/22 2104  •  ondansetron (ZOFRAN) injection 4 mg, 4 mg, Intravenous, Q6H PRN, Tosha Higginbotham PA-C  •  pravastatin (PRAVACHOL) tablet 80 mg, 80 mg, Oral, Daily With Synetta Factor CITLALLI Higginbotham, 80 mg at 10/09/22 2134    Prior to Admission medications    Medication Sig Start Date End Date Taking? Authorizing Provider   albuterol (2 5 mg/3 mL) 0 083 % nebulizer solution Take 3 mL (2 5 mg total) by nebulization every 6 (six) hours as needed for wheezing or shortness of breath 9/15/22  Yes HAYDEE Rodrigues   albuterol (PROVENTIL HFA,VENTOLIN HFA) 90 mcg/act inhaler inhale 2 puffs by mouth and INTO THE LUNGS every 6 hours if needed for wheezing 9/2/22  Yes HAYDEE Rodrigues   ALPRAZolam Durel Alt) 0 25 mg tablet Take 1 tablet (0 25 mg total) by mouth daily at bedtime as needed for anxiety 7/21/22  Yes HAYDEE Rodrigues   buPROPion (Wellbutrin XL) 150 mg 24 hr tablet 1 tablet po daily 8/26/22  Yes HAYDEE Rodrigues   fluticasone (FLONASE) 50 mcg/act nasal spray instill 1 spray into each nostril once daily 7/7/22  Yes HAYDEE Rodrigues   ipratropium-albuterol (DUO-NEB) 0 5-2 5 mg/3 mL nebulizer solution INHALE 1 VIAL VIA NEBULIZER 4 TIMES A DAY AS NEEDED 9/6/22  Yes Historical Provider, MD   levothyroxine 25 mcg tablet Take one tablet on an empty stomach every night time 3/10/22  Yes Taras Zepeda MD   montelukast (SINGULAIR) 10 mg tablet Take 1 tablet (10 mg total) by mouth daily at bedtime 9/15/22  Yes HAYDEE Rodrigues   Multiple Vitamins-Minerals (ZINC PO) Take by mouth daily     Yes Historical Provider, MD   rosuvastatin (CRESTOR) 10 MG tablet Take 1 tablet (10 mg total) by mouth daily 7/21/22  Yes HAYDEE Rodrigues   ergocalciferol (VITAMIN D2) 50,000 units Take 1 capsule (50,000 Units total) by mouth once a week 7/21/22   HAYDEE Rodrigues       Allergies   Allergen Reactions   • Iodine - Food Allergy Itching and Vomiting   • Shellfish-Derived Products - Food Allergy Itching and Vomiting   • Levaquin [Levofloxacin] Rash       Objective   Vitals: Blood pressure 137/59, pulse 74, temperature 98 2 °F (36 8 °C), temperature source Oral, resp   rate 18, height 5' 2" (1 575 m), weight 76 1 kg (167 lb 12 8 oz), SpO2 98 %, not currently breastfeeding  ,Body mass index is 30 69 kg/m²  Intake/Output Summary (Last 24 hours) at 10/10/2022 1152  Last data filed at 10/9/2022 1736  Gross per 24 hour   Intake 1050 ml   Output --   Net 1050 ml     Invasive Devices  Report    Peripheral Intravenous Line  Duration           Peripheral IV 10/09/22 Right Antecubital <1 day                Physical Exam  Vitals reviewed  Constitutional:       General: She is not in acute distress  Appearance: Normal appearance  She is well-developed  Comments: Room ai O2 sat 94%    Peak flow rate 240 l/m, predicted 350 to 440 l/m   HENT:      Head: Normocephalic  Right Ear: External ear normal       Left Ear: External ear normal       Nose: Nose normal       Mouth/Throat:      Mouth: Mucous membranes are moist       Pharynx: Oropharynx is clear  No oropharyngeal exudate  Comments: Mallampatti 3  Eyes:      Conjunctiva/sclera: Conjunctivae normal       Pupils: Pupils are equal, round, and reactive to light  Neck:      Vascular: No JVD  Trachea: No tracheal deviation  Cardiovascular:      Rate and Rhythm: Normal rate and regular rhythm  Heart sounds: Normal heart sounds  Pulmonary:      Effort: Pulmonary effort is normal       Comments: Bilateral expiratory wheezes, no rhonchi or crackles  Abdominal:      General: There is no distension  Palpations: Abdomen is soft  Tenderness: There is no abdominal tenderness  There is no guarding  Musculoskeletal:      Cervical back: Neck supple  Comments: No edema, cyanosis, or clubbine   Lymphadenopathy:      Cervical: No cervical adenopathy  Skin:     General: Skin is warm and dry  Findings: No rash  Neurological:      General: No focal deficit present  Mental Status: She is alert and oriented to person, place, and time  Psychiatric:         Behavior: Behavior normal          Thought Content:  Thought content normal  Lab Results: ABG:   Lab Results   Component Value Date    PHART 7 448 10/09/2022    YLZ1IUU 34 1 (L) 10/09/2022    PO2ART 100 1 10/09/2022    IED5TKZ 23 1 10/09/2022    BEART -0 4 10/09/2022    SOURCE Radial, Right 10/09/2022   , BNP: No results found for: BNP, CBC:   Lab Results   Component Value Date    WBC 9 26 10/10/2022    HGB 12 0 10/10/2022    HCT 38 0 10/10/2022    MCV 79 (L) 10/10/2022     10/10/2022    MCH 24 9 (L) 10/10/2022    MCHC 31 6 10/10/2022    RDW 15 6 (H) 10/10/2022    MPV 12 2 10/10/2022    NRBC 0 10/10/2022   , CMP:   Lab Results   Component Value Date    K 4 1 10/10/2022    K 4 3 04/22/2022     10/10/2022    CL 98 04/22/2022    CO2 27 10/10/2022    CO2 25 04/22/2022    BUN 13 10/10/2022    BUN 19 04/22/2022    CREATININE 1 05 10/10/2022    CALCIUM 9 0 10/10/2022    AST 24 04/22/2022    ALT 32 04/22/2022    ALKPHOS 63 05/29/2020    EGFR 57 10/10/2022   , PT/INR:   Lab Results   Component Value Date    INR 0 97 06/25/2020   , Troponin: No results found for: TROPONIN    Imaging Studies: I have personally reviewed pertinent reports  and I have personally reviewed pertinent films in PACS    EKG, Pathology, and Other Studies: I have personally reviewed pertinent reports  VTE Prophylaxis: Enoxaparin (Lovenox)    Code Status: Level 1 - Full Code    Counseling/Coordination of Care: Total floor / unit time spent today 30 minutes  Greater than 50% of total time was spent with the patient and / or family counseling and / or coordination of care   A description of the counseling / coordination of care: Review CT of chest, chart and I discussed diagnosis and treatment with patient

## 2022-10-10 NOTE — ASSESSMENT & PLAN NOTE
Patient presents with increasing shortness of breath, wheezing, productive cough  · CT chest: Findings suggesting mild chronic bronchitis  No consolidation or edema    · Given edwards neb, duo neb, solumedrol 125mg, mag in ED with some improvement  · Currently 99% on room air, placed on mid flow in ED for comfort  · Covid/flu/rsv negative  · WBC 8 71  · Respiratory panel pending  · Check procalcitonin  · Solumedrol 40mg q8, duo nebs q6  · Flovent bid  · Continue singulair, flonase  · Pulmonology consult

## 2022-10-10 NOTE — ASSESSMENT & PLAN NOTE
Patient presents with increasing shortness of breath, wheezing, productive cough  CT chest: Findings suggesting mild chronic bronchitis  No consolidation or edema  Noted to have mucus plugging calcified granuloma  Given edwards neb, duo neb, solumedrol 125mg, mag in ED with some improvement  Noted to 99% on room air, placed on mid flow in ED for comfort  Covid/flu/rsv negative  White count within normal limit without any eosinophilia    Appears to be improving  · Continue bronchodilators  · IV steroids 40 mg q 8 hours  · Respiratory panel pending  · Procalcitonin negative x2  · Will check IgE  · Sputum culture if possible  · Started on Symbicort  · Continue singulair, flonase  · Symptomatic treatment, Mucinex  · Pulmonary evaluation    10/11 Patient reports slight improvement today but still having coughing spells and feels very SOB with ambulation, continue steroids, follow allergy testing, follow pulm recommendations

## 2022-10-10 NOTE — UTILIZATION REVIEW
Initial Clinical Review    Admission: Date/Time/Statement:   Admission Orders (From admission, onward)     Ordered        10/09/22 1805  INPATIENT ADMISSION  Once                      Orders Placed This Encounter   Procedures   • INPATIENT ADMISSION     Standing Status:   Standing     Number of Occurrences:   1     Order Specific Question:   Level of Care     Answer:   Med Surg [16]     Order Specific Question:   Estimated length of stay     Answer:   More than 2 Midnights     Order Specific Question:   Certification     Answer:   I certify that inpatient services are medically necessary for this patient for a duration of greater than two midnights  See H&P and MD Progress Notes for additional information about the patient's course of treatment  ED Arrival Information     Expected   -    Arrival   10/9/2022 14:24    Acuity   Urgent            Means of arrival   Walk-In    Escorted by   Self    Service   Hospitalist    Admission type   Emergency            Arrival complaint   asthmatic           Chief Complaint   Patient presents with   • Shortness of Breath     Sob X 2 months, uses nebulizer, pt has appt for pulmonology on 11/2, but feels like she cant make it until then  Patient has a cough with expiratory wheezing in triage     Initial Presentation:   65 yof to ER from home c/o wheezing & chest tightness x 2 months  3rd visit to ER, placed on multiple doses steroids, nebs; return of symptoms when steroids stop  Hx  asthma, hypothyroidism  Presents tachypneic, SOB, cough, diffuse wheezing noted  Admitted to inpatient status for acute asthma exacerbation  Started on IV steroids, O2 requirements @[address]ltr [address]  Date: 10/10/22   Day 2:   Persistent SOB, cough & wheezing  O2 requirements @ 2 5ltr via nc  IV steroids continued  Pulmonary consulted  Per pulm: acute asthma exacerbation  Postnasal drainage, may have seasonal allergic rhinitis   Continue methylprednisone 40 mg IV every 8 hours and neb treatments with DuoNeb every 6 hours  Monitor peak flow rates  Trial of Symbicort 160 mcg 2 puffs b i d     Will try to avoid powdered inhaler as this may make her cough worse  Agree with Tussionex and give dose at nighttime to help with cough  Can use benzonatate as needed during the day 100 mg b i d  Peak flow meter given  Predicted peak flow rate is 350-440 liters/minute and measured peak flow rate today was 240 L  Agree with serum IgE level and also will check [facility] panel      ED Triage Vitals   Temperature Pulse Respirations Blood Pressure SpO2   10/09/22 1441 10/09/22 1441 10/09/22 1441 10/09/22 1441 10/09/22 1441   98 1 °F (36 7 °C) 67 (!) 24 142/96 100 %      Temp Source Heart Rate Source Patient Position - Orthostatic VS BP Location FiO2 (%)   10/09/22 1441 10/09/22 1441 10/09/22 1441 10/09/22 1441 --   Oral Monitor Sitting Right arm       Pain Score       10/09/22 2005       No Pain          Wt Readings from Last 1 Encounters:   10/09/22 76 1 kg (167 lb 12 8 oz)     Additional Vital Signs:   Date/Time Temp Pulse Resp BP MAP (mmHg) SpO2 O2 Flow Rate (L/min) O2 Device Patient Position - Orthostatic VS   10/10/22 0751 98 2 °F (36 8 °C) 74 18 137/59 85 98 % 2 5 L/min Mid flow nasal cannula Lying   10/10/22 07:50:49 98 2 °F (36 8 °C) 70 -- 137/59 85 98 % -- -- --   10/10/22 0726 -- 62 -- -- -- 99 % 2 5 L/min Mid flow nasal cannula --   10/10/22 03:00:34 97 7 °F (36 5 °C) 63 17 114/61 79 97 % -- -- --   10/10/22 0206 -- -- -- -- -- 98 % 2 5 L/min Mid flow nasal cannula --   10/09/22 22:22:20 97 6 °F (36 4 °C) 73 17 131/62 85 97 % -- -- --   10/09/22 2111 -- -- -- -- -- 98 % 2 5 L/min Mid flow nasal cannula --   10/09/22 20:31:44 97 7 °F (36 5 °C) 66 18 146/68 94 97 % 2 5 L/min Mid flow nasal cannula --   10/09/22 1929 97 4 °F (36 3 °C) Abnormal  75 22 188/83 Abnormal  -- 99 % 4 L/min Mid flow nasal cannula Lying     Pertinent Labs/Diagnostic Test Results:   CT chest without contrast   Final Result (10/09 1720) Findings suggesting mild chronic bronchitis  No consolidation or edema       Results from last 7 days   Lab Units 10/09/22  1740   SARS-COV-2  Negative     Results from last 7 days   Lab Units 10/10/22  0535 10/09/22  1512   WBC Thousand/uL 9 26 8 71   HEMOGLOBIN g/dL 12 0 13 0   HEMATOCRIT % 38 0 42 2   PLATELETS Thousands/uL 231 248   NEUTROS ABS Thousands/µL 7 62 4 01     Results from last 7 days   Lab Units 10/10/22  0535 10/09/22  1512   SODIUM mmol/L 139 141   POTASSIUM mmol/L 4 1 3 5   CHLORIDE mmol/L 104 104   CO2 mmol/L 27 28   ANION GAP mmol/L 8 9   BUN mg/dL 13 13   CREATININE mg/dL 1 05 0 99   EGFR ml/min/1 73sq m 57 61   CALCIUM mg/dL 9 0 9 4     Results from last 7 days   Lab Units 10/10/22  0535 10/09/22  1512   GLUCOSE RANDOM mg/dL 119 94     Results from last 7 days   Lab Units 10/09/22  1754   PH ART  7 448   PCO2 ART mm Hg 34 1*   PO2 ART mm Hg 100 1   HCO3 ART mmol/L 23 1   BASE EXC ART mmol/L -0 4   O2 CONTENT ART mL/dL 18 8   O2 HGB, ARTERIAL % 97 6*   ABG SOURCE  Radial, Right     Results from last 7 days   Lab Units 10/10/22  0535 10/09/22  2057   PROCALCITONIN ng/ml <0 05 <0 05     Results from last 7 days   Lab Units 10/09/22  1740   INFLUENZA A PCR  Negative   INFLUENZA B PCR  Negative   RSV PCR  Negative     ED Treatment:   Medication Administration from 10/09/2022 1424 to 10/09/2022 1956       Date/Time Order Dose Route Action     10/09/2022 1447 ipratropium-albuterol (DUO-NEB) 0 5-2 5 mg/3 mL inhalation solution 3 mL 3 mL Nebulization Given     10/09/2022 1532 magnesium sulfate 2 g/50 mL IVPB (premix) 2 g 2 g Intravenous New Bag     10/09/2022 1531 methylPREDNISolone sodium succinate (Solu-MEDROL) injection 125 mg 125 mg Intravenous Given     10/09/2022 1515 sodium chloride 0 9 % bolus 1,000 mL 1,000 mL Intravenous New Bag     10/09/2022 1520 albuterol inhalation solution 10 mg 10 mg Nebulization Given     10/09/2022 1520 sodium chloride 0 9 % inhalation solution 3 mL 3 mL Nebulization Given        Past Medical History:   Diagnosis Date   • Arthritis    • Asthma    • COVID-19 01/02/2022   • Disease of thyroid gland     hypo   • Dizziness     s/p covid 19   • Mediterranean anemia    • Pemphigus vulgaris    • Thalassemia    • Vitamin D deficiency    • Wears partial dentures     upper     Present on Admission:  • Acute asthma exacerbation  • Acquired hypothyroidism  • Depression  • Hypertriglyceridemia    Admitting Diagnosis: SOB (shortness of breath) [R06 02]  Asthma exacerbation [J45 901]  Age/Sex: 65 y o  female  Admission Orders:  Consult pulmonary  Scd/foot pumps    Scheduled Medications:  buPROPion, 150 mg, Oral, Daily  enoxaparin, 40 mg, Subcutaneous, Daily  fluticasone, 1 spray, Each Nare, Daily  fluticasone, 2 puff, Inhalation, BID  ipratropium-albuterol, 3 mL, Nebulization, Q6H  levothyroxine, 25 mcg, Oral, Early Morning  methylPREDNISolone sodium succinate, 40 mg, Intravenous, Q8H ASIM  montelukast, 10 mg, Oral, HS  pravastatin, 80 mg, Oral, Daily With Dinner    PRN Meds:  acetaminophen, 650 mg, Oral, Q6H PRN  ALPRAZolam, 0 25 mg, Oral, Daily PRN  diphenhydrAMINE, 25 mg, Intravenous, Q6H PRN, 10/10 x1  hydrocodone-chlorpheniramine polistirex, 5 mL, Oral, Q12H PRN, 10/9 x1  ondansetron, 4 mg, Intravenous, Q6H PRN    Network Utilization Review Department  ATTENTION: Please call with any questions or concerns to [phone] and carefully listen to the prompts so that you are directed to the right person  All voicemails are confidential   Loren Rosenbaum all requests for admission clinical reviews, approved or denied determinations and any other requests to dedicated fax number below belonging to the campus where the patient is receiving treatment   List of dedicated fax numbers for the Facilities:  [address] [place] [address] DENIALS (Administrative/Medical Necessity) [phone]   [address] (Maternity/NICU/Pediatrics) [phone]   [address] [place] [address] [address] [place] [phone]   [facility] [phone]   [address] [facility] [place] [address] [address] [address] [place] 28 [phone] [address] [place] [facility] [place] [address] [phone] never used

## 2022-10-11 LAB
ALBUMIN SERPL BCP-MCNC: 3.6 G/DL (ref 3.5–5)
ALP SERPL-CCNC: 59 U/L (ref 46–116)
ALT SERPL W P-5'-P-CCNC: 49 U/L (ref 12–78)
ANION GAP SERPL CALCULATED.3IONS-SCNC: 9 MMOL/L (ref 4–13)
AST SERPL W P-5'-P-CCNC: 17 U/L (ref 5–45)
BILIRUB SERPL-MCNC: 0.18 MG/DL (ref 0.2–1)
BUN SERPL-MCNC: 18 MG/DL (ref 5–25)
CALCIUM SERPL-MCNC: 8.9 MG/DL (ref 8.3–10.1)
CHLORIDE SERPL-SCNC: 105 MMOL/L (ref 96–108)
CO2 SERPL-SCNC: 26 MMOL/L (ref 21–32)
CREAT SERPL-MCNC: 0.96 MG/DL (ref 0.6–1.3)
ERYTHROCYTE [DISTWIDTH] IN BLOOD BY AUTOMATED COUNT: 15.9 % (ref 11.6–15.1)
GFR SERPL CREATININE-BSD FRML MDRD: 64 ML/MIN/1.73SQ M
GLUCOSE SERPL-MCNC: 143 MG/DL (ref 65–140)
HCT VFR BLD AUTO: 38.3 % (ref 34.8–46.1)
HGB BLD-MCNC: 12 G/DL (ref 11.5–15.4)
MCH RBC QN AUTO: 24.9 PG (ref 26.8–34.3)
MCHC RBC AUTO-ENTMCNC: 31.3 G/DL (ref 31.4–37.4)
MCV RBC AUTO: 80 FL (ref 82–98)
PLATELET # BLD AUTO: 230 THOUSANDS/UL (ref 149–390)
PMV BLD AUTO: 12.4 FL (ref 8.9–12.7)
POTASSIUM SERPL-SCNC: 4.4 MMOL/L (ref 3.5–5.3)
PROT SERPL-MCNC: 7 G/DL (ref 6.4–8.4)
RBC # BLD AUTO: 4.82 MILLION/UL (ref 3.81–5.12)
SODIUM SERPL-SCNC: 140 MMOL/L (ref 135–147)
WBC # BLD AUTO: 18.21 THOUSAND/UL (ref 4.31–10.16)

## 2022-10-11 PROCEDURE — 99232 SBSQ HOSP IP/OBS MODERATE 35: CPT | Performed by: INTERNAL MEDICINE

## 2022-10-11 PROCEDURE — 85027 COMPLETE CBC AUTOMATED: CPT | Performed by: INTERNAL MEDICINE

## 2022-10-11 PROCEDURE — 94640 AIRWAY INHALATION TREATMENT: CPT

## 2022-10-11 PROCEDURE — 86003 ALLG SPEC IGE CRUDE XTRC EA: CPT | Performed by: INTERNAL MEDICINE

## 2022-10-11 PROCEDURE — 94668 MNPJ CHEST WALL SBSQ: CPT

## 2022-10-11 PROCEDURE — 94760 N-INVAS EAR/PLS OXIMETRY 1: CPT

## 2022-10-11 PROCEDURE — 82785 ASSAY OF IGE: CPT | Performed by: INTERNAL MEDICINE

## 2022-10-11 PROCEDURE — 80053 COMPREHEN METABOLIC PANEL: CPT | Performed by: INTERNAL MEDICINE

## 2022-10-11 RX ADMIN — BUDESONIDE AND FORMOTEROL FUMARATE DIHYDRATE 2 PUFF: 160; 4.5 AEROSOL RESPIRATORY (INHALATION) at 08:25

## 2022-10-11 RX ADMIN — BUDESONIDE AND FORMOTEROL FUMARATE DIHYDRATE 2 PUFF: 160; 4.5 AEROSOL RESPIRATORY (INHALATION) at 21:44

## 2022-10-11 RX ADMIN — FLUTICASONE PROPIONATE 1 SPRAY: 50 SPRAY, METERED NASAL at 08:25

## 2022-10-11 RX ADMIN — METHYLPREDNISOLONE SODIUM SUCCINATE 40 MG: 40 INJECTION, POWDER, FOR SOLUTION INTRAMUSCULAR; INTRAVENOUS at 21:08

## 2022-10-11 RX ADMIN — MONTELUKAST 10 MG: 10 TABLET, FILM COATED ORAL at 21:07

## 2022-10-11 RX ADMIN — IPRATROPIUM BROMIDE AND ALBUTEROL SULFATE 3 ML: 2.5; .5 SOLUTION RESPIRATORY (INHALATION) at 13:33

## 2022-10-11 RX ADMIN — METHYLPREDNISOLONE SODIUM SUCCINATE 40 MG: 40 INJECTION, POWDER, FOR SOLUTION INTRAMUSCULAR; INTRAVENOUS at 13:45

## 2022-10-11 RX ADMIN — IPRATROPIUM BROMIDE AND ALBUTEROL SULFATE 3 ML: 2.5; .5 SOLUTION RESPIRATORY (INHALATION) at 01:42

## 2022-10-11 RX ADMIN — LEVOTHYROXINE SODIUM 25 MCG: 25 TABLET ORAL at 05:17

## 2022-10-11 RX ADMIN — PRAVASTATIN SODIUM 80 MG: 80 TABLET ORAL at 15:56

## 2022-10-11 RX ADMIN — BENZONATATE 100 MG: 100 CAPSULE ORAL at 16:18

## 2022-10-11 RX ADMIN — BUPROPION HYDROCHLORIDE 150 MG: 150 TABLET, EXTENDED RELEASE ORAL at 08:24

## 2022-10-11 RX ADMIN — HYDROCODONE POLISTIREX AND CHLORPHENIRAMINE POLISTIREX 5 ML: 10; 8 SUSPENSION, EXTENDED RELEASE ORAL at 21:09

## 2022-10-11 RX ADMIN — GUAIFENESIN 600 MG: 600 TABLET, EXTENDED RELEASE ORAL at 21:07

## 2022-10-11 RX ADMIN — ENOXAPARIN SODIUM 40 MG: 40 INJECTION SUBCUTANEOUS at 08:24

## 2022-10-11 RX ADMIN — IPRATROPIUM BROMIDE AND ALBUTEROL SULFATE 3 ML: 2.5; .5 SOLUTION RESPIRATORY (INHALATION) at 07:49

## 2022-10-11 RX ADMIN — GUAIFENESIN 600 MG: 600 TABLET, EXTENDED RELEASE ORAL at 08:24

## 2022-10-11 RX ADMIN — METHYLPREDNISOLONE SODIUM SUCCINATE 40 MG: 40 INJECTION, POWDER, FOR SOLUTION INTRAMUSCULAR; INTRAVENOUS at 05:17

## 2022-10-11 RX ADMIN — IPRATROPIUM BROMIDE AND ALBUTEROL SULFATE 3 ML: 2.5; .5 SOLUTION RESPIRATORY (INHALATION) at 19:36

## 2022-10-11 NOTE — PROGRESS NOTES
Progress Note - Pulmonary   Keenan De Santiago 65 y o  female MRN: [id number]  Unit/Bed#: [address] [place] [address] Encounter: [id number]    Assessment:  Acute exacerbation of asthma with some improvement  Suspect patient has mild persistent asthma  Not on any maintenance inhaler at home  Acquired hypothyroidism  Postnasal drainage with possible seasonal allergic rhinitis  History of mild COVID infection in early January this year  Plan:  Patient has difficulty doing peak flow meter measurement due to cough  Measure peak flow rate today 220 liters/minute  Clinic she feels better  Continue methylprednisone 40 mg IV every 8 hours and neb treatments with DuoNeb every 6 hours  Continue Symbicort 160 mcg 2 puffs b i d  She did have benefit with Tussionex for her cough at night  [place] allergy panel was drawn today and this includes IgE level  Results pending  Subjective:   Feels better today  Still has cough  Less short of breath    Objective:     Vitals: Blood pressure 135/65, pulse 78, temperature 98 5 °F (36 9 °C), resp  rate 18, height 5' 2" (1 575 m), weight 76 1 kg (167 lb 12 8 oz), SpO2 93 %, not currently breastfeeding  ,Body mass index is 30 69 kg/m²  Intake/Output Summary (Last 24 hours) at 10/11/2022 1727  Last data filed at 10/11/2022 1201  Gross per 24 hour   Intake 550 ml   Output --   Net 550 ml       Physical Exam: Physical Exam  Vitals reviewed  Constitutional:       General: She is not in acute distress  Appearance: Normal appearance  She is well-developed  HENT:      Head: Normocephalic  Nose: Nose normal       Mouth/Throat:      Pharynx: Oropharynx is clear  No oropharyngeal exudate  Eyes:      Conjunctiva/sclera: Conjunctivae normal       Pupils: Pupils are equal, round, and reactive to light  Neck:      Vascular: No JVD  Trachea: No tracheal deviation  Cardiovascular:      Rate and Rhythm: Normal rate and regular rhythm        Heart sounds: Normal heart sounds  Pulmonary:      Effort: Pulmonary effort is normal       Comments: Breath sounds decreased with few faint expiratory wheezes lower lobes  Abdominal:      General: There is no distension  Palpations: Abdomen is soft  Tenderness: There is no abdominal tenderness  There is no guarding  Musculoskeletal:      Cervical back: Neck supple  Comments: No edema   Lymphadenopathy:      Cervical: No cervical adenopathy  Skin:     General: Skin is warm and dry  Findings: No rash  Neurological:      General: No focal deficit present  Mental Status: She is alert and oriented to person, place, and time  Psychiatric:         Behavior: Behavior normal          Thought Content: Thought content normal           Labs: I have personally reviewed pertinent lab results  , ABG:   Lab Results   Component Value Date    PHART 7 448 10/09/2022    CMM2CMY 34 1 (L) 10/09/2022    PO2ART 100 1 10/09/2022    XIQ4EKS 23 1 10/09/2022    BEART -0 4 10/09/2022    SOURCE Radial, Right 10/09/2022   , BNP: No results found for: BNP, CBC:   Lab Results   Component Value Date    WBC 18 21 (H) 10/11/2022    HGB 12 0 10/11/2022    HCT 38 3 10/11/2022    MCV 80 (L) 10/11/2022     10/11/2022    MCH 24 9 (L) 10/11/2022    MCHC 31 3 (L) 10/11/2022    RDW 15 9 (H) 10/11/2022    MPV 12 4 10/11/2022    NRBC 0 10/10/2022   , CMP:   Lab Results   Component Value Date    K 4 4 10/11/2022    K 4 3 04/22/2022     10/11/2022    CL 98 04/22/2022    CO2 26 10/11/2022    CO2 25 04/22/2022    BUN 18 10/11/2022    BUN 19 04/22/2022    CREATININE 0 96 10/11/2022    CALCIUM 8 9 10/11/2022    AST 17 10/11/2022    AST 24 04/22/2022    ALT 49 10/11/2022    ALT 32 04/22/2022    ALKPHOS 59 10/11/2022    EGFR 64 10/11/2022   , PT/INR:   Lab Results   Component Value Date    INR 0 97 06/25/2020   , Troponin: No results found for: TROPONIN    Imaging and other studies: I have personally reviewed pertinent reports     and I have personally reviewed pertinent films in PACS

## 2022-10-11 NOTE — UTILIZATION REVIEW
Continued Stay Review    Date: 10/11/22                          Current Patient Class: inpatient  Current Level of Care: med surg  HPI:62 y o  female initially admitted on 10/9/22     Assessment/Plan:   Asthma exacerbation with persistent scattered expiratory wheezing noted  C/o unable to take deep breath, coughing spells, very SOB & desaturates with ambulation  IV steroids continued  O2 requirements currently weaned to RA  Monitor closely      Vital Signs:   Date/Time Temp Pulse Resp BP MAP (mmHg) SpO2 O2 Flow Rate (L/min) O2 Device Patient Position - Orthostatic VS   10/11/22 0800 98 4 °F (36 9 °C) 80 20 137/99 -- 99 % -- None (Room air) Lying   10/11/22 0749 -- -- -- -- -- 95 % -- None (Room air) --   10/11/22 0142 -- -- -- -- -- 95 % 2 5 L/min Mid flow nasal cannula --   10/11/22 00:03:34 98 2 °F (36 8 °C) 70 -- 133/63 86 96 % -- -- --     Pertinent Labs/Diagnostic Results:   Results from last 7 days   Lab Units 10/09/22  1740 10/09/22  1512   SARS-COV-2  Negative Not Detected     Results from last 7 days   Lab Units 10/11/22  0515 10/10/22  0535 10/09/22  1512   WBC Thousand/uL 18 21* 9 26 8 71   HEMOGLOBIN g/dL 12 0 12 0 13 0   HEMATOCRIT % 38 3 38 0 42 2   PLATELETS Thousands/uL 230 231 248   NEUTROS ABS Thousands/µL  --  7 62 4 01     Results from last 7 days   Lab Units 10/11/22  0515 10/10/22  0535 10/09/22  1512   SODIUM mmol/L 140 139 141   POTASSIUM mmol/L 4 4 4 1 3 5   CHLORIDE mmol/L 105 104 104   CO2 mmol/L 26 27 28   ANION GAP mmol/L 9 8 9   BUN mg/dL 18 13 13   CREATININE mg/dL 0 96 1 05 0 99   EGFR ml/min/1 73sq m 64 57 61   CALCIUM mg/dL 8 9 9 0 9 4     Results from last 7 days   Lab Units 10/11/22  0515   AST U/L 17   ALT U/L 49   ALK PHOS U/L 59   TOTAL PROTEIN g/dL 7 0   ALBUMIN g/dL 3 6   TOTAL BILIRUBIN mg/dL 0 18*     Results from last 7 days   Lab Units 10/11/22  0515 10/10/22  0535 10/09/22  1512   GLUCOSE RANDOM mg/dL 143* 119 94     Results from last 7 days   Lab Units 10/09/22  [address] ART  7 448   PCO2 ART mm Hg 34 1*   PO2 ART mm Hg 100 1   HCO3 ART mmol/L 23 1   BASE EXC ART mmol/L -0 4   O2 CONTENT ART mL/dL 18 8   O2 HGB, ARTERIAL % 97 6*   ABG SOURCE  Radial, Right     Results from last 7 days   Lab Units 10/10/22  0535 10/09/22  2057   PROCALCITONIN ng/ml <0 05 <0 05     Results from last 7 days   Lab Units 10/09/22  1740 10/09/22  1512   INFLUENZA A PCR  Negative  --    INFLUENZA B PCR  Negative  --    RSV PCR  Negative  --    RESPIRATORY SYNCYTIAL VIRUS   --  Not Detected     Results from last 7 days   Lab Units 10/09/22  1512   ADENOVIRUS  Not Detected   BORDETELLA PARAPERTUSSIS  Not Detected   BORDETELLA PERTUSSIS  Not Detected   CHLAMYDIA PNEUMONIAE  Not Detected   CORONAVIRUS 229E  Not Detected   CORONAVIRUS HKU1  Not Detected   CORONAVIRUS NL63  Not Detected   CORONAVIRUS OC43  Not Detected   METAPNEUMOVIRUS  Not Detected   RHINOVIRUS  Not Detected   MYCOPLASMA PNEUMONIAE  Not Detected   PARAINFLUENZA 1  Not Detected   PARAINFLUENZA 2  Not Detected   PARAINFLUENZA 3  Not Detected   PARAINFLUENZA 4  Not Detected     Medications:   Scheduled Medications:  budesonide-formoterol, 2 puff, Inhalation, BID  buPROPion, 150 mg, Oral, Daily  enoxaparin, 40 mg, Subcutaneous, Daily  fluticasone, 1 spray, Each Nare, Daily  guaiFENesin, 600 mg, Oral, Q12H [address]  hydrocodone-chlorpheniramine polistirex, 5 mL, Oral, HS  ipratropium-albuterol, 3 mL, Nebulization, Q6H  levothyroxine, 25 mcg, Oral, Early Morning  methylPREDNISolone sodium succinate, 40 mg, Intravenous, Q8H ASIM  montelukast, 10 mg, Oral, HS  pravastatin, 80 mg, Oral, Daily With Dinner    PRN Meds:  acetaminophen, 650 mg, Oral, Q6H PRN  ALPRAZolam, 0 25 mg, Oral, Daily PRN  benzonatate, 100 mg, Oral, BID PRN  diphenhydrAMINE, 25 mg, Intravenous, Q6H PRN  ondansetron, 4 mg, Intravenous, Q6H PRN    Discharge Plan: tbd    Network Utilization Review Department  ATTENTION: Please call with any questions or concerns to [phone] and carefully listen to the prompts so that you are directed to the right person  All voicemails are confidential   Osmel Magallon all requests for admission clinical reviews, approved or denied determinations and any other requests to dedicated fax number below belonging to the campus where the patient is receiving treatment   List of dedicated fax numbers for the Facilities:  [address] DENIALS (Administrative/Medical Necessity) [phone]   [address] (Maternity/NICU/Pediatrics) [phone]   5 Blanquita Leong [phone]   Justin Dangelo 77 [phone]   1306 [facility] 150 [facility] [address] [address] Clyde Kilpatrick 28 [phone]   1550 [facility] Kash Morales [facility] 134 815 [facility] [phone]

## 2022-10-11 NOTE — PROGRESS NOTES
[facility] Progress Note  Patient: Giorgio Ralph 65 y o  female   MRN: [id number]  PCP: HAYDEE Hampton  Unit/Bed#: [address] [place] [address] Encounter: [id number]  Date Of Visit: 10/11/22    Problem List:    Principal Problem:    Acute asthma exacerbation  Active Problems:    Acquired hypothyroidism    Hypertriglyceridemia    Depression    Elevated blood pressure reading      Assessment & Plan:    * Acute asthma exacerbation  Assessment & Plan  Patient presents with increasing shortness of breath, wheezing, productive cough  CT chest: Findings suggesting mild chronic bronchitis  No consolidation or edema  Noted to have mucus plugging calcified granuloma  Given edwards neb, duo neb, solumedrol 125mg, mag in ED with some improvement  Noted to 99% on room air, placed on mid flow in ED for comfort  Covid/flu/rsv negative  White count within normal limit without any eosinophilia    Appears to be improving  · Continue bronchodilators  · IV steroids 40 mg q 8 hours  · Respiratory panel pending  · Procalcitonin negative x2  · Will check IgE  · Sputum culture if possible  · Started on Symbicort  · Continue singulair, flonase  · Symptomatic treatment, Mucinex  · Pulmonary evaluation    10/11 Patient reports slight improvement today but still having coughing spells and feels very SOB with ambulation, continue steroids, follow allergy testing, follow pulm recommendations     Elevated blood pressure reading  Assessment & Plan  On presentation in setting of respiratory status  Now improving  Monitor    Depression  Assessment & Plan  Continue xanax prn and bupropion 150mg daily    Hypertriglyceridemia  Assessment & Plan  Continue statin    Acquired hypothyroidism  Assessment & Plan  Continue levothyroxine 25mcg daily          VTE Pharmacologic Prophylaxis:   Pharmacologic: Enoxaparin (Lovenox)  Mechanical VTE Prophylaxis in Place: Yes    Patient Centered Rounds: I have performed bedside rounds with nursing staff today     Discussions with Specialists or Other Care Team Provider: Yes    Education and Discussions with Family / Patient: Yes    Time Spent for Care: 30 minutes  More than 50% of total time spent on counseling and coordination of care as described above  Current Length of Stay: 2 day(s)    Current Patient Status: Inpatient   Certification Statement: The patient will continue to require additional inpatient hospital stay due to ASthma    Discharge Plan: Home    Code Status: Level 1 - Full Code      Subjective:   Patient seen and examined at bedside  NAD  Patient saturating well on RA at rest but reporting her oxygen drops with ambulation   Patient reports she is feeling improved but still cannot take a deep breath and does not feel ready for DC    Objective:     Vitals:   Temp (24hrs), Av 4 °F (36 9 °C), Min:98 2 °F (36 8 °C), Max:98 5 °F (36 9 °C)    Temp:  [98 2 °F (36 8 °C)-98 5 °F (36 9 °C)] 98 5 °F (36 9 °C)  HR:  [70-82] 78  Resp:  [18-22] 22  BP: (132-137)/(59-99) 134/78  SpO2:  [93 %-99 %] 95 %  Body mass index is 30 69 kg/m²  Input and Output Summary (last 24 hours):        Intake/Output Summary (Last 24 hours) at 10/11/2022 1353  Last data filed at 10/11/2022 1201  Gross per 24 hour   Intake 550 ml   Output --   Net 550 ml       Physical Exam:   Gen: AAO x3, NAD  CVS - s1, s2, RRR  Lungs Scattered expiratory wheezes   Abomen: soft nontender nondistended +BS  Ext - no edema   Neuro: CN 2-12 intact, 5/5 srength, sensation in tact     Additional Data:     Labs:    Results from last 7 days   Lab Units 10/11/22  0515 10/10/22  0535   WBC Thousand/uL 18 21* 9 26   HEMOGLOBIN g/dL 12 0 12 0   HEMATOCRIT % 38 3 38 0   PLATELETS Thousands/uL 230 231   NEUTROS PCT %  --  83*   LYMPHS PCT %  --  15   MONOS PCT %  --  2*   EOS PCT %  --  0     Results from last 7 days   Lab Units 10/11/22  0515   POTASSIUM mmol/L 4 4   CHLORIDE mmol/L 105   CO2 mmol/L 26   BUN mg/dL 18   CREATININE mg/dL 0 96   CALCIUM mg/dL 8 9   ALK PHOS U/L 59   ALT U/L 49   AST U/L 17           * I Have Reviewed All Lab Data Listed Above  * Additional Pertinent Lab Tests Reviewed: All Labs Within Last 24 Hours Reviewed        Recent Cultures (last 7 days):           Last 24 Hours Medication List:   Current Facility-Administered Medications   Medication Dose Route Frequency Provider Last Rate   • acetaminophen  650 mg Oral Q6H PRN Sudhakar Ospina PA-C     • ALPRAZolam  0 25 mg Oral Daily PRN Sudhakar Ospina PA-C     • benzonatate  100 mg Oral BID PRN Kasi Eagle DO     • budesonide-formoterol  2 puff Inhalation BID Corinne Obrien MD     • buPROPion  150 mg Oral Daily Tosha Higginbotham PA-C     • diphenhydrAMINE  25 mg Intravenous Q6H PRN Tosha Higginbotham PA-C     • enoxaparin  40 mg Subcutaneous Daily Tosha Higginbotham PA-C     • fluticasone  1 spray Each Nare Daily Tosha Higginbotham PA-C     • guaiFENesin  600 mg Oral Q12H Drew Everett MD     • hydrocodone-chlorpheniramine polistirex  5 mL Oral HS Ranjit Samano DO     • ipratropium-albuterol  3 mL Nebulization Q6H Tosha Higginbotham PA-C     • levothyroxine  25 mcg Oral Early Morning Tosha Higginbotham PA-C     • methylPREDNISolone sodium succinate  40 mg Intravenous Q8H [address] Tosha Higginbotham PA-C     • montelukast  10 mg Oral HS Tosha Higginbotham PA-C     • ondansetron  4 mg Intravenous Q6H PRN Tosha Higginbotham PA-C     • pravastatin  80 mg Oral Daily With Dinner Sudhakar Ospina PA-C            Today, Patient Was Seen By: Benny Carey    ** Please Note: "This note has been constructed using a voice recognition system  Therefore there may be syntax, spelling, and/or grammatical errors   Please call if you have any questions  "**

## 2022-10-12 LAB
A ALTERNATA IGE QN: <0.1 KUA/I
A FUMIGATUS IGE QN: <0.1 KUA/I
ANION GAP SERPL CALCULATED.3IONS-SCNC: 7 MMOL/L (ref 4–13)
BASOPHILS # BLD AUTO: 0.01 THOUSANDS/ΜL (ref 0–0.1)
BASOPHILS NFR BLD AUTO: 0 % (ref 0–1)
BERMUDA GRASS IGE QN: <0.1 KUA/I
BOXELDER IGE QN: <0.1 KUA/I
BUN SERPL-MCNC: 19 MG/DL (ref 5–25)
C HERBARUM IGE QN: <0.1 KUA/I
CALCIUM SERPL-MCNC: 9.2 MG/DL (ref 8.3–10.1)
CAT DANDER IGE QN: <0.1 KUA/I
CHLORIDE SERPL-SCNC: 103 MMOL/L (ref 96–108)
CMN PIGWEED IGE QN: <0.1 KUA/I
CO2 SERPL-SCNC: 30 MMOL/L (ref 21–32)
COMMON RAGWEED IGE QN: <0.1 KUA/I
COTTONWOOD IGE QN: <0.1 KUA/I
CREAT SERPL-MCNC: 1 MG/DL (ref 0.6–1.3)
D FARINAE IGE QN: 0.3 KUA/I
D PTERONYSS IGE QN: 0.36 KUA/I
DOG DANDER IGE QN: <0.1 KUA/I
EOSINOPHIL # BLD AUTO: 0 THOUSAND/ΜL (ref 0–0.61)
EOSINOPHIL NFR BLD AUTO: 0 % (ref 0–6)
ERYTHROCYTE [DISTWIDTH] IN BLOOD BY AUTOMATED COUNT: 16 % (ref 11.6–15.1)
GFR SERPL CREATININE-BSD FRML MDRD: 60 ML/MIN/1.73SQ M
GLUCOSE SERPL-MCNC: 117 MG/DL (ref 65–140)
HCT VFR BLD AUTO: 40.5 % (ref 34.8–46.1)
HGB BLD-MCNC: 12.4 G/DL (ref 11.5–15.4)
IMM GRANULOCYTES # BLD AUTO: 0.15 THOUSAND/UL (ref 0–0.2)
IMM GRANULOCYTES NFR BLD AUTO: 1 % (ref 0–2)
LONDON PLANE IGE QN: <0.1 KUA/I
LYMPHOCYTES # BLD AUTO: 2.11 THOUSANDS/ΜL (ref 0.6–4.47)
LYMPHOCYTES NFR BLD AUTO: 14 % (ref 14–44)
MCH RBC QN AUTO: 24.4 PG (ref 26.8–34.3)
MCHC RBC AUTO-ENTMCNC: 30.6 G/DL (ref 31.4–37.4)
MCV RBC AUTO: 80 FL (ref 82–98)
MONOCYTES # BLD AUTO: 0.41 THOUSAND/ΜL (ref 0.17–1.22)
MONOCYTES NFR BLD AUTO: 3 % (ref 4–12)
MOUSE URINE PROT IGE QN: <0.1 KUA/I
MT JUNIPER IGE QN: <0.1 KUA/I
MUGWORT IGE QN: <0.1 KUA/I
NEUTROPHILS # BLD AUTO: 12.14 THOUSANDS/ΜL (ref 1.85–7.62)
NEUTS SEG NFR BLD AUTO: 82 % (ref 43–75)
NRBC BLD AUTO-RTO: 0 /100 WBCS
P NOTATUM IGE QN: <0.1 KUA/I
PLATELET # BLD AUTO: 248 THOUSANDS/UL (ref 149–390)
PMV BLD AUTO: 12.1 FL (ref 8.9–12.7)
POTASSIUM SERPL-SCNC: 4 MMOL/L (ref 3.5–5.3)
RBC # BLD AUTO: 5.09 MILLION/UL (ref 3.81–5.12)
ROACH IGE QN: 0.11 KUA/I
SHEEP SORREL IGE QN: <0.1 KUA/I
SILVER BIRCH IGE QN: <0.1 KUA/I
SODIUM SERPL-SCNC: 140 MMOL/L (ref 135–147)
TIMOTHY IGE QN: <0.1 KUA/I
TOTAL IGE SMQN RAST: 24.2 KU/L (ref 0–113)
WALNUT IGE QN: <0.1 KUA/I
WBC # BLD AUTO: 14.82 THOUSAND/UL (ref 4.31–10.16)
WHITE ASH IGE QN: <0.1 KUA/I
WHITE ELM IGE QN: <0.1 KUA/I
WHITE MULBERRY IGE QN: <0.1 KUA/I
WHITE OAK IGE QN: <0.1 KUA/I

## 2022-10-12 PROCEDURE — 99232 SBSQ HOSP IP/OBS MODERATE 35: CPT | Performed by: INTERNAL MEDICINE

## 2022-10-12 PROCEDURE — 94760 N-INVAS EAR/PLS OXIMETRY 1: CPT

## 2022-10-12 PROCEDURE — 94640 AIRWAY INHALATION TREATMENT: CPT

## 2022-10-12 PROCEDURE — 94668 MNPJ CHEST WALL SBSQ: CPT

## 2022-10-12 PROCEDURE — 94664 DEMO&/EVAL PT USE INHALER: CPT

## 2022-10-12 PROCEDURE — 85025 COMPLETE CBC W/AUTO DIFF WBC: CPT | Performed by: INTERNAL MEDICINE

## 2022-10-12 PROCEDURE — 80048 BASIC METABOLIC PNL TOTAL CA: CPT | Performed by: INTERNAL MEDICINE

## 2022-10-12 RX ADMIN — FLUTICASONE PROPIONATE 1 SPRAY: 50 SPRAY, METERED NASAL at 09:13

## 2022-10-12 RX ADMIN — BUDESONIDE AND FORMOTEROL FUMARATE DIHYDRATE 2 PUFF: 160; 4.5 AEROSOL RESPIRATORY (INHALATION) at 21:35

## 2022-10-12 RX ADMIN — DIPHENHYDRAMINE HYDROCHLORIDE 25 MG: 50 INJECTION, SOLUTION INTRAMUSCULAR; INTRAVENOUS at 00:22

## 2022-10-12 RX ADMIN — IPRATROPIUM BROMIDE AND ALBUTEROL SULFATE 3 ML: 2.5; .5 SOLUTION RESPIRATORY (INHALATION) at 13:38

## 2022-10-12 RX ADMIN — BUDESONIDE AND FORMOTEROL FUMARATE DIHYDRATE 2 PUFF: 160; 4.5 AEROSOL RESPIRATORY (INHALATION) at 09:13

## 2022-10-12 RX ADMIN — IPRATROPIUM BROMIDE AND ALBUTEROL SULFATE 3 ML: 2.5; .5 SOLUTION RESPIRATORY (INHALATION) at 01:12

## 2022-10-12 RX ADMIN — HYDROCODONE POLISTIREX AND CHLORPHENIRAMINE POLISTIREX 5 ML: 10; 8 SUSPENSION, EXTENDED RELEASE ORAL at 21:35

## 2022-10-12 RX ADMIN — BENZONATATE 100 MG: 100 CAPSULE ORAL at 18:28

## 2022-10-12 RX ADMIN — METHYLPREDNISOLONE SODIUM SUCCINATE 40 MG: 40 INJECTION, POWDER, FOR SOLUTION INTRAMUSCULAR; INTRAVENOUS at 21:35

## 2022-10-12 RX ADMIN — DIPHENHYDRAMINE HYDROCHLORIDE 25 MG: 50 INJECTION, SOLUTION INTRAMUSCULAR; INTRAVENOUS at 22:34

## 2022-10-12 RX ADMIN — IPRATROPIUM BROMIDE AND ALBUTEROL SULFATE 3 ML: 2.5; .5 SOLUTION RESPIRATORY (INHALATION) at 20:33

## 2022-10-12 RX ADMIN — LEVOTHYROXINE SODIUM 25 MCG: 25 TABLET ORAL at 06:28

## 2022-10-12 RX ADMIN — BUPROPION HYDROCHLORIDE 150 MG: 150 TABLET, EXTENDED RELEASE ORAL at 09:12

## 2022-10-12 RX ADMIN — GUAIFENESIN 600 MG: 600 TABLET, EXTENDED RELEASE ORAL at 09:12

## 2022-10-12 RX ADMIN — METHYLPREDNISOLONE SODIUM SUCCINATE 40 MG: 40 INJECTION, POWDER, FOR SOLUTION INTRAMUSCULAR; INTRAVENOUS at 14:01

## 2022-10-12 RX ADMIN — MONTELUKAST 10 MG: 10 TABLET, FILM COATED ORAL at 21:35

## 2022-10-12 RX ADMIN — PRAVASTATIN SODIUM 80 MG: 80 TABLET ORAL at 16:52

## 2022-10-12 RX ADMIN — GUAIFENESIN 600 MG: 600 TABLET, EXTENDED RELEASE ORAL at 21:35

## 2022-10-12 RX ADMIN — ENOXAPARIN SODIUM 40 MG: 40 INJECTION SUBCUTANEOUS at 09:12

## 2022-10-12 RX ADMIN — METHYLPREDNISOLONE SODIUM SUCCINATE 40 MG: 40 INJECTION, POWDER, FOR SOLUTION INTRAMUSCULAR; INTRAVENOUS at 06:28

## 2022-10-12 RX ADMIN — IPRATROPIUM BROMIDE AND ALBUTEROL SULFATE 3 ML: 2.5; .5 SOLUTION RESPIRATORY (INHALATION) at 07:39

## 2022-10-12 NOTE — PLAN OF CARE
Problem: RESPIRATORY - ADULT  Goal: Achieves optimal ventilation and oxygenation  Description: INTERVENTIONS:  - Assess for changes in respiratory status  - Assess for changes in mentation and behavior  - Position to facilitate oxygenation and minimize respiratory effort  - Oxygen administered by appropriate delivery if ordered  - Initiate smoking cessation education as indicated  - Encourage broncho-pulmonary hygiene including cough, deep breathe, Incentive Spirometry  - Assess the need for suctioning and aspirate as needed  - Assess and instruct to report SOB or any respiratory difficulty  - Respiratory Therapy support as indicated  Outcome: Progressing     Problem: INFECTION - ADULT  Goal: Absence or prevention of progression during hospitalization  Description: INTERVENTIONS:  - Assess and monitor for signs and symptoms of infection  - Monitor lab/diagnostic results  - Monitor all insertion sites, i e  indwelling lines, tubes, and drains  - Monitor endotracheal if appropriate and nasal secretions for changes in amount and color  - [place] appropriate cooling/warming therapies per order  - Administer medications as ordered  - Instruct and encourage patient and family to use good hand hygiene technique  - Identify and instruct in appropriate isolation precautions for identified infection/condition  Outcome: Progressing

## 2022-10-13 PROCEDURE — 99232 SBSQ HOSP IP/OBS MODERATE 35: CPT | Performed by: INTERNAL MEDICINE

## 2022-10-13 PROCEDURE — 94664 DEMO&/EVAL PT USE INHALER: CPT

## 2022-10-13 PROCEDURE — 94760 N-INVAS EAR/PLS OXIMETRY 1: CPT

## 2022-10-13 PROCEDURE — 94640 AIRWAY INHALATION TREATMENT: CPT

## 2022-10-13 PROCEDURE — 94668 MNPJ CHEST WALL SBSQ: CPT

## 2022-10-13 RX ADMIN — IPRATROPIUM BROMIDE AND ALBUTEROL SULFATE 3 ML: 2.5; .5 SOLUTION RESPIRATORY (INHALATION) at 01:40

## 2022-10-13 RX ADMIN — PRAVASTATIN SODIUM 80 MG: 80 TABLET ORAL at 17:16

## 2022-10-13 RX ADMIN — BUDESONIDE AND FORMOTEROL FUMARATE DIHYDRATE 2 PUFF: 160; 4.5 AEROSOL RESPIRATORY (INHALATION) at 10:57

## 2022-10-13 RX ADMIN — HYDROCODONE POLISTIREX AND CHLORPHENIRAMINE POLISTIREX 5 ML: 10; 8 SUSPENSION, EXTENDED RELEASE ORAL at 22:10

## 2022-10-13 RX ADMIN — FLUTICASONE PROPIONATE 1 SPRAY: 50 SPRAY, METERED NASAL at 10:56

## 2022-10-13 RX ADMIN — METHYLPREDNISOLONE SODIUM SUCCINATE 40 MG: 40 INJECTION, POWDER, FOR SOLUTION INTRAMUSCULAR; INTRAVENOUS at 22:10

## 2022-10-13 RX ADMIN — IPRATROPIUM BROMIDE AND ALBUTEROL SULFATE 3 ML: 2.5; .5 SOLUTION RESPIRATORY (INHALATION) at 20:40

## 2022-10-13 RX ADMIN — GUAIFENESIN 600 MG: 600 TABLET, EXTENDED RELEASE ORAL at 10:58

## 2022-10-13 RX ADMIN — BUPROPION HYDROCHLORIDE 150 MG: 150 TABLET, EXTENDED RELEASE ORAL at 10:58

## 2022-10-13 RX ADMIN — IPRATROPIUM BROMIDE AND ALBUTEROL SULFATE 3 ML: 2.5; .5 SOLUTION RESPIRATORY (INHALATION) at 07:37

## 2022-10-13 RX ADMIN — MONTELUKAST 10 MG: 10 TABLET, FILM COATED ORAL at 22:10

## 2022-10-13 RX ADMIN — METHYLPREDNISOLONE SODIUM SUCCINATE 40 MG: 40 INJECTION, POWDER, FOR SOLUTION INTRAMUSCULAR; INTRAVENOUS at 06:05

## 2022-10-13 RX ADMIN — ENOXAPARIN SODIUM 40 MG: 40 INJECTION SUBCUTANEOUS at 10:58

## 2022-10-13 RX ADMIN — IPRATROPIUM BROMIDE AND ALBUTEROL SULFATE 3 ML: 2.5; .5 SOLUTION RESPIRATORY (INHALATION) at 14:12

## 2022-10-13 RX ADMIN — METHYLPREDNISOLONE SODIUM SUCCINATE 40 MG: 40 INJECTION, POWDER, FOR SOLUTION INTRAMUSCULAR; INTRAVENOUS at 14:49

## 2022-10-13 RX ADMIN — BUDESONIDE AND FORMOTEROL FUMARATE DIHYDRATE 2 PUFF: 160; 4.5 AEROSOL RESPIRATORY (INHALATION) at 22:00

## 2022-10-13 RX ADMIN — LEVOTHYROXINE SODIUM 25 MCG: 25 TABLET ORAL at 06:06

## 2022-10-13 RX ADMIN — BENZONATATE 100 MG: 100 CAPSULE ORAL at 11:02

## 2022-10-13 RX ADMIN — DIPHENHYDRAMINE HYDROCHLORIDE 25 MG: 50 INJECTION, SOLUTION INTRAMUSCULAR; INTRAVENOUS at 23:01

## 2022-10-13 RX ADMIN — GUAIFENESIN 600 MG: 600 TABLET, EXTENDED RELEASE ORAL at 22:10

## 2022-10-13 NOTE — PROGRESS NOTES
Progress Note - Pulmonary   Antine Qujaison 65 y o  female MRN: [id number]  Unit/Bed#: [address] [place] [address] Encounter: [id number]    Assessment:  Acute exacerbation of asthma with slow improvement  Patient not on any maintenance inhaler at home  Cough likely related to combination some postnasal drainage and lower airway inflammation    Plan:  Continue methylprednisone 40 mg IV every 8 hours for now  She continues to have improvement in her breathing then we may be considered discharging home soon with prednisone 40 mg for 6 days with 10 mg taper every 7th day  Would continue Symbicort 160 mcg 2 puffs b i d  but if not covered by her prescription plan then may have to use AirDuo 113 mcg 1 puff twice a day  She can continue using nebulizer with DuoNeb 4 times a day at home  She has difficulty doing peak flow maneuver because of her cough  Subjective:   Had coughing fit yesterday evening cause increased shortness of breath  When she coughs often she will have some shortness of breath  Objective:     Vitals: Blood pressure 159/79, pulse 75, temperature 98 5 °F (36 9 °C), resp  rate 18, height 5' 2" (1 575 m), weight 75 8 kg (167 lb), SpO2 96 %, not currently breastfeeding  ,Body mass index is 30 54 kg/m²  Intake/Output Summary (Last 24 hours) at 10/13/2022 1933  Last data filed at 10/13/2022 0630  Gross per 24 hour   Intake 1020 ml   Output --   Net 1020 ml       Physical Exam: Physical Exam  Vitals reviewed  Constitutional:       General: She is not in acute distress  Appearance: Normal appearance  She is well-developed  HENT:      Head: Normocephalic  Nose: Nose normal       Mouth/Throat:      Pharynx: No oropharyngeal exudate  Eyes:      Conjunctiva/sclera: Conjunctivae normal       Pupils: Pupils are equal, round, and reactive to light  Neck:      Vascular: No JVD  Trachea: No tracheal deviation  Cardiovascular:      Rate and Rhythm: Normal rate and regular rhythm        Heart sounds: Normal heart sounds  Pulmonary:      Effort: Pulmonary effort is normal       Comments: faint expiratory wheezes both lower lobes  Abdominal:      General: There is no distension  Palpations: Abdomen is soft  Tenderness: There is no abdominal tenderness  There is no guarding  Musculoskeletal:      Cervical back: Neck supple  Comments: No edema, cyanosis or clubbing   Lymphadenopathy:      Cervical: No cervical adenopathy  Skin:     General: Skin is warm and dry  Findings: No rash  Neurological:      General: No focal deficit present  Mental Status: She is alert and oriented to person, place, and time  Psychiatric:         Behavior: Behavior normal          Thought Content: Thought content normal           Labs: I have personally reviewed pertinent lab results  , ABG:   Lab Results   Component Value Date    PHART 7 448 10/09/2022    HVG7WID 34 1 (L) 10/09/2022    PO2ART 100 1 10/09/2022    DCH4BEM 23 1 10/09/2022    BEART -0 4 10/09/2022    SOURCE Radial, Right 10/09/2022   , BNP: No results found for: BNP, CBC:   Lab Results   Component Value Date    WBC 14 82 (H) 10/12/2022    HGB 12 4 10/12/2022    HCT 40 5 10/12/2022    MCV 80 (L) 10/12/2022     10/12/2022    MCH 24 4 (L) 10/12/2022    MCHC 30 6 (L) 10/12/2022    RDW 16 0 (H) 10/12/2022    MPV 12 1 10/12/2022    NRBC 0 10/12/2022   , CMP:   Lab Results   Component Value Date    K 4 0 10/12/2022    K 4 3 04/22/2022     10/12/2022    CL 98 04/22/2022    CO2 30 10/12/2022    CO2 25 04/22/2022    BUN 19 10/12/2022    BUN 19 04/22/2022    CREATININE 1 00 10/12/2022    CALCIUM 9 2 10/12/2022    AST 17 10/11/2022    AST 24 04/22/2022    ALT 49 10/11/2022    ALT 32 04/22/2022    ALKPHOS 59 10/11/2022    EGFR 60 10/12/2022   , PT/INR:   Lab Results   Component Value Date    INR 0 97 06/25/2020   , Troponin: No results found for: TROPONIN    Imaging and other studies: I have personally reviewed pertinent reports  and I have personally reviewed pertinent films in PACS

## 2022-10-13 NOTE — ASSESSMENT & PLAN NOTE
Patient presents with increasing shortness of breath, wheezing, productive cough  CT chest: Findings suggesting mild chronic bronchitis  No consolidation or edema  Noted to have mucus plugging calcified granuloma  Reviewed personally  Given edwards neb, duo neb, solumedrol 125mg, mag in ED with some improvement  Noted to 99% on room air, placed on mid flow in ED for comfortCovid/flu/rsv negative  White count within normal limit without any eosinophilia  Reviewed allergy panel with Pulmonary  IGE within normal limits  Patient has dust mite and cockroach allergies  Appears to be improving  · Continue bronchodilators  · IV steroids 40 mg q 8 hours, may need extended course still with conversational dyspnea  · Respiratory panel reviewed  Normal IgE  Cockroach and dust mite allergies  · Procalcitonin negative x2  · Will check IgE:  Normal  · Sputum culture if possible:  Patient has been unable to produce sputum  · Started on Symbicort monitor for thrush  · Continue singulair, flonase  · Symptomatic treatment, Mucinex  · Pulmonary evaluation:  Discussed with Pulmonary attending  Continue with current measures re-evaluate in a m   for discharge to home

## 2022-10-13 NOTE — PLAN OF CARE
Problem: RESPIRATORY - ADULT  Goal: Achieves optimal ventilation and oxygenation  Description: INTERVENTIONS:  - Assess for changes in respiratory status  - Assess for changes in mentation and behavior  - Position to facilitate oxygenation and minimize respiratory effort  - Oxygen administered by appropriate delivery if ordered  - Initiate smoking cessation education as indicated  - Encourage broncho-pulmonary hygiene including cough, deep breathe, Incentive Spirometry  - Assess the need for suctioning and aspirate as needed  - Assess and instruct to report SOB or any respiratory difficulty  - Respiratory Therapy support as indicated  Outcome: Progressing     Problem: PAIN - ADULT  Goal: Verbalizes/displays adequate comfort level or baseline comfort level  Description: Interventions:  - Encourage patient to monitor pain and request assistance  - Assess pain using appropriate pain scale  - Administer analgesics based on type and severity of pain and evaluate response  - Implement non-pharmacological measures as appropriate and evaluate response  - Consider cultural and social influences on pain and pain management  - Notify physician/advanced practitioner if interventions unsuccessful or patient reports new pain  Outcome: Progressing     Problem: INFECTION - ADULT  Goal: Absence or prevention of progression during hospitalization  Description: INTERVENTIONS:  - Assess and monitor for signs and symptoms of infection  - Monitor lab/diagnostic results  - Monitor all insertion sites, i e  indwelling lines, tubes, and drains  - Monitor endotracheal if appropriate and nasal secretions for changes in amount and color  - [place] appropriate cooling/warming therapies per order  - Administer medications as ordered  - Instruct and encourage patient and family to use good hand hygiene technique  - Identify and instruct in appropriate isolation precautions for identified infection/condition  Outcome: Progressing

## 2022-10-13 NOTE — UTILIZATION REVIEW
Continued Stay Review    Date: 10/13/22                          Current Patient Class: inpatient  Current Level of Care: med surg  HPI:62 y o  female initially admitted on 10/9/22     Assessment/Plan:   10/12/22:  Persistently conversationally dyspneic, no steroid taper at this time  Lungs with expiratory wheezing noted at bases, poor air entry  10/13/22:  Ongoing significant cough causing SOB  IV steroids continued  Lungs with faint expiratory wheezing noted       Vital Signs:   10/13/22 08:33:50 98 5 °F (36 9 °C) 75 -- 159/79 106 96 % -- -- --   10/13/22 08:33:24 -- 75 -- 159/79 106 94 % -- -- --   10/13/22 0748 -- -- -- -- -- 99 % -- -- --   10/13/22 0737 -- 66 18 -- -- 95 % -- None (Room air) --   10/13/22 0141 -- -- -- -- -- 95 % -- -- --   10/12/22 22:48:14 98 5 °F (36 9 °C) 63 18 165/80 108 96 % -- None (Room air) Sitting   10/12/22 2033 -- 78 20 -- -- 98 % -- None (Room air) --   10/12/22 19:54:32 98 3 °F (36 8 °C) 68 18 141/70 94 93 % -- None (Room air) --   10/12/22 15:07:25 98 7 °F (37 1 °C) 71 18 141/67 92 92 % -- -- --   10/12/22 1338 -- -- -- -- -- 96 % -- None (Room air) --   10/12/22 0739 -- -- -- -- -- 96 % -- None (Room air) --   10/12/22 07:33:33 98 1 °F (36 7 °C) 60 18 133/68 90 96 % -- None (Room air) Lying     Pertinent Labs/Diagnostic Results:   Results from last 7 days   Lab Units 10/09/22  1740 10/09/22  1512   SARS-COV-2  Negative Not Detected     Results from last 7 days   Lab Units 10/12/22  0642 10/11/22  0515 10/10/22  0535 10/09/22  1512   WBC Thousand/uL 14 82* 18 21* 9 26 8 71   HEMOGLOBIN g/dL 12 4 12 0 12 0 13 0   HEMATOCRIT % 40 5 38 3 38 0 42 2   PLATELETS Thousands/uL 248 230 231 248   NEUTROS ABS Thousands/µL 12 14*  --  7 62 4 01     Results from last 7 days   Lab Units 10/12/22  0642 10/11/22  0515 10/10/22  0535 10/09/22  1512   SODIUM mmol/L 140 140 139 141   POTASSIUM mmol/L 4 0 4 4 4 1 3 5   CHLORIDE mmol/L 103 105 104 104   CO2 mmol/L 30 26 27 28   ANION GAP mmol/L 7 9 8 9   BUN mg/dL 19 18 13 13   CREATININE mg/dL 1 00 0 96 1 05 0 99   EGFR ml/min/1 73sq m 60 64 57 61   CALCIUM mg/dL 9 2 8 9 9 0 9 4     Results from last 7 days   Lab Units 10/11/22  0515   AST U/L 17   ALT U/L 49   ALK PHOS U/L 59   TOTAL PROTEIN g/dL 7 0   ALBUMIN g/dL 3 6   TOTAL BILIRUBIN mg/dL 0 18*     Results from last 7 days   Lab Units 10/12/22  0642 10/11/22  0515 10/10/22  0535 10/09/22  1512   GLUCOSE RANDOM mg/dL 117 143* 119 94     Results from last 7 days   Lab Units 10/09/22  1754   PH ART  7 448   PCO2 ART mm Hg 34 1*   PO2 ART mm Hg 100 1   HCO3 ART mmol/L 23 1   BASE EXC ART mmol/L -0 4   O2 CONTENT ART mL/dL 18 8   O2 HGB, ARTERIAL % 97 6*   ABG SOURCE  Radial, Right     Results from last 7 days   Lab Units 10/10/22  0535 10/09/22  2057   PROCALCITONIN ng/ml <0 05 <0 05     Results from last 7 days   Lab Units 10/09/22  1740 10/09/22  1512   INFLUENZA A PCR  Negative  --    INFLUENZA B PCR  Negative  --    RSV PCR  Negative  --    RESPIRATORY SYNCYTIAL VIRUS   --  Not Detected     Results from last 7 days   Lab Units 10/09/22  1512   ADENOVIRUS  Not Detected   BORDETELLA PARAPERTUSSIS  Not Detected   BORDETELLA PERTUSSIS  Not Detected   CHLAMYDIA PNEUMONIAE  Not Detected   CORONAVIRUS 229E  Not Detected   CORONAVIRUS HKU1  Not Detected   CORONAVIRUS NL63  Not Detected   CORONAVIRUS OC43  Not Detected   METAPNEUMOVIRUS  Not Detected   RHINOVIRUS  Not Detected   MYCOPLASMA PNEUMONIAE  Not Detected   PARAINFLUENZA 1  Not Detected   PARAINFLUENZA 2  Not Detected   PARAINFLUENZA 3  Not Detected   PARAINFLUENZA 4  Not Detected     Medications:   Scheduled Medications:  budesonide-formoterol, 2 puff, Inhalation, BID  buPROPion, 150 mg, Oral, Daily  enoxaparin, 40 mg, Subcutaneous, Daily  fluticasone, 1 spray, Each Nare, Daily  guaiFENesin, 600 mg, Oral, Q12H [facility]  hydrocodone-chlorpheniramine polistirex, 5 mL, Oral, HS  ipratropium-albuterol, 3 mL, Nebulization, Q6H  levothyroxine, 25 mcg, Oral, Early Morning  methylPREDNISolone sodium succinate, 40 mg, Intravenous, Q8H [address]  montelukast, 10 mg, Oral, HS  pravastatin, 80 mg, Oral, Daily With Dinner    PRN Meds:  acetaminophen, 650 mg, Oral, Q6H PRN  ALPRAZolam, 0 25 mg, Oral, Daily PRN  benzonatate, 100 mg, Oral, BID PRN, 10/12 x1, 10/13 x1  diphenhydrAMINE, 25 mg, Intravenous, Q6H PRN, 10/12 x2  ondansetron, 4 mg, Intravenous, Q6H PRN    Discharge Plan: d    Network Utilization Review Department  ATTENTION: Please call with any questions or concerns to [phone] and carefully listen to the prompts so that you are directed to the right person  All voicemails are confidential   Sudie Crigler all requests for admission clinical reviews, approved or denied determinations and any other requests to dedicated fax number below belonging to the campus where the patient is receiving treatment   List of dedicated fax numbers for the Facilities:  [address] DENIALS (Administrative/Medical Necessity) [phone]   [address] (Maternity/NICU/Pediatrics) [phone]   [address] [phone]   [facility] [phone]   1306 [facility] [address] [place] [address] [address] Clyde Kilpatrick 28 [phone]   [address] [place] Kash Morales [facility] [place] [address] [phone]

## 2022-10-13 NOTE — PLAN OF CARE
Problem: RESPIRATORY - ADULT  Goal: Achieves optimal ventilation and oxygenation  Description: INTERVENTIONS:  - Assess for changes in respiratory status  - Assess for changes in mentation and behavior  - Position to facilitate oxygenation and minimize respiratory effort  - Oxygen administered by appropriate delivery if ordered  - Initiate smoking cessation education as indicated  - Encourage broncho-pulmonary hygiene including cough, deep breathe, Incentive Spirometry  - Assess the need for suctioning and aspirate as needed  - Assess and instruct to report SOB or any respiratory difficulty  - Respiratory Therapy support as indicated  Outcome: Progressing     Problem: PAIN - ADULT  Goal: Verbalizes/displays adequate comfort level or baseline comfort level  Description: Interventions:  - Encourage patient to monitor pain and request assistance  - Assess pain using appropriate pain scale  - Administer analgesics based on type and severity of pain and evaluate response  - Implement non-pharmacological measures as appropriate and evaluate response  - Consider cultural and social influences on pain and pain management  - Notify physician/advanced practitioner if interventions unsuccessful or patient reports new pain  Outcome: Progressing     Problem: INFECTION - ADULT  Goal: Absence or prevention of progression during hospitalization  Description: INTERVENTIONS:  - Assess and monitor for signs and symptoms of infection  - Monitor lab/diagnostic results  - Monitor all insertion sites, i e  indwelling lines, tubes, and drains  - Monitor endotracheal if appropriate and nasal secretions for changes in amount and color  - [place] appropriate cooling/warming therapies per order  - Administer medications as ordered  - Instruct and encourage patient and family to use good hand hygiene technique  - Identify and instruct in appropriate isolation precautions for identified infection/condition  Outcome: Progressing

## 2022-10-13 NOTE — PROGRESS NOTES
Progress Note - Pulmonary   Teri Reddy 65 y o  female MRN: [id number]  Unit/Bed#: [address] [place], [place] [address] Encounter: [id number]    Assessment:  Acute exacerbation of asthma with gradual improvement  Patient not on any maintenance inhaler at home  Acquired hypothyroidism  Postnasal drainage  [facility] allergy panel showed IgE level to be normal   Had positive response to dust mites but otherwise negative to other substances tested    Plan:  Continue methylprednisone 40 mg IV every 8 hours and neb treatments with DuoNeb every 6 hours  How patient does over next day or so may be able to consider change to p o  prednisone soon  Continue Symbicort 160 mcg 2 puffs b i d  Subjective:   Patient states she feels like her breathing is improving   Cough is better    Objective:     Vitals: Blood pressure 141/70, pulse 78, temperature 98 3 °F (36 8 °C), resp  rate 20, height 5' 2" (1 575 m), weight 76 1 kg (167 lb 12 8 oz), SpO2 98 %, not currently breastfeeding  ,Body mass index is 30 69 kg/m²  No intake or output data in the 24 hours ending 10/12/22 2127    Physical Exam: Physical Exam  Constitutional:       General: She is not in acute distress  Appearance: She is well-developed  HENT:      Head: Normocephalic  Right Ear: External ear normal       Left Ear: External ear normal       Nose: Nose normal       Mouth/Throat:      Mouth: Mucous membranes are moist       Pharynx: Oropharynx is clear  No oropharyngeal exudate  Eyes:      Conjunctiva/sclera: Conjunctivae normal       Pupils: Pupils are equal, round, and reactive to light  Neck:      Vascular: No JVD  Trachea: No tracheal deviation  Cardiovascular:      Rate and Rhythm: Normal rate and regular rhythm  Heart sounds: Normal heart sounds  Pulmonary:      Effort: Pulmonary effort is normal       Comments: Few faint expiratory wheezes at bases of lungs  No rhonchi or crackles  Abdominal:      General: There is no distension  Palpations: Abdomen is soft  Tenderness: There is no abdominal tenderness  There is no guarding  Musculoskeletal:      Cervical back: Neck supple  Comments: No edema, cyanosis or clubbing   Lymphadenopathy:      Cervical: No cervical adenopathy  Skin:     General: Skin is warm and dry  Findings: No rash  Neurological:      General: No focal deficit present  Mental Status: She is alert and oriented to person, place, and time  Psychiatric:         Behavior: Behavior normal          Thought Content: Thought content normal           Labs: I have personally reviewed pertinent lab results  , ABG:   Lab Results   Component Value Date    PHART 7 448 10/09/2022    TYL0BCT 34 1 (L) 10/09/2022    PO2ART 100 1 10/09/2022    [id number] 23 1 10/09/2022    BEART -0 4 10/09/2022    SOURCE Radial, Right 10/09/2022   , BNP: No results found for: BNP, CBC:   Lab Results   Component Value Date    WBC 14 82 (H) 10/12/2022    HGB 12 4 10/12/2022    HCT 40 5 10/12/2022    MCV 80 (L) 10/12/2022     10/12/2022    MCH 24 4 (L) 10/12/2022    MCHC 30 6 (L) 10/12/2022    RDW 16 0 (H) 10/12/2022    MPV 12 1 10/12/2022    NRBC 0 10/12/2022   , CMP:   Lab Results   Component Value Date    K 4 0 10/12/2022    K 4 3 04/22/2022     10/12/2022    CL 98 04/22/2022    CO2 30 10/12/2022    CO2 25 04/22/2022    BUN 19 10/12/2022    BUN 19 04/22/2022    CREATININE 1 00 10/12/2022    CALCIUM 9 2 10/12/2022    AST 17 10/11/2022    AST 24 04/22/2022    ALT 49 10/11/2022    ALT 32 04/22/2022    ALKPHOS 59 10/11/2022    EGFR 60 10/12/2022   , PT/INR:   Lab Results   Component Value Date    INR 0 97 06/25/2020   , Troponin: No results found for: TROPONIN    Imaging and other studies: I have personally reviewed pertinent reports     and I have personally reviewed pertinent films in PACS

## 2022-10-13 NOTE — PROGRESS NOTES
Jose C 46  Progress Note - Heide Pappas 1960, 65 y o  female MRN: [id number]  Unit/Bed#: [address] [address] Encounter: [id number]  Primary Care Provider: HAYDEE Rocha   Date and time admitted to hospital: 10/9/2022  2:31 PM    * Acute asthma exacerbation  Assessment & Plan  Patient presents with increasing shortness of breath, wheezing, productive cough  CT chest: Findings suggesting mild chronic bronchitis  No consolidation or edema  Noted to have mucus plugging calcified granuloma  Reviewed personally  Given edwards neb, duo neb, solumedrol 125mg, mag in ED with some improvement  Noted to 99% on room air, placed on mid flow in ED for comfortCovid/flu/rsv negative  White count within normal limit without any eosinophilia  Reviewed allergy panel with Pulmonary  IGE within normal limits  Patient has dust mite and cockroach allergies  Appears to be improving  · Continue bronchodilators  · IV steroids 40 mg q 8 hours, may need extended course still with conversational dyspnea  · Respiratory panel reviewed  Normal IgE  Cockroach and dust mite allergies  · Procalcitonin negative x2  · Will check IgE:  Normal  · Sputum culture if possible:  Patient has been unable to produce sputum  · Started on Symbicort monitor for thrush  · Continue singulair, flonase  · Symptomatic treatment, Mucinex  · Pulmonary evaluation:  Discussed with Pulmonary attending  Continue with current measures re-evaluate in a m   for discharge to home  Acquired hypothyroidism  Assessment & Plan  Agree with Continue levothyroxine 25mcg daily    Hypertriglyceridemia  Assessment & Plan  Agree with Continue statin    Depression  Assessment & Plan  Agree with Continue xanax prn and bupropion 150mg daily    Elevated blood pressure reading  Assessment & Plan  On presentation in setting of poor respiratory status  Now improving  Monitor    Obesity (BMI 30-39  9)  Assessment & Plan  Noted to have BMI slightly over the 30 range  Dietary modifications will be discussed      VTE Pharmacologic Prophylaxis:   Pharmacologic: Enoxaparin (Lovenox)  Mechanical VTE Prophylaxis in Place: Yes    Patient Centered Rounds: I have performed bedside rounds with nursing staff today  Discussions with Specialists or Other Care Team Provider:  Reviewed with Pulmonary attending in-person    Education and Discussions with Family / Patient:  No family at bedside  Patient reports knowing to communicate    Time Spent for Care: 20 minutes  More than 50% of total time spent on counseling and coordination of care as described above  Current Length of Stay: 3 day(s)    Current Patient Status: Inpatient   Certification Statement: The patient will continue to require additional inpatient hospital stay due to Titrating medications  Discharge Plan:  Hopeful for discharge home next 12:48 p m  Depending on response to therapy    Code Status: Level 1 - Full Code      Subjective:    patient noted to be dyspneic with conversation  Reports some improvement but still fairly significantly delayed in her response  She reports positive shortness of breath at rest no nausea no vomiting no diarrhea  Objective:     Vitals:   Temp (24hrs), Av 4 °F (36 9 °C), Min:98 1 °F (36 7 °C), Max:98 7 °F (37 1 °C)    Temp:  [98 1 °F (36 7 °C)-98 7 °F (37 1 °C)] 98 3 °F (36 8 °C)  HR:  [60-78] 63  Resp:  [18-20] 20  BP: (133-165)/(67-80) 165/80  SpO2:  [92 %-98 %] 96 %  Body mass index is 30 69 kg/m²       Input and Output Summary (last 24 hours):     No intake or output data in the 24 hours ending 10/12/22 9178    Physical Exam:     Physical Exam  pleasant female in no acute distress other than positive conversational dyspnea normocephalic atraumatic pupils equal round reactive to light extraocular muscles intact mucous membranes are moist neck is supple there is no JVD no lymph nodes no carotid bruits chest is decreased with poor air entry anteriorly and posteriorly, some end-expiratory wheezes at the bases  Cardiovascular regular rate rhythm positive S1-S2 no S3-S4 no murmur or gallop  Abdomen soft nontender nondistended with positive bowel sounds there is no hepatosplenomegaly no guarding or rebound new line neurologically patient awake alert oriented cranial nerves 2-12 intact    Additional Data:     Labs:    Results from last 7 days   Lab Units 10/12/22  0642   WBC Thousand/uL 14 82*   HEMOGLOBIN g/dL 12 4   HEMATOCRIT % 40 5   PLATELETS Thousands/uL 248   NEUTROS PCT % 82*   LYMPHS PCT % 14   MONOS PCT % 3*   EOS PCT % 0     Results from last 7 days   Lab Units 10/12/22  0642 10/11/22  0515   SODIUM mmol/L 140 140   POTASSIUM mmol/L 4 0 4 4   CHLORIDE mmol/L 103 105   CO2 mmol/L 30 26   BUN mg/dL 19 18   CREATININE mg/dL 1 00 0 96   ANION GAP mmol/L 7 9   CALCIUM mg/dL 9 2 8 9   ALBUMIN g/dL  --  3 6   TOTAL BILIRUBIN mg/dL  --  0 18*   ALK PHOS U/L  --  59   ALT U/L  --  49   AST U/L  --  17   GLUCOSE RANDOM mg/dL 117 143*                 Results from last 7 days   Lab Units 10/10/22  0535 10/09/22  2057   PROCALCITONIN ng/ml <0 05 <0 05           * I Have Reviewed All Lab Data Listed Above  * Additional Pertinent Lab Tests Reviewed:  All Labs Within Last 24 Hours Reviewed    Imaging:    Imaging Reports Reviewed Today Include:  None two-view  Imaging Personally Reviewed by Myself Includes:  None to the    Recent Cultures (last 7 days):           Last 24 Hours Medication List:   Current Facility-Administered Medications   Medication Dose Route Frequency Provider Last Rate   • acetaminophen  650 mg Oral Q6H PRN Rose Torres PA-C     • ALPRAZolam  0 25 mg Oral Daily PRN Rose Torres PA-C     • benzonatate  100 mg Oral BID PRN Johann Boggs DO     • budesonide-formoterol  2 puff Inhalation BID Griffin Cunningham MD     • buPROPion  150 mg Oral Daily Tosha Higginbotham PA-C     • diphenhydrAMINE  25 mg Intravenous Q6H PRN Rose Torres PA-C • enoxaparin  40 mg Subcutaneous Daily Tosha Higginbotham PA-C     • fluticasone  1 spray Each Nare Daily Tosha Higginbotham PA-C     • guaiFENesin  600 mg Oral Q12H Geovanna Godoy MD     • hydrocodone-chlorpheniramine polistirex  5 mL Oral HS Juana Chavez DO     • ipratropium-albuterol  3 mL Nebulization Q6H Tosha Higginbotham PA-C     • levothyroxine  25 mcg Oral Early Morning Tosha Higginbotham PA-C     • methylPREDNISolone sodium succinate  40 mg Intravenous Q8H [address] Tosha Higginbotham PA-C     • montelukast  10 mg Oral HS Tosha Higginbotham PA-C     • ondansetron  4 mg Intravenous Q6H PRN Tosha Higginbotham PA-C     • pravastatin  80 mg Oral Daily With Dinner Laurel Cortez PA-C          Today, Patient Was Seen By: Serina Perkins    ** Please Note: Dictation voice to text software may have been used in the creation of this document   **

## 2022-10-14 VITALS
WEIGHT: 167 LBS | BODY MASS INDEX: 30.73 KG/M2 | HEIGHT: 62 IN | TEMPERATURE: 97.7 F | RESPIRATION RATE: 16 BRPM | HEART RATE: 66 BPM | DIASTOLIC BLOOD PRESSURE: 74 MMHG | SYSTOLIC BLOOD PRESSURE: 140 MMHG | OXYGEN SATURATION: 96 %

## 2022-10-14 PROBLEM — J45.51: Status: ACTIVE | Noted: 2022-10-09

## 2022-10-14 PROCEDURE — 94760 N-INVAS EAR/PLS OXIMETRY 1: CPT

## 2022-10-14 PROCEDURE — 94668 MNPJ CHEST WALL SBSQ: CPT

## 2022-10-14 PROCEDURE — 99239 HOSP IP/OBS DSCHRG MGMT >30: CPT | Performed by: INTERNAL MEDICINE

## 2022-10-14 PROCEDURE — 94640 AIRWAY INHALATION TREATMENT: CPT

## 2022-10-14 RX ORDER — BENZONATATE 100 MG/1
100 CAPSULE ORAL 2 TIMES DAILY PRN
Qty: 20 CAPSULE | Refills: 0 | Status: SHIPPED | OUTPATIENT
Start: 2022-10-14

## 2022-10-14 RX ORDER — ALBUTEROL SULFATE 2.5 MG/3ML
2.5 SOLUTION RESPIRATORY (INHALATION) EVERY 6 HOURS PRN
Qty: 75 ML | Refills: 0 | Status: SHIPPED | OUTPATIENT
Start: 2022-10-14

## 2022-10-14 RX ORDER — FLUTICASONE PROPIONATE AND SALMETEROL 113; 14 UG/1; UG/1
1 POWDER, METERED RESPIRATORY (INHALATION) 2 TIMES DAILY
Qty: 1 EACH | Refills: 0 | Status: SHIPPED | OUTPATIENT
Start: 2022-10-14

## 2022-10-14 RX ORDER — IPRATROPIUM BROMIDE AND ALBUTEROL SULFATE 2.5; .5 MG/3ML; MG/3ML
3 SOLUTION RESPIRATORY (INHALATION) 4 TIMES DAILY
Qty: 100 ML | Refills: 0 | Status: SHIPPED | OUTPATIENT
Start: 2022-10-14

## 2022-10-14 RX ORDER — HYDROCODONE POLISTIREX AND CHLORPHENIRAMINE POLISTIREX 10; 8 MG/5ML; MG/5ML
5 SUSPENSION, EXTENDED RELEASE ORAL
Qty: 50 ML | Refills: 0 | Status: SHIPPED | OUTPATIENT
Start: 2022-10-14 | End: 2022-10-24

## 2022-10-14 RX ORDER — PREDNISONE 10 MG/1
TABLET ORAL
Qty: 150 TABLET | Refills: 0 | Status: SHIPPED | OUTPATIENT
Start: 2022-10-14

## 2022-10-14 RX ORDER — GUAIFENESIN 600 MG/1
600 TABLET, EXTENDED RELEASE ORAL EVERY 12 HOURS SCHEDULED
Qty: 30 TABLET | Refills: 0 | Status: SHIPPED | OUTPATIENT
Start: 2022-10-14

## 2022-10-14 RX ORDER — ALBUTEROL SULFATE 90 UG/1
2 AEROSOL, METERED RESPIRATORY (INHALATION) EVERY 6 HOURS PRN
Qty: 8.5 G | Refills: 0 | Status: SHIPPED | OUTPATIENT
Start: 2022-10-14

## 2022-10-14 RX ADMIN — BUPROPION HYDROCHLORIDE 150 MG: 150 TABLET, EXTENDED RELEASE ORAL at 08:05

## 2022-10-14 RX ADMIN — IPRATROPIUM BROMIDE AND ALBUTEROL SULFATE 3 ML: 2.5; .5 SOLUTION RESPIRATORY (INHALATION) at 13:21

## 2022-10-14 RX ADMIN — BUDESONIDE AND FORMOTEROL FUMARATE DIHYDRATE 2 PUFF: 160; 4.5 AEROSOL RESPIRATORY (INHALATION) at 08:04

## 2022-10-14 RX ADMIN — IPRATROPIUM BROMIDE AND ALBUTEROL SULFATE 3 ML: 2.5; .5 SOLUTION RESPIRATORY (INHALATION) at 07:41

## 2022-10-14 RX ADMIN — ENOXAPARIN SODIUM 40 MG: 40 INJECTION SUBCUTANEOUS at 08:05

## 2022-10-14 RX ADMIN — FLUTICASONE PROPIONATE 1 SPRAY: 50 SPRAY, METERED NASAL at 08:04

## 2022-10-14 RX ADMIN — METHYLPREDNISOLONE SODIUM SUCCINATE 40 MG: 40 INJECTION, POWDER, FOR SOLUTION INTRAMUSCULAR; INTRAVENOUS at 13:47

## 2022-10-14 RX ADMIN — LEVOTHYROXINE SODIUM 25 MCG: 25 TABLET ORAL at 06:28

## 2022-10-14 RX ADMIN — METHYLPREDNISOLONE SODIUM SUCCINATE 40 MG: 40 INJECTION, POWDER, FOR SOLUTION INTRAMUSCULAR; INTRAVENOUS at 06:26

## 2022-10-14 RX ADMIN — GUAIFENESIN 600 MG: 600 TABLET, EXTENDED RELEASE ORAL at 08:04

## 2022-10-14 NOTE — PROGRESS NOTES
[address]  Progress Note - Kamilah Cristobal 1960, 65 y o  female MRN: [id number]  Unit/Bed#: [address] [address] Encounter: [id number]  Primary Care Provider: HAYDEE Bergeron   Date and time admitted to hospital: 10/9/2022  2:31 PM    * Acute asthma exacerbation  Assessment & Plan  Patient presents with increasing shortness of breath, wheezing, productive cough  CT chest: Findings suggesting mild chronic bronchitis  No consolidation or edema  Noted to have mucus plugging calcified granuloma  Given edwards neb, duo neb, solumedrol 125mg, mag in ED with some improvement  Noted to 99% on room air, placed on mid flow in ED for comfortCovid/flu/rsv negative  White count within normal limit without any eosinophilia  Reviewed allergy panel with Pulmonary  IGE within normal limits  Patient has dust mite and cockroach allergies  Appears to be improving patient with significant coughing spell overnight caused her to lose her breath  Continuing IV steroids an additional night then re-evaluation  · Continue bronchodilators  · IV steroids 40 mg q 8 hours, may need extended course still with conversational dyspnea  · Respiratory panel reviewed  Normal IgE  Cockroach and dust mite allergies  · Procalcitonin negative x2  · Will check IgE:  Normal  · Sputum culture if possible:  Patient has been unable to produce sputum  · Started on Symbicort monitor for thrush  · Continue singulair, flonase  · Symptomatic treatment, Mucinex  · Pulmonary evaluation:  Discussed with Pulmonary attending  Continue with current measures re-evaluate in a m   for discharge to home      Acquired hypothyroidism  Assessment & Plan  Agree with Continue levothyroxine 25mcg daily    Depression  Assessment & Plan  Agree with Continue xanax prn and bupropion 150mg daily    Elevated blood pressure reading  Assessment & Plan  On presentation in setting of poor respiratory status  Now improving currently off  Monitor    Obesity (BMI 30-39  9)  Assessment & Plan  Noted to have BMI slightly over the 30 range  Dietary modifications will be discussed  VTE Pharmacologic Prophylaxis:   Pharmacologic: Enoxaparin (Lovenox)  Mechanical VTE Prophylaxis in Place: No    Patient Centered Rounds: I have evaluated patient without nursing staff present due to Discussed at desk    Discussions with Specialists or Other Care Team Provider:  Discussed with Pulmonary attending requested additional IV steroids today    Education and Discussions with Family / Patient:  Patient converting information to her family    Time Spent for Care: 20 minutes  More than 50% of total time spent on counseling and coordination of care as described above  Current Length of Stay: 5 day(s)    Current Patient Status: Inpatient   Certification Statement: The patient will continue to require additional inpatient hospital stay due to Need for IV steroids per Pulmonary    Discharge Plan:  Hopeful for a m  Transition to oral therapy and discharged home    Code Status: Level 1 - Full Code      Subjective:   Patient had excessive coughing fit last evening that was choking in nature  Patient afraid to leave hospital   Responded favorably to cough medicine and Benadryl  Will provide at bedtime and discharge    Objective:     Vitals:   Temp (24hrs), Av 9 °F (36 6 °C), Min:97 4 °F (36 3 °C), Max:98 5 °F (36 9 °C)    Temp:  [97 4 °F (36 3 °C)-98 5 °F (36 9 °C)] 97 7 °F (36 5 °C)  HR:  [60-75] 60  Resp:  [17-19] 19  BP: (132-159)/(71-79) 132/71  SpO2:  [94 %-99 %] 97 %  Body mass index is 30 54 kg/m²  Input and Output Summary (last 24 hours):        Intake/Output Summary (Last 24 hours) at 10/14/2022 0145  Last data filed at 10/13/2022 0630  Gross per 24 hour   Intake 510 ml   Output --   Net 510 ml       Physical Exam:     Physical Exam  General well-developed reasonably nourished female no acute distress normocephalic atraumatic pupils equal round and reactive to light extraocular muscles intact mucous membranes are moist neck is supple there is no JVD no lymph nodes no carotid bruits   chest is decreased but clear continues with poor air entry to auscultation is no rhonchi rales or wheezes  Cardiovascular regular rate rhythm positive S1 and S2 no S3-S4 murmur or gallop  Abdomen is soft nontender nondistended with positive bowel sounds no hepatosplenomegaly no guarding rebound  Neurologically patient awake alert oriented cranial nerves 2-12 intact    Additional Data:     Labs:    Results from last 7 days   Lab Units 10/12/22  0642   WBC Thousand/uL 14 82*   HEMOGLOBIN g/dL 12 4   HEMATOCRIT % 40 5   PLATELETS Thousands/uL 248   NEUTROS PCT % 82*   LYMPHS PCT % 14   MONOS PCT % 3*   EOS PCT % 0     Results from last 7 days   Lab Units 10/12/22  0642 10/11/22  0515   SODIUM mmol/L 140 140   POTASSIUM mmol/L 4 0 4 4   CHLORIDE mmol/L 103 105   CO2 mmol/L 30 26   BUN mg/dL 19 18   CREATININE mg/dL 1 00 0 96   ANION GAP mmol/L 7 9   CALCIUM mg/dL 9 2 8 9   ALBUMIN g/dL  --  3 6   TOTAL BILIRUBIN mg/dL  --  0 18*   ALK PHOS U/L  --  59   ALT U/L  --  49   AST U/L  --  17   GLUCOSE RANDOM mg/dL 117 143*                 Results from last 7 days   Lab Units 10/10/22  0535 10/09/22  2057   PROCALCITONIN ng/ml <0 05 <0 05           * I Have Reviewed All Lab Data Listed Above  * Additional Pertinent Lab Tests Reviewed:  All Labs Within Last 24 Hours Reviewed    Imaging:    Imaging Reports Reviewed Today Include:  No new  Imaging Personally Reviewed by Myself Includes:  No new    Recent Cultures (last 7 days):           Last 24 Hours Medication List:   Current Facility-Administered Medications   Medication Dose Route Frequency Provider Last Rate   • acetaminophen  650 mg Oral Q6H PRN Andreas Reyes PA-C     • ALPRAZolam  0 25 mg Oral Daily PRN Andreas Reyes PA-C     • benzonatate  100 mg Oral BID PRN Jesús Boles DO     • budesonide-formoterol  2 puff Inhalation BID Gerson Karri Peck MD     • buPROPion  150 mg Oral Daily Rose Torres PA-C     • diphenhydrAMINE  25 mg Intravenous Q6H PRN Tosha Higginbotham PA-C     • enoxaparin  40 mg Subcutaneous Daily Tosha Higginbotham PA-C     • fluticasone  1 spray Each Nare Daily Tosha Higginbotham PA-C     • guaiFENesin  600 mg Oral Q12H Paco Maguire MD     • hydrocodone-chlorpheniramine polistirex  5 mL Oral HS Ranjit Samano DO     • ipratropium-albuterol  3 mL Nebulization Q6H Tosha Higginbotham PA-C     • levothyroxine  25 mcg Oral Early Morning Tosha Higginbotham PA-C     • methylPREDNISolone sodium succinate  40 mg Intravenous Q8H [address] Tosha Higginbotham PA-C     • montelukast  10 mg Oral HS Tosha Higginbotham PA-C     • ondansetron  4 mg Intravenous Q6H PRN Tosha Higginbotham PA-C     • pravastatin  80 mg Oral Daily With Dinner Rose Torres PA-C          Today, Patient Was Seen By: Eldridge Olszewski    ** Please Note: Dictation voice to text software may have been used in the creation of this document   **

## 2022-10-14 NOTE — ASSESSMENT & PLAN NOTE
Patient presents with increasing shortness of breath, wheezing, productive cough  CT chest: Findings suggesting mild chronic bronchitis  No consolidation or edema  Noted to have mucus plugging calcified granuloma  Given edwards neb, duo neb, solumedrol 125mg, mag in ED with some improvement  Noted to 99% on room air, placed on mid flow in ED for comfortCovid/flu/rsv negative  White count within normal limit without any eosinophilia  Reviewed allergy panel with Pulmonary  IGE within normal limits  Patient has dust mite and cockroach allergies  Appears to be improving  · Continue bronchodilators  · IV steroids 40 mg q 8 hours, may need extended course still with conversational dyspnea  · Respiratory panel reviewed  Normal IgE  Cockroach and dust mite allergies  · Procalcitonin negative x2  · Will check IgE:  Normal  · Sputum culture if possible:  Patient has been unable to produce sputum  · Started on Symbicort monitor for thrush  · Continue singulair, flonase  · Symptomatic treatment, Mucinex  · Pulmonary evaluation:  Discussed with Pulmonary attending  Continue with current measures re-evaluate in a m   for discharge to home

## 2022-10-14 NOTE — ASSESSMENT & PLAN NOTE
Abeba from home health called regarding the order to home health care that was done 10/19/18. She stated if the patient still needs this order they need a new one sent to them. Please advise, thank you. Noted to have BMI slightly over the 30 range  Dietary modifications will be discussed

## 2022-10-14 NOTE — PLAN OF CARE
Problem: RESPIRATORY - ADULT  Goal: Achieves optimal ventilation and oxygenation  Description: INTERVENTIONS:  - Assess for changes in respiratory status  - Assess for changes in mentation and behavior  - Position to facilitate oxygenation and minimize respiratory effort  - Oxygen administered by appropriate delivery if ordered  - Initiate smoking cessation education as indicated  - Encourage broncho-pulmonary hygiene including cough, deep breathe, Incentive Spirometry  - Assess the need for suctioning and aspirate as needed  - Assess and instruct to report SOB or any respiratory difficulty  - Respiratory Therapy support as indicated  Outcome: Progressing     Problem: PAIN - ADULT  Goal: Verbalizes/displays adequate comfort level or baseline comfort level  Description: Interventions:  - Encourage patient to monitor pain and request assistance  - Assess pain using appropriate pain scale  - Administer analgesics based on type and severity of pain and evaluate response  - Implement non-pharmacological measures as appropriate and evaluate response  - Consider cultural and social influences on pain and pain management  - Notify physician/advanced practitioner if interventions unsuccessful or patient reports new pain  Outcome: Progressing     Problem: INFECTION - ADULT  Goal: Absence or prevention of progression during hospitalization  Description: INTERVENTIONS:  - Assess and monitor for signs and symptoms of infection  - Monitor lab/diagnostic results  - Monitor all insertion sites, i e  indwelling lines, tubes, and drains  - Monitor endotracheal if appropriate and nasal secretions for changes in amount and color  - [place] appropriate cooling/warming therapies per order  - Administer medications as ordered  - Instruct and encourage patient and family to use good hand hygiene technique  - Identify and instruct in appropriate isolation precautions for identified infection/condition  Outcome: Progressing

## 2022-10-14 NOTE — DISCHARGE SUMMARY
Discharge Summary - [address] [facility]    Patient Information: Moe Solano 65 y o  female MRN: [id number]  Unit/Bed#: [address] [address] Encounter: [id number]    Discharging Physician / Practitioner: Mandy Ocampo  PCP: Dorie Rodrigues  Admission Date: 10/9/2022  Discharge Date: 10/14/22    Reason for Admission: Shortness of breath    Discharge Diagnoses:     Principal Problem:    Chronic asthma, severe persistent, with acute exacerbation: Patient with severe asthma exacerbation  Plan for very long steroid taper , f/u Dr Margarita Rodriguez  Inhaled AirDuo  And aggressive pulmonary toilet with antitussives  She had no increased Oxygen requirement at the time of discharge but was still coughing intermittently   Active Problems:    Acquired hypothyroidism: continue levothyroxine    Hypertriglyceridemia:continue pravastatin    Depression: continue buproprion, and xanax through outside provider    Elevated blood pressure reading: resolved    Obesity (BMI 30-39  9)  Resolved Problems:    * No resolved hospital problems  *      Consultations During Hospital Stay:  · Pulmonary    Procedures Performed:     CT chest: CT chest without contrast    Result Date: 10/9/2022  Narrative CT CHEST WITHOUT IV CONTRAST INDICATION:   Cough, persistent persistent cough and asthma x 2 months  COMPARISON:  None  TECHNIQUE: CT examination of the chest was performed without intravenous contrast  Axial, sagittal, and coronal 2D reformatted images were created from the source data and submitted for interpretation  Radiation dose length product (DLP) for this visit:  294 86 mGy-cm   This examination, like all CT scans performed in the [facility], was performed utilizing techniques to minimize radiation dose exposure, including the use of iterative  reconstruction and automated exposure control  FINDINGS: LUNGS:  Mild diffuse bronchial wall thickening and scattered bronchial mucus plugging in the left lower lobe  Scattered punctate calcified granulomas  There is no tracheal or endobronchial lesion  PLEURA:  Unremarkable  HEART/GREAT VESSELS: Heart is unremarkable for patient's age  No thoracic aortic aneurysm  MEDIASTINUM AND LOKI:  Small hiatal hernia noted  No mediastinal or hilar lymphadenopathy  CHEST WALL AND LOWER NECK:  Unremarkable  VISUALIZED STRUCTURES IN THE UPPER ABDOMEN:  Unremarkable  OSSEOUS STRUCTURES:  Spinal degenerative changes are noted  No acute fracture or destructive osseous lesion  T9 vertebral body hemangioma  · Impression Findings suggesting mild chronic bronchitis  No consolidation or edema  Workstation performed: [id number]     Significant Findings / Test Results:     · As above    Incidental Findings:   · None    Test Results Pending at Discharge (will require follow up): · None     Outpatient Tests Requested:  · None    Complications:  None    Hospital Course:     Ester Santos is a 65 y o  female patient who originally presented to the hospital on 10/9/2022 due to severe asthma/acute bronchitis with  exacerbation  Patient admitted and placed on high dose IV steroids  Aggressive pulmonary toilet and treatment for tussive effects  Respiratory panel was negative  IGE was normal , Cockroach and dust mite allergies noted  Condition at Discharge: fair     Discharge Day Visit / Exam:     Subjective:  Pt feeling some better, less tussive paroxysms  Vitals: Blood Pressure: 140/74 (10/14/22 0800)  Pulse: 66 (10/14/22 0800)  Temperature: 97 7 °F (36 5 °C) (10/14/22 0800)  Temp Source: Oral (10/13/22 2301)  Respirations: 16 (10/14/22 0800)  Height: 5' 2" (157 5 cm) (10/09/22 2027)  Weight - Scale: 75 8 kg (167 lb) (10/13/22 0600)  SpO2: 96 % (10/14/22 0800)  Exam:   Physical Exam  Vitals reviewed  Constitutional:       General: She is not in acute distress  Appearance: She is well-developed  She is not diaphoretic  HENT:      Head: Normocephalic and atraumatic        Right Ear: External ear normal       Left Ear: External ear normal       Nose: Nose normal       Mouth/Throat:      Pharynx: No oropharyngeal exudate  Eyes:      General: No scleral icterus  Right eye: No discharge  Left eye: No discharge  Conjunctiva/sclera: Conjunctivae normal       Pupils: Pupils are equal, round, and reactive to light  Neck:      Thyroid: No thyromegaly  Vascular: No JVD  Cardiovascular:      Rate and Rhythm: Normal rate and regular rhythm  Heart sounds: Normal heart sounds  No murmur heard  No friction rub  No gallop  Pulmonary:      Effort: No respiratory distress  Breath sounds: No wheezing or rales  Comments: Mildly decreased BS  Abdominal:      General: Bowel sounds are normal  There is no distension  Palpations: Abdomen is soft  Tenderness: There is no abdominal tenderness  There is no guarding or rebound  Musculoskeletal:         General: No deformity  Normal range of motion  Cervical back: Normal range of motion and neck supple  Lymphadenopathy:      Cervical: No cervical adenopathy  Skin:     General: Skin is warm and dry  Findings: No erythema or rash  Neurological:      Mental Status: She is alert and oriented to person, place, and time  Cranial Nerves: No cranial nerve deficit  Motor: No abnormal muscle tone  Deep Tendon Reflexes: Reflexes are normal and symmetric  Reflexes normal              Discharge instructions/Information to patient and family:   See after visit summary for information provided to patient and family  Provisions for Follow-Up Care:  See after visit summary for information related to follow-up care and any pertinent home health orders  Disposition:     Home    For Discharges to [facility] SNF:   · Not Applicable to this Patient - Not Applicable to this Patient    Planned Readmission: Patient is at high risk for readmission due to severe asthma       Discharge Statement:  I spent 40 minutes discharging the patient  This time was spent on the day of discharge  I had direct contact with the patient on the day of discharge  Greater than 50% of the total time was spent examining patient, answering all patient questions, arranging and discussing plan of care with patient as well as directly providing post-discharge instructions  Additional time then spent on discharge activities  Including discussion with pulmonary attending, CM, and nursing  I have left information for transition of care to the patient's PCP    Discharge Medications:  See after visit summary for reconciled discharge medications provided to patient and family        ** Please Note: This note has been constructed using a voice recognition system **

## 2022-10-17 ENCOUNTER — TELEPHONE (OUTPATIENT)
Dept: PULMONOLOGY | Facility: MEDICAL CENTER | Age: 62
End: 2022-10-17

## 2022-10-17 ENCOUNTER — TRANSITIONAL CARE MANAGEMENT (OUTPATIENT)
Dept: FAMILY MEDICINE CLINIC | Facility: CLINIC | Age: 62
End: 2022-10-17

## 2022-10-18 NOTE — UTILIZATION REVIEW
Continued Stay Review    Date: 10/13/22                          Current Patient Class: inpatient  Current Level of Care: med surg  HPI:62 y o  female initially admitted on 10/9/22     Assessment/Plan:   10/12/22:  Persistently conversationally dyspneic, no steroid taper at this time  Lungs with expiratory wheezing noted at bases, poor air entry  10/13/22:  Ongoing significant cough causing SOB  IV steroids continued  Lungs with faint expiratory wheezing noted       Vital Signs:   10/13/22 08:33:50 98 5 °F (36 9 °C) 75 -- 159/79 106 96 % -- -- --   10/13/22 08:33:24 -- 75 -- 159/79 106 94 % -- -- --   10/13/22 0748 -- -- -- -- -- 99 % -- -- --   10/13/22 0737 -- 66 18 -- -- 95 % -- None (Room air) --   10/13/22 0141 -- -- -- -- -- 95 % -- -- --   10/12/22 22:48:14 98 5 °F (36 9 °C) 63 18 165/80 108 96 % -- None (Room air) Sitting   10/12/22 2033 -- 78 20 -- -- 98 % -- None (Room air) --   10/12/22 19:54:32 98 3 °F (36 8 °C) 68 18 141/70 94 93 % -- None (Room air) --   10/12/22 15:07:25 98 7 °F (37 1 °C) 71 18 141/67 92 92 % -- -- --   10/12/22 1338 -- -- -- -- -- 96 % -- None (Room air) --   10/12/22 0739 -- -- -- -- -- 96 % -- None (Room air) --   10/12/22 07:33:33 98 1 °F (36 7 °C) 60 18 133/68 90 96 % -- None (Room air) Lying     Pertinent Labs/Diagnostic Results:       Results from last 7 days   Lab Units 10/12/22  0642   WBC Thousand/uL 14 82*   HEMOGLOBIN g/dL 12 4   HEMATOCRIT % 40 5   PLATELETS Thousands/uL 248   NEUTROS ABS Thousands/µL 12 14*     Results from last 7 days   Lab Units 10/12/22  0642   SODIUM mmol/L 140   POTASSIUM mmol/L 4 0   CHLORIDE mmol/L 103   CO2 mmol/L 30   ANION GAP mmol/L 7   BUN mg/dL 19   CREATININE mg/dL 1 00   EGFR ml/min/1 73sq m 60   CALCIUM mg/dL 9 2         Results from last 7 days   Lab Units 10/12/22  0642   GLUCOSE RANDOM mg/dL 117                     Medications:   Scheduled Medications:  budesonide-formoterol, 2 puff, Inhalation, BID  buPROPion, 150 mg, Oral, Daily  enoxaparin, 40 mg, Subcutaneous, Daily  fluticasone, 1 spray, Each Nare, Daily  guaiFENesin, 600 mg, Oral, Q12H [address]  hydrocodone-chlorpheniramine polistirex, 5 mL, Oral, HS  ipratropium-albuterol, 3 mL, Nebulization, Q6H  levothyroxine, 25 mcg, Oral, Early Morning  methylPREDNISolone sodium succinate, 40 mg, Intravenous, Q8H ASIM  montelukast, 10 mg, Oral, HS  pravastatin, 80 mg, Oral, Daily With Dinner    PRN Meds:  acetaminophen, 650 mg, Oral, Q6H PRN  ALPRAZolam, 0 25 mg, Oral, Daily PRN  benzonatate, 100 mg, Oral, BID PRN, 10/12 x1, 10/13 x1  diphenhydrAMINE, 25 mg, Intravenous, Q6H PRN, 10/12 x2  ondansetron, 4 mg, Intravenous, Q6H PRN    Discharge Plan: d    Network Utilization Review Department  ATTENTION: Please call with any questions or concerns to [phone] and carefully listen to the prompts so that you are directed to the right person  All voicemails are confidential   [facility] all requests for admission clinical reviews, approved or denied determinations and any other requests to dedicated fax number below belonging to the campus where the patient is receiving treatment   List of dedicated fax numbers for the Facilities:  [address] DENIALS (Administrative/Medical Necessity) [phone]   [address] (Maternity/NICU/Pediatrics) [phone]   [address] [phone]   [facility] [phone]   1306 [facility] 150 [facility] [place] [address] [address] [facility] [address] [facility] [place] [facility] 1102 [facility] [place] [phone]     NOTIFICATION OF ADMISSION DISCHARGE   This is a Notification of Discharge from [address]  Please be advised that this patient has been discharge from our facility  Below you will find the admission and discharge date and time including the patient’s disposition  UTILIZATION REVIEW CONTACT:  Roma Martins  Utilization   Network Utilization Review Department  Phone: [phone] x carefully listen to the prompts  All voicemails are confidential   Email: [email or web address]     ADMISSION INFORMATION  PRESENTATION DATE: 10/9/2022  2:31 PM  OBERVATION ADMISSION DATE:   INPATIENT ADMISSION DATE: 10/9/22  6:05 PM   DISCHARGE DATE: 10/14/2022  2:43 PM  DISPOSITION: Home/Self Care Home/Self Care      IMPORTANT INFORMATION:  Send all requests for admission clinical reviews, approved or denied determinations and any other requests to dedicated fax number below belonging to the campus where the patient is receiving treatment   List of dedicated fax numbers:  [address] DENIALS (Administrative/Medical Necessity) [phone]   [address] (Maternity/NICU/Pediatrics) [phone]   [facility] [phone]   [place] [address] [phone]   [address] [phone]   [address] [facility] [phone]   [address] [place] [address] [phone]   [address] [place] [address] 119 [phone]   [facility]  [phone]   4051 [facility] [phone]   412 [facility] 850 [address] [facility] [phone]

## 2022-10-27 ENCOUNTER — OFFICE VISIT (OUTPATIENT)
Dept: FAMILY MEDICINE CLINIC | Facility: CLINIC | Age: 62
End: 2022-10-27
Payer: COMMERCIAL

## 2022-10-27 VITALS
SYSTOLIC BLOOD PRESSURE: 136 MMHG | DIASTOLIC BLOOD PRESSURE: 80 MMHG | TEMPERATURE: 98.1 F | OXYGEN SATURATION: 98 % | BODY MASS INDEX: 30.54 KG/M2 | HEART RATE: 76 BPM | WEIGHT: 167 LBS | RESPIRATION RATE: 14 BRPM

## 2022-10-27 DIAGNOSIS — J45.20 MILD INTERMITTENT ASTHMA WITHOUT COMPLICATION: ICD-10-CM

## 2022-10-27 DIAGNOSIS — E03.9 ACQUIRED HYPOTHYROIDISM: ICD-10-CM

## 2022-10-27 DIAGNOSIS — F32.A DEPRESSION, UNSPECIFIED DEPRESSION TYPE: ICD-10-CM

## 2022-10-27 DIAGNOSIS — F41.9 ANXIETY: ICD-10-CM

## 2022-10-27 DIAGNOSIS — J45.51 CHRONIC ASTHMA, SEVERE PERSISTENT, WITH ACUTE EXACERBATION: Primary | ICD-10-CM

## 2022-10-27 PROCEDURE — 99495 TRANSJ CARE MGMT MOD F2F 14D: CPT | Performed by: NURSE PRACTITIONER

## 2022-10-27 RX ORDER — MONTELUKAST SODIUM 10 MG/1
10 TABLET ORAL
Qty: 90 TABLET | Refills: 1 | Status: SHIPPED | OUTPATIENT
Start: 2022-10-27

## 2022-10-27 RX ORDER — ALPRAZOLAM 0.25 MG/1
0.25 TABLET ORAL
Qty: 30 TABLET | Refills: 1 | Status: SHIPPED | OUTPATIENT
Start: 2022-10-27

## 2022-10-27 NOTE — PATIENT INSTRUCTIONS
Stay well hydrated  Recommend at least 64 ounces water per day  Rescue inhaler 2 puffs or nebulizer treatments every 4-6 hours as needed for shortness breath, chest tightness, bronchospasm, coughing fits  At this time, recommend nebulizer treatments at least twice daily for the next few weeks  It is very important that you are using the inhaled steroid, 1 puff twice daily as directed  Finish Prednisone taper as prescribed     Continue all other medications  Follow up with pulmonary as scheduled on 11/2/2022

## 2022-10-27 NOTE — PROGRESS NOTES
INTERNAL MEDICINE TRANSITION OF CARE OFFICE VISIT  [facility] - [facility]    NAME: Aparna Lopez  AGE: 65 y o  SEX: female  : 1960     DATE: 10/27/2022     Assessment and Plan:   1  Chronic asthma, severe persistent, with acute exacerbation  Finish prednisone taper as prescribed  Advised on use of nebulizer/bronchodilators at least twice daily until symptoms improve  Rescue inhaler 2 puffs or nebulizer treatments every 4-6 hours as needed for shortness breath, chest tightness, bronchospasm, coughing fits  Counseled on use of maintenance inhaled steroids and to use as prescribed  Continue all other medications  No indication for antibiotics at this time  On exam, good aeration and no increased respiratory effort  Pulse ox 98%  Fu with pulmonary as scheduled  2  Mild intermittent asthma without complication  - montelukast (SINGULAIR) 10 mg tablet; Take 1 tablet (10 mg total) by mouth daily at bedtime  Dispense: 90 tablet; Refill: 1  3  Anxiety  Stress management  Activities to divert attention when possible  Conscious breathing techniques as discussed  Coping mechanisms and strategies vary from person to person so try to utilize strategies that you think may work for you (such as meditation, music, etc  )  - ALPRAZolam (XANAX) 0 25 mg tablet; Take 1 tablet (0 25 mg total) by mouth daily at bedtime as needed for anxiety  Dispense: 30 tablet; Refill: 1  4  Acquired hypothyroidism  Continue levothyroxine  5  Depression, unspecified depression type  Continue wellbutrin  Monitor  Counseling is recommended  Anticipatory guidance  Patient was counseled regarding instructions for management which included: impression/diagnosis, risk/benefits of treatment options, importance of compliance with treatment, risk factor reduction, and prognosis     I have reviewed the instructions with the patient answering all questions and patient verbalized understanding  Transitional Care Management Review:     Vincent Kramer is a 65 y o  female here for TCM follow-up    During the TCM phone call patient stated:    TCM Call     Date and time call was made  10/17/2022  8:41 AM    Hospital care reviewed  Records reviewed    Patient was hospitialized at  [facility]    Date of Admission  10/09/22    Date of discharge  10/14/22    Diagnosis  chronic asthma    Disposition  Home    Current Symptoms  None      TCM Call     Post hospital issues  None    Should patient be enrolled in anticoag monitoring? No    Scheduled for follow up? Yes    Did you obtain your prescribed medications  Yes    Do you need help managing your prescriptions or medications  No    Is transportation to your appointment needed  No    I have advised the patient to call PCP with any new or worsening symptoms  Pilar Rodriguez/grayson    Living Arrangements  Spouse or Significiant other    Support System  Partner    The type of support provided  Emotional; Financial; Physical    Do you have social support  Yes, as much as I need    Are you recieving any outpatient services  No    Are you recieving home care services  No    Are you using any community resources  No    Current waiver services  No    Have you fallen in the last 12 months  No    Interperter language line needed  No           HPI:   Here for follow up from hospital admission, for asthma  Has not been using rescue inhaler or nebulizer  Called pulmonary and was advised to use nebulizer more frequently  Still with a lot of mucous in chest  Reports today "spit up thick green" sputum   Not feeling well but admits to breathing improved  Currently on prednisone, taper  Will be starting new job on 11/7 and will be getting new insurance   Pulmonary fu appt 11/2  Has not been using AirDuo as prescribed  States thought is was as needed  Discussed importance of maintenance inhaled steroids as well as use of rescue inhaler/nebs  Denies any sob  No fevers  Thinks should be on antibiotic  [place] back and [place] [address] [place], [place] [address]  Has been using xanax prn and normally does not take regularly but feeling more anxious recently  Needs refill  The following portions of the patient's history were reviewed and updated as appropriate: allergies, current medications, past family history, past medical history, past social history, past surgical history and problem list      Review of Systems:     Review of Systems   Constitutional: Positive for diaphoresis  Negative for activity change, appetite change and fever  HENT: Positive for postnasal drip  Negative for sinus pressure and sore throat  Lost voice   Respiratory: Positive for cough and shortness of breath  Gastrointestinal: Negative for constipation, diarrhea (yesterday but resolved today), nausea and vomiting  Allergic/Immunologic: Negative for immunocompromised state  Neurological: Negative for dizziness, weakness and headaches  Problem List:     Patient Active Problem List   Diagnosis   • Mild intermittent asthma without complication   • Acquired hypothyroidism   • Vitamin D deficiency   • Anxiety   • Vitamin B12 deficiency   • Mediterranean anemia   • Mixed hyperlipidemia   • Hypertriglyceridemia   • Pemphigus vulgaris   • DDD (degenerative disc disease), lumbar   • Lumbar radiculopathy   • Weight gain   • Obesity (BMI 30-39  9)   • COVID-19   • Post-menopausal bleeding   • Abdominal bloating   • Depression   • Gastric erosion   • Hyperplastic colonic polyp   • Chronic asthma, severe persistent, with acute exacerbation   • Elevated blood pressure reading        Objective:   Hospital records reviewed  W, admitted 10/9, discharged 10/14  Reason for Admission: Shortness of breath     Discharge Diagnoses:      Principal Problem:    Chronic asthma, severe persistent, with acute exacerbation: Patient with severe asthma exacerbation   Plan for very long steroid taper , f/u Dr Mio Ramirez  Inhaled AirDuo  And aggressive pulmonary toilet with antitussives  She had no increased Oxygen requirement at the time of discharge but was still coughing intermittently   Active Problems:    Acquired hypothyroidism: continue levothyroxine    Hypertriglyceridemia:continue pravastatin    Depression: continue buproprion, and xanax through outside provider    Elevated blood pressure reading: resolved    Obesity (BMI 30-39  9)  Resolved Problems:    * No resolved hospital problems  [facility] Course:      Kahlil Graham is a 65 y o  female patient who originally presented to the hospital on 10/9/2022 due to severe asthma/acute bronchitis with  exacerbation  Patient admitted and placed on high dose IV steroids  Aggressive pulmonary toilet and treatment for tussive effects  Respiratory panel was negative  IGE was normal , Cockroach and dust mite allergies noted   Consultations During Hospital Stay:  · Pulmonary    /80   Pulse 76   Temp 98 1 °F (36 7 °C) (Tympanic)   Resp 14   Wt 75 8 kg (167 lb)   SpO2 98%   BMI 30 54 kg/m²     Physical Exam  Vitals reviewed  Constitutional:       General: She is not in acute distress  Appearance: She is not ill-appearing  Neck:      Vascular: No carotid bruit  Cardiovascular:      Rate and Rhythm: Normal rate and regular rhythm  Pulmonary:      Effort: Pulmonary effort is normal  No respiratory distress  Breath sounds: Normal breath sounds  No wheezing, rhonchi or rales  Abdominal:      General: There is no distension  Palpations: Abdomen is soft  Musculoskeletal:      Cervical back: Normal range of motion  Right lower leg: No edema  Left lower leg: No edema  Skin:     General: Skin is warm and dry  Coloration: Skin is not jaundiced or pale  Neurological:      General: No focal deficit present  Mental Status: She is alert and oriented to person, place, and time        Cranial Nerves: No cranial nerve deficit  Sensory: No sensory deficit  Psychiatric:         Behavior: Behavior normal          Thought Content: Thought content normal          Judgment: Judgment normal       Comments: Anxious  Well groomed  Dressed appropriately for the weather  Calm  Pleasant   Cooperative  Good eye contact  Converses freely and appropriately  Laboratory Results: I have personally reviewed the pertinent laboratory results/reports   Recent Results (from the past 672 hour(s))   CBC and differential    Collection Time: 10/09/22  3:12 PM   Result Value Ref Range    WBC 8 71 4 31 - 10 16 Thousand/uL    RBC 5 36 (H) 3 81 - 5 12 Million/uL    Hemoglobin 13 0 11 5 - 15 4 g/dL    Hematocrit 42 2 34 8 - 46 1 %    MCV 79 (L) 82 - 98 fL    MCH 24 3 (L) 26 8 - 34 3 pg    MCHC 30 8 (L) 31 4 - 37 4 g/dL    RDW 15 7 (H) 11 6 - 15 1 %    MPV 12 1 8 9 - 12 7 fL    Platelets 190 205 - 236 Thousands/uL    nRBC 0 /100 WBCs    Neutrophils Relative 46 43 - 75 %    Immat GRANS % 0 0 - 2 %    Lymphocytes Relative 38 14 - 44 %    Monocytes Relative 7 4 - 12 %    Eosinophils Relative 8 (H) 0 - 6 %    Basophils Relative 1 0 - 1 %    Neutrophils Absolute 4 01 1 85 - 7 62 Thousands/µL    Immature Grans Absolute 0 03 0 00 - 0 20 Thousand/uL    Lymphocytes Absolute 3 31 0 60 - 4 47 Thousands/µL    Monocytes Absolute 0 61 0 17 - 1 22 Thousand/µL    Eosinophils Absolute 0 65 (H) 0 00 - 0 61 Thousand/µL    Basophils Absolute 0 10 0 00 - 0 10 Thousands/µL   Basic metabolic panel    Collection Time: 10/09/22  3:12 PM   Result Value Ref Range    Sodium 141 135 - 147 mmol/L    Potassium 3 5 3 5 - 5 3 mmol/L    Chloride 104 96 - 108 mmol/L    CO2 28 21 - 32 mmol/L    ANION GAP 9 4 - 13 mmol/L    BUN 13 5 - 25 mg/dL    Creatinine 0 99 0 60 - 1 30 mg/dL    Glucose 94 65 - 140 mg/dL    Calcium 9 4 8 3 - 10 1 mg/dL    eGFR 61 ml/min/1 73sq m   Respiratory Panel 2  1(RP2)with COVID19    Collection Time: 10/09/22  3:12 PM    Specimen: Nasopharyngeal Swab   Result Value Ref Range    Adenovirus Not Detected Not Detected    Bordetella parapertussis Not Detected Not Detected    Bordetella pertussis Not Detected Not Detected    Chlamydia pneumoniae Not Detected Not detected    SARS-CoV-2 Not Detected Not Detected    Coronavirus 229E Not Detected Not Detected    Coronavirus HKU1 Not Detected Not Detected    Coronavirus NL63 Not Detected Not Detected    Coronavirus OC43 Not Detected Not Detected    Human Metapneumovirus Not Detected Not Detected    Rhino/Enterovirus Not Detected Not Detected    Influenza A Not Detected Not Detected    Influenza B Not Detected No Detected    Mycoplasma pneumoniae Not Detected Not Detected    Parainfluenza 1 Not Detected Not Detected    Parainfluenza 2 Not Detected Not Detected    Parainfluenza 3 Not Detected Not Detected    Parainfluenza 4 Not Detected Not Detected    Respiratory Syncytial Virus Not Detected Not Detected   FLU/RSV/COVID - if FLU/RSV clinically relevant    Collection Time: 10/09/22  5:40 PM    Specimen: Nose; Nares   Result Value Ref Range    SARS-CoV-2 Negative Negative    INFLUENZA A PCR Negative Negative    INFLUENZA B PCR Negative Negative    RSV PCR Negative Negative   Blood gas, arterial    Collection Time: 10/09/22  5:54 PM   Result Value Ref Range    pH, Arterial 7 448 7 350 - 7 450    PH ART TC 7 459 (H) 7 350 - 7 450    pCO2, Arterial 34 1 (L) 36 0 - 44 0 mm Hg    PCO2 (TC) Arterial 33 1 (L) 36 0 - 44 0 mm Hg    pO2, Arterial 100 1 75 0 - 129 0 mm Hg    PO2 (TC) Arterial 96 0 75 0 - 129 0 mm Hg    HCO3, Arterial 23 1 22 0 - 28 0 mmol/L    Base Excess, Arterial -0 4 mmol/L    O2 Content, Arterial 18 8 16 0 - 23 0 mL/dL    O2 HGB,Arterial  97 6 (H) 94 0 - 97 0 %    SOURCE Radial, Right     SHAKILA TEST Yes     Temperature 97 4 Degrees Fehrenheit    Nasal Cannula 2    Procalcitonin    Collection Time: 10/09/22  8:57 PM   Result Value Ref Range    Procalcitonin <0 05 <=0 25 ng/ml   Basic metabolic panel    Collection Time: 10/10/22  5:35 AM   Result Value Ref Range    Sodium 139 135 - 147 mmol/L    Potassium 4 1 3 5 - 5 3 mmol/L    Chloride 104 96 - 108 mmol/L    CO2 27 21 - 32 mmol/L    ANION GAP 8 4 - 13 mmol/L    BUN 13 5 - 25 mg/dL    Creatinine 1 05 0 60 - 1 30 mg/dL    Glucose 119 65 - 140 mg/dL    Calcium 9 0 8 3 - 10 1 mg/dL    eGFR 57 ml/min/1 73sq m   CBC and differential    Collection Time: 10/10/22  5:35 AM   Result Value Ref Range    WBC 9 26 4 31 - 10 16 Thousand/uL    RBC 4 82 3 81 - 5 12 Million/uL    Hemoglobin 12 0 11 5 - 15 4 g/dL    Hematocrit 38 0 34 8 - 46 1 %    MCV 79 (L) 82 - 98 fL    MCH 24 9 (L) 26 8 - 34 3 pg    MCHC 31 6 31 4 - 37 4 g/dL    RDW 15 6 (H) 11 6 - 15 1 %    MPV 12 2 8 9 - 12 7 fL    Platelets 482 909 - 453 Thousands/uL    nRBC 0 /100 WBCs    Neutrophils Relative 83 (H) 43 - 75 %    Immat GRANS % 0 0 - 2 %    Lymphocytes Relative 15 14 - 44 %    Monocytes Relative 2 (L) 4 - 12 %    Eosinophils Relative 0 0 - 6 %    Basophils Relative 0 0 - 1 %    Neutrophils Absolute 7 62 1 85 - 7 62 Thousands/µL    Immature Grans Absolute 0 03 0 00 - 0 20 Thousand/uL    Lymphocytes Absolute 1 40 0 60 - 4 47 Thousands/µL    Monocytes Absolute 0 20 0 17 - 1 22 Thousand/µL    Eosinophils Absolute 0 00 0 00 - 0 61 Thousand/µL    Basophils Absolute 0 01 0 00 - 0 10 Thousands/µL   Procalcitonin    Collection Time: 10/10/22  5:35 AM   Result Value Ref Range    Procalcitonin <0 05 <=0 25 ng/ml   CBC    Collection Time: 10/11/22  5:15 AM   Result Value Ref Range    WBC 18 21 (H) 4 31 - 10 16 Thousand/uL    RBC 4 82 3 81 - 5 12 Million/uL    Hemoglobin 12 0 11 5 - 15 4 g/dL    Hematocrit 38 3 34 8 - 46 1 %    MCV 80 (L) 82 - 98 fL    MCH 24 9 (L) 26 8 - 34 3 pg    MCHC 31 3 (L) 31 4 - 37 4 g/dL    RDW 15 9 (H) 11 6 - 15 1 %    Platelets 968 946 - 390 Thousands/uL    MPV 12 4 8 9 - 12 7 fL   Comprehensive metabolic panel    Collection Time: 10/11/22  5:15 AM   Result Value Ref Range    Sodium 140 135 - 147 mmol/L    Potassium 4 4 3 5 - 5 3 mmol/L    Chloride 105 96 - 108 mmol/L    CO2 26 21 - 32 mmol/L    ANION GAP 9 4 - 13 mmol/L    BUN 18 5 - 25 mg/dL    Creatinine 0 96 0 60 - 1 30 mg/dL    Glucose 143 (H) 65 - 140 mg/dL    Calcium 8 9 8 3 - 10 1 mg/dL    AST 17 5 - 45 U/L    ALT 49 12 - 78 U/L    Alkaline Phosphatase 59 46 - 116 U/L    Total Protein 7 0 6 4 - 8 4 g/dL    Albumin 3 6 3 5 - 5 0 g/dL    Total Bilirubin 0 18 (L) 0 20 - 1 00 mg/dL    eGFR 64 ml/min/1 73sq m   [facility] Allergy Panel, Adult    Collection Time: 10/11/22  5:15 AM   Result Value Ref Range    A  ALTERNATA <0 10 0 00 - 0 09 kUA/I    A  FUMIGATUS <0 10 0 00 - 0 09 kUA/I    Bermuda Grass <0 10 0 00 - 0 09 kUA/I    [place]ancock  <0 10 0 00 - 0 09 kUA/I    Cat Epithellium-Dander <0 10 0 00 - 0 09 kUA/I    C HERBARUM <0 10 0 00 - 0 09 kUA/I    Cockroach 0 11 (H) 0 00 - 0 09 kUA/I    Common Silver Birch <0 10 0 00 - 0 09 kUA/I    Gulf <0 10 0 00 - 0 09 kUA/I    D  farinae 0 30 (H) 0 00 - 0 09 kUA/I    D  pteronyssinus 0 36 (H) 0 00 - 0 09 kUA/I    Dog Dander <0 10 0 00 - 0 09 kUA/I    Elm IgE <0 10 0 00 - 0 09 kUA/I    Mountain [place] Tree <0 10 0 00 - 0 09 kUA/I    Mugwort <0 10 0 00 - 0 09 kUA/I    [place]ace Tree <0 10 0 00 - 0 09 kUA/I    Oak <0 10 0 00 - 0 09 kUA/I    P CHRYSOGENUM <0 10 0 00 - 0 09 kUA/I    Rough Pigweed  IgE <0 10 0 00 - 0 09 kUA/I    Common Ragweed <0 10 0 00 - 0 09 kUA/I    Sheep Sorrel IgE <0 10 0 00 - 0 09 kUA/I    [place] Tree <0 10 0 00 - 0 09 kUA/I    Jaguar Grass <0 10 0 00 - 0 09 kUA/I    [place] Tree <0 10 0 00 - 0 09 kUA/I    White Vinicius Tree <0 10 0 00 - 0 09 kUA/I    IgE 24 2 0 - 113 kU/l    MOUSE URINE <0 10 0 00 - 0 09 kUA/I   CBC and differential    Collection Time: 10/12/22  6:42 AM   Result Value Ref Range    WBC 14 82 (H) 4 31 - 10 16 Thousand/uL    RBC 5 09 3 81 - 5 12 Million/uL    Hemoglobin 12 4 11 5 - 15 4 g/dL    Hematocrit 40 5 34 8 - 46 1 %    MCV 80 (L) 82 - 98 fL    MCH 24 4 (L) 26 8 - 34 3 pg    MCHC 30 6 (L) 31 4 - 37 4 g/dL    RDW 16 0 (H) 11 6 - 15 1 %    MPV 12 1 8 9 - 12 7 fL    Platelets 072 367 - 264 Thousands/uL    nRBC 0 /100 WBCs    Neutrophils Relative 82 (H) 43 - 75 %    Immat GRANS % 1 0 - 2 %    Lymphocytes Relative 14 14 - 44 %    Monocytes Relative 3 (L) 4 - 12 %    Eosinophils Relative 0 0 - 6 %    Basophils Relative 0 0 - 1 %    Neutrophils Absolute 12 14 (H) 1 85 - 7 62 Thousands/µL    Immature Grans Absolute 0 15 0 00 - 0 20 Thousand/uL    Lymphocytes Absolute 2 11 0 60 - 4 47 Thousands/µL    Monocytes Absolute 0 41 0 17 - 1 22 Thousand/µL    Eosinophils Absolute 0 00 0 00 - 0 61 Thousand/µL    Basophils Absolute 0 01 0 00 - 0 10 Thousands/µL   Basic metabolic panel    Collection Time: 10/12/22  6:42 AM   Result Value Ref Range    Sodium 140 135 - 147 mmol/L    Potassium 4 0 3 5 - 5 3 mmol/L    Chloride 103 96 - 108 mmol/L    CO2 30 21 - 32 mmol/L    ANION GAP 7 4 - 13 mmol/L    BUN 19 5 - 25 mg/dL    Creatinine 1 00 0 60 - 1 30 mg/dL    Glucose 117 65 - 140 mg/dL    Calcium 9 2 8 3 - 10 1 mg/dL    eGFR 60 ml/min/1 73sq m       Radiology/Other Diagnostic Testing Results: I have personally reviewed pertinent reports  CT chest without contrast    Result Date: 10/9/2022  CT CHEST WITHOUT IV CONTRAST INDICATION:   Cough, persistent persistent cough and asthma x 2 months  COMPARISON:  None  TECHNIQUE: CT examination of the chest was performed without intravenous contrast  Axial, sagittal, and coronal 2D reformatted images were created from the source data and submitted for interpretation  Radiation dose length product (DLP) for this visit:  294 86 mGy-cm   This examination, like all CT scans performed in the [facility], was performed utilizing techniques to minimize radiation dose exposure, including the use of iterative  reconstruction and automated exposure control   FINDINGS: LUNGS:  Mild diffuse bronchial wall thickening and scattered bronchial mucus plugging in the left lower lobe  Scattered punctate calcified granulomas  There is no tracheal or endobronchial lesion  PLEURA:  Unremarkable  HEART/GREAT VESSELS: Heart is unremarkable for patient's age  No thoracic aortic aneurysm  MEDIASTINUM AND LOKI:  Small hiatal hernia noted  No mediastinal or hilar lymphadenopathy  CHEST WALL AND LOWER NECK:  Unremarkable  VISUALIZED STRUCTURES IN THE UPPER ABDOMEN:  Unremarkable  OSSEOUS STRUCTURES:  Spinal degenerative changes are noted  No acute fracture or destructive osseous lesion  T9 vertebral body hemangioma  Findings suggesting mild chronic bronchitis  No consolidation or edema  Workstation performed: [id number]        Current Medications:     Outpatient Medications Prior to Visit   Medication Sig Dispense Refill   • albuterol (2 5 mg/3 mL) 0 083 % nebulizer solution Take 3 mL (2 5 mg total) by nebulization every 6 (six) hours as needed for wheezing or shortness of breath 75 mL 0   • albuterol (PROVENTIL HFA,VENTOLIN HFA) 90 mcg/act inhaler Inhale 2 puffs every 6 (six) hours as needed for wheezing 8 5 g 0   • ALPRAZolam (XANAX) 0 25 mg tablet Take 1 tablet (0 25 mg total) by mouth daily at bedtime as needed for anxiety 30 tablet 1   • benzonatate (TESSALON PERLES) 100 mg capsule Take 1 capsule (100 mg total) by mouth 2 (two) times a day as needed for cough 20 capsule 0   • buPROPion (Wellbutrin XL) 150 mg 24 hr tablet 1 tablet po daily 90 tablet 3   • ergocalciferol (VITAMIN D2) 50,000 units Take 1 capsule (50,000 Units total) by mouth once a week 12 capsule 1   • fluticasone (FLONASE) 50 mcg/act nasal spray instill 1 spray into each nostril once daily 16 g 3   • Fluticasone-Salmeterol,sensor, (AirDuo Digihaler) 113-14 MCG/ACT AEPB Inhale 1 puff 2 (two) times a day Rinse mouth after use   1 each 0   • levothyroxine 25 mcg tablet Take one tablet on an empty stomach every night time 90 tablet 3   • Multiple Vitamins-Minerals (ZINC PO) Take by mouth daily • predniSONE 10 mg tablet 60 mg by mouth daily for 6 days, then 50 mg by mouth daily for 6 days, then 40 mg by mouth daily for 6 days, 30 mg by mouth for 6 days, 20 mg by mouth for 6 days , 10 mg by mouth for 6 days then stop 150 tablet 0   • rosuvastatin (CRESTOR) 10 MG tablet Take 1 tablet (10 mg total) by mouth daily 90 tablet 1   • montelukast (SINGULAIR) 10 mg tablet Take 1 tablet (10 mg total) by mouth daily at bedtime 30 tablet 1   • guaiFENesin (MUCINEX) 600 mg 12 hr tablet Take 1 tablet (600 mg total) by mouth every 12 (twelve) hours (Patient not taking: No sig reported) 30 tablet 0   • ipratropium-albuterol (DUO-NEB) 0 5-2 5 mg/3 mL nebulizer solution Take 3 mL by nebulization 4 (four) times a day (Patient not taking: No sig reported) 100 mL 0     No facility-administered medications prior to visit         Gonzalo Peralta [id number]

## 2022-11-02 ENCOUNTER — OFFICE VISIT (OUTPATIENT)
Dept: PULMONOLOGY | Facility: MEDICAL CENTER | Age: 62
End: 2022-11-02

## 2022-11-02 VITALS
WEIGHT: 166 LBS | SYSTOLIC BLOOD PRESSURE: 142 MMHG | DIASTOLIC BLOOD PRESSURE: 80 MMHG | BODY MASS INDEX: 30.55 KG/M2 | TEMPERATURE: 97.9 F | HEART RATE: 56 BPM | OXYGEN SATURATION: 99 % | HEIGHT: 62 IN

## 2022-11-02 DIAGNOSIS — J45.51 CHRONIC ASTHMA, SEVERE PERSISTENT, WITH ACUTE EXACERBATION: Primary | ICD-10-CM

## 2022-11-02 DIAGNOSIS — R09.82 POSTNASAL DRIP: ICD-10-CM

## 2022-11-02 RX ORDER — FLUTICASONE PROPIONATE AND SALMETEROL 113; 14 UG/1; UG/1
1 POWDER, METERED RESPIRATORY (INHALATION) 2 TIMES DAILY
Qty: 1 EACH | Refills: 6 | Status: SHIPPED | OUTPATIENT
Start: 2022-11-02

## 2022-11-02 RX ORDER — ALBUTEROL SULFATE 90 UG/1
2 AEROSOL, METERED RESPIRATORY (INHALATION) EVERY 6 HOURS PRN
Qty: 8.5 G | Refills: 6 | Status: SHIPPED | OUTPATIENT
Start: 2022-11-02

## 2022-11-02 NOTE — PROGRESS NOTES
Progress note - Pulmonary Medicine   Noemy Petty 65 y o  female MRN: [id number]       Impression & Plan:     Chronic asthma, severe persistent, with acute exacerbation  Improved asthma exacerbation, currently she is stable and I decided to taper her steroids faster so I told her to start 20 mg tomorrow for 2 days and then 10 mg for 2 days then 5 mg for 2 days then she will stop  Otherwise continue air duo and p r n  Albuterol and I will check PFTs given the chronicity of her asthma since childhood  Her asthma does not to be allergic with no eosinophils and no significant allergies on the allergy test that was done recently  Postnasal drip  Continue Flonase      Diagnoses and all orders for this visit:    Chronic asthma, severe persistent, with acute exacerbation  -     Ambulatory referral to Pulmonology  -     Fluticasone-Salmeterol,sensor, (Samaria Shapleigh) 113-14 MCG/ACT AEPB; Inhale 1 puff 2 (two) times a day Rinse mouth after use  -     albuterol (PROVENTIL HFA,VENTOLIN HFA) 90 mcg/act inhaler; Inhale 2 puffs every 6 (six) hours as needed for wheezing or shortness of breath  -     Complete PFT with post bronchodilator; Future    Postnasal drip      ______________________________________________________________________    HPI:    Noemy Petty presents today for follow-up of asthma with recent hospitalization due to asthma exacerbation  Patient had asthma all her life since childhood, it seems to be getting worse recently but she was managed all the time with p r n  Albuterol  She had a hospitalization few years ago that was significant and then this recent exacerbation few weeks ago  No history of intubation  No clear triggers of her asthma  She was discharged in mid October on a very prolonged prednisone taper, started with 60 mg and taper by 10 mg every 6 days, today is day 3 of 40 mg  She feels some shaking    She denies shortness of breath or wheezing or cough but she still has some thick mucus in her throat  She has postnasal drip and she is on Flonase  She denies GERD or dysphagia  Otherwise she feels much better      Current Medications:    Current Outpatient Medications:   •  albuterol (2 5 mg/3 mL) 0 083 % nebulizer solution, Take 3 mL (2 5 mg total) by nebulization every 6 (six) hours as needed for wheezing or shortness of breath, Disp: 75 mL, Rfl: 0  •  albuterol (PROVENTIL HFA,VENTOLIN HFA) 90 mcg/act inhaler, Inhale 2 puffs every 6 (six) hours as needed for wheezing, Disp: 8 5 g, Rfl: 0  •  ALPRAZolam (XANAX) 0 25 mg tablet, Take 1 tablet (0 25 mg total) by mouth daily at bedtime as needed for anxiety, Disp: 30 tablet, Rfl: 1  •  benzonatate (TESSALON PERLES) 100 mg capsule, Take 1 capsule (100 mg total) by mouth 2 (two) times a day as needed for cough (Patient taking differently: Take 100 mg by mouth 2 (two) times a day as needed for cough Prn), Disp: 20 capsule, Rfl: 0  •  buPROPion (Wellbutrin XL) 150 mg 24 hr tablet, 1 tablet po daily, Disp: 90 tablet, Rfl: 3  •  ergocalciferol (VITAMIN D2) 50,000 units, Take 1 capsule (50,000 Units total) by mouth once a week, Disp: 12 capsule, Rfl: 1  •  fluticasone (FLONASE) 50 mcg/act nasal spray, instill 1 spray into each nostril once daily, Disp: 16 g, Rfl: 3  •  Fluticasone-Salmeterol,sensor, (AirDuo Digihaler) 113-14 MCG/ACT AEPB, Inhale 1 puff 2 (two) times a day Rinse mouth after use , Disp: 1 each, Rfl: 0  •  levothyroxine 25 mcg tablet, Take one tablet on an empty stomach every night time, Disp: 90 tablet, Rfl: 3  •  montelukast (SINGULAIR) 10 mg tablet, Take 1 tablet (10 mg total) by mouth daily at bedtime, Disp: 90 tablet, Rfl: 1  •  Multiple Vitamins-Minerals (ZINC PO), Take by mouth daily  , Disp: , Rfl:   •  predniSONE 10 mg tablet, 60 mg by mouth daily for 6 days, then 50 mg by mouth daily for 6 days, then 40 mg by mouth daily for 6 days, 30 mg by mouth for 6 days, 20 mg by mouth for 6 days , 10 mg by mouth for 6 days then stop, Disp: 150 tablet, Rfl: 0  •  rosuvastatin (CRESTOR) 10 MG tablet, Take 1 tablet (10 mg total) by mouth daily, Disp: 90 tablet, Rfl: 1  •  guaiFENesin (MUCINEX) 600 mg 12 hr tablet, Take 1 tablet (600 mg total) by mouth every 12 (twelve) hours (Patient not taking: No sig reported), Disp: 30 tablet, Rfl: 0  •  ipratropium-albuterol (DUO-NEB) 0 5-2 5 mg/3 mL nebulizer solution, Take 3 mL by nebulization 4 (four) times a day (Patient not taking: No sig reported), Disp: 100 mL, Rfl: 0    Review of Systems:  Review of Systems   Constitutional: Negative  HENT: Negative  Eyes: Negative  Respiratory: Positive for shortness of breath  Cardiovascular: Negative  Gastrointestinal: Negative  Endocrine: Negative  Genitourinary: Negative  Musculoskeletal: Negative  Skin: Negative  Allergic/Immunologic: Negative  Neurological: Negative  Hematological: Negative  Psychiatric/Behavioral: Negative        Aside from what is mentioned in the HPI, the review of systems is otherwise negative    Past medical history, surgical history, and family history were reviewed and updated as appropriate    Social history updates:  Social History     Tobacco Use   Smoking Status Former Smoker   • Years: 10 00   • Quit date:    • Years since quittin 8   Smokeless Tobacco Never Used       PhysicalExamination:  Vitals:   /80   Pulse 56   Temp 97 9 °F (36 6 °C)   Ht 5' 2" (1 575 m)   Wt 75 3 kg (166 lb)   SpO2 99%   BMI 30 36 kg/m²     General: alert, not in acute distress  HEENT: PERRL, no icteric sclera or cyanosis, no thrush  Neck:  Supple, no lymphadenopathy or thyromegaly, no JVD  Lungs:  Equal breath sounds and clear auscultations bilaterally, no wheezing or crackles  Heart: S1S2 regular, no murmurs or gallops  Abdomen: soft, nontender, bowel sounds  present  Extremities: no edema, no clubbing or cyanosis  Neuro: Alert and oriented x 3, no focal neurodeficits   Skin: intact, no rashes    Diagnostic Data:  Labs: I personally reviewed the most recent laboratory data pertinent to today's visit    Lab Results   Component Value Date    WBC 14 82 (H) 10/12/2022    HGB 12 4 10/12/2022    HCT 40 5 10/12/2022    MCV 80 (L) 10/12/2022     10/12/2022     Lab Results   Component Value Date    SODIUM 140 10/12/2022    K 4 0 10/12/2022    CO2 30 10/12/2022     10/12/2022    BUN 19 10/12/2022    CREATININE 1 00 10/12/2022    CALCIUM 9 2 10/12/2022           Imaging:  I personally reviewed the images on the [facility] pertinent to today's visit  Chest CT scan reviewed on PACs:  Few areas of bronchial mucosal thickening but otherwise grossly normal with no suspicious lesions or nodules      Other studies:  2019 cardiac stress test: IMPRESSIONS: Normal study after maximal exercise without reproduction of symptoms        Jeremías Guzman

## 2022-11-02 NOTE — ASSESSMENT & PLAN NOTE
Improved asthma exacerbation, currently she is stable and I decided to taper her steroids faster so I told her to start 20 mg tomorrow for 2 days and then 10 mg for 2 days then 5 mg for 2 days then she will stop  Otherwise continue air duo and p r n  Albuterol and I will check PFTs given the chronicity of her asthma since childhood  Her asthma does not to be allergic with no eosinophils and no significant allergies on the allergy test that was done recently

## 2022-11-09 DIAGNOSIS — J30.2 SEASONAL ALLERGIC RHINITIS, UNSPECIFIED TRIGGER: ICD-10-CM

## 2022-11-10 RX ORDER — FLUTICASONE PROPIONATE 50 MCG
SPRAY, SUSPENSION (ML) NASAL
Qty: 16 G | Refills: 3 | Status: SHIPPED | OUTPATIENT
Start: 2022-11-10

## 2022-12-26 DIAGNOSIS — E55.9 VITAMIN D DEFICIENCY: ICD-10-CM

## 2022-12-27 ENCOUNTER — TELEPHONE (OUTPATIENT)
Dept: FAMILY MEDICINE CLINIC | Facility: CLINIC | Age: 62
End: 2022-12-27

## 2022-12-27 RX ORDER — ERGOCALCIFEROL 1.25 MG/1
CAPSULE ORAL
Qty: 12 CAPSULE | Refills: 1 | Status: SHIPPED | OUTPATIENT
Start: 2022-12-27

## 2023-01-16 DIAGNOSIS — F32.A DEPRESSION, UNSPECIFIED DEPRESSION TYPE: ICD-10-CM

## 2023-01-16 DIAGNOSIS — E03.9 ACQUIRED HYPOTHYROIDISM: ICD-10-CM

## 2023-01-16 DIAGNOSIS — F41.9 ANXIETY: ICD-10-CM

## 2023-01-16 DIAGNOSIS — E78.2 MIXED HYPERLIPIDEMIA: ICD-10-CM

## 2023-01-16 DIAGNOSIS — E78.1 HYPERTRIGLYCERIDEMIA: ICD-10-CM

## 2023-01-17 RX ORDER — ROSUVASTATIN CALCIUM 10 MG/1
10 TABLET, COATED ORAL DAILY
Qty: 90 TABLET | Refills: 0 | Status: SHIPPED | OUTPATIENT
Start: 2023-01-17

## 2023-01-17 RX ORDER — BUPROPION HYDROCHLORIDE 150 MG/1
TABLET ORAL
Qty: 90 TABLET | Refills: 0 | Status: SHIPPED | OUTPATIENT
Start: 2023-01-17

## 2023-01-17 RX ORDER — LEVOTHYROXINE SODIUM 0.03 MG/1
TABLET ORAL
Qty: 90 TABLET | Refills: 0 | Status: SHIPPED | OUTPATIENT
Start: 2023-01-17

## 2023-01-17 RX ORDER — ALPRAZOLAM 0.25 MG/1
0.25 TABLET ORAL
Qty: 30 TABLET | Refills: 0 | Status: SHIPPED | OUTPATIENT
Start: 2023-01-17

## 2023-01-20 ENCOUNTER — TELEPHONE (OUTPATIENT)
Dept: FAMILY MEDICINE CLINIC | Facility: CLINIC | Age: 63
End: 2023-01-20

## 2023-01-20 DIAGNOSIS — E78.2 MIXED HYPERLIPIDEMIA: ICD-10-CM

## 2023-01-20 DIAGNOSIS — Z00.00 ANNUAL PHYSICAL EXAM: ICD-10-CM

## 2023-01-20 DIAGNOSIS — D56.9 MEDITERRANEAN ANEMIA: ICD-10-CM

## 2023-01-20 DIAGNOSIS — R73.9 ELEVATED SERUM GLUCOSE: ICD-10-CM

## 2023-01-20 DIAGNOSIS — E53.8 VITAMIN B12 DEFICIENCY: ICD-10-CM

## 2023-01-20 DIAGNOSIS — E78.1 HYPERTRIGLYCERIDEMIA: ICD-10-CM

## 2023-01-20 DIAGNOSIS — E03.9 ACQUIRED HYPOTHYROIDISM: Primary | ICD-10-CM

## 2023-01-20 DIAGNOSIS — E55.9 VITAMIN D DEFICIENCY: ICD-10-CM

## 2023-01-20 NOTE — TELEPHONE ENCOUNTER
----- Message from [facility] sent at 1/20/2023  8:24 AM EST -----  Regarding: labs  Pt has upcoming appt for physical on 1/23  Needs to have labs done prior to appt  Orders in chart  Please call pt to advise  If labs are not done, appt will need to be re-scheduled  TY    Spoke with patient  She will not be able to have labs done prior to appointment  Patient will call me back to reschedule

## 2023-02-27 ENCOUNTER — TELEPHONE (OUTPATIENT)
Dept: PULMONOLOGY | Facility: MEDICAL CENTER | Age: 63
End: 2023-02-27

## 2023-02-27 NOTE — TELEPHONE ENCOUNTER
LM for patient to call office back to schedule follow up with Dr Adin Pearce, schedule next available  Appointment reminder mailed

## 2023-03-01 DIAGNOSIS — N95.1 MENOPAUSAL AND FEMALE CLIMACTERIC STATES: ICD-10-CM

## 2023-03-06 NOTE — TELEPHONE ENCOUNTER
----- Message from Blaire Flores MD sent at 3/21/2020  2:00 PM EDT -----  Please inform the patient that the ultrasound was completely normal   No evidence of gallstones  If patient is having persistent right upper quadrant abdominal pain, would recommend EGD at the same time as colonoscopy to rule out peptic ulcer disease 
Spoke to patient, notified of results  Pt expressed understanding and would like egd added to colon
,

## 2023-04-03 ENCOUNTER — NON-APPOINTMENT (OUTPATIENT)
Age: 63
End: 2023-04-03

## 2023-04-03 ENCOUNTER — APPOINTMENT (OUTPATIENT)
Dept: CARDIOLOGY | Facility: CLINIC | Age: 63
End: 2023-04-03
Payer: COMMERCIAL

## 2023-04-03 VITALS
SYSTOLIC BLOOD PRESSURE: 140 MMHG | DIASTOLIC BLOOD PRESSURE: 82 MMHG | OXYGEN SATURATION: 98 % | RESPIRATION RATE: 15 BRPM | WEIGHT: 159 LBS | HEIGHT: 62 IN | HEART RATE: 64 BPM | BODY MASS INDEX: 29.26 KG/M2 | TEMPERATURE: 97.6 F

## 2023-04-03 VITALS — SYSTOLIC BLOOD PRESSURE: 144 MMHG | DIASTOLIC BLOOD PRESSURE: 76 MMHG

## 2023-04-03 DIAGNOSIS — Z86.2 PERSONAL HISTORY OF DISEASES OF THE BLOOD AND BLOOD-FORMING ORGANS AND CERTAIN DISORDERS INVOLVING THE IMMUNE MECHANISM: ICD-10-CM

## 2023-04-03 DIAGNOSIS — E03.9 HYPOTHYROIDISM, UNSPECIFIED: ICD-10-CM

## 2023-04-03 DIAGNOSIS — Z51.11 ENCOUNTER FOR ANTINEOPLASTIC CHEMOTHERAPY: ICD-10-CM

## 2023-04-03 DIAGNOSIS — F32.A ANXIETY DISORDER, UNSPECIFIED: ICD-10-CM

## 2023-04-03 DIAGNOSIS — R42 DIZZINESS AND GIDDINESS: ICD-10-CM

## 2023-04-03 DIAGNOSIS — F41.9 ANXIETY DISORDER, UNSPECIFIED: ICD-10-CM

## 2023-04-03 PROCEDURE — 99204 OFFICE O/P NEW MOD 45 MIN: CPT

## 2023-04-03 RX ORDER — ROSUVASTATIN CALCIUM 10 MG/1
10 TABLET, FILM COATED ORAL
Qty: 90 | Refills: 1 | Status: ACTIVE | COMMUNITY
Start: 2023-04-03 | End: 1900-01-01

## 2023-04-03 RX ORDER — ACETAMINOPHEN 160 MG/5ML
250 LIQUID ORAL
Refills: 0 | Status: DISCONTINUED | COMMUNITY
End: 2023-04-03

## 2023-04-03 RX ORDER — LEVOTHYROXINE SODIUM 125 MCG
TABLET ORAL
Refills: 0 | Status: ACTIVE | COMMUNITY

## 2023-04-03 RX ORDER — CITALOPRAM HYDROBROMIDE 10 MG/1
10 TABLET, FILM COATED ORAL
Refills: 0 | Status: DISCONTINUED | COMMUNITY
End: 2023-04-03

## 2023-04-03 RX ORDER — AMOXICILLIN 500 MG/1
500 CAPSULE ORAL 3 TIMES DAILY
Qty: 30 | Refills: 0 | Status: DISCONTINUED | COMMUNITY
Start: 2017-11-08 | End: 2023-04-03

## 2023-04-03 RX ORDER — ERGOCALCIFEROL 1.25 MG/1
1.25 MG CAPSULE, LIQUID FILLED ORAL
Qty: 12 | Refills: 0 | Status: ACTIVE | COMMUNITY
Start: 2022-12-27

## 2023-04-03 RX ORDER — BUPROPION HYDROCHLORIDE 150 MG/1
150 TABLET, EXTENDED RELEASE ORAL
Qty: 30 | Refills: 0 | Status: ACTIVE | COMMUNITY
Start: 2023-03-24

## 2023-04-03 RX ORDER — TURMERIC ROOT EXTRACT 500 MG
TABLET ORAL
Refills: 0 | Status: DISCONTINUED | COMMUNITY
End: 2023-04-03

## 2023-04-03 RX ORDER — LOSARTAN POTASSIUM 25 MG/1
25 TABLET, FILM COATED ORAL DAILY
Qty: 30 | Refills: 3 | Status: DISCONTINUED | COMMUNITY
Start: 2017-11-22 | End: 2023-04-03

## 2023-04-03 RX ORDER — LEVOTHYROXINE SODIUM 0.03 MG/1
25 TABLET ORAL
Refills: 0 | Status: DISCONTINUED | COMMUNITY
End: 2023-04-03

## 2023-04-03 NOTE — DISCUSSION/SUMMARY
[FreeTextEntry1] : The patient had inverted T waves in lead V2 which is most likely secondary to lead placement.  Her EKG is otherwise unremarkable.\par \par Hypertension: She has borderline high blood pressures today.  Target blood pressure less than 130 over 80 mmHg.  Nonpharmacologic ways of reducing blood pressure were discussed.  Blood pressure response to ETT would be helpful.  She used to take losartan in the past which she has stopped.\par \par Hypercholesterolemia.  Patient will resume Crestor 10 mg daily.  Did not have any side effects to it.  Check labs after 1 month.  Lab slip was given to her.  Dietary changes were also discussed.  Cutting back on saturated fat intake is most important.\par \par CT calcium score should be obtained along with echo and ETT.  History of carotid atherosclerosis.  Check ultrasound also.\par \par Follow-up after above tests\par \par Thank you for this referral and allowing me to participate in the care of this patient.  If I can be of any further help or  if you have any questions, please do not hesitate to contact me\par \par \par Sincerely,\par \par Abner Lyons MD, FACC, JOON

## 2023-04-03 NOTE — HISTORY OF PRESENT ILLNESS
[FreeTextEntry1] : Monika is a pleasant 63-year-old female with history of hypertension and hypercholesterolemia, family history of CAD.  Her EKG was labeled abnormal by the  diagnostic algorithm\par \par Patient is asymptomatic for CAD.  She does get slightly winded on heavy exertion or going up a flight of steps quickly.  She does not have any chest pain.  No palpitations or dizziness.  She does have mild asthma which she believes is contributing to her dyspnea on exertion.\par \par She has stopped all her cardiac medications -this included Crestor and losartan\par \par History of atherosclerosis in carotids on ultrasound in the past

## 2023-04-10 ENCOUNTER — APPOINTMENT (OUTPATIENT)
Dept: OBGYN | Facility: CLINIC | Age: 63
End: 2023-04-10
Payer: COMMERCIAL

## 2023-04-10 VITALS
BODY MASS INDEX: 29.08 KG/M2 | SYSTOLIC BLOOD PRESSURE: 132 MMHG | WEIGHT: 158 LBS | DIASTOLIC BLOOD PRESSURE: 72 MMHG | HEIGHT: 62 IN

## 2023-04-10 DIAGNOSIS — Z82.49 FAMILY HISTORY OF ISCHEMIC HEART DISEASE AND OTHER DISEASES OF THE CIRCULATORY SYSTEM: ICD-10-CM

## 2023-04-10 DIAGNOSIS — Z83.511 FAMILY HISTORY OF GLAUCOMA: ICD-10-CM

## 2023-04-10 DIAGNOSIS — Z82.3 FAMILY HISTORY OF STROKE: ICD-10-CM

## 2023-04-10 DIAGNOSIS — Z13.820 ENCOUNTER FOR SCREENING FOR OSTEOPOROSIS: ICD-10-CM

## 2023-04-10 DIAGNOSIS — Z01.419 ENCOUNTER FOR GYNECOLOGICAL EXAMINATION (GENERAL) (ROUTINE) W/OUT ABNORMAL FINDINGS: ICD-10-CM

## 2023-04-10 DIAGNOSIS — J45.909 UNSPECIFIED ASTHMA, UNCOMPLICATED: ICD-10-CM

## 2023-04-10 DIAGNOSIS — Z83.438 FAMILY HISTORY OF OTHER DISORDER OF LIPOPROTEIN METABOLISM AND OTHER LIPIDEMIA: ICD-10-CM

## 2023-04-10 DIAGNOSIS — D64.9 ANEMIA, UNSPECIFIED: ICD-10-CM

## 2023-04-10 DIAGNOSIS — Z80.3 FAMILY HISTORY OF MALIGNANT NEOPLASM OF BREAST: ICD-10-CM

## 2023-04-10 DIAGNOSIS — Z83.79 FAMILY HISTORY OF OTHER DISEASES OF THE DIGESTIVE SYSTEM: ICD-10-CM

## 2023-04-10 DIAGNOSIS — R92.2 INCONCLUSIVE MAMMOGRAM: ICD-10-CM

## 2023-04-10 PROCEDURE — 99386 PREV VISIT NEW AGE 40-64: CPT

## 2023-04-10 NOTE — PLAN
[FreeTextEntry1] : unremarkable CBE and pelvic exams\par SBE  encouraged monthly\par Pap/HPV not collected as negative last year and without hx of abnormal paps or HPV\par Mammo, DEXA and breast US  ordered\par Patient interested in [facility] expanded panel genetic screening secondary to extensive history of cancer in family\par I reviewed dietary, vitamin and exercise measures for maintaining optimal bone density and patient verbalizes understanding of these recommendations.\par Healthy diet,exercise and sleep hygiene discussed\par The importance of a screening colonoscopy was discussed.\par

## 2023-04-10 NOTE — REVIEW OF SYSTEMS
[Patient Intake Form Reviewed] : Patient intake form was reviewed [FreeTextEntry8] : vasomotor sx, vaginal dryness

## 2023-04-10 NOTE — HISTORY OF PRESENT ILLNESS
[TextBox_4] : 62 yo  for well woman exam.  Currently sexually active.  Had some discomfort secondary to vaginal dryness but was started on vaginal estrogen and improvement noted.  No hx abnormal pap smears.  Reports severe vasomotor sx, hx small fibroids.   [PapSmeardate] : 2022 [BoneDensityDate] : 2021

## 2023-04-11 ENCOUNTER — APPOINTMENT (OUTPATIENT)
Dept: CARDIOLOGY | Facility: CLINIC | Age: 63
End: 2023-04-11
Payer: COMMERCIAL

## 2023-04-11 PROCEDURE — 93306 TTE W/DOPPLER COMPLETE: CPT

## 2023-05-16 ENCOUNTER — APPOINTMENT (OUTPATIENT)
Dept: CARDIOLOGY | Facility: CLINIC | Age: 63
End: 2023-05-16

## 2023-06-07 DIAGNOSIS — N95.0 POSTMENOPAUSAL BLEEDING: ICD-10-CM

## 2023-07-10 DIAGNOSIS — M54.50 LOW BACK PAIN, UNSPECIFIED: ICD-10-CM

## 2023-07-10 DIAGNOSIS — R82.998 OTHER ABNORMAL FINDINGS IN URINE: ICD-10-CM

## 2023-07-12 LAB — BACTERIA UR CULT: NORMAL

## 2023-07-20 ENCOUNTER — APPOINTMENT (OUTPATIENT)
Dept: RADIOLOGY | Facility: CLINIC | Age: 63
End: 2023-07-20
Payer: OTHER MISCELLANEOUS

## 2023-07-20 PROCEDURE — 72110 X-RAY EXAM L-2 SPINE 4/>VWS: CPT

## 2023-07-20 PROCEDURE — 72070 X-RAY EXAM THORAC SPINE 2VWS: CPT

## 2023-09-05 ENCOUNTER — APPOINTMENT (OUTPATIENT)
Dept: MRI IMAGING | Facility: CLINIC | Age: 63
End: 2023-09-05
Payer: OTHER MISCELLANEOUS

## 2023-09-05 PROCEDURE — 72148 MRI LUMBAR SPINE W/O DYE: CPT

## 2023-09-05 PROCEDURE — 72146 MRI CHEST SPINE W/O DYE: CPT

## 2023-12-14 ENCOUNTER — APPOINTMENT (OUTPATIENT)
Dept: RADIOLOGY | Facility: CLINIC | Age: 63
End: 2023-12-14

## 2023-12-14 ENCOUNTER — RESULT REVIEW (OUTPATIENT)
Age: 63
End: 2023-12-14

## 2023-12-14 ENCOUNTER — APPOINTMENT (OUTPATIENT)
Dept: ULTRASOUND IMAGING | Facility: CLINIC | Age: 63
End: 2023-12-14

## 2023-12-14 ENCOUNTER — APPOINTMENT (OUTPATIENT)
Dept: MAMMOGRAPHY | Facility: CLINIC | Age: 63
End: 2023-12-14
Payer: COMMERCIAL

## 2023-12-14 PROCEDURE — 77063 BREAST TOMOSYNTHESIS BI: CPT

## 2023-12-14 PROCEDURE — 77085 DXA BONE DENSITY AXL VRT FX: CPT

## 2023-12-14 PROCEDURE — 77067 SCR MAMMO BI INCL CAD: CPT

## 2023-12-14 PROCEDURE — 76641 ULTRASOUND BREAST COMPLETE: CPT | Mod: 50

## 2023-12-17 DIAGNOSIS — R92.8 OTHER ABNORMAL AND INCONCLUSIVE FINDINGS ON DIAGNOSTIC IMAGING OF BREAST: ICD-10-CM

## 2023-12-21 ENCOUNTER — RESULT REVIEW (OUTPATIENT)
Age: 63
End: 2023-12-21

## 2023-12-21 ENCOUNTER — APPOINTMENT (OUTPATIENT)
Dept: ULTRASOUND IMAGING | Facility: CLINIC | Age: 63
End: 2023-12-21
Payer: COMMERCIAL

## 2023-12-21 PROCEDURE — 76642 ULTRASOUND BREAST LIMITED: CPT | Mod: RT

## 2024-01-10 ENCOUNTER — LABORATORY RESULT (OUTPATIENT)
Age: 64
End: 2024-01-10

## 2024-01-10 DIAGNOSIS — L29.9 PRURITUS, UNSPECIFIED: ICD-10-CM

## 2024-02-26 ENCOUNTER — APPOINTMENT (OUTPATIENT)
Dept: OBGYN | Facility: CLINIC | Age: 64
End: 2024-02-26
Payer: COMMERCIAL

## 2024-02-26 DIAGNOSIS — G89.29 LOW BACK PAIN, UNSPECIFIED: ICD-10-CM

## 2024-02-26 DIAGNOSIS — M54.50 LOW BACK PAIN, UNSPECIFIED: ICD-10-CM

## 2024-02-26 PROCEDURE — 97811 ACUP 1/> W/O ESTIM EA ADD 15: CPT

## 2024-02-26 PROCEDURE — 97810 ACUP 1/> WO ESTIM 1ST 15 MIN: CPT

## 2024-03-08 PROBLEM — M54.50 CHRONIC RIGHT-SIDED LOW BACK PAIN WITHOUT SCIATICA: Status: ACTIVE | Noted: 2024-02-26

## 2024-03-13 ENCOUNTER — APPOINTMENT (OUTPATIENT)
Dept: CARDIOLOGY | Facility: CLINIC | Age: 64
End: 2024-03-13
Payer: COMMERCIAL

## 2024-03-13 ENCOUNTER — NON-APPOINTMENT (OUTPATIENT)
Age: 64
End: 2024-03-13

## 2024-03-13 VITALS
OXYGEN SATURATION: 96 % | DIASTOLIC BLOOD PRESSURE: 66 MMHG | BODY MASS INDEX: 30.73 KG/M2 | WEIGHT: 167 LBS | HEIGHT: 62 IN | HEART RATE: 57 BPM | SYSTOLIC BLOOD PRESSURE: 134 MMHG

## 2024-03-13 DIAGNOSIS — R07.9 CHEST PAIN, UNSPECIFIED: ICD-10-CM

## 2024-03-13 DIAGNOSIS — Z91.013 ALLERGY TO SEAFOOD: ICD-10-CM

## 2024-03-13 DIAGNOSIS — Z71.89 OTHER SPECIFIED COUNSELING: ICD-10-CM

## 2024-03-13 DIAGNOSIS — E78.00 PURE HYPERCHOLESTEROLEMIA, UNSPECIFIED: ICD-10-CM

## 2024-03-13 DIAGNOSIS — Z86.79 PERSONAL HISTORY OF OTHER DISEASES OF THE CIRCULATORY SYSTEM: ICD-10-CM

## 2024-03-13 DIAGNOSIS — I10 ESSENTIAL (PRIMARY) HYPERTENSION: ICD-10-CM

## 2024-03-13 DIAGNOSIS — Z86.39 PERSONAL HISTORY OF OTHER ENDOCRINE, NUTRITIONAL AND METABOLIC DISEASE: ICD-10-CM

## 2024-03-13 PROCEDURE — 99215 OFFICE O/P EST HI 40 MIN: CPT

## 2024-03-13 RX ORDER — ESTRADIOL 0.1 MG/G
0.1 CREAM VAGINAL
Qty: 1 | Refills: 0 | Status: DISCONTINUED | COMMUNITY
Start: 2023-10-25 | End: 2024-03-13

## 2024-03-13 RX ORDER — METHYLPREDNISOLONE 32 MG/1
32 TABLET ORAL
Qty: 2 | Refills: 0 | Status: ACTIVE | COMMUNITY
Start: 2024-03-13 | End: 1900-01-01

## 2024-03-13 RX ORDER — ESTRADIOL 10 UG/1
10 TABLET VAGINAL
Qty: 41 | Refills: 3 | Status: DISCONTINUED | COMMUNITY
Start: 2023-03-01 | End: 2024-03-13

## 2024-03-13 NOTE — ASSESSMENT
[FreeTextEntry1] : 62 yo F with HTN, ex-smoker, HLD, rotted artery stenosis and family history of CAD presents today for evaluation of chest pain.

## 2024-03-13 NOTE — CARDIOLOGY SUMMARY
[de-identified] : 3/13/2024: Sinus bradycardia, HR 56, no ischemic changes. [de-identified] : 4/11/2023: LVEF 60%, LVEDD 4.1 cm, IVSD 0.9 cm, TAPSE 2.0 cm, mild AI, trace MR, trace TR.

## 2024-03-13 NOTE — HISTORY OF PRESENT ILLNESS
[FreeTextEntry1] : 64 yo F with HTN, ex-smoker, HLD,  mild carotid artery stenosis and family history of CAD presents today for evaluation of chest pain.  For the past 1 to 2 months patient has had increasing chest pain with left arm numbness that occurs with stress and anxiety.  She notes this will occasionally happen with exercise.  However she does not do a formal exercise program.  She will occasionally feel short of breath on walking up stairs.  She denies any symptoms at rest.  She does not have palpitations, dyspnea at rest or exertion, orthopnea, leg swelling, changes in appetite, changes in weight, bendopnea, lightheadedness, dizziness, and syncope/pre-syncope.  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 She was previously followed by a cardiologist in [place] and was told that she had mild carotid artery stenosis.  She had fluctuating levels of cholesterol and was recently resumed on a low-dose of rosuvastatin to 5 mg daily.

## 2024-03-13 NOTE — PHYSICAL EXAM
[Normal Gait] : normal gait [Normal] : soft, non-tender, no masses/organomegaly, normal bowel sounds [No Rash] : no rash [No Edema] : no edema [Moves all extremities] : moves all extremities [de-identified] : warm [Alert and Oriented] : alert and oriented

## 2024-03-13 NOTE — DISCUSSION/SUMMARY
[FreeTextEntry1] : #Chest pain -Occurs with anxiety and with exercise.  May likely be an anginal equivalent. -EKG today without ischemic changes. -Discussed stress testing versus coronary CTA.  Patient would like to pursue coronary CTA.  Of note, she does have a shellfish allergy.  Will premedicate her with methylprednisolone 32 mg 12 hours pre and then again 2 hours pretesting along with Benadryl 50 mg 1 hour prior to testing. -Chest pain precautions given to patient, instructions to report to the ED should she have a change in her quality/quantity of chest pain.  #Hyperlipidemia -Last lipid panel 2/22/2023: , HDL 61, , TG 2118. -Managed by her PCP.  On rosuvastatin 5 mg daily.  #Hypertension -Not currently on medications.  /66 today.  Will continue to monitor.  #Carotid artery stenosis -Will obtain repeat carotid ultrasound.  Continue with risk factor modification and cholesterol therapy for now.  #Cardiac risk counseling -ASCVD risk of 4.2% (based on lipid panel from 2/22/2023).  Follow-up after above testing.

## 2024-03-14 NOTE — REASON FOR VISIT
[New Patient] : [unfilled] is a new patient [TextEntry] : 62y/o, female, NPA, presents for an initial acupuncture encounter with a chief complaint of R sided low back pain.   PT presents with a secondary complaint of depression.

## 2024-03-14 NOTE — PLAN
[FreeTextEntry1] : Treatment Principles: Break stasis, move local qi and blood, relieve pain, restore function. Smooth LV qi.

## 2024-03-14 NOTE — ASSESSMENT
[TextEntry] : This is local qi and blood stasis of the associated area. An underlying LV qi stasis contributes to the condition.

## 2024-03-14 NOTE — PROCEDURE
[Time Out Completed] : Patient's name, date of birth, procedure and correct site confirmed [Written consent] : and a written consent was obtained prior to the procedure and is detailed in the patient's record [Attending] : Attending [Pain] : pain [___ Year(s)] : [unfilled] year(s) ago [Alcohol] : alcohol [Lateral Recumbent] : Lateral Recumbent [Both] : Laterality: both [15 mins] : 15 mins [No complications] : No complications [FreeTextEntry4] : PT presents with R sided lumbar pain, onset 1yr, located at the distal end of the 12th rib and radiating to her flank. The pain is intermittent and sharp in nature, aggravated by long periods walking. PT reports activity levels have drastically decreased over the last few years and may be contributing factor. PT denies and right flank pain or pain radiating to the ant liver region. PT reports feelings of depression surrounding her current living situation and prospects for the future, claims to feel "stuck", manifestig in lower energy and increased stress levels with diff decisions to be made. PT manages her condition with Welbutrin.  [FreeTextEntry6] : R sided low back [de-identified] :  7. Sleep: PT reports waking freq  with discomfort related to cc. occasionally Rx Zanax to help her 'calm down' when she has diff sleeping, wakes freq unrested.  Pulse (R):  wiry, sl rapid .Pulse (L): deep, thin, sl rapid Tongue: red body, red tip, swollen maricarmen R sided ST/SP, tooth marked, quivering, prominent center crack, sm bilateral cracks, thin white coat, uneven dist, wetter edges, distended sublingual veins, some L deviaton on extension of tongue. [FreeTextEntry1] : 9 [FreeTextEntry2] : 9 [FreeTextEntry3] : Entered the room: 1:30, initial intake 30min, palpation and needle insertion 15min. Exited the room: 2:15, needle retention 20min. Entered the room: 2:35, needles removed.  Total time spent in the room with the PT: 50min. PT accepted and tolerated the needles well and was able to retain them for the entire treatment.  This was the PT first acupuncture experience, and a conservative approach was adopted both in number of needles, location and intensity of manipulation. After follow-up and assessment of their reaction to the treatment a more aggressive approach may be adopted at subsequent encounters.  The provider used single use, disposable, stainless steel, filiform needles that he inserted at critical points on the body determined based on the patient's chief complaint and in accordance with the theories and principles of Traditional Chinese Medicine (TCM) acupuncture practice. The provider located the points according to TCM point location and by palpating for anatomical landmarks. After locating the point, the location is cleaned with an individually packaged, single use, alcohol swab, performed in a single, circular motion on the skin. The provider selected the appropriate needle lengths based on the individual point to be needled, then carefully inserted the needles, and mildly stimulated the needle by various techniques such as rotating the needle or inserting and withdrawing the needle repeatedly depending upon the affect desired, until the sensation of 'de qi' is achieved according to patient report. After all needles have been inserted, repeating the above protocol for each point, he then instructed the patient to rest for a period of time with the needles still inserted. He returned periodically to check on the patient and readjust the needles as necessary. When the provider sees the desired effect is achieved, he removed and disposed of the needles in the sharp's container, applying pressure on the points with a cotton ball to prevent bruising or bleeding. He provided the patient with post procedure instructions, and then charts the procedure. He does not include the patient resting time as part of the time calculation for this service, but only the time he actually spends with the patient.

## 2024-03-26 ENCOUNTER — APPOINTMENT (OUTPATIENT)
Dept: CARDIOLOGY | Facility: CLINIC | Age: 64
End: 2024-03-26
Payer: COMMERCIAL

## 2024-03-26 PROCEDURE — 93880 EXTRACRANIAL BILAT STUDY: CPT

## 2024-09-09 ENCOUNTER — OFFICE VISIT (OUTPATIENT)
Dept: URGENT CARE | Facility: CLINIC | Age: 64
End: 2024-09-09
Payer: COMMERCIAL

## 2024-09-09 VITALS
HEART RATE: 63 BPM | DIASTOLIC BLOOD PRESSURE: 82 MMHG | HEIGHT: 62 IN | RESPIRATION RATE: 18 BRPM | BODY MASS INDEX: 31.83 KG/M2 | WEIGHT: 173 LBS | SYSTOLIC BLOOD PRESSURE: 180 MMHG | TEMPERATURE: 98 F

## 2024-09-09 DIAGNOSIS — L25.5 DERMATITIS DUE TO PLANTS, INCLUDING POISON IVY, SUMAC, AND OAK: Primary | ICD-10-CM

## 2024-09-09 PROCEDURE — 99213 OFFICE O/P EST LOW 20 MIN: CPT | Performed by: PHYSICIAN ASSISTANT

## 2024-09-09 RX ORDER — PREDNISONE 50 MG/1
50 TABLET ORAL DAILY
Qty: 5 TABLET | Refills: 0 | Status: SHIPPED | OUTPATIENT
Start: 2024-09-09 | End: 2024-09-14

## 2024-09-09 NOTE — PROGRESS NOTES
[facility]        NAME: Eleni Groves is a 64 y.o. female  : 1960    MRN: [id number]  DATE: 2024  TIME: 12:23 PM    Assessment and Plan   Dermatitis due to plants, including poison ivy, sumac, and oak [L25.5]  1. Dermatitis due to plants, including poison ivy, sumac, and oak  predniSONE 50 mg tablet            Patient Instructions   Poison ivy, oak or sumac  rx prednisone once daily x 5 days sent via EMR  Can continue oral benadryl as needed  Also recommend over the counter products: calamine lotion, benadryl cream, or other similar products topically as needed  Cool compress for comfort  Wash all linens/clothes in hot water    Follow up with PCP in 3-5 days.  Proceed to  ER if symptoms worsen.    If tests have been performed at [facility], our office will contact you with results if changes need to be made to the care plan discussed with you at the visit.  You can review your full results on [facility] MyChart.    Chief Complaint     Chief Complaint   Patient presents with    Rash     Stated notice rash five days ago and it's getting worst. Rash spread all over. Used hydrocortisone, calamine lotion, apple cider vinegar.         History of Present Illness       Eleni is a 64-year-old female who presents to clinic complaining of rash x 4 days.  She states it is on bilateral arms bilateral legs, chest, abdomen, and back.  She states she was outside prior to the rash and was working weeding.  She denies any fever or chills.  She denies any leading or discharge from the rash.        Review of Systems   Review of Systems   Constitutional:  Negative for chills and fever.   Skin:  Positive for rash.         Current Medications       Current Outpatient Medications:     albuterol (PROVENTIL HFA,VENTOLIN HFA) 90 mcg/act inhaler, Inhale 2 puffs every 6 (six) hours as needed for wheezing or shortness of breath, Disp: 8.5 g, Rfl: 6    ALPRAZolam (XANAX) 0.25 mg tablet, Take 1 tablet (0.25 mg  total) by mouth daily at bedtime as needed for anxiety, Disp: 30 tablet, Rfl: 0    buPROPion (Wellbutrin XL) 150 mg 24 hr tablet, 1 tablet po daily, Disp: 90 tablet, Rfl: 0    Multiple Vitamins-Minerals (ZINC PO), Take by mouth daily  , Disp: , Rfl:     predniSONE 50 mg tablet, Take 1 tablet (50 mg total) by mouth daily for 5 days, Disp: 5 tablet, Rfl: 0    albuterol (2.5 mg/3 mL) 0.083 % nebulizer solution, Take 3 mL (2.5 mg total) by nebulization every 6 (six) hours as needed for wheezing or shortness of breath (Patient not taking: Reported on 9/9/2024), Disp: 75 mL, Rfl: 0    benzonatate (TESSALON PERLES) 100 mg capsule, Take 1 capsule (100 mg total) by mouth 2 (two) times a day as needed for cough (Patient not taking: Reported on 9/9/2024), Disp: 20 capsule, Rfl: 0    ergocalciferol (VITAMIN D2) 50,000 units, take 1 capsule by mouth every week (Patient not taking: Reported on 9/9/2024), Disp: 12 capsule, Rfl: 1    fluticasone (FLONASE) 50 mcg/act nasal spray, instill 1 spray into each nostril once daily (Patient not taking: Reported on 9/9/2024), Disp: 16 g, Rfl: 3    Fluticasone-Salmeterol,sensor, (AirDuo Digihaler) 113-14 MCG/ACT AEPB, Inhale 1 puff 2 (two) times a day Rinse mouth after use. (Patient not taking: Reported on 9/9/2024), Disp: 1 each, Rfl: 6    guaiFENesin (MUCINEX) 600 mg 12 hr tablet, Take 1 tablet (600 mg total) by mouth every 12 (twelve) hours (Patient not taking: Reported on 10/17/2022), Disp: 30 tablet, Rfl: 0    ipratropium-albuterol (DUO-NEB) 0.5-2.5 mg/3 mL nebulizer solution, Take 3 mL by nebulization 4 (four) times a day (Patient not taking: Reported on 10/17/2022), Disp: 100 mL, Rfl: 0    levothyroxine 25 mcg tablet, Take one tablet on an empty stomach every night time, Disp: 90 tablet, Rfl: 0    montelukast (SINGULAIR) 10 mg tablet, Take 1 tablet (10 mg total) by mouth daily at bedtime (Patient not taking: Reported on 9/9/2024), Disp: 90 tablet, Rfl: 1    predniSONE 10 mg tablet, 60  mg by mouth daily for 6 days, then 50 mg by mouth daily for 6 days, then 40 mg by mouth daily for 6 days, 30 mg by mouth for 6 days, 20 mg by mouth for 6 days , 10 mg by mouth for 6 days then stop (Patient not taking: Reported on 9/9/2024), Disp: 150 tablet, Rfl: 0    rosuvastatin (CRESTOR) 10 MG tablet, Take 1 tablet (10 mg total) by mouth daily (Patient not taking: Reported on 9/9/2024), Disp: 90 tablet, Rfl: 0    Current Allergies     Allergies as of 09/09/2024 - Reviewed 09/09/2024   Allergen Reaction Noted    Iodine - food allergy Itching and Vomiting 01/07/2019    Shellfish-derived products - food allergy Itching and Vomiting 01/07/2019    Levaquin [levofloxacin] Rash 04/10/2019            The following portions of the patient's history were reviewed and updated as appropriate: allergies, current medications, past family history, past medical history, past social history, past surgical history and problem list.     Past Medical History:   Diagnosis Date    Arthritis     Asthma     COVID-19 01/02/2022    Disease of thyroid gland     hypo    Dizziness     s/p covid 19    Mediterranean anemia     Pemphigus vulgaris     Thalassemia     Vitamin D deficiency     Wears partial dentures     upper       Past Surgical History:   Procedure Laterality Date    BLEPHAROPLASTY      bilateral    BREAST BIOPSY Right 2000    benign-not sure of exact year    COLONOSCOPY      SC ARTHRS KNE SURG W/MENISCECTOMY MED/LAT W/SHVG Right 6/2/2020    Procedure: MENISCECTOMY MEDIAL;  Surgeon: Mykel Armas DO;  Location: [facility];  Service: Orthopedics       Family History   Problem Relation Age of Onset    No Known Problems Mother     Asthma Father     Hypertension Father     Hypertension Maternal Grandmother     Liver cancer Maternal Grandmother     Diabetes Maternal Grandmother     Cancer Maternal Grandmother         liver    Cancer Family         multiple relatives    Stomach cancer Family     Liver cancer Family     Bone cancer  "Family     Leukemia Family     Seizures Sister     Asthma Brother     Hypertension Brother     Leukemia Maternal Uncle     Cancer Maternal Uncle         leukemia    Heart attack Cousin     Bone cancer Maternal Uncle     No Known Problems Paternal Aunt     Substance Abuse Neg Hx     Mental illness Neg Hx          Medications have been verified.        Objective   BP (!) 180/82   Pulse 63   Temp 98 °F (36.7 °C)   Resp 18   Ht 5' 2\" (1.575 m)   Wt 78.5 kg (173 lb)   BMI 31.64 kg/m²   No LMP recorded. Patient is postmenopausal.       Physical Exam     Physical Exam  Vitals and nursing note reviewed.   Constitutional:       General: She is not in acute distress.     Appearance: Normal appearance. She is not ill-appearing.   Pulmonary:      Effort: Pulmonary effort is normal.   Skin:     Findings: Rash present. Rash is macular and papular. Rash is not crusting, pustular, scaling, urticarial or vesicular.   Neurological:      Mental Status: She is alert and oriented to person, place, and time.   Psychiatric:         Mood and Affect: Mood normal.         Behavior: Behavior normal.                   "

## 2024-11-15 ENCOUNTER — APPOINTMENT (OUTPATIENT)
Dept: PSYCHIATRY | Facility: CLINIC | Age: 64
End: 2024-11-15
Payer: COMMERCIAL

## 2024-11-15 PROCEDURE — 90791 PSYCH DIAGNOSTIC EVALUATION: CPT | Mod: 95

## 2024-12-03 DIAGNOSIS — Z23 ENCOUNTER FOR IMMUNIZATION: ICD-10-CM

## 2024-12-03 RX ORDER — INFLUENZA A VIRUS A/VICTORIA/4897/2022 IVR-238 (H1N1) ANTIGEN (FORMALDEHYDE INACTIVATED), INFLUENZA A VIRUS A/CALIFORNIA/122/2022 SAN-022 (H3N2) ANTIGEN (FORMALDEHYDE INACTIVATED), AND INFLUENZA B VIRUS B/MICHIGAN/01/2021 ANTIGEN (FORMALDEHYDE INACTIVATED) 15; 15; 15 MG/.5ML; MG/.5ML; MG/.5ML
0.5 INJECTION, SUSPENSION INTRAMUSCULAR
Qty: 1 | Refills: 0 | Status: ACTIVE | COMMUNITY
Start: 2024-12-03

## 2025-02-21 ENCOUNTER — NON-APPOINTMENT (OUTPATIENT)
Age: 65
End: 2025-02-21

## 2025-02-21 ENCOUNTER — APPOINTMENT (OUTPATIENT)
Dept: CARDIOLOGY | Facility: CLINIC | Age: 65
End: 2025-02-21
Payer: COMMERCIAL

## 2025-02-21 VITALS
WEIGHT: 160 LBS | SYSTOLIC BLOOD PRESSURE: 134 MMHG | BODY MASS INDEX: 29.44 KG/M2 | OXYGEN SATURATION: 98 % | HEART RATE: 67 BPM | HEIGHT: 62 IN | DIASTOLIC BLOOD PRESSURE: 60 MMHG

## 2025-02-21 VITALS — DIASTOLIC BLOOD PRESSURE: 64 MMHG | SYSTOLIC BLOOD PRESSURE: 140 MMHG

## 2025-02-21 DIAGNOSIS — I10 ESSENTIAL (PRIMARY) HYPERTENSION: ICD-10-CM

## 2025-02-21 DIAGNOSIS — E78.00 PURE HYPERCHOLESTEROLEMIA, UNSPECIFIED: ICD-10-CM

## 2025-02-21 DIAGNOSIS — R07.9 CHEST PAIN, UNSPECIFIED: ICD-10-CM

## 2025-02-21 DIAGNOSIS — E03.9 HYPOTHYROIDISM, UNSPECIFIED: ICD-10-CM

## 2025-02-21 LAB — HBA1C MFR BLD HPLC: 5.9

## 2025-02-21 PROCEDURE — 99204 OFFICE O/P NEW MOD 45 MIN: CPT

## 2025-02-21 PROCEDURE — 93000 ELECTROCARDIOGRAM COMPLETE: CPT

## 2025-02-21 PROCEDURE — G2211 COMPLEX E/M VISIT ADD ON: CPT

## 2025-02-21 RX ORDER — LANSOPRAZOLE 30 MG/1
30 CAPSULE, DELAYED RELEASE ORAL DAILY
Qty: 90 | Refills: 2 | Status: ACTIVE | COMMUNITY
Start: 2025-02-21 | End: 1900-01-01

## 2025-02-21 RX ORDER — LOSARTAN POTASSIUM 25 MG/1
25 TABLET, FILM COATED ORAL DAILY
Refills: 0 | Status: ACTIVE | COMMUNITY

## 2025-03-05 ENCOUNTER — APPOINTMENT (OUTPATIENT)
Dept: CARDIOLOGY | Facility: CLINIC | Age: 65
End: 2025-03-05
Payer: COMMERCIAL

## 2025-03-05 PROCEDURE — 78452 HT MUSCLE IMAGE SPECT MULT: CPT

## 2025-03-05 PROCEDURE — 93015 CV STRESS TEST SUPVJ I&R: CPT

## 2025-03-05 PROCEDURE — A9502: CPT | Mod: JZ

## 2025-03-07 ENCOUNTER — APPOINTMENT (OUTPATIENT)
Dept: CARDIOLOGY | Facility: CLINIC | Age: 65
End: 2025-03-07
Payer: COMMERCIAL

## 2025-03-07 VITALS
OXYGEN SATURATION: 99 % | WEIGHT: 159 LBS | HEART RATE: 58 BPM | HEIGHT: 62 IN | SYSTOLIC BLOOD PRESSURE: 112 MMHG | BODY MASS INDEX: 29.26 KG/M2 | DIASTOLIC BLOOD PRESSURE: 56 MMHG

## 2025-03-07 DIAGNOSIS — E03.9 HYPOTHYROIDISM, UNSPECIFIED: ICD-10-CM

## 2025-03-07 DIAGNOSIS — D56.9 THALASSEMIA, UNSPECIFIED: ICD-10-CM

## 2025-03-07 DIAGNOSIS — E78.00 PURE HYPERCHOLESTEROLEMIA, UNSPECIFIED: ICD-10-CM

## 2025-03-07 DIAGNOSIS — J45.909 UNSPECIFIED ASTHMA, UNCOMPLICATED: ICD-10-CM

## 2025-03-07 DIAGNOSIS — R07.9 CHEST PAIN, UNSPECIFIED: ICD-10-CM

## 2025-03-07 DIAGNOSIS — I10 ESSENTIAL (PRIMARY) HYPERTENSION: ICD-10-CM

## 2025-03-07 DIAGNOSIS — F41.1 GENERALIZED ANXIETY DISORDER: ICD-10-CM

## 2025-03-07 DIAGNOSIS — Z86.2 PERSONAL HISTORY OF DISEASES OF THE BLOOD AND BLOOD-FORMING ORGANS AND CERTAIN DISORDERS INVOLVING THE IMMUNE MECHANISM: ICD-10-CM

## 2025-03-07 DIAGNOSIS — Z71.89 OTHER SPECIFIED COUNSELING: ICD-10-CM

## 2025-03-07 PROCEDURE — G2211 COMPLEX E/M VISIT ADD ON: CPT

## 2025-03-07 PROCEDURE — 99214 OFFICE O/P EST MOD 30 MIN: CPT

## 2025-03-27 ENCOUNTER — APPOINTMENT (OUTPATIENT)
Dept: OBGYN | Facility: CLINIC | Age: 65
End: 2025-03-27

## 2025-03-27 VITALS
DIASTOLIC BLOOD PRESSURE: 76 MMHG | WEIGHT: 167 LBS | SYSTOLIC BLOOD PRESSURE: 174 MMHG | HEIGHT: 62 IN | BODY MASS INDEX: 30.73 KG/M2

## 2025-03-27 DIAGNOSIS — Z87.19 PERSONAL HISTORY OF OTHER DISEASES OF THE DIGESTIVE SYSTEM: ICD-10-CM

## 2025-03-27 DIAGNOSIS — Z80.8 FAMILY HISTORY OF MALIGNANT NEOPLASM OF OTHER ORGANS OR SYSTEMS: ICD-10-CM

## 2025-03-27 DIAGNOSIS — Z87.2 PERSONAL HISTORY OF DISEASES OF THE SKIN AND SUBCUTANEOUS TISSUE: ICD-10-CM

## 2025-03-27 PROCEDURE — 99212 OFFICE O/P EST SF 10 MIN: CPT | Mod: 25

## 2025-03-27 PROCEDURE — 99386 PREV VISIT NEW AGE 40-64: CPT

## 2025-03-27 RX ORDER — ESTRADIOL 2 MG/1
7.5 SYSTEM VAGINAL
Qty: 1 | Refills: 3 | Status: ACTIVE | COMMUNITY
Start: 2025-03-27 | End: 1900-01-01

## 2025-03-28 LAB — HPV HIGH+LOW RISK DNA PNL CVX: NOT DETECTED

## 2025-04-01 LAB — CYTOLOGY CVX/VAG DOC THIN PREP: ABNORMAL

## 2025-04-10 DIAGNOSIS — L25.5 DERMATITIS DUE TO PLANTS, INCLUDING POISON IVY, SUMAC, AND OAK: ICD-10-CM

## 2025-04-15 RX ORDER — PREDNISONE 50 MG/1
50 TABLET ORAL DAILY
Qty: 5 TABLET | Refills: 0 | OUTPATIENT
Start: 2025-04-15 | End: 2025-04-20

## 2025-05-02 ENCOUNTER — APPOINTMENT (OUTPATIENT)
Dept: MAMMOGRAPHY | Facility: CLINIC | Age: 65
End: 2025-05-02
Payer: COMMERCIAL

## 2025-05-02 ENCOUNTER — APPOINTMENT (OUTPATIENT)
Dept: ULTRASOUND IMAGING | Facility: CLINIC | Age: 65
End: 2025-05-02
Payer: COMMERCIAL

## 2025-05-02 PROCEDURE — 77063 BREAST TOMOSYNTHESIS BI: CPT

## 2025-05-02 PROCEDURE — 77067 SCR MAMMO BI INCL CAD: CPT

## 2025-05-02 PROCEDURE — 76641 ULTRASOUND BREAST COMPLETE: CPT | Mod: 50

## 2025-06-10 ENCOUNTER — LABORATORY RESULT (OUTPATIENT)
Age: 65
End: 2025-06-10

## 2025-06-11 LAB
25(OH)D3 SERPL-MCNC: 24.6 NG/ML
ALBUMIN SERPL ELPH-MCNC: 4.5 G/DL
ALP BLD-CCNC: 85 U/L
ALT SERPL-CCNC: 42 U/L
ANION GAP SERPL CALC-SCNC: 19 MMOL/L
AST SERPL-CCNC: 22 U/L
BASOPHILS # BLD AUTO: 0.07 K/UL
BASOPHILS NFR BLD AUTO: 1.1 %
BILIRUB SERPL-MCNC: 0.4 MG/DL
BUN SERPL-MCNC: 20 MG/DL
CALCIUM SERPL-MCNC: 9.7 MG/DL
CHLORIDE SERPL-SCNC: 99 MMOL/L
CO2 SERPL-SCNC: 23 MMOL/L
CREAT SERPL-MCNC: 1.29 MG/DL
EGFRCR SERPLBLD CKD-EPI 2021: 46 ML/MIN/1.73M2
EOSINOPHIL # BLD AUTO: 0.39 K/UL
EOSINOPHIL NFR BLD AUTO: 6.2 %
GLUCOSE SERPL-MCNC: 44 MG/DL
HCT VFR BLD CALC: 45.1 %
HGB BLD-MCNC: 12.9 G/DL
IMM GRANULOCYTES NFR BLD AUTO: 0.2 %
LYMPHOCYTES # BLD AUTO: 2.27 K/UL
LYMPHOCYTES NFR BLD AUTO: 36.1 %
MAN DIFF?: NORMAL
MCHC RBC-ENTMCNC: 24.2 PG
MCHC RBC-ENTMCNC: 28.6 G/DL
MCV RBC AUTO: 84.6 FL
MONOCYTES # BLD AUTO: 0.41 K/UL
MONOCYTES NFR BLD AUTO: 6.5 %
NEUTROPHILS # BLD AUTO: 3.13 K/UL
NEUTROPHILS NFR BLD AUTO: 49.9 %
PLATELET # BLD AUTO: 211 K/UL
POTASSIUM SERPL-SCNC: 4.8 MMOL/L
PROT SERPL-MCNC: 7.2 G/DL
RBC # BLD: 5.33 M/UL
RBC # FLD: 15.3 %
SODIUM SERPL-SCNC: 141 MMOL/L
TSH SERPL-ACNC: 2.73 UIU/ML
WBC # FLD AUTO: 6.28 K/UL

## 2025-07-28 ENCOUNTER — TRANSCRIPTION ENCOUNTER (OUTPATIENT)
Age: 65
End: 2025-07-28

## 2025-08-06 ENCOUNTER — TRANSCRIPTION ENCOUNTER (OUTPATIENT)
Age: 65
End: 2025-08-06

## 2025-08-10 ENCOUNTER — NON-APPOINTMENT (OUTPATIENT)
Age: 65
End: 2025-08-10

## 2025-09-05 ENCOUNTER — APPOINTMENT (OUTPATIENT)
Dept: CARDIOLOGY | Facility: CLINIC | Age: 65
End: 2025-09-05

## (undated) DEVICE — 3M™ STERI-DRAPE™ U-DRAPE 1015: Brand: STERI-DRAPE™

## (undated) DEVICE — REPEL LITE CUT REST SURGICAL GLV LNRS X-LG: Brand: REPEL

## (undated) DEVICE — VAPR PREMIERE50 ELECTRODE WITH HAND CONTROLS 3.0MM, 50 DEGREES SUCTION WITH INTEGRATED HANDPIECE: Brand: VAPR

## (undated) DEVICE — FABRIC REINFORCED, SURGICAL GOWN, XL: Brand: CONVERTORS

## (undated) DEVICE — ASTOUND SURGICAL GOWN, XXX LARGE, X-LONG: Brand: CONVERTORS

## (undated) DEVICE — CHLORAPREP HI-LITE 26ML ORANGE

## (undated) DEVICE — TUBING ARTHROSCOPIC WAVE  MAIN PUMP

## (undated) DEVICE — OCCLUSIVE GAUZE STRIP,3% BISMUTH TRIBROMOPHENATE IN PETROLATUM BLEND: Brand: XEROFORM

## (undated) DEVICE — ACE WRAP 6 IN STERILE

## (undated) DEVICE — PADDING CAST 4 IN  COTTON STRL

## (undated) DEVICE — BLADE SHAVER EXCALIBUR 4MM 13CM COOLCUT

## (undated) DEVICE — PREP SURGICAL PURPREP 26ML

## (undated) DEVICE — SPONGE GAUZE 4 X 8 12 PLY STRL LF

## (undated) DEVICE — TIBURON SPLIT SHEET: Brand: CONVERTORS

## (undated) DEVICE — TIBURON EXTREMITY SHEET: Brand: CONVERTORS

## (undated) DEVICE — GLOVE SRG BIOGEL 8

## (undated) DEVICE — BANDAGE, ESMARK LF STR 6"X9' (20/CS): Brand: CYPRESS

## (undated) DEVICE — GLOVE INDICATOR PI UNDERGLOVE SZ 8.5 BLUE

## (undated) DEVICE — GLOVE SRG BIOGEL 8.5

## (undated) DEVICE — PACK ARTHROSCOPY

## (undated) DEVICE — GLOVE INDICATOR PI UNDERGLOVE SZ 7.5 BLUE

## (undated) DEVICE — INTENDED FOR TISSUE SEPARATION, AND OTHER PROCEDURES THAT REQUIRE A SHARP SURGICAL BLADE TO PUNCTURE OR CUT.: Brand: BARD-PARKER SAFETY BLADES SIZE 11, STERILE

## (undated) DEVICE — LIGHT GLOVE GREEN

## (undated) DEVICE — BLADE SHAVER TORPEDO CRV 4MM 13MM COOLCUT